# Patient Record
Sex: MALE | NOT HISPANIC OR LATINO | Employment: OTHER | URBAN - METROPOLITAN AREA
[De-identification: names, ages, dates, MRNs, and addresses within clinical notes are randomized per-mention and may not be internally consistent; named-entity substitution may affect disease eponyms.]

---

## 2023-03-24 LAB
ANION GAP IN SER/PLAS: 15 MMOL/L (ref 10–20)
BASOPHILS (10*3/UL) IN BLOOD BY AUTOMATED COUNT: 0.03 X10E9/L (ref 0–0.1)
BASOPHILS/100 LEUKOCYTES IN BLOOD BY AUTOMATED COUNT: 0.4 % (ref 0–2)
CALCIUM (MG/DL) IN SER/PLAS: 9 MG/DL (ref 8.6–10.3)
CARBON DIOXIDE, TOTAL (MMOL/L) IN SER/PLAS: 32 MMOL/L (ref 21–32)
CHLORIDE (MMOL/L) IN SER/PLAS: 91 MMOL/L (ref 98–107)
CREATININE (MG/DL) IN SER/PLAS: 3.33 MG/DL (ref 0.5–1.3)
EOSINOPHILS (10*3/UL) IN BLOOD BY AUTOMATED COUNT: 0.11 X10E9/L (ref 0–0.4)
EOSINOPHILS/100 LEUKOCYTES IN BLOOD BY AUTOMATED COUNT: 1.6 % (ref 0–6)
ERYTHROCYTE DISTRIBUTION WIDTH (RATIO) BY AUTOMATED COUNT: 15.2 % (ref 11.5–14.5)
ERYTHROCYTE MEAN CORPUSCULAR HEMOGLOBIN CONCENTRATION (G/DL) BY AUTOMATED: 31.1 G/DL (ref 32–36)
ERYTHROCYTE MEAN CORPUSCULAR VOLUME (FL) BY AUTOMATED COUNT: 95 FL (ref 80–100)
ERYTHROCYTES (10*6/UL) IN BLOOD BY AUTOMATED COUNT: 2.95 X10E12/L (ref 4.5–5.9)
GFR MALE: 17 ML/MIN/1.73M2
GLUCOSE (MG/DL) IN SER/PLAS: 111 MG/DL (ref 74–99)
HEMATOCRIT (%) IN BLOOD BY AUTOMATED COUNT: 28 % (ref 41–52)
HEMOGLOBIN (G/DL) IN BLOOD: 8.7 G/DL (ref 13.5–17.5)
IMMATURE GRANULOCYTES/100 LEUKOCYTES IN BLOOD BY AUTOMATED COUNT: 0.3 % (ref 0–0.9)
LEUKOCYTES (10*3/UL) IN BLOOD BY AUTOMATED COUNT: 6.7 X10E9/L (ref 4.4–11.3)
LYMPHOCYTES (10*3/UL) IN BLOOD BY AUTOMATED COUNT: 1.67 X10E9/L (ref 0.8–3)
LYMPHOCYTES/100 LEUKOCYTES IN BLOOD BY AUTOMATED COUNT: 25 % (ref 13–44)
MONOCYTES (10*3/UL) IN BLOOD BY AUTOMATED COUNT: 0.63 X10E9/L (ref 0.05–0.8)
MONOCYTES/100 LEUKOCYTES IN BLOOD BY AUTOMATED COUNT: 9.4 % (ref 2–10)
NATRIURETIC PEPTIDE B (PG/ML) IN SER/PLAS: 247 PG/ML (ref 0–99)
NEUTROPHILS (10*3/UL) IN BLOOD BY AUTOMATED COUNT: 4.23 X10E9/L (ref 1.6–5.5)
NEUTROPHILS/100 LEUKOCYTES IN BLOOD BY AUTOMATED COUNT: 63.3 % (ref 40–80)
PLATELETS (10*3/UL) IN BLOOD AUTOMATED COUNT: 229 X10E9/L (ref 150–450)
POTASSIUM (MMOL/L) IN SER/PLAS: 3.8 MMOL/L (ref 3.5–5.3)
SODIUM (MMOL/L) IN SER/PLAS: 134 MMOL/L (ref 136–145)
UREA NITROGEN (MG/DL) IN SER/PLAS: 69 MG/DL (ref 6–23)

## 2023-10-09 ENCOUNTER — HOSPITAL ENCOUNTER (INPATIENT)
Facility: HOSPITAL | Age: 88
LOS: 9 days | Discharge: ANOTHER HOSPITAL | DRG: 064 | End: 2023-10-19
Attending: STUDENT IN AN ORGANIZED HEALTH CARE EDUCATION/TRAINING PROGRAM | Admitting: INTERNAL MEDICINE
Payer: MEDICARE

## 2023-10-09 ENCOUNTER — APPOINTMENT (OUTPATIENT)
Dept: RADIOLOGY | Facility: HOSPITAL | Age: 88
DRG: 064 | End: 2023-10-09
Payer: MEDICARE

## 2023-10-09 DIAGNOSIS — R13.11 ORAL PHASE DYSPHAGIA: ICD-10-CM

## 2023-10-09 DIAGNOSIS — N18.9 ACUTE KIDNEY INJURY SUPERIMPOSED ON CHRONIC KIDNEY DISEASE (CMS-HCC): ICD-10-CM

## 2023-10-09 DIAGNOSIS — Z79.01 ANTICOAGULANT LONG-TERM USE: ICD-10-CM

## 2023-10-09 DIAGNOSIS — N39.0 URINARY TRACT INFECTION ASSOCIATED WITH INDWELLING URETHRAL CATHETER, INITIAL ENCOUNTER (CMS-HCC): ICD-10-CM

## 2023-10-09 DIAGNOSIS — I69.322 DYSARTHRIA AS LATE EFFECT OF CEREBELLAR CEREBROVASCULAR ACCIDENT (CVA): ICD-10-CM

## 2023-10-09 DIAGNOSIS — N17.9 ACUTE KIDNEY INJURY SUPERIMPOSED ON CHRONIC KIDNEY DISEASE (CMS-HCC): ICD-10-CM

## 2023-10-09 DIAGNOSIS — I61.9 INTRAPARENCHYMAL HEMORRHAGE OF BRAIN (MULTI): Primary | ICD-10-CM

## 2023-10-09 DIAGNOSIS — T83.511A URINARY TRACT INFECTION ASSOCIATED WITH INDWELLING URETHRAL CATHETER, INITIAL ENCOUNTER (CMS-HCC): ICD-10-CM

## 2023-10-09 DIAGNOSIS — R73.9 HYPERGLYCEMIA: ICD-10-CM

## 2023-10-09 DIAGNOSIS — I1A.0 RESISTANT HYPERTENSION: ICD-10-CM

## 2023-10-09 DIAGNOSIS — I48.91 A-FIB (MULTI): ICD-10-CM

## 2023-10-09 DIAGNOSIS — K21.9 GASTROESOPHAGEAL REFLUX DISEASE, UNSPECIFIED WHETHER ESOPHAGITIS PRESENT: ICD-10-CM

## 2023-10-09 LAB
APTT PPP: 40 SECONDS (ref 27–38)
BASOPHILS # BLD AUTO: 0.03 X10*3/UL (ref 0–0.1)
BASOPHILS NFR BLD AUTO: 0.4 %
EOSINOPHIL # BLD AUTO: 0.11 X10*3/UL (ref 0–0.4)
EOSINOPHIL NFR BLD AUTO: 1.4 %
ERYTHROCYTE [DISTWIDTH] IN BLOOD BY AUTOMATED COUNT: 14.5 % (ref 11.5–14.5)
GLUCOSE BLD MANUAL STRIP-MCNC: 200 MG/DL (ref 74–99)
HCT VFR BLD AUTO: 34.4 % (ref 41–52)
HGB BLD-MCNC: 10.6 G/DL (ref 13.5–17.5)
IMM GRANULOCYTES # BLD AUTO: 0.03 X10*3/UL (ref 0–0.5)
IMM GRANULOCYTES NFR BLD AUTO: 0.4 % (ref 0–0.9)
INR PPP: 1.6 (ref 0.9–1.1)
LYMPHOCYTES # BLD AUTO: 1.99 X10*3/UL (ref 0.8–3)
LYMPHOCYTES NFR BLD AUTO: 25.9 %
MCH RBC QN AUTO: 29.3 PG (ref 26–34)
MCHC RBC AUTO-ENTMCNC: 30.8 G/DL (ref 32–36)
MCV RBC AUTO: 95 FL (ref 80–100)
MONOCYTES # BLD AUTO: 0.83 X10*3/UL (ref 0.05–0.8)
MONOCYTES NFR BLD AUTO: 10.8 %
NEUTROPHILS # BLD AUTO: 4.69 X10*3/UL (ref 1.6–5.5)
NEUTROPHILS NFR BLD AUTO: 61.1 %
NRBC BLD-RTO: 0 /100 WBCS (ref 0–0)
PLATELET # BLD AUTO: 208 X10*3/UL (ref 150–450)
PMV BLD AUTO: 11.2 FL (ref 7.5–11.5)
PROTHROMBIN TIME: 17.8 SECONDS (ref 9.8–12.8)
RBC # BLD AUTO: 3.62 X10*6/UL (ref 4.5–5.9)
WBC # BLD AUTO: 7.7 X10*3/UL (ref 4.4–11.3)

## 2023-10-09 PROCEDURE — 96374 THER/PROPH/DIAG INJ IV PUSH: CPT

## 2023-10-09 PROCEDURE — G1004 CDSM NDSC: HCPCS

## 2023-10-09 PROCEDURE — 84484 ASSAY OF TROPONIN QUANT: CPT | Performed by: STUDENT IN AN ORGANIZED HEALTH CARE EDUCATION/TRAINING PROGRAM

## 2023-10-09 PROCEDURE — 2550000001 HC RX 255 CONTRASTS: Performed by: STUDENT IN AN ORGANIZED HEALTH CARE EDUCATION/TRAINING PROGRAM

## 2023-10-09 PROCEDURE — 99285 EMERGENCY DEPT VISIT HI MDM: CPT | Mod: 25 | Performed by: STUDENT IN AN ORGANIZED HEALTH CARE EDUCATION/TRAINING PROGRAM

## 2023-10-09 PROCEDURE — 82947 ASSAY GLUCOSE BLOOD QUANT: CPT

## 2023-10-09 PROCEDURE — 70496 CT ANGIOGRAPHY HEAD: CPT | Performed by: STUDENT IN AN ORGANIZED HEALTH CARE EDUCATION/TRAINING PROGRAM

## 2023-10-09 PROCEDURE — 80053 COMPREHEN METABOLIC PANEL: CPT | Performed by: STUDENT IN AN ORGANIZED HEALTH CARE EDUCATION/TRAINING PROGRAM

## 2023-10-09 PROCEDURE — 85025 COMPLETE CBC W/AUTO DIFF WBC: CPT | Performed by: STUDENT IN AN ORGANIZED HEALTH CARE EDUCATION/TRAINING PROGRAM

## 2023-10-09 PROCEDURE — 2500000004 HC RX 250 GENERAL PHARMACY W/ HCPCS (ALT 636 FOR OP/ED): Performed by: STUDENT IN AN ORGANIZED HEALTH CARE EDUCATION/TRAINING PROGRAM

## 2023-10-09 PROCEDURE — 85730 THROMBOPLASTIN TIME PARTIAL: CPT | Performed by: STUDENT IN AN ORGANIZED HEALTH CARE EDUCATION/TRAINING PROGRAM

## 2023-10-09 PROCEDURE — 83036 HEMOGLOBIN GLYCOSYLATED A1C: CPT | Mod: STJLAB

## 2023-10-09 PROCEDURE — 93010 ELECTROCARDIOGRAM REPORT: CPT | Performed by: STUDENT IN AN ORGANIZED HEALTH CARE EDUCATION/TRAINING PROGRAM

## 2023-10-09 PROCEDURE — 99291 CRITICAL CARE FIRST HOUR: CPT | Performed by: STUDENT IN AN ORGANIZED HEALTH CARE EDUCATION/TRAINING PROGRAM

## 2023-10-09 PROCEDURE — 6360000002 HC RX 636 FACTOR: Performed by: STUDENT IN AN ORGANIZED HEALTH CARE EDUCATION/TRAINING PROGRAM

## 2023-10-09 PROCEDURE — 85610 PROTHROMBIN TIME: CPT | Performed by: STUDENT IN AN ORGANIZED HEALTH CARE EDUCATION/TRAINING PROGRAM

## 2023-10-09 PROCEDURE — 70450 CT HEAD/BRAIN W/O DYE: CPT | Mod: MG

## 2023-10-09 PROCEDURE — 70498 CT ANGIOGRAPHY NECK: CPT | Performed by: STUDENT IN AN ORGANIZED HEALTH CARE EDUCATION/TRAINING PROGRAM

## 2023-10-09 PROCEDURE — A4217 STERILE WATER/SALINE, 500 ML: HCPCS | Performed by: STUDENT IN AN ORGANIZED HEALTH CARE EDUCATION/TRAINING PROGRAM

## 2023-10-09 PROCEDURE — 99292 CRITICAL CARE ADDL 30 MIN: CPT | Performed by: STUDENT IN AN ORGANIZED HEALTH CARE EDUCATION/TRAINING PROGRAM

## 2023-10-09 PROCEDURE — 36415 COLL VENOUS BLD VENIPUNCTURE: CPT | Performed by: STUDENT IN AN ORGANIZED HEALTH CARE EDUCATION/TRAINING PROGRAM

## 2023-10-09 RX ORDER — NICARDIPINE HYDROCHLORIDE 0.2 MG/ML
2.5-15 INJECTION INTRAVENOUS CONTINUOUS
Status: DISCONTINUED | OUTPATIENT
Start: 2023-10-09 | End: 2023-10-12

## 2023-10-09 RX ADMIN — PROTHROMBIN, COAGULATION FACTOR VII HUMAN, COAGULATION FACTOR IX HUMAN, COAGULATION FACTOR X HUMAN, PROTEIN C, PROTEIN S HUMAN, AND WATER 2000 UNITS: KIT at 23:47

## 2023-10-09 RX ADMIN — IOHEXOL 75 ML: 350 INJECTION, SOLUTION INTRAVENOUS at 22:52

## 2023-10-09 RX ADMIN — NICARDIPINE HYDROCHLORIDE 5 MG/HR: 0.2 INJECTION, SOLUTION INTRAVENOUS at 23:34

## 2023-10-09 ASSESSMENT — LIFESTYLE VARIABLES
HAVE PEOPLE ANNOYED YOU BY CRITICIZING YOUR DRINKING: NO
EVER HAD A DRINK FIRST THING IN THE MORNING TO STEADY YOUR NERVES TO GET RID OF A HANGOVER: NO
HAVE YOU EVER FELT YOU SHOULD CUT DOWN ON YOUR DRINKING: NO
EVER FELT BAD OR GUILTY ABOUT YOUR DRINKING: NO

## 2023-10-09 ASSESSMENT — COLUMBIA-SUICIDE SEVERITY RATING SCALE - C-SSRS
6. HAVE YOU EVER DONE ANYTHING, STARTED TO DO ANYTHING, OR PREPARED TO DO ANYTHING TO END YOUR LIFE?: NO
2. HAVE YOU ACTUALLY HAD ANY THOUGHTS OF KILLING YOURSELF?: NO
1. IN THE PAST MONTH, HAVE YOU WISHED YOU WERE DEAD OR WISHED YOU COULD GO TO SLEEP AND NOT WAKE UP?: NO

## 2023-10-09 NOTE — Clinical Note
Temporary dialysis catheter removed from right internal jugular.  14.5fr x 23cm tunneled dialysis catheter placed to right chest. Line secured with suture, venotomy closed with steri strips.  Site dressed with CHG dressing.  No bleeding or hematoma  noted. Pt tolerated procedure well.  Pt taken to room 3017 for recovery and ongoing hospitalization.

## 2023-10-09 NOTE — Clinical Note
Procedure end time. Temporary dialysis catheter placed to right chest and sutured into place. Site free from bleeding/hematoma. Patient tolerated procedure well. Surgical technician applying G central line dressing at this time.

## 2023-10-10 ENCOUNTER — APPOINTMENT (OUTPATIENT)
Dept: RADIOLOGY | Facility: HOSPITAL | Age: 88
DRG: 064 | End: 2023-10-10
Payer: MEDICARE

## 2023-10-10 PROBLEM — I61.9 INTRAPARENCHYMAL HEMORRHAGE OF BRAIN (MULTI): Status: ACTIVE | Noted: 2023-10-10

## 2023-10-10 PROBLEM — R74.01 TRANSAMINITIS: Status: ACTIVE | Noted: 2023-10-10

## 2023-10-10 PROBLEM — R73.9 HYPERGLYCEMIA: Status: ACTIVE | Noted: 2023-10-10

## 2023-10-10 PROBLEM — I10 HYPERTENSION: Status: ACTIVE | Noted: 2023-10-10

## 2023-10-10 LAB
ALBUMIN SERPL BCP-MCNC: 3.2 G/DL (ref 3.4–5)
ALBUMIN SERPL BCP-MCNC: 3.5 G/DL (ref 3.4–5)
ALP SERPL-CCNC: 129 U/L (ref 33–136)
ALP SERPL-CCNC: 137 U/L (ref 33–136)
ALT SERPL W P-5'-P-CCNC: 34 U/L (ref 10–52)
ALT SERPL W P-5'-P-CCNC: 35 U/L (ref 10–52)
ANION GAP SERPL CALC-SCNC: 11 MMOL/L (ref 10–20)
ANION GAP SERPL CALC-SCNC: 13 MMOL/L (ref 10–20)
AST SERPL W P-5'-P-CCNC: 40 U/L (ref 9–39)
AST SERPL W P-5'-P-CCNC: 40 U/L (ref 9–39)
BILIRUB SERPL-MCNC: 0.6 MG/DL (ref 0–1.2)
BILIRUB SERPL-MCNC: 0.6 MG/DL (ref 0–1.2)
BUN SERPL-MCNC: 49 MG/DL (ref 6–23)
BUN SERPL-MCNC: 50 MG/DL (ref 6–23)
CALCIUM SERPL-MCNC: 8.5 MG/DL (ref 8.6–10.3)
CALCIUM SERPL-MCNC: 8.6 MG/DL (ref 8.6–10.3)
CARDIAC TROPONIN I PNL SERPL HS: 22 NG/L (ref 0–20)
CARDIAC TROPONIN I PNL SERPL HS: 22 NG/L (ref 0–20)
CHLORIDE SERPL-SCNC: 97 MMOL/L (ref 98–107)
CHLORIDE SERPL-SCNC: 99 MMOL/L (ref 98–107)
CO2 SERPL-SCNC: 25 MMOL/L (ref 21–32)
CO2 SERPL-SCNC: 26 MMOL/L (ref 21–32)
CREAT SERPL-MCNC: 3.06 MG/DL (ref 0.5–1.3)
CREAT SERPL-MCNC: 3.11 MG/DL (ref 0.5–1.3)
ERYTHROCYTE [DISTWIDTH] IN BLOOD BY AUTOMATED COUNT: 14.6 % (ref 11.5–14.5)
EST. AVERAGE GLUCOSE BLD GHB EST-MCNC: 166 MG/DL
GFR SERPL CREATININE-BSD FRML MDRD: 19 ML/MIN/1.73M*2
GFR SERPL CREATININE-BSD FRML MDRD: 19 ML/MIN/1.73M*2
GLUCOSE BLD MANUAL STRIP-MCNC: 108 MG/DL (ref 74–99)
GLUCOSE BLD MANUAL STRIP-MCNC: 137 MG/DL (ref 74–99)
GLUCOSE BLD MANUAL STRIP-MCNC: 158 MG/DL (ref 74–99)
GLUCOSE BLD MANUAL STRIP-MCNC: 188 MG/DL (ref 74–99)
GLUCOSE BLD MANUAL STRIP-MCNC: 209 MG/DL (ref 74–99)
GLUCOSE BLD MANUAL STRIP-MCNC: 224 MG/DL (ref 74–99)
GLUCOSE SERPL-MCNC: 151 MG/DL (ref 74–99)
GLUCOSE SERPL-MCNC: 192 MG/DL (ref 74–99)
HBA1C MFR BLD: 7.4 %
HCT VFR BLD AUTO: 31.9 % (ref 41–52)
HGB BLD-MCNC: 10 G/DL (ref 13.5–17.5)
INR PPP: 1.4 (ref 0.9–1.1)
MAGNESIUM SERPL-MCNC: 2.03 MG/DL (ref 1.6–2.4)
MCH RBC QN AUTO: 29.9 PG (ref 26–34)
MCHC RBC AUTO-ENTMCNC: 31.3 G/DL (ref 32–36)
MCV RBC AUTO: 96 FL (ref 80–100)
NRBC BLD-RTO: 0 /100 WBCS (ref 0–0)
PHOSPHATE SERPL-MCNC: 2.9 MG/DL (ref 2.5–4.9)
PHOSPHATE SERPL-MCNC: 2.9 MG/DL (ref 2.5–4.9)
PLATELET # BLD AUTO: 184 X10*3/UL (ref 150–450)
PMV BLD AUTO: 10.8 FL (ref 7.5–11.5)
POTASSIUM SERPL-SCNC: 3.7 MMOL/L (ref 3.5–5.3)
POTASSIUM SERPL-SCNC: 4.2 MMOL/L (ref 3.5–5.3)
PROT SERPL-MCNC: 7 G/DL (ref 6.4–8.2)
PROT SERPL-MCNC: 7.5 G/DL (ref 6.4–8.2)
PROTHROMBIN TIME: 15.4 SECONDS (ref 9.8–12.8)
RBC # BLD AUTO: 3.34 X10*6/UL (ref 4.5–5.9)
SODIUM SERPL-SCNC: 131 MMOL/L (ref 136–145)
SODIUM SERPL-SCNC: 132 MMOL/L (ref 136–145)
WBC # BLD AUTO: 8.1 X10*3/UL (ref 4.4–11.3)

## 2023-10-10 PROCEDURE — 36415 COLL VENOUS BLD VENIPUNCTURE: CPT

## 2023-10-10 PROCEDURE — 84100 ASSAY OF PHOSPHORUS: CPT

## 2023-10-10 PROCEDURE — 97116 GAIT TRAINING THERAPY: CPT | Mod: GP | Performed by: PHYSICAL THERAPIST

## 2023-10-10 PROCEDURE — 92523 SPEECH SOUND LANG COMPREHEN: CPT | Mod: GN | Performed by: STUDENT IN AN ORGANIZED HEALTH CARE EDUCATION/TRAINING PROGRAM

## 2023-10-10 PROCEDURE — 99291 CRITICAL CARE FIRST HOUR: CPT

## 2023-10-10 PROCEDURE — 2500000002 HC RX 250 W HCPCS SELF ADMINISTERED DRUGS (ALT 637 FOR MEDICARE OP, ALT 636 FOR OP/ED)

## 2023-10-10 PROCEDURE — 97530 THERAPEUTIC ACTIVITIES: CPT | Mod: GO

## 2023-10-10 PROCEDURE — 2020000001 HC ICU ROOM DAILY

## 2023-10-10 PROCEDURE — 97165 OT EVAL LOW COMPLEX 30 MIN: CPT | Mod: GO

## 2023-10-10 PROCEDURE — 2500000001 HC RX 250 WO HCPCS SELF ADMINISTERED DRUGS (ALT 637 FOR MEDICARE OP): Performed by: INTERNAL MEDICINE

## 2023-10-10 PROCEDURE — 96372 THER/PROPH/DIAG INJ SC/IM: CPT

## 2023-10-10 PROCEDURE — 80053 COMPREHEN METABOLIC PANEL: CPT

## 2023-10-10 PROCEDURE — 85027 COMPLETE CBC AUTOMATED: CPT

## 2023-10-10 PROCEDURE — 84484 ASSAY OF TROPONIN QUANT: CPT

## 2023-10-10 PROCEDURE — 99222 1ST HOSP IP/OBS MODERATE 55: CPT | Performed by: PHYSICIAN ASSISTANT

## 2023-10-10 PROCEDURE — 97162 PT EVAL MOD COMPLEX 30 MIN: CPT | Mod: GP | Performed by: PHYSICAL THERAPIST

## 2023-10-10 PROCEDURE — 2500000002 HC RX 250 W HCPCS SELF ADMINISTERED DRUGS (ALT 637 FOR MEDICARE OP, ALT 636 FOR OP/ED): Performed by: INTERNAL MEDICINE

## 2023-10-10 PROCEDURE — 70450 CT HEAD/BRAIN W/O DYE: CPT | Performed by: STUDENT IN AN ORGANIZED HEALTH CARE EDUCATION/TRAINING PROGRAM

## 2023-10-10 PROCEDURE — G1004 CDSM NDSC: HCPCS

## 2023-10-10 PROCEDURE — 2500000004 HC RX 250 GENERAL PHARMACY W/ HCPCS (ALT 636 FOR OP/ED)

## 2023-10-10 PROCEDURE — 2500000001 HC RX 250 WO HCPCS SELF ADMINISTERED DRUGS (ALT 637 FOR MEDICARE OP)

## 2023-10-10 PROCEDURE — 92610 EVALUATE SWALLOWING FUNCTION: CPT | Mod: GN | Performed by: STUDENT IN AN ORGANIZED HEALTH CARE EDUCATION/TRAINING PROGRAM

## 2023-10-10 PROCEDURE — 2500000004 HC RX 250 GENERAL PHARMACY W/ HCPCS (ALT 636 FOR OP/ED): Performed by: INTERNAL MEDICINE

## 2023-10-10 PROCEDURE — 82947 ASSAY GLUCOSE BLOOD QUANT: CPT

## 2023-10-10 PROCEDURE — 85610 PROTHROMBIN TIME: CPT

## 2023-10-10 PROCEDURE — 83735 ASSAY OF MAGNESIUM: CPT

## 2023-10-10 RX ORDER — MULTIVITAMIN/IRON/FOLIC ACID 18MG-0.4MG
1 TABLET ORAL EVERY OTHER DAY
COMMUNITY
End: 2023-10-19 | Stop reason: HOSPADM

## 2023-10-10 RX ORDER — AMIODARONE HYDROCHLORIDE 200 MG/1
200 TABLET ORAL DAILY
Status: DISCONTINUED | OUTPATIENT
Start: 2023-10-10 | End: 2023-10-19 | Stop reason: HOSPADM

## 2023-10-10 RX ORDER — SERTRALINE HYDROCHLORIDE 25 MG/1
25 TABLET, FILM COATED ORAL DAILY
Status: ON HOLD | COMMUNITY
End: 2023-11-05 | Stop reason: ENTERED-IN-ERROR

## 2023-10-10 RX ORDER — DEXTROSE 50 % IN WATER (D50W) INTRAVENOUS SYRINGE
25
Status: DISCONTINUED | OUTPATIENT
Start: 2023-10-10 | End: 2023-10-19 | Stop reason: HOSPADM

## 2023-10-10 RX ORDER — ROSUVASTATIN CALCIUM 20 MG/1
20 TABLET, COATED ORAL DAILY
COMMUNITY

## 2023-10-10 RX ORDER — MULTIVITAMIN/IRON/FOLIC ACID 18MG-0.4MG
1 TABLET ORAL EVERY OTHER DAY
Status: DISCONTINUED | OUTPATIENT
Start: 2023-10-11 | End: 2023-10-11

## 2023-10-10 RX ORDER — AMIODARONE HYDROCHLORIDE 200 MG/1
200 TABLET ORAL DAILY
COMMUNITY
End: 2023-11-08 | Stop reason: HOSPADM

## 2023-10-10 RX ORDER — ALBUTEROL SULFATE 90 UG/1
2 AEROSOL, METERED RESPIRATORY (INHALATION) EVERY 6 HOURS PRN
Status: ON HOLD | COMMUNITY
End: 2023-11-05 | Stop reason: ENTERED-IN-ERROR

## 2023-10-10 RX ORDER — FUROSEMIDE 40 MG/1
40 TABLET ORAL DAILY PRN
COMMUNITY
End: 2023-10-19 | Stop reason: HOSPADM

## 2023-10-10 RX ORDER — ESOMEPRAZOLE MAGNESIUM 40 MG/1
40 GRANULE, DELAYED RELEASE ORAL
Status: DISCONTINUED | OUTPATIENT
Start: 2023-10-11 | End: 2023-10-19 | Stop reason: HOSPADM

## 2023-10-10 RX ORDER — ASPIRIN 81 MG/1
81 TABLET ORAL DAILY
COMMUNITY

## 2023-10-10 RX ORDER — DEXTROSE MONOHYDRATE 100 MG/ML
50 INJECTION, SOLUTION INTRAVENOUS ONCE AS NEEDED
Status: DISCONTINUED | OUTPATIENT
Start: 2023-10-10 | End: 2023-10-19 | Stop reason: HOSPADM

## 2023-10-10 RX ORDER — VIT C/E/ZN/COPPR/LUTEIN/ZEAXAN 250MG-90MG
25 CAPSULE ORAL EVERY OTHER DAY
Status: DISCONTINUED | OUTPATIENT
Start: 2023-10-11 | End: 2023-10-19 | Stop reason: HOSPADM

## 2023-10-10 RX ORDER — PANTOPRAZOLE SODIUM 40 MG/10ML
40 INJECTION, POWDER, LYOPHILIZED, FOR SOLUTION INTRAVENOUS
Status: DISCONTINUED | OUTPATIENT
Start: 2023-10-11 | End: 2023-10-19 | Stop reason: HOSPADM

## 2023-10-10 RX ORDER — INSULIN LISPRO 100 [IU]/ML
INJECTION, SOLUTION INTRAVENOUS; SUBCUTANEOUS
COMMUNITY
End: 2023-10-19 | Stop reason: HOSPADM

## 2023-10-10 RX ORDER — ONDANSETRON HYDROCHLORIDE 2 MG/ML
4 INJECTION, SOLUTION INTRAVENOUS EVERY 6 HOURS PRN
Status: DISCONTINUED | OUTPATIENT
Start: 2023-10-10 | End: 2023-10-13

## 2023-10-10 RX ORDER — HYDRALAZINE HYDROCHLORIDE 25 MG/1
0.5 TABLET, FILM COATED ORAL 2 TIMES DAILY
COMMUNITY
End: 2023-10-19 | Stop reason: HOSPADM

## 2023-10-10 RX ORDER — INSULIN GLARGINE 100 [IU]/ML
12 INJECTION, SOLUTION SUBCUTANEOUS NIGHTLY
COMMUNITY
End: 2023-11-08 | Stop reason: HOSPADM

## 2023-10-10 RX ORDER — INSULIN LISPRO 100 [IU]/ML
0-10 INJECTION, SOLUTION INTRAVENOUS; SUBCUTANEOUS EVERY 4 HOURS
Status: DISCONTINUED | OUTPATIENT
Start: 2023-10-10 | End: 2023-10-10

## 2023-10-10 RX ORDER — HYDRALAZINE HYDROCHLORIDE 25 MG/1
12.5 TABLET, FILM COATED ORAL 2 TIMES DAILY
Status: DISCONTINUED | OUTPATIENT
Start: 2023-10-10 | End: 2023-10-11

## 2023-10-10 RX ORDER — INSULIN LISPRO 100 [IU]/ML
0-10 INJECTION, SOLUTION INTRAVENOUS; SUBCUTANEOUS
Status: DISCONTINUED | OUTPATIENT
Start: 2023-10-10 | End: 2023-10-19 | Stop reason: HOSPADM

## 2023-10-10 RX ORDER — INSULIN GLARGINE 100 [IU]/ML
12 INJECTION, SOLUTION SUBCUTANEOUS NIGHTLY
Status: DISCONTINUED | OUTPATIENT
Start: 2023-10-10 | End: 2023-10-19 | Stop reason: HOSPADM

## 2023-10-10 RX ORDER — DOCUSATE SODIUM 100 MG/1
100 CAPSULE, LIQUID FILLED ORAL DAILY
COMMUNITY

## 2023-10-10 RX ORDER — ISOSORBIDE MONONITRATE 30 MG/1
30 TABLET, EXTENDED RELEASE ORAL DAILY
COMMUNITY
End: 2023-10-19 | Stop reason: HOSPADM

## 2023-10-10 RX ORDER — PANTOPRAZOLE SODIUM 40 MG/1
40 TABLET, DELAYED RELEASE ORAL
Status: DISCONTINUED | OUTPATIENT
Start: 2023-10-11 | End: 2023-10-19 | Stop reason: HOSPADM

## 2023-10-10 RX ORDER — VIT C/E/ZN/COPPR/LUTEIN/ZEAXAN 250MG-90MG
25 CAPSULE ORAL EVERY OTHER DAY
COMMUNITY

## 2023-10-10 RX ORDER — OMEPRAZOLE 40 MG/1
40 CAPSULE, DELAYED RELEASE ORAL
COMMUNITY
End: 2023-10-19 | Stop reason: HOSPADM

## 2023-10-10 RX ORDER — FLUTICASONE PROPIONATE 50 MCG
1 SPRAY, SUSPENSION (ML) NASAL DAILY
COMMUNITY
End: 2023-10-19 | Stop reason: HOSPADM

## 2023-10-10 RX ORDER — ROSUVASTATIN CALCIUM 20 MG/1
20 TABLET, COATED ORAL DAILY
Status: DISCONTINUED | OUTPATIENT
Start: 2023-10-10 | End: 2023-10-19 | Stop reason: HOSPADM

## 2023-10-10 RX ORDER — DOCUSATE SODIUM 100 MG/1
100 CAPSULE, LIQUID FILLED ORAL 2 TIMES DAILY
Status: DISCONTINUED | OUTPATIENT
Start: 2023-10-10 | End: 2023-10-19 | Stop reason: HOSPADM

## 2023-10-10 RX ORDER — DULAGLUTIDE 3 MG/.5ML
1 INJECTION, SOLUTION SUBCUTANEOUS
COMMUNITY
End: 2023-10-19 | Stop reason: HOSPADM

## 2023-10-10 RX ADMIN — INSULIN LISPRO 4 UNITS: 100 INJECTION, SOLUTION INTRAVENOUS; SUBCUTANEOUS at 20:28

## 2023-10-10 RX ADMIN — HYDRALAZINE HYDROCHLORIDE 12.5 MG: 25 TABLET ORAL at 20:29

## 2023-10-10 RX ADMIN — INSULIN GLARGINE 12 UNITS: 100 INJECTION, SOLUTION SUBCUTANEOUS at 20:31

## 2023-10-10 RX ADMIN — AMIODARONE HYDROCHLORIDE 200 MG: 200 TABLET ORAL at 11:53

## 2023-10-10 RX ADMIN — INSULIN LISPRO 4 UNITS: 100 INJECTION, SOLUTION INTRAVENOUS; SUBCUTANEOUS at 17:47

## 2023-10-10 RX ADMIN — ROSUVASTATIN CALCIUM 20 MG: 20 TABLET, FILM COATED ORAL at 11:53

## 2023-10-10 RX ADMIN — HYDRALAZINE HYDROCHLORIDE 12.5 MG: 25 TABLET ORAL at 11:53

## 2023-10-10 RX ADMIN — INSULIN LISPRO 2 UNITS: 100 INJECTION, SOLUTION INTRAVENOUS; SUBCUTANEOUS at 02:51

## 2023-10-10 RX ADMIN — DOCUSATE SODIUM 100 MG: 100 CAPSULE, LIQUID FILLED ORAL at 20:29

## 2023-10-10 RX ADMIN — DOCUSATE SODIUM 100 MG: 100 CAPSULE, LIQUID FILLED ORAL at 09:19

## 2023-10-10 RX ADMIN — NICARDIPINE HYDROCHLORIDE 2.5 MG/HR: 0.2 INJECTION, SOLUTION INTRAVENOUS at 12:45

## 2023-10-10 SDOH — SOCIAL STABILITY: SOCIAL NETWORK
DO YOU BELONG TO ANY CLUBS OR ORGANIZATIONS SUCH AS CHURCH GROUPS UNIONS, FRATERNAL OR ATHLETIC GROUPS, OR SCHOOL GROUPS?: NO

## 2023-10-10 SDOH — ECONOMIC STABILITY: INCOME INSECURITY: IN THE PAST 12 MONTHS, HAS THE ELECTRIC, GAS, OIL, OR WATER COMPANY THREATENED TO SHUT OFF SERVICE IN YOUR HOME?: NO

## 2023-10-10 SDOH — SOCIAL STABILITY: SOCIAL NETWORK: HOW OFTEN DO YOU ATTEND CHURCH OR RELIGIOUS SERVICES?: MORE THAN 4 TIMES PER YEAR

## 2023-10-10 SDOH — ECONOMIC STABILITY: TRANSPORTATION INSECURITY
IN THE PAST 12 MONTHS, HAS LACK OF TRANSPORTATION KEPT YOU FROM MEETINGS, WORK, OR FROM GETTING THINGS NEEDED FOR DAILY LIVING?: NO

## 2023-10-10 SDOH — SOCIAL STABILITY: SOCIAL NETWORK: HOW OFTEN DO YOU ATTENT MEETINGS OF THE CLUB OR ORGANIZATION YOU BELONG TO?: NEVER

## 2023-10-10 SDOH — ECONOMIC STABILITY: FOOD INSECURITY: WITHIN THE PAST 12 MONTHS, YOU WORRIED THAT YOUR FOOD WOULD RUN OUT BEFORE YOU GOT MONEY TO BUY MORE.: NEVER TRUE

## 2023-10-10 SDOH — ECONOMIC STABILITY: INCOME INSECURITY: IN THE LAST 12 MONTHS, WAS THERE A TIME WHEN YOU WERE NOT ABLE TO PAY THE MORTGAGE OR RENT ON TIME?: NO

## 2023-10-10 SDOH — ECONOMIC STABILITY: INCOME INSECURITY: HOW HARD IS IT FOR YOU TO PAY FOR THE VERY BASICS LIKE FOOD, HOUSING, MEDICAL CARE, AND HEATING?: NOT HARD AT ALL

## 2023-10-10 SDOH — ECONOMIC STABILITY: HOUSING INSECURITY: IN THE LAST 12 MONTHS, HOW MANY PLACES HAVE YOU LIVED?: 1

## 2023-10-10 SDOH — ECONOMIC STABILITY: FOOD INSECURITY: WITHIN THE PAST 12 MONTHS, THE FOOD YOU BOUGHT JUST DIDN'T LAST AND YOU DIDN'T HAVE MONEY TO GET MORE.: NEVER TRUE

## 2023-10-10 SDOH — SOCIAL STABILITY: SOCIAL INSECURITY: HAVE YOU HAD THOUGHTS OF HARMING ANYONE ELSE?: NO

## 2023-10-10 SDOH — ECONOMIC STABILITY: HOUSING INSECURITY
IN THE LAST 12 MONTHS, WAS THERE A TIME WHEN YOU DID NOT HAVE A STEADY PLACE TO SLEEP OR SLEPT IN A SHELTER (INCLUDING NOW)?: NO

## 2023-10-10 SDOH — HEALTH STABILITY: MENTAL HEALTH: HOW OFTEN DO YOU HAVE A DRINK CONTAINING ALCOHOL?: NEVER

## 2023-10-10 SDOH — SOCIAL STABILITY: SOCIAL INSECURITY: DO YOU FEEL ANYONE HAS EXPLOITED OR TAKEN ADVANTAGE OF YOU FINANCIALLY OR OF YOUR PERSONAL PROPERTY?: NO

## 2023-10-10 SDOH — HEALTH STABILITY: MENTAL HEALTH
STRESS IS WHEN SOMEONE FEELS TENSE, NERVOUS, ANXIOUS, OR CAN'T SLEEP AT NIGHT BECAUSE THEIR MIND IS TROUBLED. HOW STRESSED ARE YOU?: NOT AT ALL

## 2023-10-10 SDOH — HEALTH STABILITY: PHYSICAL HEALTH: ON AVERAGE, HOW MANY DAYS PER WEEK DO YOU ENGAGE IN MODERATE TO STRENUOUS EXERCISE (LIKE A BRISK WALK)?: 4 DAYS

## 2023-10-10 SDOH — SOCIAL STABILITY: SOCIAL INSECURITY: DOES ANYONE TRY TO KEEP YOU FROM HAVING/CONTACTING OTHER FRIENDS OR DOING THINGS OUTSIDE YOUR HOME?: NO

## 2023-10-10 SDOH — HEALTH STABILITY: MENTAL HEALTH: HOW OFTEN DO YOU HAVE 6 OR MORE DRINKS ON ONE OCCASION?: NEVER

## 2023-10-10 SDOH — SOCIAL STABILITY: SOCIAL INSECURITY: ABUSE: ADULT

## 2023-10-10 SDOH — ECONOMIC STABILITY: TRANSPORTATION INSECURITY
IN THE PAST 12 MONTHS, HAS THE LACK OF TRANSPORTATION KEPT YOU FROM MEDICAL APPOINTMENTS OR FROM GETTING MEDICATIONS?: NO

## 2023-10-10 SDOH — SOCIAL STABILITY: SOCIAL NETWORK: ARE YOU MARRIED, WIDOWED, DIVORCED, SEPARATED, NEVER MARRIED, OR LIVING WITH A PARTNER?: MARRIED

## 2023-10-10 SDOH — SOCIAL STABILITY: SOCIAL NETWORK: IN A TYPICAL WEEK, HOW MANY TIMES DO YOU TALK ON THE PHONE WITH FAMILY, FRIENDS, OR NEIGHBORS?: TWICE A WEEK

## 2023-10-10 SDOH — SOCIAL STABILITY: SOCIAL NETWORK: HOW OFTEN DO YOU GET TOGETHER WITH FRIENDS OR RELATIVES?: THREE TIMES A WEEK

## 2023-10-10 SDOH — HEALTH STABILITY: PHYSICAL HEALTH: ON AVERAGE, HOW MANY MINUTES DO YOU ENGAGE IN EXERCISE AT THIS LEVEL?: 30 MIN

## 2023-10-10 SDOH — SOCIAL STABILITY: SOCIAL INSECURITY: ARE YOU OR HAVE YOU BEEN THREATENED OR ABUSED PHYSICALLY, EMOTIONALLY, OR SEXUALLY BY ANYONE?: NO

## 2023-10-10 SDOH — SOCIAL STABILITY: SOCIAL INSECURITY: ARE THERE ANY APPARENT SIGNS OF INJURIES/BEHAVIORS THAT COULD BE RELATED TO ABUSE/NEGLECT?: NO

## 2023-10-10 SDOH — HEALTH STABILITY: MENTAL HEALTH: HOW MANY STANDARD DRINKS CONTAINING ALCOHOL DO YOU HAVE ON A TYPICAL DAY?: PATIENT DOES NOT DRINK

## 2023-10-10 SDOH — SOCIAL STABILITY: SOCIAL INSECURITY: HAS ANYONE EVER THREATENED TO HURT YOUR FAMILY OR YOUR PETS?: NO

## 2023-10-10 SDOH — SOCIAL STABILITY: SOCIAL INSECURITY: DO YOU FEEL UNSAFE GOING BACK TO THE PLACE WHERE YOU ARE LIVING?: NO

## 2023-10-10 SDOH — SOCIAL STABILITY: SOCIAL INSECURITY: WERE YOU ABLE TO COMPLETE ALL THE BEHAVIORAL HEALTH SCREENINGS?: YES

## 2023-10-10 ASSESSMENT — COGNITIVE AND FUNCTIONAL STATUS - GENERAL
STANDING UP FROM CHAIR USING ARMS: A LOT
DRESSING REGULAR UPPER BODY CLOTHING: A LOT
MOBILITY SCORE: 12
PERSONAL GROOMING: A LITTLE
HELP NEEDED FOR BATHING: A LITTLE
WALKING IN HOSPITAL ROOM: A LOT
TOILETING: TOTAL
PERSONAL GROOMING: A LITTLE
DAILY ACTIVITIY SCORE: 18
MOVING TO AND FROM BED TO CHAIR: A LOT
PERSONAL GROOMING: A LITTLE
WALKING IN HOSPITAL ROOM: A LOT
STANDING UP FROM CHAIR USING ARMS: A LOT
PATIENT BASELINE BEDBOUND: NO
DRESSING REGULAR LOWER BODY CLOTHING: A LOT
DAILY ACTIVITIY SCORE: 13
EATING MEALS: A LITTLE
MOVING FROM LYING ON BACK TO SITTING ON SIDE OF FLAT BED WITH BEDRAILS: A LITTLE
CLIMB 3 TO 5 STEPS WITH RAILING: TOTAL
EATING MEALS: A LITTLE
CLIMB 3 TO 5 STEPS WITH RAILING: TOTAL
MOVING FROM LYING ON BACK TO SITTING ON SIDE OF FLAT BED WITH BEDRAILS: A LOT
CLIMB 3 TO 5 STEPS WITH RAILING: TOTAL
HELP NEEDED FOR BATHING: A LITTLE
TOILETING: A LITTLE
MOVING FROM LYING ON BACK TO SITTING ON SIDE OF FLAT BED WITH BEDRAILS: A LITTLE
STANDING UP FROM CHAIR USING ARMS: A LITTLE
DRESSING REGULAR UPPER BODY CLOTHING: A LITTLE
MOVING TO AND FROM BED TO CHAIR: A LOT
DRESSING REGULAR LOWER BODY CLOTHING: A LITTLE
TOILETING: A LITTLE
HELP NEEDED FOR BATHING: A LOT
DAILY ACTIVITIY SCORE: 18
TURNING FROM BACK TO SIDE WHILE IN FLAT BAD: A LITTLE
EATING MEALS: A LITTLE
WALKING IN HOSPITAL ROOM: TOTAL
MOBILITY SCORE: 13
DRESSING REGULAR UPPER BODY CLOTHING: A LITTLE
MOVING TO AND FROM BED TO CHAIR: A LOT
TURNING FROM BACK TO SIDE WHILE IN FLAT BAD: A LOT
TURNING FROM BACK TO SIDE WHILE IN FLAT BAD: A LITTLE
MOBILITY SCORE: 12
DRESSING REGULAR LOWER BODY CLOTHING: A LITTLE

## 2023-10-10 ASSESSMENT — ACTIVITIES OF DAILY LIVING (ADL)
TOILETING: NEEDS ASSISTANCE
LACK_OF_TRANSPORTATION: NO
ADEQUATE_TO_COMPLETE_ADL: YES
DRESSING YOURSELF: NEEDS ASSISTANCE
HEARING - RIGHT EAR: FUNCTIONAL
HEARING - LEFT EAR: FUNCTIONAL
GROOMING: NEEDS ASSISTANCE
WALKS IN HOME: INDEPENDENT
FEEDING YOURSELF: NEEDS ASSISTANCE
JUDGMENT_ADEQUATE_SAFELY_COMPLETE_DAILY_ACTIVITIES: NO
PATIENT'S MEMORY ADEQUATE TO SAFELY COMPLETE DAILY ACTIVITIES?: YES
LACK_OF_TRANSPORTATION: NO
BATHING: NEEDS ASSISTANCE

## 2023-10-10 ASSESSMENT — PAIN - FUNCTIONAL ASSESSMENT
PAIN_FUNCTIONAL_ASSESSMENT: 0-10

## 2023-10-10 ASSESSMENT — ENCOUNTER SYMPTOMS
HEADACHES: 0
NUMBNESS: 0
NAUSEA: 0
SHORTNESS OF BREATH: 0
BACK PAIN: 0
NECK PAIN: 0
SPEECH DIFFICULTY: 1
ABDOMINAL PAIN: 0
WEAKNESS: 1

## 2023-10-10 ASSESSMENT — PATIENT HEALTH QUESTIONNAIRE - PHQ9
1. LITTLE INTEREST OR PLEASURE IN DOING THINGS: NOT AT ALL
SUM OF ALL RESPONSES TO PHQ9 QUESTIONS 1 & 2: 0
2. FEELING DOWN, DEPRESSED OR HOPELESS: NOT AT ALL

## 2023-10-10 ASSESSMENT — PAIN SCALES - GENERAL
PAINLEVEL_OUTOF10: 0 - NO PAIN

## 2023-10-10 ASSESSMENT — LIFESTYLE VARIABLES
SKIP TO QUESTIONS 9-10: 1
SKIP TO QUESTIONS 9-10: 1
HOW MANY STANDARD DRINKS CONTAINING ALCOHOL DO YOU HAVE ON A TYPICAL DAY: PATIENT DOES NOT DRINK
AUDIT-C TOTAL SCORE: 0
HOW OFTEN DO YOU HAVE A DRINK CONTAINING ALCOHOL: NEVER
SUBSTANCE_ABUSE_PAST_12_MONTHS: NO
PRESCIPTION_ABUSE_PAST_12_MONTHS: NO
AUDIT-C TOTAL SCORE: 0
HOW OFTEN DO YOU HAVE 6 OR MORE DRINKS ON ONE OCCASION: NEVER
AUDIT-C TOTAL SCORE: 0

## 2023-10-10 NOTE — CARE PLAN
Problem: Diabetes  Goal: Achieve decreasing blood glucose levels by end of shift  Outcome: Progressing  Goal: Increase stability of blood glucose readings by end of shift  Outcome: Progressing  Goal: Decrease in ketones present in urine by end of shift  Outcome: Progressing  Goal: Maintain electrolyte levels within acceptable range throughout shift  Outcome: Progressing  Goal: Learn about and adhere to nutrition recommendations by end of shift  Outcome: Progressing  Goal: Increase self care and/or family involovement by end of shift  Outcome: Progressing  Goal: Receive DSME education by end of shift  Outcome: Progressing     Problem: General Stroke  Goal: Demonstrate improvement in neurological exam throughout the shift  Outcome: Progressing  Goal: Maintain BP within ordered limits throughout shift  Outcome: Progressing  Goal: Participate in treatment (ie., meds, therapy) throughout shift  Outcome: Progressing  Goal: No symptoms of hemorrhage throughout shift  Outcome: Progressing  Goal: Controlled blood glucose throughout shift  Outcome: Progressing     Problem: ICU Stroke  Goal: Achieve/maintain targeted sodium level throughout shift  Outcome: Progressing     Problem: Fall/Injury  Goal: Verbalize understanding of risk factor reduction measures to prevent injury from fall in the home  Outcome: Progressing  Goal: Pace activities to prevent fatigue by end of the shift  Outcome: Progressing     Problem: Psychosocial Needs  Goal: Demonstrates ability to cope with hospitalization/illness  Outcome: Progressing  Flowsheets (Taken 10/10/2023 8893)  Demonstrates ability to cope with hospitalization/illness:   Encourage verbalization of feelings/concerns/expectations   Provide low-stimulation environment as needed   Assist resident to identify and practice own strengths and abilities   Encourage resident to set and complete small goals for self   Reinforce positive adaptation of new coping behaviors   Include  resident/family/caregiver in decisions related to psychosocial needs  Goal: Collaborate with me, my family, and caregiver to identify my specific goals  Outcome: Progressing  Flowsheets (Taken 10/10/2023 0214)  Cultural Requests During Hospitalization: n/a  Spiritual Requests During Hospitalization: n/a     Problem: Discharge Barriers  Goal: My discharge needs are met  Outcome: Progressing   The patient's goals for the shift include get a few hours sleep this shift    The clinical goals for the shift include maintain injury free this shift    Over the shift, the patient did not make progress toward the following goals. Barriers to progression include anxiety at times. Recommendations to address these barriers include encouragement and reinforcement.

## 2023-10-10 NOTE — PROGRESS NOTES
Occupational Therapy    Evaluation    Patient Name: Zack Figueroa  MRN: [id number]  Today's Date: 10/10/2023  Time Calculation  Start Time: 1109  Stop Time: 1135  Time Calculation (min): 26 min        Assessment:  Prognosis: Fair    Plan:  Treatment Interventions: Neuromuscular reeducation, Patient/family training, Endurance training, UE strengthening/ROM, Functional transfer training, ADL retraining, Fine motor coordination activities  OT Frequency: 5 times per week  OT Discharge Recommendations: High intensity level of continued care  Treatment Interventions: Neuromuscular reeducation, Patient/family training, Endurance training, UE strengthening/ROM, Functional transfer training, ADL retraining, Fine motor coordination activities    Subjective   Current Problem:  1. Intraparenchymal hemorrhage of brain (CMS/HCC)          General:  General  Reason for Referral: impaired ADLs; patient to ED with stroke like symptoms  Referred By: NITA Rodriguez; 10/10  Past Medical History Relevant to Rehab:  (afib, anemia, DM, CAD s/p CABG, CKD III-IV, HFpEF, JESUS, previous CVA)  Family/Caregiver Present: Yes (wife)  Co-Treatment: PT (to facilitate safety and activity tolerance)  Prior to Session Communication: Bedside nurse  Patient Position Received:  (patient seated in recliner to start session, agreeable to OT session this date; patient seated in recliner at end of session with all needs met and call bell within reach, chair alarm on)  General Comment:  (CTH:acute or subacute hemorrhagic lacunar infarct centered in the (L) posterior internal capsule with mild associated vasgenic edema)  Precautions:  Medical Precautions: Fall precautions, Seizure precautions (aspiration precuaitons; SBP <140, HOB @30 degrees)  Vital Signs:  Heart Rate: 71  Resp: 19  BP: 127/54  Pain:  Pain Assessment  Pain Assessment: 0-10  Pain Score: 0 - No pain    Objective   Cognition:  Overall Cognitive Status:  ([place], increased processing  time and dysartria noted)           Home Living:  Home Living Comments: requires assist with IADLs; patient reporting that he does not drive; declines recent falls; retired cardiologist/professor (patient reporting that he lives in a two story house with his wife with 2STE and (B) HR; patient with second story bed/bath but access to first floor set up as needed; patient with WIS with shower chair and GB; utilizes FWW at PLOF, independent ADLs)    ADL:  ADL Comments: LB dressing: MAX A to don (B) socks, Toileting: total assist 2/2 ext cath  Activity Tolerance:  Endurance: Decreased tolerance for upright activites  Bed Mobility/Transfers:     and Transfers  Transfer: Yes  Transfer 1  Trials/Comments 1: sit < > stand x3 trials this date with MOD A and FWW assist (significant (R) lateral lean and pushers syndrome noted)     Sensation:  Sensation Comment: reports chronic numbness/tingling to (B) LEs  Strength:  Strength Comments:  ((L) UE grossly 4-/5; (R) UE impaired, gossly 3/5 with shoulder AROM 0-80 degrees)     Coordination:  Finger to Target: Impaired (moderate dysmetria noted with (R) UE)        Extremities: RUE   RUE :  (see strength for details) and LUE   LUE: Within Functional Limits    Therapeutic Activity/Treatment:  Completing x3 trials of functional mobility with MOD A- MAX A for functional mobility 2/2 severe (R) lateral lean, chair follow required with FWW assist; completing for household distances x3 with improved posture noted over course of mobility     Outcome Measures:[facility] Daily Activity  Putting on and taking off regular lower body clothing: A lot  Bathing (including washing, rinsing, drying): A lot  Putting on and taking off regular upper body clothing: A lot  Toileting, which includes using toilet, bedpan or urinal: Total  Taking care of personal grooming such as brushing teeth: A little  Eating Meals: A little  Daily Activity - Total Score: 13        Education Documentation  Body Mechanics,  taught by aVle Call OT at 10/10/2023  2:06 PM.  Learner: Significant Other, Patient  Readiness: Acceptance  Method: Explanation  Response: Verbalizes Understanding, Needs Reinforcement    ADL Training, taught by Vale Call OT at 10/10/2023  2:06 PM.  Learner: Significant Other, Patient  Readiness: Acceptance  Method: Explanation  Response: Verbalizes Understanding, Needs Reinforcement    Education Comments  No comments found.        OP EDUCATION:  Education  Individual(s) Educated: Patient, Spouse  Education Provided: Ergonomics and postural realignment, Fall precautons, POC discussed and agreed upon  Education Comment: recpetive to recommendations, will require continued education    Goals:  Encounter Problems       Encounter Problems (Active)       OT Goals       Patient will complete functional transfers at CGA level with LRD to facilitate increased independence and safety with home going  (Progressing)       Start:  10/10/23    Expected End:  10/24/23            Patient will complete functional mobility for household distances at CGA level with LRD to facilitate increased independence and safety with home going  (Progressing)       Start:  10/10/23    Expected End:  10/24/23            Patient will complete LB dressing at CGA level to facilitate safety and independence for home going   (Progressing)       Start:  10/10/23    Expected End:  10/24/23            Patient will complete toileting at CGA level to facilitate safety and independence for home going   (Progressing)       Start:  10/10/23    Expected End:  10/24/23            Patient will complete UB dressing at supervision level to facilitate safety and independence for home going   (Progressing)       Start:  10/10/23    Expected End:  10/24/23

## 2023-10-10 NOTE — PROGRESS NOTES
Physical Therapy    Physical Therapy Evaluation    Patient Name: Zack Figueroa  MRN: [id number]  Today's Date: 10/11/2023        Assessment/Plan   PT Assessment  PT Assessment Results: Decreased strength, Decreased range of motion, Decreased endurance, Impaired balance, Decreased mobility, Decreased cognition, Impaired judgement, Decreased safety awareness, Decreased coordination, Impaired hearing  Rehab Prognosis: Good  Evaluation/Treatment Tolerance: Patient tolerated treatment well  Medical Staff Made Aware: Yes  End of Session Communication: Bedside nurse    Assessment Comment: Pt is a 89 y/o male admitted on 10/9 for intraparenchymal hemorrhage of brain. Pt presents with RUE strength and coordination deficits. He displayed a significant right lateral lean during functional mobility tasks with inability to maintain COG/GABRIEL in midline. He had an episode of retro LOB when standing and beginning to ambulate with FWW. Able to recover standing balance with assistance from SPT. Pt able to tolerate sit <> stand transfers and ambulation during session as stated in functional assessment below. He is functioning below baseline level of function. Pt will benefit from skilled therapy during stay to improve overall functional mobility, strength, ROM, balance and safety awareness. Upon discharge pt will benefit from high intensity therapy for continued improvement in functional mobility.     End of Session Patient Position: Alarm on, Up in chair  IP OR SWING BED PT PLAN  Inpatient or Swing Bed: Inpatient  PT Plan  Treatment/Interventions: Bed mobility, Transfer training, Gait training, Stair training, Balance training, Neuromuscular re-education, Strengthening, Endurance training, Range of motion, Therapeutic exercise, Therapeutic activity, Postural re-education  PT Plan: Skilled PT  PT Frequency: Daily  PT Discharge Recommendations: High intensity level of continued care  PT Recommended Transfer Status: Assist x2,  Assistive device    Subjective     Current Problem:  1. Intraparenchymal hemorrhage of brain (CMS/HCC)        2. Oral phase dysphagia [R13.11]        3. 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]          Past Medical History:   Diagnosis Date    Atrial fibrillation (CMS/[facility])     CAD (coronary artery disease)     CHF (congestive heart failure) (CMS/[facility])     CKD (chronic kidney disease) stage 3, GFR 30-59 ml/min (CMS/[facility])     HLD (hyperlipidemia)     HTN (hypertension)     JESUS (iron deficiency anemia)     Insulin dependent type 2 diabetes mellitus (CMS/[facility])     Stroke (CMS/[facility])      Past Surgical History:   Procedure Laterality Date    APPENDECTOMY      CORONARY ARTERY BYPASS GRAFT  1994       General Visit Information:       Home Living:  Home Living  Home Living Comments: Pt lives in 2-story home with wife and kids. 1-2 MERVIN to enter with BL HR. Flight of steps with HR to second floor where bed/bath located. Walk in shower with shower seat and grab bars. Also had a bathroom on first floor.    Prior Level of Function:  Prior Function Per Pt/Caregiver Report  Prior Function Comments: Mod I for mobility with FWW. Independent with ADLs. Requires assistance for IADLs from wife. Wife provides transportation, pt does not drive. Denies recent falls. Retired cardiologist.    Precautions:  Precautions  Medical Precautions: Fall precautions, Seizure precautions (Aspiration precautions)  Precautions Comment: Maintain HOB at 30 degrees all times. Maintain SBP < 140.    Vital Signs:     Objective     Pain:       Cognition:       General Assessments:                                Functional Assessments:                      Extremity/Trunk Assessments:                Outcome Measures:        Encounter Problems       Encounter Problems (Active)       PT Problem       Bed mobility (Progressing)       Start:  10/10/23    Expected End:  10/31/23       Pt will be able to complete bed mobility mod  I with use of bed HR.            Transfers (Progressing)       Start:  10/10/23    Expected End:  10/31/23       Pt will be able to complete all transfers with FWW CGA demonstrating good safety awareness and proper body mechanics.           Gait (Progressing)       Start:  10/10/23    Expected End:  10/31/23       Pt will be able to ambulate 100 ft with FWW CGA with good safety awareness and ability to maintain COG/GABRIEL in midline.           Strength (Progressing)       Start:  10/10/23    Expected End:  10/31/23       Pt will improve BLE strength with supine, seated and standing exercises to WFL.           Balance (Progressing)       Start:  10/10/23    Expected End:  10/31/23       Pt will demonstrate good static/dynamic standing balance without evidence of right lateral lean or retro LOB.                  Education Documentation  Body Mechanics, taught by VERONICA Ochoa at 10/10/2023  1:29 PM.  Learner: Family, Patient  Readiness: Acceptance  Method: Explanation  Response: Verbalizes Understanding, Needs Reinforcement    Mobility Training, taught by VERONICA Ochoa at 10/10/2023  1:29 PM.  Learner: Family, Patient  Readiness: Acceptance  Method: Explanation  Response: Verbalizes Understanding, Needs Reinforcement    Education Comments  Pt educated on the benefits of mobility and importance of participation in therapy for overall health and wellness. Pt educated on proper body mechanics and proper hand placement during functional mobility tasks.

## 2023-10-10 NOTE — PROGRESS NOTES
1530 Met with patient an his son, Barber, at the bedside. States patient lives at home with his wife. States he uses walker and only has one step in the home to use, otherwise has ramp to get inside and stays on first floor for bed/bathroom. Son states patient just finished with [facility] yesterday for PT and Home health aide. Has hired help (mostly for patient's mom per son for housework) 8 hours per week from [employer]. Patient does not drive. Sometimes wife transports, but also uses [employer] senior transportation sometimes. Patient is also active with palliative care at home as well and he cannot remember name of agency--sees them once a month currently. Discussed dc plan. Patient's son/family would like  [facility]--referral sent and also provided AR list--son states he does prefer  [place] after reviewing list.

## 2023-10-10 NOTE — CARE PLAN
The patient's goals for the shift include get a few hours sleep this shift    The clinical goals for the shift include maintain injury free this shift    Over the shift, the patient did not make progress toward the following goals. Barriers to progression include denial of need. Recommendations to address these barriers include education to patient and family.

## 2023-10-10 NOTE — NURSING NOTE
PATIENT ADMITTED TO ROOM 2126 FROM ED ACCOMPANIED BY EDRN. ADMISSION AND INITIAL ASSESSMENT COMPLETED.

## 2023-10-10 NOTE — PROGRESS NOTES
Speech-Language Pathology    Inpatient Speech-Language Pathology Clinical Swallow Evaluation    Patient Name: Zack Figueroa  MRN: [id number]  Today's Date: 10/10/2023             Current Problem:   Patient Active Problem List   Diagnosis    Intraparenchymal hemorrhage of brain (CMS/HCC)    Hypertension    Hyperglycemia    Transaminitis         Recommendations:  Risk for Aspiration: No  Additional Recommendations: Dysphagia treatment  Solid Diet Recommendations : Regular (IDDSI Level 7)  Liquid Diet Recommendations: Thin (IDDSI Level 0)  Compensatory Swallowing Strategies: Upright 90 degrees as possible for all oral intake, Remain upright for 20-30 minutes after meals, No straws, Single sips, Small bites/sips, Eat/feed slowly  Medication Administration Recommendations: Whole, With Liquid  Follow up treatments: Diet tolerance monitoring, Patient/family education      Assessment:  Patient presents with mild oral dysphagia and no suspected oropharyngeal dysphagia upon completion of clinical swallow evaluation at bedside this date. Oral motor assessment remarkable for R labial/lingual weakness. Initial attempt to drink from the cup resulted in a small amount of anterior escape on the R side but this appeared to be related to dexterity versus oral dysphagia. Subsequent sips completed with no escape. Initial straw presentation completed with no s/s of aspiration however when independently taking a drink the patient had a cough response. The patient's son pointed out that he has never seen his father use a straw so it wasn't a natural thing for him to do. Puree trial completed with appropriate bolus manipulation, A-P transfer and timely onset of swallow. Solid trial completed with appropriate mastication and mild oral residues that were cleared with a liquid wash. The patient is favorable to resume an oral diet as medically indicated. ST to follow.       Prognosis: Good  Treatment Provided:  No      Plan:  Inpatient/Swing Bed or Outpatient: Inpatient  Treatment/Interventions: Diet recommendations, Assess diet tolerance  SLP Plan: Skilled SLP  SLP Frequency: 1x per week  Duration: 1 week  Next Treatment Priority: Diet tolerance  Discussed POC: Patient  Discussed Risks/Benefits: Yes  Patient/Caregiver Agreeable: Yes    Goals:  Patient will:  Tolerate recommended oral diet absent of overt s/s of aspiration and without pulmonary compromise.  Implement safe swallowing strategies to reduce risk of aspiration in 100% of trials given caregiver assistance/cueing as needed.  Complete a modified barium swallow study (MBSS) as warranted upon further assessment/discussion with medical team for objective assessment of oropharyngeal swallow function, to assess for aspiration, and to guide further recommendations and treatment plan.    Subjective   Patient oriented to person and place. Unable to state the year. Speech and language assessment also ordered. See separate note for more details. Patient's son indicated the patient was having further cognitive decline over the past 6 months. Patient is a former cardiologist.    General Visit Information:  Patient Class: Inpatient  Living Environment: Home  Caregiver Feedback: Son present for the evaluation  Reason for Referral: Patient was seen for a clinical swallow evaluation (CSE) to assess swallow function, determine least restrictive diet, determine if a modified barium swallow study is warranted and develop appropriate POC for the current setting.   Referred By: ARACELIS Rodriguez-CNP  Past Medical History Relevant to Rehab: Patient presented to ED for stroke like symptoms and AMS. afib, anemia, DM, CAD s/p CABG, CKD III-IV, HFpEF, JESUS, previous CVA  Prior Level of Function: WFL (Son reports patient's previous stroke symptoms had resolved)  Patient Seen During This Visit: Yes  Prior to Session Communication: Bedside nurse  Reviewed Procedures and Risks:  "Yes  [employer] Diet: regular/thin  Current Diet : NPO  Dysphagia Diagnosis: Mild oral stage dysphagia    Baseline Assessment:  Behavior/Cognition: Cooperative, Pleasant mood  Volitional Cough: Weak    Pain:  Pain Assessment: 0-10  Pain Score: 0 - No pain     Oral/Motor Assessment:  Dentition: Adequate/Natural  Oral Motor: Impaired Function  Facial Symmetry: Right droop  Lingual Deviation Right:  (Yes)  Intelligibility: Intelligibility reduced  Intelligibility Ratin%-99%  Breath Support: Adequate for speech    Consistencies Trialed:  Consistencies Trialed: Yes  Consistencies Trialed: Thin (IDDSI Level 0) - Straw, Thin (IDDSI Level 0) - Cup, Pureed/extremely thick (IDDSI Level 4), Regular (IDDSI Level 7)    Clinical Observations:  Patient Positioning: Upright in Chair  Was The 3 oz Swallow Protocol Completed: No    Education:    Learner:  Patient    Family  Barriers to Learning: cognitive limitations   Method: Verbal  Written (\"Swallowing Guidelines\" posted at patient's bedside)    Education - Topic: ST provided patient education regarding role of ST, purpose of assessment, clinical impressions, goals of treatment, and plan of care. Patient verbalized questionable full comprehension, consistent with cognitive status. Education will be reinforced. ST further coordinated with RN regarding recommendations and precautions per this assessment, with RN verbalizing understanding.    Outcome:  Verbalized understanding and agreement  needs review/reinforcement      "

## 2023-10-10 NOTE — CARE PLAN
Problem: PT Problem  Goal: Bed mobility  Description: Pt will be able to complete bed mobility mod I with use of bed HR.     Outcome: Progressing  Goal: Transfers  Description: Pt will be able to complete all transfers with FWW CGA demonstrating good safety awareness and proper body mechanics.    Outcome: Progressing  Goal: Gait  Description: Pt will be able to ambulate 100 ft with FWW CGA with good safety awareness and ability to maintain COG/GABRIEL in midline.    Outcome: Progressing  Goal: Strength  Description: Pt will improve BLE strength with supine, seated and standing exercises to WFL.    Outcome: Progressing  Goal: Balance  Description: Pt will demonstrate good static/dynamic standing balance without evidence of right lateral lean or retro LOB.    Outcome: Progressing

## 2023-10-10 NOTE — CARE PLAN
Problem: Diabetes  Goal: Achieve decreasing blood glucose levels by end of shift  Outcome: Progressing     Problem: General Stroke  Goal: Demonstrate improvement in neurological exam throughout the shift  Outcome: Progressing     Problem: General Stroke  Goal: Maintain BP within ordered limits throughout shift  Outcome: Met     Problem: General Stroke  Goal: Participate in treatment (ie., meds, therapy) throughout shift  Outcome: Met   The patient's goals for the shift include get a few hours sleep this shift    The clinical goals for the shift include maintain injury free this shift    Over the shift, the patient did not make progress toward the following goals. Barriers to progression include ilness. Recommendations to address these barriers include educated patient and family throughout the shift.

## 2023-10-10 NOTE — H&P
Addendum   Lung auscultation is positive for inspirational crackles on the right lower lung, heart is regular, now murmurs, or gallops. Right hand drifting in a sitting position with eyes closed. Dysarthria present with tongue deviates to the right. Pupils are 2 mm, round and reactive to light.     Plan summary  Find out baseline of renal function. Son confirmed that RFT is consistent with the patients baseline on CKD 4 with Crt of 3-4. Started hydralazine 12.5 mg. Patient passed swallow test with Speech Therapist who okayed oral intake. Patient placed on Diabetic Diet. Hold Aspirin 81 mg, Eliquis, Lasix PRN, Zoloft. Patient's. Nephrology consulted.      Scheduled medications  amiodarone, 200 mg, oral, Daily  docusate sodium, 100 mg, oral, BID  [START ON 10/11/2023] esomeprazole, 40 mg, nasoduodenal tube, Daily before breakfast   Or  [START ON 10/11/2023] pantoprazole, 40 mg, oral, Daily before breakfast   Or  [START ON 10/11/2023] pantoprazole, 40 mg, intravenous, Daily before breakfast  influenza, 0.7 mL, intramuscular, During hospitalization  hydrALAZINE, 12.5 mg, oral, BID  insulin lispro, 0-10 Units, subcutaneous, q4h  rosuvastatin, 20 mg, oral, Daily        Continuous medications  niCARdipine, 2.5-15 mg/hr, Last Rate: 2.5 mg/hr (10/10/23 1245)      PRN medications  PRN medications: dextrose 10 % in water (D10W), dextrose, glucagon, ondansetron     History Of Present Illness  Robert Malave is a 88 y.o. male with PMH of a fib on Eliquis, HFpEF, CAD s/p CABG, CKD 3-4, HLD, HTN, JESUS, IDDM and previous CVA who presented with altered mental status, right arm and leg weakness, and concern for stroke like symptoms. Patient denies fall. He denies pain, nausea, shortness of breath or loss of sensation. His wife is no longer present, history was obtained from chart review and patient. He is still having some confusion and dysarthria but able to answer yes/no questions.     ED course: Initial vitals-T36.8, HR 68,  RR 20, /54, SPO2 98%.  Labs remarkable for anemia with H/H 10.6/34.4 (hx of JESUS), hyperglycemia of 200, INR 1.6/PT 17.8 on anticoagulation, elevated troponin 22, BUN/Cr 49/3.06 (hx of CKD3), AlkPhos 137, and AST 40. BAT called, CT showed acute or subacute hemorrhagic lacunar infarct centered in the left posterior internal capsule with mild associated vasogenic edema. NSGY was consulted and recommended repeat CT in 6 hours as well as Q1 neurochecks and goal SBP < 140. Patient was given Kcentra for anticoagulation reversal and started on nicardipine for BP control. He was admitted to ICU for close neurological and hemodynamic monitoring.     Past Medical History  Past Medical History:   Diagnosis Date    Atrial fibrillation (CMS/[facility])     CAD (coronary artery disease)     CHF (congestive heart failure) (CMS/[facility])     CKD (chronic kidney disease) stage 3, GFR 30-59 ml/min (CMS/[facility])     HLD (hyperlipidemia)     HTN (hypertension)     JESUS (iron deficiency anemia)     Insulin dependent type 2 diabetes mellitus (CMS/[facility])     Stroke (CMS/[facility])        Surgical History  Past Surgical History:   Procedure Laterality Date    APPENDECTOMY      CORONARY ARTERY BYPASS GRAFT  1994        Social History  He reports that he has never smoked. He does not have any smokeless tobacco history on file. He reports that he does not drink alcohol and does not use drugs.    Family History  No family history on file. DM, CAD, MI     Allergies  Patient has no known allergies.    Review of Systems   Unable to perform ROS: Mental status change   Respiratory:  Negative for shortness of breath.    Cardiovascular:  Negative for chest pain.   Gastrointestinal:  Negative for abdominal pain and nausea.   Musculoskeletal:  Negative for back pain and neck pain.   Neurological:  Positive for speech difficulty and weakness. Negative for numbness and headaches.        Physical Exam  Constitutional:       General: He is not in acute distress.  HENT:       "Head: Normocephalic and atraumatic.      Mouth/Throat:      Mouth: Mucous membranes are moist.      Pharynx: Oropharynx is clear.   Eyes:      Extraocular Movements: Extraocular movements intact.      Conjunctiva/sclera: Conjunctivae normal.      Pupils: Pupils are equal, round, and reactive to light.   Cardiovascular:      Rate and Rhythm: Normal rate and regular rhythm.      Pulses: Normal pulses.      Heart sounds: Normal heart sounds.   Pulmonary:      Breath sounds: Normal breath sounds.   Abdominal:      General: There is no distension.      Palpations: Abdomen is soft.      Tenderness: There is no abdominal tenderness.   Musculoskeletal:         General: No tenderness or deformity.      Cervical back: Normal range of motion.   Skin:     General: Skin is warm and dry.   Neurological:      Mental Status: He is alert.      Sensory: No sensory deficit.      Motor: Weakness present.      Comments: 4/5 RUE, 4/5 RLE. No drift. Dysarthria.   Psychiatric:         Behavior: Behavior normal.        Last Recorded Vitals  Blood pressure 117/53, pulse 76, temperature 35.8 °C (96.4 °F), temperature source Temporal, resp. rate 21, height 1.676 m (5' 5.98\"), weight 78.1 kg (172 lb 2.9 oz), SpO2 99 %.    Relevant Results  Scheduled medications  influenza, 0.7 mL, intramuscular, During hospitalization  insulin lispro, 0-10 Units, subcutaneous, q4h      Continuous medications  niCARdipine, 2.5-15 mg/hr, Last Rate: 5 mg/hr (10/09/23 2334)      PRN medications  Results for orders placed or performed during the hospital encounter of 10/09/23 (from the past 24 hour(s))   POCT GLUCOSE   Result Value Ref Range    POCT Glucose 200 (H) 74 - 99 mg/dL   CBC and Auto Differential   Result Value Ref Range    WBC 7.7 4.4 - 11.3 x10*3/uL    nRBC 0.0 0.0 - 0.0 /100 WBCs    RBC 3.62 (L) 4.50 - 5.90 x10*6/uL    Hemoglobin 10.6 (L) 13.5 - 17.5 g/dL    Hematocrit 34.4 (L) 41.0 - 52.0 %    MCV 95 80 - 100 fL    MCH 29.3 26.0 - 34.0 pg    MCHC 30.8 " (L) 32.0 - 36.0 g/dL    RDW 14.5 11.5 - 14.5 %    Platelets 208 150 - 450 x10*3/uL    MPV 11.2 7.5 - 11.5 fL    Neutrophils % 61.1 40.0 - 80.0 %    Immature Granulocytes %, Automated 0.4 0.0 - 0.9 %    Lymphocytes % 25.9 13.0 - 44.0 %    Monocytes % 10.8 2.0 - 10.0 %    Eosinophils % 1.4 0.0 - 6.0 %    Basophils % 0.4 0.0 - 2.0 %    Neutrophils Absolute 4.69 1.60 - 5.50 x10*3/uL    Immature Granulocytes Absolute, Automated 0.03 0.00 - 0.50 x10*3/uL    Lymphocytes Absolute 1.99 0.80 - 3.00 x10*3/uL    Monocytes Absolute 0.83 (H) 0.05 - 0.80 x10*3/uL    Eosinophils Absolute 0.11 0.00 - 0.40 x10*3/uL    Basophils Absolute 0.03 0.00 - 0.10 x10*3/uL   Comprehensive metabolic panel   Result Value Ref Range    Glucose 192 (H) 74 - 99 mg/dL    Sodium 132 (L) 136 - 145 mmol/L    Potassium 4.2 3.5 - 5.3 mmol/L    Chloride 97 (L) 98 - 107 mmol/L    Bicarbonate 26 21 - 32 mmol/L    Anion Gap 13 10 - 20 mmol/L    Urea Nitrogen 49 (H) 6 - 23 mg/dL    Creatinine 3.06 (H) 0.50 - 1.30 mg/dL    eGFR 19 (L) >60 mL/min/1.73m*2    Calcium 8.6 8.6 - 10.3 mg/dL    Albumin 3.5 3.4 - 5.0 g/dL    Alkaline Phosphatase 137 (H) 33 - 136 U/L    Total Protein 7.5 6.4 - 8.2 g/dL    AST 40 (H) 9 - 39 U/L    Bilirubin, Total 0.6 0.0 - 1.2 mg/dL    ALT 35 10 - 52 U/L   Troponin I, High Sensitivity   Result Value Ref Range    Troponin I, High Sensitivity 22 (H) 0 - 20 ng/L   Protime-INR   Result Value Ref Range    Protime 17.8 (H) 9.8 - 12.8 seconds    INR 1.6 (H) 0.9 - 1.1   APTT   Result Value Ref Range    aPTT 40 (H) 27 - 38 seconds     CT brain attack head wo IV contrast    Result Date: 10/10/2023  Interpreted By:  Esteban Johnson, STUDY: CT ANGIO BRAIN ATTACK NECK W IV CONTRAST AND POST PROCEDURE; CT ANGIO BRAIN ATTACK HEAD W IV CONTRAST AND POST PROCEDURE; CT BRAIN ATTACK HEAD WO IV CONTRAST;  10/9/2023 10:52 pm; 10/9/2023 10:46 pm   INDICATION: Signs/Symptoms:Stroke Evaluation with VAN Positive; Signs/Symptoms:Stroke Evaluation.   COMPARISON:  None.   ACCESSION NUMBER(S): [id number]; [id number]; [id number]   ORDERING CLINICIAN: ISABELLA SNEED   TECHNIQUE: Unenhanced CT images of the head were obtained. Subsequently,  75 mL Omnipaque 350 was administered intravenously and axial images of the head and neck were acquired.  Coronal, sagittal, and 3-D reconstructions were provided for review.   FINDINGS: Noncontrast CT of the head: There is an approximately 1.8 x 1.4 cm hyperdensity involving the left posterior internal capsule with suggestion of mild surrounding vasogenic edema, likely representing acute to subacute hemorrhagic lacunar infarct. There is no significant mass effect. The gray-white matter differentiation is otherwise maintained. There is no hydrocephalus. There is mild to moderate generalized cerebral volume loss and associated ventricular and sulcal enlargement. Scattered periventricular and deep white matter hypodensities suggestive of mild to moderate chronic microvascular ischemic change.   CTA HEAD FINDINGS:   Anterior circulation: The bilateral intracranial internal carotid arteries, bilateral carotid terminals, bilateral proximal anterior and middle cerebral arteries are patent without significant focal stenosis. There are bilateral carotid siphon atherosclerotic calcifications without significant focal stenosis.   Posterior circulation: Bilateral intracranial vertebral arteries, vertebrobasilar junction, basilar artery and proximal posterior cerebral arteries are normal.   CTA NECK FINDINGS:   Right carotid vessels: There is retropharyngeal course of the upper common carotid artery. There are atherosclerotic calcifications of the carotid bifurcation with 0% stenosis by NASCET criteria. The remainder of the cervical left internal carotid artery is also widely patent.   Left carotid vessels: There is retropharyngeal course of the upper common carotid artery. There are atherosclerotic calcifications of the carotid bifurcation with 0%  stenosis by NASCET criteria. The remainder of the cervical left internal carotid artery is also widely patent.   Vertebral vessels:  The visualized segments of the cervical vertebral arteries are patent. There are atherosclerotic calcifications of the left vertebral artery without focal stenosis.         Acute or subacute hemorrhagic lacunar infarct centered in the left posterior internal capsule with mild associated vasogenic edema. No evidence for significant stenosis of the cervical vessels.   No evidence for significant stenosis or large branch vessel cutoffs of the intracranial vessels.   Esteban Johnson discussed the significance and urgency of this critical finding by telephone with  ISABELLA SNEED on 10/10/2023 at 12:05 am. (**-RCF-**) Findings:  See findings.     MACRO: None   Signed by: Esteban Johnson 10/10/2023 12:06 AM Dictation workstation:   [id number]    CT angio brain attack head w IV contrast and post procedure    Result Date: 10/10/2023  Interpreted By:  Esteban Johnson, STUDY: CT ANGIO BRAIN ATTACK NECK W IV CONTRAST AND POST PROCEDURE; CT ANGIO BRAIN ATTACK HEAD W IV CONTRAST AND POST PROCEDURE; CT BRAIN ATTACK HEAD WO IV CONTRAST;  10/9/2023 10:52 pm; 10/9/2023 10:46 pm   INDICATION: Signs/Symptoms:Stroke Evaluation with VAN Positive; Signs/Symptoms:Stroke Evaluation.   COMPARISON: None.   ACCESSION NUMBER(S): [id number]; [id number]; [id number]   ORDERING CLINICIAN: ISABELLA SNEED   TECHNIQUE: Unenhanced CT images of the head were obtained. Subsequently,  75 mL Omnipaque 350 was administered intravenously and axial images of the head and neck were acquired.  Coronal, sagittal, and 3-D reconstructions were provided for review.   FINDINGS: Noncontrast CT of the head: There is an approximately 1.8 x 1.4 cm hyperdensity involving the left posterior internal capsule with suggestion of mild surrounding vasogenic edema, likely representing acute to subacute hemorrhagic lacunar infarct. There is  no significant mass effect. The gray-white matter differentiation is otherwise maintained. There is no hydrocephalus. There is mild to moderate generalized cerebral volume loss and associated ventricular and sulcal enlargement. Scattered periventricular and deep white matter hypodensities suggestive of mild to moderate chronic microvascular ischemic change.   CTA HEAD FINDINGS:   Anterior circulation: The bilateral intracranial internal carotid arteries, bilateral carotid terminals, bilateral proximal anterior and middle cerebral arteries are patent without significant focal stenosis. There are bilateral carotid siphon atherosclerotic calcifications without significant focal stenosis.   Posterior circulation: Bilateral intracranial vertebral arteries, vertebrobasilar junction, basilar artery and proximal posterior cerebral arteries are normal.   CTA NECK FINDINGS:   Right carotid vessels: There is retropharyngeal course of the upper common carotid artery. There are atherosclerotic calcifications of the carotid bifurcation with 0% stenosis by NASCET criteria. The remainder of the cervical left internal carotid artery is also widely patent.   Left carotid vessels: There is retropharyngeal course of the upper common carotid artery. There are atherosclerotic calcifications of the carotid bifurcation with 0% stenosis by NASCET criteria. The remainder of the cervical left internal carotid artery is also widely patent.   Vertebral vessels:  The visualized segments of the cervical vertebral arteries are patent. There are atherosclerotic calcifications of the left vertebral artery without focal stenosis.         Acute or subacute hemorrhagic lacunar infarct centered in the left posterior internal capsule with mild associated vasogenic edema. No evidence for significant stenosis of the cervical vessels.   No evidence for significant stenosis or large branch vessel cutoffs of the intracranial vessels.   Esteban Johnson discussed  the significance and urgency of this critical finding by telephone with  ISABELLA SNEED on 10/10/2023 at 12:05 am. (**-RCF-**) Findings:  See findings.     MACRO: None   Signed by: Esteban Johnson 10/10/2023 12:06 AM Dictation workstation:   [id number]    CT angio brain attack neck w IV contrast and post procedure    Result Date: 10/10/2023  Interpreted By:  Esteban Johnson, STUDY: CT ANGIO BRAIN ATTACK NECK W IV CONTRAST AND POST PROCEDURE; CT ANGIO BRAIN ATTACK HEAD W IV CONTRAST AND POST PROCEDURE; CT BRAIN ATTACK HEAD WO IV CONTRAST;  10/9/2023 10:52 pm; 10/9/2023 10:46 pm   INDICATION: Signs/Symptoms:Stroke Evaluation with VAN Positive; Signs/Symptoms:Stroke Evaluation.   COMPARISON: None.   ACCESSION NUMBER(S): [id number]; [id number]; [id number]   ORDERING CLINICIAN: ISABELLA SNEED   TECHNIQUE: Unenhanced CT images of the head were obtained. Subsequently,  75 mL Omnipaque 350 was administered intravenously and axial images of the head and neck were acquired.  Coronal, sagittal, and 3-D reconstructions were provided for review.   FINDINGS: Noncontrast CT of the head: There is an approximately 1.8 x 1.4 cm hyperdensity involving the left posterior internal capsule with suggestion of mild surrounding vasogenic edema, likely representing acute to subacute hemorrhagic lacunar infarct. There is no significant mass effect. The gray-white matter differentiation is otherwise maintained. There is no hydrocephalus. There is mild to moderate generalized cerebral volume loss and associated ventricular and sulcal enlargement. Scattered periventricular and deep white matter hypodensities suggestive of mild to moderate chronic microvascular ischemic change.   CTA HEAD FINDINGS:   Anterior circulation: The bilateral intracranial internal carotid arteries, bilateral carotid terminals, bilateral proximal anterior and middle cerebral arteries are patent without significant focal stenosis. There are bilateral carotid siphon  atherosclerotic calcifications without significant focal stenosis.   Posterior circulation: Bilateral intracranial vertebral arteries, vertebrobasilar junction, basilar artery and proximal posterior cerebral arteries are normal.   CTA NECK FINDINGS:   Right carotid vessels: There is retropharyngeal course of the upper common carotid artery. There are atherosclerotic calcifications of the carotid bifurcation with 0% stenosis by NASCET criteria. The remainder of the cervical left internal carotid artery is also widely patent.   Left carotid vessels: There is retropharyngeal course of the upper common carotid artery. There are atherosclerotic calcifications of the carotid bifurcation with 0% stenosis by NASCET criteria. The remainder of the cervical left internal carotid artery is also widely patent.   Vertebral vessels:  The visualized segments of the cervical vertebral arteries are patent. There are atherosclerotic calcifications of the left vertebral artery without focal stenosis.         Acute or subacute hemorrhagic lacunar infarct centered in the left posterior internal capsule with mild associated vasogenic edema. No evidence for significant stenosis of the cervical vessels.   No evidence for significant stenosis or large branch vessel cutoffs of the intracranial vessels.   Esteban Johnson discussed the significance and urgency of this critical finding by telephone with  ISABELLA SNEED on 10/10/2023 at 12:05 am. (**-RCF-**) Findings:  See findings.     MACRO: None   Signed by: Esteban Johnson 10/10/2023 12:06 AM Dictation workstation:   [id number]        Assessment/Plan   Principal Problem:    Intraparenchymal hemorrhage of brain (CMS/HCC)  Active Problems:    Hypertension    Hyperglycemia    Transaminitis    Plan:  Neurological    #Acute or subacute hemorrhagic lacunar infarct centered in the left posterior internal capsule with mild associated vasogenic edema.   #CVA  #Altered mental status  - Patient is alert  with dysarthria, mild RUE/RLE weakness, no drift, no loss of sensation.   - Given Kcentra in ED for DOAC reversal   - Neurological checks q1h  - Seizure, fall, aspiration precautions  - Head of bed at 30 degrees at all times  - SBP goal < 140, continue nicardipine  - CTH: repeat 6 hours after admit CT, ordered for 0445  - PT/OT/SLP  - Zofran for nausea  - Hold all anticoagulants  - Neurosurgery following, appreciate recs    Cardiovascular  #Hypertension  #A fib on Eliquis (currently on hold due to intracranial hemorrhage)   #HFpEF  #CAD s/p CABG  - Hemodynamically stable. Goal SBP < 140.  - Continuous cardiac telemetry and Q1 vitals  - SBP goal < 140  - Continue nicardipine  -Trop 22, trend  - Hold anticoagulation    Respiratory: Lungs are clear. In no respiratory distress.  - Aspiration precautions, head of bed above 30 degrees  - PRN O2    Gastrointestinal:  #Hyperlipidemia  - Abdomen soft. No issues. Transaminitis  - Monitor CMP  - NPO until after head CT, if remains stable will begin feeding  - Bedside swallow evaluation  - GI Prophylaxis: not indicated    Renal:   #CKD 3-4  - Admit creatinine 3.06  - Monitor daily CMP, Mg, Phos  - Avoid hypotonic fluids as this can worsen cerebral edema  - No Fields if remains alert    Endocrinology:  #Insulin dependent diabetes melitis  - FSBS q4hr while NPO. Once eating, with meals.  - Insulin SS with mild coverage for now, if sugars are high will increase  - Hypoglycemia protocol    Hematology:   #Iron Deficiency Anemia  - Hemoglobin 10.6  - Monitor CBC daily  - Given Kcentra in ED   - SCDs for prophylaxis; no anticoagulants given ICH    Infectious Disease:   - No infection suspected. No leukocytosis. Afebrile.  - Current access: PIVs  - Keep normothermic, aggressive fever control as this worsens neurological outcomes  - Acetamnophen PRN for pain or headache prn fever > 38.3    Disposition:  Patient requires ICU level of care for frequent neuro monitoring and potential  progression of ICH.          I spent 32 minutes in the professional and overall care of this patient.      Kathleen Luis, APRN-CNP

## 2023-10-10 NOTE — PROGRESS NOTES
Speech-Language Pathology    SLP ADULT Inpatient Speech-Language Cognition    Patient Name: Zack Figueroa  MRN: [id number]  Today's Date: 10/10/2023   Time Calculation  Start Time: 1430  Stop Time: 1445  Time Calculation (min): 15 min         Current Problem:   Patient Active Problem List   Diagnosis    Intraparenchymal hemorrhage of brain (CMS/HCC)    Hypertension    Hyperglycemia    Transaminitis     SLP Assessment:  SLP Assessment  SLP Assessment Results: Motor Speech Deficits  Prognosis: Good  Treatment Provided: No      SLP Plan:  Plan  Inpatient/Swing Bed or Outpatient: Inpatient  SLP Frequency: 1x per week  Duration: 1 week  Next Treatment Priority: speech/lang goals  Discussed POC: Patient, Caregiver/family  Discussed Risks/Benefits: Yes  Patient/Caregiver Agreeable: Yes    Goals:  Patient will:  1. Increase speech intelligibility at the phrase/sentence with use of recommended strategies (reduced rate, increased volume, over-articulation) in 90% of opportunities.  2. Recall intelligibility-enhancing strategies given minimal-moderate cues during treatment session.   3. Express wants/needs in the current environment given min cues.     Subjective   Patient oriented to person and place. Unable to state the year. clinical swallow evaluation also ordered. See separate note for more details. Patient's son indicated the patient was having further cognitive decline over the past 6 months. 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    General Visit Information:  General Information  Chart Reviewed: Yes  Caregiver Feedback: Son present for the evaluation  Reason for Referral: Changes in speech production  Referred By: NITA Rodriguez  Past Medical History Relevant to Rehab: Patient presented to ED for stroke like symptoms and AMS. afib, anemia, DM, CAD s/p CABG, CKD III-IV, HFpEF, JESUS, previous CVA  Patient Seen During This Visit: Yes  Prior to Session Communication: Bedside  nurse      Objective   Language Assessment:  [place] Aphasia Screening Test (MAST): 65/80    Expressive Index: 33/40  -Namin/10  -Automatic Speech: 10/10  -Repetition: 10/10  -Writing: 10 - not assessed   -Verbal Fluency: 5/10     Receptive Index: 32/40  -Yes/No Accuracy:   -Object Recognition: 8/10  -Following Instructions: 6/10; difficulty with 2 step instructions  -Reading Instructions: -/10 not assessed    The patient's son reported that the patient does not have a dementia diagnosis but has been declining cognitively. Scores on the MAST reflect some language/cultural differences as English is not the patient's native language. The son endorses the patient's spoken English is not at baseline. He spoke to the patient in Marathi and reported that the patient understood him just fine and his responses were more intelligible than in English. However the patient did demonstrate difficulty remembering some family member names even when given a phonemic cue.     Pain:  Pain Assessment  Pain Assessment: 0-10  Pain Score: 0 - No pain      Cognition:  Cognition  Overall Cognitive Status: Impaired at baseline  Orientation Level: Disoriented to time, Disoriented to situation  Awareness of Deficits: Decreased Awareness of Deficits      Motor Speech Production:  Motor Speech Production  Assessments Used: Informal    Education:  Learner:  Patient    Family  Barriers to Learning: cognitive limitations   Method: Verbal  Education - Topic: ST provided patient education regarding role of ST, purpose of assessment, clinical impressions, goals of treatment, and plan of care. Patient verbalized questionable full comprehension, consistent with cognitive status. Education will be reinforced. ST further coordinated with RN regarding recommendations and precautions per this assessment, with RN verbalizing understanding.    Outcome:  Verbalized understanding and agreement  needs review/reinforcement

## 2023-10-10 NOTE — ED PROVIDER NOTES
HPI   No chief complaint on file.      HPI  Patient is an 88-year-old male with history of previous CVA who was on apixaban who presents to the emergency department today due to concern for altered mental status as well as strokelike symptoms.  On arrival, patient had some dysarthria as well as right upper extremity and lower extremity drift as well as confusion.  Last known normal was last night prior to patient went to bed.  Patient was confused but was otherwise following commands.  He denies any chest pain or abdominal pain.  Patient did have paperwork with him that showed that he was on Xarelto.                  Joe Coma Scale Score: 15                  Patient History   No past medical history on file.  No past surgical history on file.  No family history on file.  Social History     Tobacco Use    Smoking status: Not on file    Smokeless tobacco: Not on file   Substance Use Topics    Alcohol use: Not on file    Drug use: Not on file       Physical Exam   ED Triage Vitals   Temp Pulse Resp BP   -- -- -- --      SpO2 Temp src Heart Rate Source Patient Position   -- -- -- --      BP Location FiO2 (%)     -- --       Physical Exam  Vitals and nursing note reviewed.   Constitutional:       General: He is not in acute distress.     Appearance: He is well-developed.   HENT:      Head: Normocephalic and atraumatic.   Eyes:      Conjunctiva/sclera: Conjunctivae normal.   Cardiovascular:      Rate and Rhythm: Normal rate and regular rhythm.      Heart sounds: No murmur heard.  Pulmonary:      Effort: Pulmonary effort is normal. No respiratory distress.      Breath sounds: Normal breath sounds.   Abdominal:      Palpations: Abdomen is soft.      Tenderness: There is no abdominal tenderness.   Musculoskeletal:         General: No swelling.      Cervical back: Neck supple.   Skin:     General: Skin is warm and dry.      Capillary Refill: Capillary refill takes less than 2 seconds.   Neurological:      Mental Status:  He is alert.      GCS: GCS eye subscore is 4. GCS verbal subscore is 4. GCS motor subscore is 6.      Cranial Nerves: Dysarthria present.      Sensory: Sensation is intact.      Motor: Pronator drift present.      Coordination: Finger-Nose-Finger Test abnormal.   Psychiatric:         Mood and Affect: Mood normal.         ED Course & MDM   Diagnoses as of 10/11/23 1936   Intraparenchymal hemorrhage of brain (CMS/HCC)       Medical Decision Making  Patient is a 88-year-old male with history of previous CVA on Eliquis who presents to the ER due to concern for strokelike symptoms.  On arrival, patient was in no acute distress patient was mildly hypertensive at 173 systolic but other vital signs were stable.  Brain attack was called for patient.  This he did have NIH of 7 with right upper and lower extremity drift as well as dysarthria and ataxia physical exam.  He was immediately taken to CT imaging.  CT imaging of head as well as CTAs of head and neck.  CBC, BMP, PT Entercote troponin were also ordered for patient.    Patient CBC showed mild anemia of 10.6, otherwise platelets within normal limits.  CMP did show elevated BUN/creatinine 49/3.06.  Troponin was mildly elevated 22.   .  CT Noncon imaging did show an independent review concern for possible left-sided intraparenchymal hemorrhage.  Radiology did confirm stating there is an acute or subacute hemorrhagic lacunar infarction in the left posterior internal capsule with associated vasogenic edema.  Given patient's Eliquis use, Kcentra was ordered and patient had been started on Cardene drip for blood pressure control monitoring.  Neurosurgery was involved with patient's case and recommended blood pressure control also before every and admit to ICU for repeat imaging in 6 hours as well as close monitoring.  Given this, patient was discussed with intensivist on-call, who accepted patient for admission under their service.    Procedure  Critical Care    Performed by:  Geraldo Jiménez DO  Authorized by: Deondre Pedersen DO    Critical care provider statement:     Critical care time (minutes):  46    Critical care time was exclusive of:  Separately billable procedures and treating other patients    Critical care was necessary to treat or prevent imminent or life-threatening deterioration of the following conditions:  CNS failure or compromise    Critical care was time spent personally by me on the following activities:  Blood draw for specimens, development of treatment plan with patient or surrogate, discussions with consultants, evaluation of patient's response to treatment, examination of patient and obtaining history from patient or surrogate    I assumed direction of critical care for this patient from another provider in my specialty: yes      Care discussed with: admitting provider         Geraldo Jiménez DO  Resident  10/11/23 [id number]

## 2023-10-10 NOTE — CONSULTS
Reason For Consult  Intracranial hemorrhage    History Of Present Illness  Zack Figueroa is a 88 y.o. male presenting with strokelike symptoms including altered mental status and right arm and leg weakness.  Denied fall.  CT of the head showed 1.8 x 1.4 cm hypodensity left posterior internal capsule likely subacute hemorrhage and neurosurgery placed on consult.  On visit this morning patient is awake, alert, and cooperative.  He is moving all extremities though significantly less coordination of right upper extremity than left.  He endorses full sensation throughout.  Does have mild right-sided facial droop otherwise neuro exam largely intact.  Repeat head CT shows hemorrhage is stable.     Past Medical History  He has a past medical history of Atrial fibrillation (CMS/[facility]), CAD (coronary artery disease), CHF (congestive heart failure) (CMS/[facility]), CKD (chronic kidney disease) stage 3, GFR 30-59 ml/min (CMS/[facility]), HLD (hyperlipidemia), HTN (hypertension), JESUS (iron deficiency anemia), Insulin dependent type 2 diabetes mellitus (CMS/[facility]), and Stroke (CMS/[facility]).    Surgical History  He has a past surgical history that includes Coronary artery bypass graft (1994) and Appendectomy.     Social History  He reports that he has never smoked. He does not have any smokeless tobacco history on file. He reports that he does not drink alcohol and does not use drugs.    Family History  No family history on file.     Allergies  Patient has no known allergies.    Review of Systems  14/14 systems reviewed and negative other than what is listed in the history of present illness      Physical Exam  General: Well developed, awake/alert/oriented x3, no distress, alert and cooperative  Skin: Warm and dry, no lesions, no rashes  ENMT: Mucous membranes moist, no apparent injury, no lesions seen  Head/Neck: Neck Supple, no apparent injury  Respiratory/Thorax: Normal breath sounds with good chest expansion, thorax  "symmetric  Cardiovascular: No pitting edema, no JVD  Cranial nerve II-XI largely intact except mild right facial droop  Motor Strength: 4/5 right upper extremity abduction, bicep, tricep, otherwise 5/5 Throughout all extremities    Muscle Bulk: Normal and symmetric in all extremities    Sensation: intact to light touch      Last Recorded Vitals  Blood pressure 134/60, pulse 68, temperature 36.6 °C (97.9 °F), resp. rate 17, height 1.676 m (5' 5.98\"), weight 78 kg (171 lb 15.3 oz), SpO2 96 %.    Relevant Results  CT head wo IV contrast    Result Date: 10/10/2023  Interpreted By:  Esteban Johnson, STUDY: CT HEAD WO IV CONTRAST;  10/10/2023 4:58 am   INDICATION: Signs/Symptoms:L intraparenchymal hemorrhage.   COMPARISON: None.   ACCESSION NUMBER(S): [id number]   ORDERING CLINICIAN: JOHN MCNEAL   TECHNIQUE: Axial noncontrast CT images of the head.   FINDINGS: Gray-white matter differentiation is maintained. There are no extra-axial collections, with recent intravenous contrast administration slightly limiting evaluation of the extra-axial spaces.. No significant mass effect or midline shift. There is no hydrocephalus. Mild generalized cerebral volume loss and associated ventricular and sulcal enlargement. Scattered periventricular and deep white matter hypodensities with suggestion of bilateral basal ganglia chronic lacunar infarcts which may represent mild-to-moderate chronic microvascular ischemic change.   HEMORRHAGE: There is stable appearance of the left thalamic capsular geographic hyperdensity likely representing acute to subacute hemorrhagic lacunar infarct. There is questionable associated vasogenic edema without evidence of significant mass effect.   CALVARIUM: No depressed skull fracture.   EXTRACRANIAL SOFT TISSUES:.. Unremarkable.   PARANASAL SINUSES/MASTOIDS: The visualized paranasal sinuses are well aerated. Mastoid air cells are clear.   ORBITS: Bilateral cataract surgeries.         Similar appearance " of geographic parenchymal hyperdensity centered in the posterior left internal capsule and left thalamus suggestive of recent hemorrhagic lacunar infarct. No evidence of significant mass effect or midline shift. Brain MRI may be obtained to better characterize chronicity.   Signed by: Esteban Johnson 10/10/2023 5:23 AM Dictation workstation:   [id number]    CT brain attack head wo IV contrast    Result Date: 10/10/2023  Interpreted By:  Esteban Johnson, STUDY: CT ANGIO BRAIN ATTACK NECK W IV CONTRAST AND POST PROCEDURE; CT ANGIO BRAIN ATTACK HEAD W IV CONTRAST AND POST PROCEDURE; CT BRAIN ATTACK HEAD WO IV CONTRAST;  10/9/2023 10:52 pm; 10/9/2023 10:46 pm   INDICATION: Signs/Symptoms:Stroke Evaluation with VAN Positive; Signs/Symptoms:Stroke Evaluation.   COMPARISON: None.   ACCESSION NUMBER(S): [id number]; [id number]; [id number]   ORDERING CLINICIAN: ISABELLA SNEED   TECHNIQUE: Unenhanced CT images of the head were obtained. Subsequently,  75 mL Omnipaque 350 was administered intravenously and axial images of the head and neck were acquired.  Coronal, sagittal, and 3-D reconstructions were provided for review.   FINDINGS: Noncontrast CT of the head: There is an approximately 1.8 x 1.4 cm hyperdensity involving the left posterior internal capsule with suggestion of mild surrounding vasogenic edema, likely representing acute to subacute hemorrhagic lacunar infarct. There is no significant mass effect. The gray-white matter differentiation is otherwise maintained. There is no hydrocephalus. There is mild to moderate generalized cerebral volume loss and associated ventricular and sulcal enlargement. Scattered periventricular and deep white matter hypodensities suggestive of mild to moderate chronic microvascular ischemic change.   CTA HEAD FINDINGS:   Anterior circulation: The bilateral intracranial internal carotid arteries, bilateral carotid terminals, bilateral proximal anterior and middle cerebral arteries  are patent without significant focal stenosis. There are bilateral carotid siphon atherosclerotic calcifications without significant focal stenosis.   Posterior circulation: Bilateral intracranial vertebral arteries, vertebrobasilar junction, basilar artery and proximal posterior cerebral arteries are normal.   CTA NECK FINDINGS:   Right carotid vessels: There is retropharyngeal course of the upper common carotid artery. There are atherosclerotic calcifications of the carotid bifurcation with 0% stenosis by NASCET criteria. The remainder of the cervical left internal carotid artery is also widely patent.   Left carotid vessels: There is retropharyngeal course of the upper common carotid artery. There are atherosclerotic calcifications of the carotid bifurcation with 0% stenosis by NASCET criteria. The remainder of the cervical left internal carotid artery is also widely patent.   Vertebral vessels:  The visualized segments of the cervical vertebral arteries are patent. There are atherosclerotic calcifications of the left vertebral artery without focal stenosis.         Acute or subacute hemorrhagic lacunar infarct centered in the left posterior internal capsule with mild associated vasogenic edema. No evidence for significant stenosis of the cervical vessels.   No evidence for significant stenosis or large branch vessel cutoffs of the intracranial vessels.   Esteban Johnson discussed the significance and urgency of this critical finding by telephone with  ISABELLA SNEED on 10/10/2023 at 12:05 am. (**-RCF-**) Findings:  See findings.     MACRO: None   Signed by: Esteban Johnson 10/10/2023 12:06 AM Dictation workstation:   [id number]    CT angio brain attack head w IV contrast and post procedure    Result Date: 10/10/2023  Interpreted By:  Esteban Johnson, STUDY: CT ANGIO BRAIN ATTACK NECK W IV CONTRAST AND POST PROCEDURE; CT ANGIO BRAIN ATTACK HEAD W IV CONTRAST AND POST PROCEDURE; CT BRAIN ATTACK HEAD WO IV CONTRAST;   10/9/2023 10:52 pm; 10/9/2023 10:46 pm   INDICATION: Signs/Symptoms:Stroke Evaluation with VAN Positive; Signs/Symptoms:Stroke Evaluation.   COMPARISON: None.   ACCESSION NUMBER(S): [id number]; [id number]; [id number]   ORDERING CLINICIAN: ISABELLA SNEED   TECHNIQUE: Unenhanced CT images of the head were obtained. Subsequently,  75 mL Omnipaque 350 was administered intravenously and axial images of the head and neck were acquired.  Coronal, sagittal, and 3-D reconstructions were provided for review.   FINDINGS: Noncontrast CT of the head: There is an approximately 1.8 x 1.4 cm hyperdensity involving the left posterior internal capsule with suggestion of mild surrounding vasogenic edema, likely representing acute to subacute hemorrhagic lacunar infarct. There is no significant mass effect. The gray-white matter differentiation is otherwise maintained. There is no hydrocephalus. There is mild to moderate generalized cerebral volume loss and associated ventricular and sulcal enlargement. Scattered periventricular and deep white matter hypodensities suggestive of mild to moderate chronic microvascular ischemic change.   CTA HEAD FINDINGS:   Anterior circulation: The bilateral intracranial internal carotid arteries, bilateral carotid terminals, bilateral proximal anterior and middle cerebral arteries are patent without significant focal stenosis. There are bilateral carotid siphon atherosclerotic calcifications without significant focal stenosis.   Posterior circulation: Bilateral intracranial vertebral arteries, vertebrobasilar junction, basilar artery and proximal posterior cerebral arteries are normal.   CTA NECK FINDINGS:   Right carotid vessels: There is retropharyngeal course of the upper common carotid artery. There are atherosclerotic calcifications of the carotid bifurcation with 0% stenosis by NASCET criteria. The remainder of the cervical left internal carotid artery is also widely patent.   Left carotid  vessels: There is retropharyngeal course of the upper common carotid artery. There are atherosclerotic calcifications of the carotid bifurcation with 0% stenosis by NASCET criteria. The remainder of the cervical left internal carotid artery is also widely patent.   Vertebral vessels:  The visualized segments of the cervical vertebral arteries are patent. There are atherosclerotic calcifications of the left vertebral artery without focal stenosis.         Acute or subacute hemorrhagic lacunar infarct centered in the left posterior internal capsule with mild associated vasogenic edema. No evidence for significant stenosis of the cervical vessels.   No evidence for significant stenosis or large branch vessel cutoffs of the intracranial vessels.   Esteban Johnson discussed the significance and urgency of this critical finding by telephone with  ISABELLA SNEED on 10/10/2023 at 12:05 am. (**-RCF-**) Findings:  See findings.     MACRO: None   Signed by: Esteban Johnson 10/10/2023 12:06 AM Dictation workstation:   [id number]    CT angio brain attack neck w IV contrast and post procedure    Result Date: 10/10/2023  Interpreted By:  Esteban Johnson, STUDY: CT ANGIO BRAIN ATTACK NECK W IV CONTRAST AND POST PROCEDURE; CT ANGIO BRAIN ATTACK HEAD W IV CONTRAST AND POST PROCEDURE; CT BRAIN ATTACK HEAD WO IV CONTRAST;  10/9/2023 10:52 pm; 10/9/2023 10:46 pm   INDICATION: Signs/Symptoms:Stroke Evaluation with VAN Positive; Signs/Symptoms:Stroke Evaluation.   COMPARISON: None.   ACCESSION NUMBER(S): [id number]; [id number]; [id number]   ORDERING CLINICIAN: ISABELLA SNEED   TECHNIQUE: Unenhanced CT images of the head were obtained. Subsequently,  75 mL Omnipaque 350 was administered intravenously and axial images of the head and neck were acquired.  Coronal, sagittal, and 3-D reconstructions were provided for review.   FINDINGS: Noncontrast CT of the head: There is an approximately 1.8 x 1.4 cm hyperdensity involving the left  posterior internal capsule with suggestion of mild surrounding vasogenic edema, likely representing acute to subacute hemorrhagic lacunar infarct. There is no significant mass effect. The gray-white matter differentiation is otherwise maintained. There is no hydrocephalus. There is mild to moderate generalized cerebral volume loss and associated ventricular and sulcal enlargement. Scattered periventricular and deep white matter hypodensities suggestive of mild to moderate chronic microvascular ischemic change.   CTA HEAD FINDINGS:   Anterior circulation: The bilateral intracranial internal carotid arteries, bilateral carotid terminals, bilateral proximal anterior and middle cerebral arteries are patent without significant focal stenosis. There are bilateral carotid siphon atherosclerotic calcifications without significant focal stenosis.   Posterior circulation: Bilateral intracranial vertebral arteries, vertebrobasilar junction, basilar artery and proximal posterior cerebral arteries are normal.   CTA NECK FINDINGS:   Right carotid vessels: There is retropharyngeal course of the upper common carotid artery. There are atherosclerotic calcifications of the carotid bifurcation with 0% stenosis by NASCET criteria. The remainder of the cervical left internal carotid artery is also widely patent.   Left carotid vessels: There is retropharyngeal course of the upper common carotid artery. There are atherosclerotic calcifications of the carotid bifurcation with 0% stenosis by NASCET criteria. The remainder of the cervical left internal carotid artery is also widely patent.   Vertebral vessels:  The visualized segments of the cervical vertebral arteries are patent. There are atherosclerotic calcifications of the left vertebral artery without focal stenosis.         Acute or subacute hemorrhagic lacunar infarct centered in the left posterior internal capsule with mild associated vasogenic edema. No evidence for significant  stenosis of the cervical vessels.   No evidence for significant stenosis or large branch vessel cutoffs of the intracranial vessels.   Esteban Johnson discussed the significance and urgency of this critical finding by telephone with  ISABELLA SNEED on 10/10/2023 at 12:05 am. (**-RCF-**) Findings:  See findings.     MACRO: None   Signed by: Esteban Johnson 10/10/2023 12:06 AM Dictation workstation:   [id number]        Assessment/Plan     Subacute to acute left intracranial hemorrhage  Stable on repeat head CT  Neuro exam as expected and stable, patient reports right upper extremity improving with strength and coordination  No acute neurosurgical intervention planned  Recommend SBP less than 140  Hold blood thinners  Repeat head CT outpatient in 2 weeks      John Heck PA-C

## 2023-10-11 ENCOUNTER — APPOINTMENT (OUTPATIENT)
Dept: CARDIOLOGY | Facility: HOSPITAL | Age: 88
DRG: 064 | End: 2023-10-11
Payer: MEDICARE

## 2023-10-11 LAB
25(OH)D3 SERPL-MCNC: 27 NG/ML (ref 30–100)
ALBUMIN SERPL BCP-MCNC: 3.2 G/DL (ref 3.4–5)
ALBUMIN SERPL BCP-MCNC: 3.4 G/DL (ref 3.4–5)
ALBUMIN SERPL BCP-MCNC: 3.4 G/DL (ref 3.4–5)
ALP SERPL-CCNC: 129 U/L (ref 33–136)
ALT SERPL W P-5'-P-CCNC: 41 U/L (ref 10–52)
ANION GAP SERPL CALC-SCNC: 14 MMOL/L (ref 10–20)
ANION GAP SERPL CALC-SCNC: 14 MMOL/L (ref 10–20)
ANION GAP SERPL CALC-SCNC: 16 MMOL/L (ref 10–20)
AST SERPL W P-5'-P-CCNC: 49 U/L (ref 9–39)
ATRIAL RATE: 68 BPM
BILIRUB SERPL-MCNC: 0.6 MG/DL (ref 0–1.2)
BUN SERPL-MCNC: 58 MG/DL (ref 6–23)
BUN SERPL-MCNC: 61 MG/DL (ref 6–23)
BUN SERPL-MCNC: 61 MG/DL (ref 6–23)
CALCIUM SERPL-MCNC: 8.4 MG/DL (ref 8.6–10.3)
CALCIUM SERPL-MCNC: 8.7 MG/DL (ref 8.6–10.3)
CALCIUM SERPL-MCNC: 8.7 MG/DL (ref 8.6–10.3)
CHLORIDE SERPL-SCNC: 97 MMOL/L (ref 98–107)
CHLORIDE SERPL-SCNC: 97 MMOL/L (ref 98–107)
CHLORIDE SERPL-SCNC: 98 MMOL/L (ref 98–107)
CO2 SERPL-SCNC: 21 MMOL/L (ref 21–32)
CO2 SERPL-SCNC: 23 MMOL/L (ref 21–32)
CO2 SERPL-SCNC: 25 MMOL/L (ref 21–32)
CREAT SERPL-MCNC: 4.09 MG/DL (ref 0.5–1.3)
CREAT SERPL-MCNC: 4.47 MG/DL (ref 0.5–1.3)
CREAT SERPL-MCNC: 4.74 MG/DL (ref 0.5–1.3)
ERYTHROCYTE [DISTWIDTH] IN BLOOD BY AUTOMATED COUNT: 14.8 % (ref 11.5–14.5)
GFR SERPL CREATININE-BSD FRML MDRD: 11 ML/MIN/1.73M*2
GFR SERPL CREATININE-BSD FRML MDRD: 12 ML/MIN/1.73M*2
GFR SERPL CREATININE-BSD FRML MDRD: 14 ML/MIN/1.73M*2
GLUCOSE BLD MANUAL STRIP-MCNC: 168 MG/DL (ref 74–99)
GLUCOSE BLD MANUAL STRIP-MCNC: 196 MG/DL (ref 74–99)
GLUCOSE BLD MANUAL STRIP-MCNC: 64 MG/DL (ref 74–99)
GLUCOSE BLD MANUAL STRIP-MCNC: 66 MG/DL (ref 74–99)
GLUCOSE BLD MANUAL STRIP-MCNC: 82 MG/DL (ref 74–99)
GLUCOSE BLD MANUAL STRIP-MCNC: 84 MG/DL (ref 74–99)
GLUCOSE SERPL-MCNC: 177 MG/DL (ref 74–99)
GLUCOSE SERPL-MCNC: 73 MG/DL (ref 74–99)
GLUCOSE SERPL-MCNC: 80 MG/DL (ref 74–99)
HCT VFR BLD AUTO: 32.6 % (ref 41–52)
HGB BLD-MCNC: 10 G/DL (ref 13.5–17.5)
INR PPP: 1.3 (ref 0.9–1.1)
MAGNESIUM SERPL-MCNC: 2.08 MG/DL (ref 1.6–2.4)
MCH RBC QN AUTO: 29.7 PG (ref 26–34)
MCHC RBC AUTO-ENTMCNC: 30.7 G/DL (ref 32–36)
MCV RBC AUTO: 97 FL (ref 80–100)
NRBC BLD-RTO: 0 /100 WBCS (ref 0–0)
P OFFSET: 90 MS
P ONSET: 59 MS
PHOSPHATE SERPL-MCNC: 4 MG/DL (ref 2.5–4.9)
PHOSPHATE SERPL-MCNC: 4 MG/DL (ref 2.5–4.9)
PLATELET # BLD AUTO: 193 X10*3/UL (ref 150–450)
PMV BLD AUTO: 11 FL (ref 7.5–11.5)
POTASSIUM SERPL-SCNC: 4.1 MMOL/L (ref 3.5–5.3)
POTASSIUM SERPL-SCNC: 4.2 MMOL/L (ref 3.5–5.3)
POTASSIUM SERPL-SCNC: 4.4 MMOL/L (ref 3.5–5.3)
PR INTERVAL: 306 MS
PROT SERPL-MCNC: 7.3 G/DL (ref 6.4–8.2)
PROTHROMBIN TIME: 14.8 SECONDS (ref 9.8–12.8)
Q ONSET: 212 MS
QRS COUNT: 11 BEATS
QRS DURATION: 110 MS
QT INTERVAL: 432 MS
QTC CALCULATION(BAZETT): 459 MS
QTC FREDERICIA: 450 MS
R AXIS: 194 DEGREES
RBC # BLD AUTO: 3.37 X10*6/UL (ref 4.5–5.9)
SODIUM SERPL-SCNC: 130 MMOL/L (ref 136–145)
SODIUM SERPL-SCNC: 131 MMOL/L (ref 136–145)
SODIUM SERPL-SCNC: 132 MMOL/L (ref 136–145)
T AXIS: 206 DEGREES
T OFFSET: 428 MS
VENTRICULAR RATE: 68 BPM
WBC # BLD AUTO: 10.4 X10*3/UL (ref 4.4–11.3)

## 2023-10-11 PROCEDURE — 2500000004 HC RX 250 GENERAL PHARMACY W/ HCPCS (ALT 636 FOR OP/ED): Performed by: INTERNAL MEDICINE

## 2023-10-11 PROCEDURE — 83970 ASSAY OF PARATHORMONE: CPT | Performed by: INTERNAL MEDICINE

## 2023-10-11 PROCEDURE — 97116 GAIT TRAINING THERAPY: CPT | Mod: GP,CQ

## 2023-10-11 PROCEDURE — 96372 THER/PROPH/DIAG INJ SC/IM: CPT

## 2023-10-11 PROCEDURE — 99291 CRITICAL CARE FIRST HOUR: CPT

## 2023-10-11 PROCEDURE — 82306 VITAMIN D 25 HYDROXY: CPT | Performed by: INTERNAL MEDICINE

## 2023-10-11 PROCEDURE — 85610 PROTHROMBIN TIME: CPT

## 2023-10-11 PROCEDURE — 97112 NEUROMUSCULAR REEDUCATION: CPT | Mod: GP,CQ

## 2023-10-11 PROCEDURE — 2500000001 HC RX 250 WO HCPCS SELF ADMINISTERED DRUGS (ALT 637 FOR MEDICARE OP)

## 2023-10-11 PROCEDURE — 80053 COMPREHEN METABOLIC PANEL: CPT

## 2023-10-11 PROCEDURE — 80069 RENAL FUNCTION PANEL: CPT | Mod: STJLAB,CCI

## 2023-10-11 PROCEDURE — 51701 INSERT BLADDER CATHETER: CPT

## 2023-10-11 PROCEDURE — 83735 ASSAY OF MAGNESIUM: CPT

## 2023-10-11 PROCEDURE — 36415 COLL VENOUS BLD VENIPUNCTURE: CPT | Mod: STJLAB

## 2023-10-11 PROCEDURE — 83921 ORGANIC ACID SINGLE QUANT: CPT

## 2023-10-11 PROCEDURE — 80061 LIPID PANEL: CPT | Performed by: INTERNAL MEDICINE

## 2023-10-11 PROCEDURE — 2500000004 HC RX 250 GENERAL PHARMACY W/ HCPCS (ALT 636 FOR OP/ED)

## 2023-10-11 PROCEDURE — 85027 COMPLETE CBC AUTOMATED: CPT

## 2023-10-11 PROCEDURE — 82607 VITAMIN B-12: CPT

## 2023-10-11 PROCEDURE — 36415 COLL VENOUS BLD VENIPUNCTURE: CPT

## 2023-10-11 PROCEDURE — 93005 ELECTROCARDIOGRAM TRACING: CPT

## 2023-10-11 PROCEDURE — 2500000001 HC RX 250 WO HCPCS SELF ADMINISTERED DRUGS (ALT 637 FOR MEDICARE OP): Performed by: INTERNAL MEDICINE

## 2023-10-11 PROCEDURE — 2500000002 HC RX 250 W HCPCS SELF ADMINISTERED DRUGS (ALT 637 FOR MEDICARE OP, ALT 636 FOR OP/ED)

## 2023-10-11 PROCEDURE — 2020000001 HC ICU ROOM DAILY

## 2023-10-11 PROCEDURE — 82947 ASSAY GLUCOSE BLOOD QUANT: CPT

## 2023-10-11 PROCEDURE — 84100 ASSAY OF PHOSPHORUS: CPT

## 2023-10-11 PROCEDURE — 2500000002 HC RX 250 W HCPCS SELF ADMINISTERED DRUGS (ALT 637 FOR MEDICARE OP, ALT 636 FOR OP/ED): Performed by: INTERNAL MEDICINE

## 2023-10-11 RX ORDER — SODIUM CHLORIDE 9 MG/ML
75 INJECTION, SOLUTION INTRAVENOUS CONTINUOUS
Status: DISCONTINUED | OUTPATIENT
Start: 2023-10-11 | End: 2023-10-12

## 2023-10-11 RX ORDER — HYDRALAZINE HYDROCHLORIDE 25 MG/1
12.5 TABLET, FILM COATED ORAL ONCE
Status: COMPLETED | OUTPATIENT
Start: 2023-10-11 | End: 2023-10-11

## 2023-10-11 RX ORDER — HYDRALAZINE HYDROCHLORIDE 25 MG/1
25 TABLET, FILM COATED ORAL 2 TIMES DAILY
Status: DISCONTINUED | OUTPATIENT
Start: 2023-10-11 | End: 2023-10-19 | Stop reason: HOSPADM

## 2023-10-11 RX ORDER — B-COMPLEX WITH VITAMIN C
1 TABLET ORAL EVERY OTHER DAY
Status: DISCONTINUED | OUTPATIENT
Start: 2023-10-11 | End: 2023-10-19 | Stop reason: HOSPADM

## 2023-10-11 RX ORDER — LABETALOL HYDROCHLORIDE 5 MG/ML
10 INJECTION, SOLUTION INTRAVENOUS EVERY 2 HOUR PRN
Status: DISCONTINUED | OUTPATIENT
Start: 2023-10-11 | End: 2023-10-13

## 2023-10-11 RX ORDER — SERTRALINE HYDROCHLORIDE 25 MG/1
25 TABLET, FILM COATED ORAL DAILY
Status: DISCONTINUED | OUTPATIENT
Start: 2023-10-11 | End: 2023-10-19 | Stop reason: HOSPADM

## 2023-10-11 RX ORDER — HYDROXYZINE HYDROCHLORIDE 25 MG/1
25 TABLET, FILM COATED ORAL ONCE
Status: COMPLETED | OUTPATIENT
Start: 2023-10-11 | End: 2023-10-11

## 2023-10-11 RX ORDER — B-COMPLEX WITH VITAMIN C
1 TABLET ORAL DAILY
Status: DISCONTINUED | OUTPATIENT
Start: 2023-10-11 | End: 2023-10-11

## 2023-10-11 RX ORDER — ISOSORBIDE MONONITRATE 30 MG/1
30 TABLET, EXTENDED RELEASE ORAL DAILY
Status: DISCONTINUED | OUTPATIENT
Start: 2023-10-11 | End: 2023-10-19 | Stop reason: HOSPADM

## 2023-10-11 RX ORDER — HYDRALAZINE HYDROCHLORIDE 25 MG/1
12.5 TABLET, FILM COATED ORAL 2 TIMES DAILY
Status: DISCONTINUED | OUTPATIENT
Start: 2023-10-11 | End: 2023-10-11

## 2023-10-11 RX ADMIN — NICARDIPINE HYDROCHLORIDE 2.5 MG/HR: 0.2 INJECTION, SOLUTION INTRAVENOUS at 03:25

## 2023-10-11 RX ADMIN — DOCUSATE SODIUM 100 MG: 100 CAPSULE, LIQUID FILLED ORAL at 21:28

## 2023-10-11 RX ADMIN — SODIUM CHLORIDE 75 ML/HR: 9 INJECTION, SOLUTION INTRAVENOUS at 14:04

## 2023-10-11 RX ADMIN — DOCUSATE SODIUM 100 MG: 100 CAPSULE, LIQUID FILLED ORAL at 08:59

## 2023-10-11 RX ADMIN — Medication 1 TABLET: at 08:59

## 2023-10-11 RX ADMIN — SERTRALINE HYDROCHLORIDE 25 MG: 25 TABLET ORAL at 11:08

## 2023-10-11 RX ADMIN — HYDRALAZINE HYDROCHLORIDE 25 MG: 25 TABLET ORAL at 21:28

## 2023-10-11 RX ADMIN — INSULIN GLARGINE 12 UNITS: 100 INJECTION, SOLUTION SUBCUTANEOUS at 21:26

## 2023-10-11 RX ADMIN — ROSUVASTATIN CALCIUM 20 MG: 20 TABLET, FILM COATED ORAL at 08:59

## 2023-10-11 RX ADMIN — AMIODARONE HYDROCHLORIDE 200 MG: 200 TABLET ORAL at 08:59

## 2023-10-11 RX ADMIN — Medication 25 MCG: at 09:00

## 2023-10-11 RX ADMIN — HYDROXYZINE HYDROCHLORIDE 25 MG: 25 TABLET ORAL at 23:01

## 2023-10-11 RX ADMIN — HYDRALAZINE HYDROCHLORIDE 12.5 MG: 25 TABLET ORAL at 16:15

## 2023-10-11 RX ADMIN — PANTOPRAZOLE SODIUM 40 MG: 40 TABLET, DELAYED RELEASE ORAL at 09:20

## 2023-10-11 RX ADMIN — INSULIN LISPRO 2 UNITS: 100 INJECTION, SOLUTION INTRAVENOUS; SUBCUTANEOUS at 21:38

## 2023-10-11 RX ADMIN — ISOSORBIDE MONONITRATE 30 MG: 30 TABLET, EXTENDED RELEASE ORAL at 09:20

## 2023-10-11 RX ADMIN — NICARDIPINE HYDROCHLORIDE 2.5 MG/HR: 0.2 INJECTION, SOLUTION INTRAVENOUS at 21:31

## 2023-10-11 RX ADMIN — HYDRALAZINE HYDROCHLORIDE 12.5 MG: 25 TABLET ORAL at 08:59

## 2023-10-11 SDOH — ECONOMIC STABILITY: FOOD INSECURITY: WITHIN THE PAST 12 MONTHS, YOU WORRIED THAT YOUR FOOD WOULD RUN OUT BEFORE YOU GOT MONEY TO BUY MORE.: NEVER TRUE

## 2023-10-11 SDOH — ECONOMIC STABILITY: FOOD INSECURITY: WITHIN THE PAST 12 MONTHS, THE FOOD YOU BOUGHT JUST DIDN'T LAST AND YOU DIDN'T HAVE MONEY TO GET MORE.: NEVER TRUE

## 2023-10-11 SDOH — ECONOMIC STABILITY: INCOME INSECURITY: IN THE LAST 12 MONTHS, WAS THERE A TIME WHEN YOU WERE NOT ABLE TO PAY THE MORTGAGE OR RENT ON TIME?: NO

## 2023-10-11 SDOH — ECONOMIC STABILITY: INCOME INSECURITY: HOW HARD IS IT FOR YOU TO PAY FOR THE VERY BASICS LIKE FOOD, HOUSING, MEDICAL CARE, AND HEATING?: NOT HARD AT ALL

## 2023-10-11 SDOH — ECONOMIC STABILITY: HOUSING INSECURITY: IN THE LAST 12 MONTHS, HOW MANY PLACES HAVE YOU LIVED?: 1

## 2023-10-11 ASSESSMENT — PAIN - FUNCTIONAL ASSESSMENT
PAIN_FUNCTIONAL_ASSESSMENT: 0-10

## 2023-10-11 ASSESSMENT — COGNITIVE AND FUNCTIONAL STATUS - GENERAL
MOBILITY SCORE: 13
WALKING IN HOSPITAL ROOM: A LOT
STANDING UP FROM CHAIR USING ARMS: A LITTLE
TURNING FROM BACK TO SIDE WHILE IN FLAT BAD: A LOT
MOVING FROM LYING ON BACK TO SITTING ON SIDE OF FLAT BED WITH BEDRAILS: A LOT
CLIMB 3 TO 5 STEPS WITH RAILING: A LOT
MOVING TO AND FROM BED TO CHAIR: A LOT

## 2023-10-11 ASSESSMENT — PAIN SCALES - GENERAL
PAINLEVEL_OUTOF10: 0 - NO PAIN

## 2023-10-11 ASSESSMENT — ACTIVITIES OF DAILY LIVING (ADL): LACK_OF_TRANSPORTATION: NO

## 2023-10-11 NOTE — CARE PLAN
Problem: General Stroke  Goal: Demonstrate improvement in neurological exam throughout the shift  Outcome: Progressing     Problem: Fall/Injury  Goal: Verbalize understanding of risk factor reduction measures to prevent injury from fall in the home  Outcome: Progressing     Problem: Fall/Injury  Goal: Use assistive devices by end of the shift  Outcome: Progressing     Problem: Psychosocial Needs  Goal: Demonstrates ability to cope with hospitalization/illness  Outcome: Met     Problem: Psychosocial Needs  Goal: Collaborate with me, my family, and caregiver to identify my specific goals  Outcome: Met   The patient's goals for the shift include get a few hours sleep this shift    The clinical goals for the shift include Patient neurological assessment will not have worsening htrough 10/11/23 at 0700.    Over the shift, the patient did not make progress toward the following goals. Barriers to progression include ilness. Recommendations to address these barriers include effective communication with family and patient.

## 2023-10-11 NOTE — CARE PLAN
The patient's goals for the shift include get a few hours sleep this shift    The clinical goals for the shift include to not have worsening in neurological exam through shift.     Patient remained safe and injury free. VSS. Patient is unable to void naturally and has intermittent confusion. Patient intermittently tries to get out of bed. Patient continues to progress per the plan of care.

## 2023-10-11 NOTE — PROGRESS NOTES
Physical Therapy    Physical Therapy Evaluation    Patient Name: Zack Figueroa  MRN: [id number]  Today's Date: 10/10/2023   Time Calculation  Start Time: 1108  Stop Time: 1137  Time Calculation (min): 29 min    Assessment/Plan   PT Assessment  PT Assessment Results: Decreased strength, Decreased range of motion, Decreased endurance, Impaired balance, Decreased mobility, Decreased cognition, Impaired judgement, Decreased safety awareness, Decreased coordination, Impaired hearing  Rehab Prognosis: Good  Evaluation/Treatment Tolerance: Patient tolerated treatment well  Medical Staff Made Aware: Yes  End of Session Communication: Bedside nurse    Assessment Comment: Assessment Comment: Pt is a 89 y/o male admitted on 10/9 for intraparenchymal hemorrhage of brain. Pt presents with RUE strength and coordination deficits. He displayed a significant right lateral lean during functional mobility tasks with inability to maintain COG/GABIREL in midline. He had an episode of retro LOB when standing and beginning to ambulate with FWW. Able to recover standing balance with assistance from SPT. Pt able to tolerate sit <> stand transfers and ambulation during session as stated in functional assessment below. He is functioning below baseline level of function. Pt will benefit from skilled therapy during stay to improve overall functional mobility, strength, ROM, balance and safety awareness. Upon discharge pt will benefit from high intensity therapy for continued improvement in functional mobility.     End of Session Patient Position: Alarm on, Up in chair  IP OR SWING BED PT PLAN  Inpatient or Swing Bed: Inpatient  PT Plan  Treatment/Interventions: Bed mobility, Transfer training, Gait training, Stair training, Balance training, Neuromuscular re-education, Strengthening, Endurance training, Range of motion, Therapeutic exercise, Therapeutic activity, Postural re-education  PT Plan: Skilled PT  PT Frequency: Daily  PT Discharge  Recommendations: High intensity level of continued care  PT Recommended Transfer Status: Assist x2, Assistive device    Subjective     Current Problem:  1. Intraparenchymal hemorrhage of brain (CMS/HCC)        2. Oral phase dysphagia [R13.11]        3. 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]          Past Medical History:   Diagnosis Date    Atrial fibrillation (CMS/[facility])     CAD (coronary artery disease)     CHF (congestive heart failure) (CMS/[facility])     CKD (chronic kidney disease) stage 3, GFR 30-59 ml/min (CMS/[facility])     HLD (hyperlipidemia)     HTN (hypertension)     JESUS (iron deficiency anemia)     Insulin dependent type 2 diabetes mellitus (CMS/[facility])     Stroke (CMS/[facility])      Past Surgical History:   Procedure Laterality Date    APPENDECTOMY      CORONARY ARTERY BYPASS GRAFT  1994       General Visit Information:  General  Reason for Referral: Impaired mobility  Referred By: Dr. Lamb  Family/Caregiver Present: Yes  Caregiver Feedback: Wife present [facility] through evaluation. Discussed discharge recommendations for pt.  Co-Treatment: OT  Prior to Session Communication: Bedside nurse  Patient Position Received: Up in chair  General Comment: Consulted with bedside nurse with permission to work with pt. Upon entering room pt sitting in chair resting. Pt willing and cooperative to work with pt.    Home Living:  Home Living  Home Living Comments: Pt lives in 2-story home with wife and kids. 1-2 MERVIN to enter with BL HR. Flight of steps with HR to second floor where bed/bath located. Walk in shower with shower seat and grab bars. Also had a bathroom on first floor.    Prior Level of Function:  Prior Function Per Pt/Caregiver Report  Prior Function Comments: Mod I for mobility with FWW. Independent with ADLs. Requires assistance for IADLs from wife. Wife provides transportation, pt does not drive. Denies recent falls. Retired cardiologist.    Precautions:  Precautions  Medical  Precautions: Fall precautions, Seizure precautions (Aspiration precautions)  Precautions Comment: Maintain HOB at 30 degrees all times. Maintain SBP < 140.    Vital Signs:  Vital Signs  Heart Rate: 71 (Post treatment: 73)  Heart Rate Source: Monitor  Resp: 19 (Post treatment: 16)  BP: 127/57 (Post treatment: 132/63)  Patient Position: Sitting  Objective     Pain:  Pain Assessment  Pain Assessment: 0-10  Pain Score: 0 - No pain    Cognition:  Cognition  Overall Cognitive Status:  (A&O x2)    General Assessments:         Sensation  Sensation Comment: Pt reports parasthesias in BL feet d/t peripheral neuropathy.        Coordination  Finger to Nose: Impaired (Displays dymetria with undershooting.)  Postural Control  Postural Control: Impaired  Posture Comment: Pt displays a signficiant right lateral lean in sitting and standing.  Static Sitting Balance  Static Sitting-Comment/Number of Minutes: Fair with UE/LE support  Static Standing Balance  Static Standing-Comment/Number of Minutes: Poor (Significant right lateral lean upon standing with FWW. Attempted standing without use of FWW using HH assist and gait belt min-mod A x2. Pt able to tolerate this for ~10-15 sec.)  Dynamic Standing Balance  Dynamic Standing-Comments: Poor (Signifiant right lateral lean and episode of retro LOB when pt began to ambulate.)    Functional Assessments:        Transfers  Transfer: Yes  Transfer 1  Trials/Comments 1: Sit <> stand x3 trials with FWW and gait belt min-mod A x1. Demonstrates significant right lateral lean and required verbal/tactile cues for proper hand placement. Sit <> stand x1 trial min-mod A x2 with HH assistance and use of gait belt.  Ambulation/Gait Training  Ambulation/Gait Training Performed: Yes  Ambulation/Gait Training 1  Comments/Distance (ft) 1: Ambulated ~40 ft with FWW and gait belt max A x1 with chair follow. Demonstrates signficiant right lateral lean and inability to shift weight to midline. He displays  decreased stance time on RLE and insufficient foot clearance BL. Attempted to ambulate with HH assist without FWW with gait belt, pt became too unsteady and reaching for FWW.          Extremity/Trunk Assessments:  RUE   RUE :  (Impaired, signficiant weakness and limited ROM in RUE.)     RLE   RLE : Within Functional Limits  LLE   LLE : Within Functional Limits    Outcome Measures:     [facility] Basic Mobility  Turning from your back to your side while in a flat bed without using bedrails: A lot  Moving from lying on your back to sitting on the side of a flat bed without using bedrails: A lot  Moving to and from bed to chair (including a wheelchair): A lot  Standing up from a chair using your arms (e.g. wheelchair or bedside chair): A little  To walk in hospital room: A lot  Climbing 3-5 steps with railing: Total  Basic Mobility - Total Score: 12  Encounter Problems       Encounter Problems (Active)       PT Problem       Bed mobility (Progressing)       Start:  10/10/23    Expected End:  10/31/23       Pt will be able to complete bed mobility mod I with use of bed HR.            Transfers (Progressing)       Start:  10/10/23    Expected End:  10/31/23       Pt will be able to complete all transfers with FWW CGA demonstrating good safety awareness and proper body mechanics.           Gait (Progressing)       Start:  10/10/23    Expected End:  10/31/23       Pt will be able to ambulate 100 ft with FWW CGA with good safety awareness and ability to maintain COG/GABRIEL in midline.           Strength (Progressing)       Start:  10/10/23    Expected End:  10/31/23       Pt will improve BLE strength with supine, seated and standing exercises to WFL.           Balance (Progressing)       Start:  10/10/23    Expected End:  10/31/23       Pt will demonstrate good static/dynamic standing balance without evidence of right lateral lean or retro LOB.                  Education Documentation  Body Mechanics, taught by Lyssa Camacho  S-PT at 10/10/2023  1:29 PM.  Learner: Family, Patient  Readiness: Acceptance  Method: Explanation  Response: Verbalizes Understanding, Needs Reinforcement    Mobility Training, taught by MADISON OchoaPT at 10/10/2023  1:29 PM.  Learner: Family, Patient  Readiness: Acceptance  Method: Explanation  Response: Verbalizes Understanding, Needs Reinforcement    Education Comments  Pt educated on the benefits of mobility and importance of participation in therapy for overall health and wellness. Pt educated on proper body mechanics and proper hand placement during functional mobility tasks.

## 2023-10-11 NOTE — PROGRESS NOTES
Met with pt and wife at bedside to update them that [facility] is able to accept upon dc. Pt's wife states she will update her son as well.      Ketty Joy RN

## 2023-10-11 NOTE — PROGRESS NOTES
Physical Therapy    Physical Therapy Treatment    Patient Name: Zack Figueroa  MRN: [id number]  Today's Date: 10/11/2023  Time Calculation  Start Time: 0950  Stop Time: 1020  Time Calculation (min): 30 min       Assessment/Plan   PT Assessment  PT Assessment Results: Decreased strength, Impaired balance, Decreased mobility, Decreased coordination, Decreased cognition, Decreased safety awareness  Rehab Prognosis: Good  Evaluation/Treatment Tolerance: Patient tolerated treatment well  Medical Staff Made Aware: Yes  End of Session Communication: Bedside nurse  Assessment Comment: ,  End of Session Patient Position: Alarm on, Up in chair     PT Plan  Treatment/Interventions: Bed mobility, Transfer training, Gait training, Balance training, Neuromuscular re-education  PT Plan: Skilled PT  PT Frequency: Daily  PT Discharge Recommendations: High intensity level of continued care  PT Recommended Transfer Status: Assist x2, Assistive device      General Visit Information:   PT  Visit  PT Received On: 10/11/23  Prior to Session Communication: Bedside nurse  Patient Position Received: Bed, 3 rail up, Alarm on    Subjective   Precautions:  Precautions  Medical Precautions: Fall precautions  Precautions Comment: Maintain HOB at 30 degrees all times. Maintain SBP < 140.  Vital Signs:  Vital Signs  Heart Rate: 74  Heart Rate Source: Monitor  SpO2: 100 %  BP: 135/70  MAP (mmHg): 108  BP Location: Left arm  BP Method: Automatic  Patient Position: Sitting    Objective   Pain:  Pain Assessment  Pain Assessment: 0-10  Pain Score: 0 - No pain  Clinical Progression: Not changed  Patient's Stated Pain Goal: No pain  Cognition:  Cognition  Overall Cognitive Status: Impaired at baseline  Orientation Level: Unable to assess  Postural Control:  Postural Control  Posture Comment: Pt displays a signficiant right lateral lean in sitting and standing.  Extremity/Trunk Assessments:    Activity Tolerance:  Activity Tolerance  Endurance:  Tolerates 30+ min exercise without fatigue  Early Mobility/Exercise Safety Screen: Proceed with mobilization - No exclusion criteria met  Treatments:  Balance/Neuromuscular Re-Education  Balance/Neuromuscular Re-Education Activity Performed: Yes  Balance/Neuromuscular Re-Education Activity 1: static sitting edge of bed with self righting reaction, mild right lean but able ot correct somewhat with visual cues.    Bed Mobility  Bed Mobility: Yes  Bed Mobility 1  Bed Mobility 1: Supine to sitting  Level of Assistance 1: Moderate assistance  Bed Mobility Comments 1: x1    Ambulation/Gait Training  Ambulation/Gait Training Performed: Yes  Ambulation/Gait Training 1  Surface 1: Level tile  Device 1: Rolling walker  Gait Support Devices: Gait belt  Assistance 1: Moderate assistance, Maximum tactile cues  Quality of Gait 1: Wide base of support  Comments/Distance (ft) 1: 40' x2, moderate to heavy right lean, difficulty placing R foot.  Transfers  Transfer: Yes  Transfer 1  Transfer From 1: Sit to  Transfer to 1: Stand  Technique 1: Sit to stand  Transfer Device 1: Walker  Transfer Level of Assistance 1: Minimum assistance  Trials/Comments 1: x2 trials    Outcome Measures:  [facility] Basic Mobility  Turning from your back to your side while in a flat bed without using bedrails: A lot  Moving from lying on your back to sitting on the side of a flat bed without using bedrails: A lot  Moving to and from bed to chair (including a wheelchair): A lot  Standing up from a chair using your arms (e.g. wheelchair or bedside chair): A little  To walk in hospital room: A lot  Climbing 3-5 steps with railing: A lot  Basic Mobility - Total Score: 13    Education Documentation  Body Mechanics, taught by Ady Beckman PTA at 10/11/2023 10:33 AM.  Learner: Patient  Readiness: Acceptance  Method: Explanation, Demonstration  Response: Verbalizes Understanding, Needs Reinforcement    Mobility Training, taught by Ady Beckman PTA at 10/11/2023 10:33  AM.  Learner: Patient  Readiness: Acceptance  Method: Explanation, Demonstration  Response: Verbalizes Understanding, Needs Reinforcement    Education Comments  No comments found.           Encounter Problems       Encounter Problems (Active)       PT Problem       Bed mobility (Progressing)       Start:  10/10/23    Expected End:  10/31/23       Pt will be able to complete bed mobility mod I with use of bed HR.            Transfers (Progressing)       Start:  10/10/23    Expected End:  10/31/23       Pt will be able to complete all transfers with FWW CGA demonstrating good safety awareness and proper body mechanics.           Gait (Progressing)       Start:  10/10/23    Expected End:  10/31/23       Pt will be able to ambulate 100 ft with FWW CGA with good safety awareness and ability to maintain COG/GABRIEL in midline.           Strength (Progressing)       Start:  10/10/23    Expected End:  10/31/23       Pt will improve BLE strength with supine, seated and standing exercises to WFL.           Balance (Progressing)       Start:  10/10/23    Expected End:  10/31/23       Pt will demonstrate good static/dynamic standing balance without evidence of right lateral lean or retro LOB.              Pain - Adult          Safety       LTG - Patient will adhere to hip precautions during ADL's and transfers       Start:  10/10/23            LTG - Patient will demonstrate safety requirements appropriate to situation/environment       Start:  10/10/23            LTG - Patient will utilize safety techniques       Start:  10/10/23            STG - Patient locks brakes on wheelchair       Start:  10/10/23            STG - Patient uses call light consistently to request assistance with transfers       Start:  10/10/23            STG - Patient uses gait belt during all transfers       Start:  10/10/23            Goal 1       Start:  10/10/23            Goal 2       Start:  10/10/23            Goal 3       Start:  10/10/23

## 2023-10-11 NOTE — CARE PLAN
Brief nephrology note:    Patient evaluated at bedside in the presence of his family.  Baseline serum creatinine in the 3-4 range  Limited data available for review but will review outside records  Full evaluation to follow, thank you very much!

## 2023-10-11 NOTE — NURSING NOTE
Patient repeatedly endorses need to urinate. Patient has not had any urine out this shift. Patient bladder scanned for 528mL. Kathleen Luis NP notified. Per Kathleen Luis NP, straight cath patient. Straight cath performed at this time. Patient straight cathed for 550mL. Patient states that he feels better now. Results of procedure given to CCM.

## 2023-10-11 NOTE — NURSING NOTE
Patient has not had any urine output. CCM aware and bladder scanned this morning with a total of 74cc. Per dr. Navarro start IV fluids and bladder scan at 5pm. Patient bladder scan at 5pm was 150cc. CCM made aware.

## 2023-10-11 NOTE — CARE PLAN
Problem: General Stroke  Goal: Tolerate enteral feeding throughout shift  Outcome: Met     Problem: Pain - Adult  Goal: Verbalizes/displays adequate comfort level or baseline comfort level  Outcome: Met     Problem: Safety - Adult  Goal: Free from fall injury  Outcome: Met   The patient's goals for the shift include get a few hours sleep this shift    The clinical goals for the shift include Patient neurological assessment will not have worsening htrough 10/11/23 at 0700.      Patient remained safe and injury free. VSS. Patient is unable to void naturally and has intermittent confusion. Patient intermittently tries to get out of bed. Patient continues to progress per the plan of care.

## 2023-10-11 NOTE — CONSULTS
INPATIENT NEPHROLOGY CONSULT NOTE        CONSULT: Nephrology SERVICE    PATIENT NAME: Zack Figueroa    MRN: [id number]  DATE of SERVICE: October 11, 2023  TIME of SERVICE: 10:44 AM    REASON FOR CONSULT:    REQUESTING PHYSICIAN:   PRIMARY CARE PHYSICIAN: Pippa Bonner MD    HPI:  Mr. Figueroa is a 88 y.o. male who presents [facility] October 10, 2023 for further evaluation of change in mental status associated with right arm and left leg weakness with concern for stroke.  Diagnosed with intraparenchymal hemorrhage.    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 Last hemoglobin A1c 7.6.  History of paroxysmal atrial fibrillation follows with EP physiologist Dr. Chávez and cardiologist Dr. Martinez,, chronic diastolic heart failure, coronary artery disease status post remote CABG, hypertension, hyperlipidemia, history of CVA.    Patient presented due to change in mental status associated with right arm and left leg weakness.  Patient seen and evaluated at bedside history obtained from his wife and son who is currently visiting.  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  CT head demonstrated 1.81 1.4 cm hypodensity left posterior internal capsule likely subacute hemorrhage.  Patient evaluated by neurosurgery team who suggested to repeat CT scan to monitor stability.      Renal history:  Last BMP prior to this admission March 2023 demonstrated creatinine 3.4 mg deciliter BUN of 69 and EGFR 17 mL/min per 1.73 m²  Baseline   Noted to have chronic anemia with hemoglobin of 8.8 mg deciliter increased RDW  Proteinuria plus protein to  creatinine ratio 2.6  Continue hyperparathyroidism last     Echocardiogram performed today at [facility] February 11, 2023 demonstrated preserved left ventricular ejection fraction increased left ventricular mass, moderately increased left ventricular septal thickness and patient noted to have prior history of syncopal episodes.  Left atrium dilated.  No evidence of valvular heart disease.      ASSESSMENT AND PLAN:    Acute Kidney injury superimposed on chronic kidney disease stage IV:  -- Acute kidney injury in the setting of intracranial hemorrhage, hemodynamically mediated changes  -- Treated with IV nicardipine infusion.  -- Proteinuric kidney disease which increased risk of coagulopathy  -- Serum creatinine up trended to 4.7 mg deciliter EGFR dropped to 12.  --Acute urinary retention  -- Oliguria to add 1 L of IV fluid    Subacute to acute left intracranial hemorrhage     Atrial fibrillation: Anticoagulation on hold  Started on amiodarone    Hypertension:   - Blood pressure on presentation 187/76 started on nicardipine infusion  - Pressure readings over the past 12 hours reviewed blood pressure fluctuating between 118/50 to 130/57  -Relatively low diastolic blood pressure, wide pulse pressure    Chronic kidney disease stage IV:  -- Longstanding history of diabetes mellitus complicated with triopathy  -- Diabetic nephropathy, hypertensive nephrosclerosis  -- Proteinuric with a protein to creatinine ratio of 2.6  mg/g 7 months ago  -- Baseline serum creatinine 3.3 mg/dL, EGFR 17 mL/min per 1.73 m²  -- serum creatinine 4.1 mg/dl  BUN of 58 EGFR of 14 mL/min per 1.73 m²    Hyponatremia sodium level 131:  -- Suspected hypervolemic related  -- To check BNP    Secondary hyperparathyroidism: Last PTH was more than 600    Acute urinary retention:  Bladder scan 500 cc status post straight cath overnight    Volume: Concern for hypovolemia    Plan:  Complex case patient with longstanding history of  diabetes mellitus complicated with vasculopathy who presented with acute hemorrhagic stroke and in need to maintain the blood pressure on the lower side to ensure stability of intracranial hemorrhage on the other hand lower blood pressure will contribute to organ hypoperfusion and worsening renal function.  Low diastolic blood pressure and wide blood pulse pressure suggestive of low organ hypoperfusion.  Since stabilizing hemorrhagic stroke is lifesaving and maintaining relatively low blood pressures is critical in his case he will benefit from continuing current plan.    Oliguria to start normal saline at 75 cc/h  Acute bladder retention to repeat bladder scan to insert Fields catheter for residual of more than 300  To check BNP  To Check vitamin D, PTH level  To Check protein to creatinine ratio  Eventually will need baseline kidney ultrasound.    Medication reviewed, renally dosed.    No uremic symptoms no urgent indication for renal replacement therapy!    I sincerely,  thank you Dr. Lamb for this opportunity to participate in the care of your patient  We will continue to monitor closely with you, thank you!    Of note, patient does not have an established nephrologist.  Was seen by a few nephrologist in the inpatient setting but not in the outpatient.   Interested in follow-up as an outpatient postdischarge.    PAST MEDICAL HISTORY:    Past Medical History:   Diagnosis Date    Atrial fibrillation (CMS/[facility])     CAD (coronary artery disease)     CHF (congestive heart failure) (CMS/[facility])     CKD (chronic kidney disease) stage 3, GFR 30-59 ml/min (CMS/[facility])     HLD (hyperlipidemia)     HTN (hypertension)     JESUS (iron deficiency anemia)     Insulin dependent type 2 diabetes mellitus (CMS/[facility])     Stroke (CMS/[facility])        PAST SURGICAL HISTORY:    Past Surgical History:   Procedure Laterality Date    APPENDECTOMY      CORONARY ARTERY BYPASS GRAFT  1994       FAMILY HISTORY:  No family history on file.    SOCIAL HISTORY:     Social History     Tobacco Use    Smoking status: Never   Substance Use Topics    Alcohol use: Never    Drug use: Never       MEDICATIONS:  Prior to Admission Medications:  Medications Prior to Admission   Medication Sig Dispense Refill Last Dose    albuterol 90 mcg/actuation inhaler Inhale 2 puffs every 6 hours if needed for wheezing.       amiodarone (Pacerone) 200 mg tablet Take 1 tablet (200 mg) by mouth once daily.       apixaban (Eliquis) 2.5 mg tablet Take 1 tablet (2.5 mg) by mouth 2 times a day.       aspirin 81 mg EC tablet Take 1 tablet (81 mg) by mouth once daily.       b complex 0.4 mg tablet Take 1 tablet by mouth every other day.       CARBONYL IRON ORAL Take 1 tablet by mouth every other day.       cholecalciferol (Vitamin D3) 25 MCG (1000 UT) capsule Take 1 capsule (25 mcg) by mouth every other day.       docusate sodium (Colace) 100 mg capsule Take 1 capsule (100 mg) by mouth once daily.       dulaglutide (Trulicity) 3 mg/0.5 mL pen injector Inject 1 Dose under the skin every 7 days. On Sat       fluticasone (Flonase Allergy Relief) 50 mcg/actuation nasal spray Administer 1 spray into each nostril once daily. Shake gently. Before first use, prime pump. After use, clean tip and replace cap.       furosemide (Lasix) 40 mg tablet Take 1 tablet (40 mg) by mouth once daily as needed.       hydrALAZINE (Apresoline) 25 mg tablet Take 0.5 tablets (12.5 mg) by mouth 2 times a day.       insulin glargine (Lantus U-100 Insulin) 100 unit/mL injection Inject 12 Units under the skin once daily at bedtime. Take as directed per insulin instructions.       insulin lispro (HumaLOG) 100 unit/mL injection Inject under the skin 3 times a day with meals. 10 units with breakfast  10 units  with lunch  12 units with dinner       isosorbide mononitrate ER (Imdur) 30 mg 24 hr tablet Take 1 tablet (30 mg) by mouth once daily. Do not crush or chew.       omeprazole (PriLOSEC) 40 mg DR capsule Take 1 capsule (40 mg) by mouth  once daily in the evening.  Take before meals. Do not crush or chew.       rosuvastatin (Crestor) 20 mg tablet Take 1 tablet (20 mg) by mouth once daily.       sertraline (Zoloft) 25 mg tablet Take 1 tablet (25 mg) by mouth once daily.        [unfilled]    ALLERGIES:  No Known Allergies    COMPLETE REVIEW OF SYSTEMS:    A comprehensive 14 point review of systems was obtained.  Constitutional: Positive for fatigue and weakness, patient mental status reported to be better since presentation  HENT: Negative for congestion and sore throat.    Eyes: Negative for pain and visual disturbance.  Respiratory: Intermittent shortness of breath.    Cardiovascular: Negative for chest pain and palpitations.  Gastrointestinal: Negative for abdominal pain, diarrhea and vomiting.  Genitourinary: Negative for dysuria and hematuria.  Musculoskeletal: Positive for back pain and myalgias.  Skin: negative for wound. Negative for color change and rash.  Neurological: Positive for weakness and headaches.  Hematological: Negative for adenopathy. Does not bruise/bleed easily.  Psychiatric/Behavioral: Negative for agitation and confusion.  All other reviewed and negative other than HPI.    PHYSICAL EXAM: Physical exam performed.  Patient Vitals for the past 24 hrs:   BP Temp Temp src Pulse Resp SpO2 Weight   10/11/23 0950 135/70 -- -- 74 -- 100 % --   10/11/23 0800 131/64 36.5 °C (97.7 °F) -- 68 25 97 % --   10/11/23 0700 124/62 -- -- 74 19 98 % --   10/11/23 0600 (!) 126/49 -- -- 78 (!) 30 97 % 78.3 kg (172 lb 9.9 oz)   10/11/23 0500 118/54 -- -- 70 26 97 % --   10/11/23 0425 135/60 36.6 °C (97.9 °F) Temporal 82 (!) 29 99 % --   10/11/23 0424 -- -- -- 82 (!) 34 99 % --   10/11/23 0400 -- -- -- 74 (!) 31 97 % --   10/11/23 0328 110/55 -- -- 74 (!) 31 97 % --   10/11/23 0300 -- -- -- 76 (!) 39 93 % --   10/11/23 0200 122/64 -- -- 78 (!) 31 97 % --   10/11/23 0135 136/55 -- -- 78 25 97 % --   10/11/23 0100 (!) 134/48 -- -- 74 21 97 % --   10/11/23  0020 140/55 -- -- 76 (!) 27 99 % --   10/11/23 0010 -- -- -- 76 (!) 29 100 % --   10/11/23 0000 -- 36.1 °C (97 °F) Temporal 76 (!) 29 97 % --   10/10/23 2350 -- -- -- 76 19 99 % --   10/10/23 2340 -- -- -- 78 (!) 28 99 % --   10/10/23 2330 127/51 -- -- 72 18 98 % --   10/10/23 2320 -- -- -- 72 (!) 37 98 % --   10/10/23 2310 -- -- -- 74 19 98 % --   10/10/23 2300 136/60 -- -- 74 22 99 % --   10/10/23 2250 -- -- -- 74 (!) 37 99 % --   10/10/23 2240 -- -- -- 76 (!) 35 99 % --   10/10/23 2230 136/55 -- -- 76 (!) 29 99 % --   10/10/23 2220 130/54 -- -- 78 24 100 % --   10/10/23 2210 -- -- -- 78 (!) 44 -- --   10/10/23 2200 -- -- -- 80 (!) 38 -- --   10/10/23 2140 133/62 -- -- 76 (!) 38 99 % --   10/10/23 2130 146/59 -- -- 74 (!) 31 100 % --   10/10/23 2120 -- -- -- 74 21 97 % --   10/10/23 2110 136/60 -- -- 72 19 98 % --   10/10/23 2100 -- -- -- 74 19 98 % --   10/10/23 2050 -- -- -- 74 23 98 % --   10/10/23 2040 -- -- -- 74 20 98 % --   10/10/23 2030 129/63 -- -- 74 20 98 % --   10/10/23 2020 129/56 -- -- 72 21 99 % --   10/10/23 2010 -- -- -- 70 17 98 % --   10/10/23 2000 137/55 36 °C (96.8 °F) Temporal 72 18 98 % --   10/10/23 1950 -- -- -- 72 18 98 % --   10/10/23 1940 -- -- -- 70 17 100 % --   10/10/23 1930 135/58 -- -- 70 17 100 % --   10/10/23 1920 129/53 -- -- 70 17 100 % --   10/10/23 1910 -- -- -- 72 18 100 % --   10/10/23 1900 129/53 -- -- 72 21 100 % --   10/10/23 1850 -- -- -- 74 21 100 % --   10/10/23 1840 -- -- -- 74 (!) 28 -- --   10/10/23 1830 (!) 123/47 -- -- 76 (!) 27 -- --   10/10/23 1820 -- -- -- 74 (!) 29 -- --   10/10/23 1810 -- -- -- 68 17 -- --   10/10/23 1800 133/58 -- -- 68 16 -- --   10/10/23 1700 124/56 -- -- 66 17 98 % --   10/10/23 1600 138/60 36.2 °C (97.2 °F) -- 70 19 99 % --   10/10/23 1500 132/61 -- -- 68 18 99 % --   10/10/23 1400 140/60 -- -- 66 16 98 % --   10/10/23 1300 134/60 -- -- 68 17 96 % --   10/10/23 1200 136/61 36.6 °C (97.9 °F) -- 70 17 97 % --   10/10/23 1109 127/54 -- --  71 19 -- --   10/10/23 1108 127/57 -- -- 71 19 -- --   10/10/23 1100 132/63 -- -- 68 15 99 % --     Body mass index is 27.88 kg/m².    CONSTITUTIONAL:  Vital signs reviewed. Hemodynamically stable.  GENERAL: Well developed, well nourished.  SKIN: No petechia or ecchymosis  HEAD/SINUSES: Atraumatic  EYES: PERRLA, + pallor  OROPHARYNX: Dry mucous membranes  NECK: +  jugulovenous distention, No carotid bruits, Carotid pulse normal contour, Supple  LUNGS: Diminished air entry bilaterally, no crackles  CARDIAC: distant S1 and S2; no rubs, murmurs, or gallops  ABDOMEN: Abdomen soft, non-tender, BS normal, distended  EXTREMITIES: Good capillary refill, trace edema.  NEUROLOGIC: Awake, alert and oriented ×2-3, right arm and left leg weakness  HEMATOLOGIC/Lymphatic/Immunologic: No abnormal bleeding, echymosis, inflammation. No cervical or supraclavicular lymphadenopathy.    Intake/Output last 3 shifts:  I/O last 3 completed shifts:  In: 220 (2.8 mL/kg) [I.V.:140 (1.8 mL/kg); IV Piggyback:80]  Out: 700 (8.9 mL/kg) [Urine:700 (0.2 mL/kg/hr)]  Weight: 78.3 kg     Diagnostic tests reviewed for today's visit:    CBC, Coags, RNP, Mg, Phos   Results from last 7 days   Lab Units 10/11/23  0338   WBC AUTO x10*3/uL 10.4   RBC AUTO x10*6/uL 3.37*   HEMOGLOBIN g/dL 10.0*   HEMATOCRIT % 32.6*     Results from last 7 days   Lab Units 10/11/23  0338 10/10/23  0352   SODIUM mmol/L 131* 131*   POTASSIUM mmol/L 4.2 3.7   CHLORIDE mmol/L 98 99   CO2 mmol/L 21 25   BUN mg/dL 58* 50*   CREATININE mg/dL 4.09* 3.11*   CALCIUM mg/dL 8.7 8.5*   PHOSPHORUS mg/dL  --  2.9  2.9   MAGNESIUM mg/dL 2.08 2.03   BILIRUBIN TOTAL mg/dL 0.6 0.6   ALT U/L 41 34   AST U/L 49* 40*         IMAGING: CXR reviewed in  images.      SIGNATURE: Nereida Navarro MD  Nephrology and Hypertension  [address]  Office phone: [phone]  FAX: [phone]      This note was partially created using voice recognition software and is inherently  "subject to errors including those of syntax and \"sound-alike\" substitutions which may escape proofreading.  In such instances, original meaning may be extrapolated by contextual derivation.   "

## 2023-10-11 NOTE — CONSULTS
"Nutrition Assessment Note   Assessment Subjective/Objective:   Note Type: re :auto referral s/p stroke     Note Authored by: Registered Dietitian Nutritionist   Pager Number: [phone] secure chat     Nutrition Note:  Zack Figueroa is a 88 y.o. male admitted 10/9 with altered mental status, RUE and RLE weakness and dysarthria. CT finding acute to subacute hemorrhagic lacunar infarct.  ST eval 10/10 suggesting regular foods/fluids; following for dysarthria.     Past Medical History   has a past medical history of Atrial fibrillation (CMS/[facility]), CAD (coronary artery disease), CHF (congestive heart failure) (CMS/[facility]), CKD (chronic kidney disease) stage 3, GFR 30-59 ml/min (CMS/[facility]), HLD (hyperlipidemia), HTN (hypertension), JESUS (iron deficiency anemia), Insulin dependent type 2 diabetes mellitus (CMS/[facility]), and Stroke (CMS/[facility]).  Surgical History   has a past surgical history that includes Coronary artery bypass graft (1994) and Appendectomy.      Objective Information:    Pain: Pain Assessment: 0-10  Pain Score: 0 - No pain    Vitals: Blood pressure 128/56, pulse 68, temperature 36.3 °C (97.3 °F), resp. rate (!) 30, height 1.676 m (5' 5.98\"), weight 78.3 kg (172 lb 9.9 oz), SpO2 100 %.    Height/Weight:  Height: 167.6   Weight (kg):  78.3kg   BMI (kg/m2): 27.8kg/m2   Weight history/ % weight change: denies  Significant Weight Loss:  Interpretation of Weight Loss:        Recent Lab Results:  Lab Results   Component Value Date    GLUCOSE 80 10/11/2023    CALCIUM 8.7 10/11/2023     (L) 10/11/2023    K 4.1 10/11/2023    CO2 25 10/11/2023    CL 97 (L) 10/11/2023    BUN 61 (H) 10/11/2023    CREATININE 4.47 (H) 10/11/2023   T chol 114, HDL 63.8, LDL 40, triglyceride 52, HA1C 7.4%    Medications:  Scheduled medications  amiodarone, 200 mg, oral, Daily  B complex-vitamin C, 1 tablet, oral, Every other day  cholecalciferol, 25 mcg, oral, Every other day  docusate sodium, 100 mg, oral, BID  esomeprazole, 40 mg, " "nasoduodenal tube, Daily before breakfast   Or  pantoprazole, 40 mg, oral, Daily before breakfast   Or  pantoprazole, 40 mg, intravenous, Daily before breakfast  influenza, 0.7 mL, intramuscular, During hospitalization  hydrALAZINE, 12.5 mg, oral, BID  insulin glargine, 12 Units, subcutaneous, Nightly  insulin lispro, 0-10 Units, subcutaneous, Before meals & nightly  isosorbide mononitrate ER, 30 mg, oral, Daily  rosuvastatin, 20 mg, oral, Daily  sertraline, 25 mg, oral, Daily      Continuous medications  niCARdipine, 2.5-15 mg/hr, Last Rate: 2.5 mg/hr (10/11/23 1330)  sodium chloride 0.9%, 75 mL/hr, Last Rate: 75 mL/hr (10/11/23 1404)      PRN medications  PRN medications: dextrose 10 % in water (D10W), dextrose, glucagon, ondansetron    Nutrition Orders:  Dietary Orders (From admission, onward)       Start     Ordered    10/10/23 1510  Adult diet Carb Controlled; 60 gram carb/meal, 30 gram Carb evening snack  Diet effective now        Question Answer Comment   Diet type Carb Controlled    Carb diet selection: 60 gram carb/meal, 30 gram Carb evening snack        10/10/23 1509    10/10/23 0222  May Participate in Room Service  Once        Question:  .  Answer:  Yes    10/10/23 0222                     Food/Nutrition Related History:  Energy Intake: >75%  Food/Nutrition Related History: When asked pt how he ate PTA, pt reports \"good, good.\" Pt lives with wife.    GI Symptoms:                           Last BM: 10/9   Time Frame for GI Symptoms Greater Than 2 Weeks: no   Oral Problems: no   Mobility: right sided weakness     Food Allergies: pt denies   Nutritional Supplements: no     Nutrition Focused Physical Findings:   Physical Findings: deferred1+        Subcutaneous Fat Loss:  Orbital Fat Pads:    Buccal Fat Pads:  Triceps:   Ribs:     Muscle Wasting:  Temporalis:   Pectoralis (Clavicular Region):    Deltoid/Trapezius:  Interosseous:   Scapular region:   Quadriceps:   Gastrocnemius:        Edema:  Edema: 1+ BLE " "  Edema Location:      Physical Findings (nutrition deficiency/toxicity):  Skin: intact     Estimated Needs:   kcals/day:    Predictive Equation Used: kcals/kg;    gms protein/day:    Weight Used:    mL fluid/day: 1mL/kcal/day or as per physician.     Nutrition Diagnosis:   New:yes  Self feeding difficulties related to stroke as evidenced by pt with right sided weakness s/p hemorrhagic stroke with need for family/staff assistance with meals.   Additional Assessment Information:   10/11: case discussed during CCM rounds and stroke rounds. PT eating 50-75% of meals; ate >75% of breakfast with staff feeding pt. Cardene off today and home oral meds adjusted.  Repeat renal BMP pending as may need IVF bolus.         Nutrition Interventions:  Coordination of Care with: LIBRA Gutierrez, CCM team and stroke team during pt care rounds.      Nutrition Education:  Diet Education: \"Cholesterol Score\" per AHA  Education Provided to: 10/11: Met with pt at bedside; pt tired s/p PT/OT. Labs reviewed; pt denies need for further MNT intervention at present time.        Nutrition Goals:  Goals: Nutrition Therapy:     oral intake >75% with assistance,adequate PO fluid intakes, Blood Glucose  mg/dl, lab values within normal limits, electrolytes within normal limits, maintain stable wt     Nutrition Goal Outcomes: goal not met.  Will assess progress towards goals upon follow-up.       Nutrition Therapy Recommendations:   Recommendations:   Diet: continue with consistent 60g CHO. Please assist with all meals.  Education: completed. Pt with this RD's name and # as well as out pt MNT contact information         Dietitian Monitoring and Evaluation Plan:  Monitoring and Evaluation Plan: PO intake/tolerance, fluid intake/adequacy, weight trend, stool output, urine output,  overall appearance, meal behavior, digestive function, labs    Follow Up  Time Spent (min): 45 minutes  Follow up: Provided information on outpatient nutrition therapy " services, Provided inpatient RDN contact information  Last Date of Nutrition Visit: 10/11/23  Nutrition Follow-Up Needed?: 7-10 days  Follow up Comment: s/p stroke, edu done

## 2023-10-11 NOTE — NURSING NOTE
Patient has not voided since straight catheterization at 0000. Patient bladder scan reveals 77mL.

## 2023-10-11 NOTE — PROGRESS NOTES
Zack Figueroa is a 88 y.o. male on day 1 of admission presenting with Intraparenchymal hemorrhage of brain (CMS/HCC).    Subjective   Robert Malave is a 88 y.o. male with PMH of a fib on Eliquis, HFpEF, CAD s/p CABG, CKD 3-4, HLD, HTN, JESUS, IDDM and previous CVA who is admitted to the ICU day 2 due to acute hemorrhagic lacunar infarct requiring Q1 neuro check. PTOT evaluated, patient still require support ambulating.        VS: /49, HR 78, RR 30 , 97%  CBC: WBC normal, H/H 10.0/32.6; PT/INR 14.8/ 1.3  CMP: GLU 73, Na+ 131, potassium is normal, RFT CKD 4, GFR 14, Crt 4.09, BUN 58. Nephrology consulted.   I&O:   Lines: Nicardipine 2.5 mg/hr      Plan summary  Resume at home imdur 30 mg PO Daily to get the patient off of nicardipine. Patient was taken off of nicardipine but his SBP went up above 140 and he was put back on it. Repeat RFT at 4 pm. Serum B12 ordered. We restarted Zoloft. Nephrology consulted. Dr. Navarro recommendations are below:  Oliguria to start normal saline at 75 cc/h  Acute bladder retention to repeat bladder scan to insert Fields catheter for residual of more than 300  To check BNP  To Check vitamin D, PTH level  To Check protein to creatinine ratio  Eventually will need baseline kidney ultrasound.      Scheduled medications  amiodarone, 200 mg, oral, Daily  B complex-vitamin C, 1 tablet, oral, Every other day  cholecalciferol, 25 mcg, oral, Every other day  docusate sodium, 100 mg, oral, BID  esomeprazole, 40 mg, nasoduodenal tube, Daily before breakfast   Or  pantoprazole, 40 mg, oral, Daily before breakfast   Or  pantoprazole, 40 mg, intravenous, Daily before breakfast  influenza, 0.7 mL, intramuscular, During hospitalization  hydrALAZINE, 12.5 mg, oral, BID  insulin glargine, 12 Units, subcutaneous, Nightly  insulin lispro, 0-10 Units, subcutaneous, Before meals & nightly  rosuvastatin, 20 mg, oral, Daily      Continuous medications  niCARdipine, 2.5-15 mg/hr, Last Rate: 2.5  "mg/hr (10/11/23 0325)      PRN medications  PRN medications: dextrose 10 % in water (D10W), dextrose, glucagon, ondansetron           Objective     Physical Exam  Constitutional:       General: He is not in acute distress.     Appearance: He is ill-appearing. He is not diaphoretic.   HENT:      Head: Normocephalic and atraumatic.      Nose: Nose normal. No congestion or rhinorrhea.      Mouth/Throat:      Mouth: Mucous membranes are moist.   Eyes:      General:         Right eye: No discharge.         Left eye: No discharge.      Conjunctiva/sclera: Conjunctivae normal.      Pupils: Pupils are equal, round, and reactive to light.   Cardiovascular:      Rate and Rhythm: Normal rate and regular rhythm.      Pulses: Normal pulses.      Heart sounds: Normal heart sounds. No murmur heard.     No friction rub.   Pulmonary:      Effort: Pulmonary effort is normal. No respiratory distress.      Breath sounds: Normal breath sounds. No stridor. No wheezing or rales.   Chest:      Chest wall: No tenderness.   Abdominal:      General: There is no distension.      Palpations: There is no mass.      Tenderness: There is no abdominal tenderness. There is no right CVA tenderness, left CVA tenderness, guarding or rebound.   Musculoskeletal:         General: No swelling or tenderness.      Cervical back: No tenderness.      Right lower leg: No edema.      Left lower leg: No edema.   Skin:     General: Skin is warm.      Capillary Refill: Capillary refill takes less than 2 seconds.      Coloration: Skin is not jaundiced or pale.      Findings: No bruising, erythema, lesion or rash.   Neurological:      Mental Status: He is alert.      Motor: Weakness present.      Comments: Right sided weakness   Psychiatric:         Mood and Affect: Mood normal.           Last Recorded Vitals  Blood pressure (!) 126/49, pulse 78, temperature 36.6 °C (97.9 °F), temperature source Temporal, resp. rate (!) 30, height 1.676 m (5' 5.98\"), weight 78.3 kg " (172 lb 9.9 oz), SpO2 97 %.  Intake/Output last 3 Shifts:  I/O last 3 completed shifts:  In: 220 (2.8 mL/kg) [I.V.:140 (1.8 mL/kg); IV Piggyback:80]  Out: 700 (8.9 mL/kg) [Urine:700 (0.2 mL/kg/hr)]  Weight: 78.3 kg     Relevant Results           This patient currently has cardiac telemetry ordered; if you would like to modify or discontinue the telemetry order, click here to go to the orders activity to modify/discontinue the order.    Results for orders placed or performed during the hospital encounter of 10/09/23 (from the past 24 hour(s))   POCT GLUCOSE   Result Value Ref Range    POCT Glucose 137 (H) 74 - 99 mg/dL   POCT GLUCOSE   Result Value Ref Range    POCT Glucose 158 (H) 74 - 99 mg/dL   POCT GLUCOSE   Result Value Ref Range    POCT Glucose 209 (H) 74 - 99 mg/dL   POCT GLUCOSE   Result Value Ref Range    POCT Glucose 224 (H) 74 - 99 mg/dL   Magnesium   Result Value Ref Range    Magnesium 2.08 1.60 - 2.40 mg/dL   CBC   Result Value Ref Range    WBC 10.4 4.4 - 11.3 x10*3/uL    nRBC 0.0 0.0 - 0.0 /100 WBCs    RBC 3.37 (L) 4.50 - 5.90 x10*6/uL    Hemoglobin 10.0 (L) 13.5 - 17.5 g/dL    Hematocrit 32.6 (L) 41.0 - 52.0 %    MCV 97 80 - 100 fL    MCH 29.7 26.0 - 34.0 pg    MCHC 30.7 (L) 32.0 - 36.0 g/dL    RDW 14.8 (H) 11.5 - 14.5 %    Platelets 193 150 - 450 x10*3/uL    MPV 11.0 7.5 - 11.5 fL   Comprehensive metabolic panel   Result Value Ref Range    Glucose 73 (L) 74 - 99 mg/dL    Sodium 131 (L) 136 - 145 mmol/L    Potassium 4.2 3.5 - 5.3 mmol/L    Chloride 98 98 - 107 mmol/L    Bicarbonate 21 21 - 32 mmol/L    Anion Gap 16 10 - 20 mmol/L    Urea Nitrogen 58 (H) 6 - 23 mg/dL    Creatinine 4.09 (H) 0.50 - 1.30 mg/dL    eGFR 14 (L) >60 mL/min/1.73m*2    Calcium 8.7 8.6 - 10.3 mg/dL    Albumin 3.4 3.4 - 5.0 g/dL    Alkaline Phosphatase 129 33 - 136 U/L    Total Protein 7.3 6.4 - 8.2 g/dL    AST 49 (H) 9 - 39 U/L    Bilirubin, Total 0.6 0.0 - 1.2 mg/dL    ALT 41 10 - 52 U/L   Protime-INR   Result Value Ref Range     Protime 14.8 (H) 9.8 - 12.8 seconds    INR 1.3 (H) 0.9 - 1.1   Lipid panel   Result Value Ref Range    Cholesterol 114 0 - 199 mg/dL    HDL-Cholesterol 63.8 mg/dL    Cholesterol/HDL Ratio 1.8     LDL Calculated 40 (L) 140 - 190 mg/dL    VLDL 10 0 - 40 mg/dL    Triglycerides 52 0 - 149 mg/dL    Non HDL Cholesterol 50 0 - 149 mg/dL                   Assessment/Plan   Principal Problem:    Intraparenchymal hemorrhage of brain (CMS/HCC)  Active Problems:    Hypertension    Hyperglycemia    Transaminitis    Plan:  Neurological     #Acute or subacute hemorrhagic lacunar infarct centered in the left posterior internal capsule with mild associated vasogenic edema.   #CVA  #Altered mental status  - Resumed imdur home dose  - Resumed hydralazine   - Patient is alert with dysarthria, mild RUE/RLE weakness, no drift, no loss of sensation.   - Neurological checks q1h  - Seizure, fall, aspiration precautions  - Head of bed at 30 degrees at all times  - SBP goal < 140, continue nicardipine  - PT/OT/SLP  - Hold all anticoagulants  - Neurosurgery following, appreciate recs     Cardiovascular  #Hypertension  #A fib on Eliquis (currently on hold due to intracranial hemorrhage)   #HFpEF  #CAD s/p CABG  - Hemodynamically stable. Goal SBP < 140.  - Continuous cardiac telemetry and Q1 vitals  - DC nicardipine, start home med imdur 30 mg PO ddaily  -Trop 22, trend  - Hold anticoagulation     Respiratory: Lungs are clear.   In no respiratory distress.  - Aspiration precautions, head of bed above 30 degrees  - PRN O2     Gastrointestinal:  #Hyperlipidemia  - Abdomen soft. No issues. Transaminitis  - Monitor CMP  - NPO until after head CT, if remains stable will begin feeding  - Bedside swallow evaluation  - GI Prophylaxis: not indicated     Renal:   #CKD 3-4  - Creatinine 4.09  - Monitor daily CMP, Mg, Phos  - Avoid hypotonic fluids as this can worsen cerebral edema  - No Fields if remains alert  - Nephrology consulted - pt was seen by   Dinary.     Endocrinology:  #Insulin dependent diabetes melitis  - FSBS q4hr while NPO. Once eating, with meals.  - Insulin SS with mild coverage for now, if sugars are high will increase  - Hypoglycemia protocol     Hematology:   #Iron Deficiency Anemia  - H/H 10.0/ 32.6  - Monitor CBC daily  - SCDs for prophylaxis;      Infectious Disease:   - Afebrile  - No infection suspected.   - No leukocytosis.  - Current access: PIVs  - Keep normothermic, aggressive fever control as this worsens neurological outcomes     Disposition:  Patient requires ICU level of care for frequent neuro monitoring and potential progression of ICH.            Sindy Sanchez MD

## 2023-10-12 LAB
ALBUMIN SERPL BCP-MCNC: 3.2 G/DL (ref 3.4–5)
ALBUMIN SERPL BCP-MCNC: 3.4 G/DL (ref 3.4–5)
ALP SERPL-CCNC: 121 U/L (ref 33–136)
ALT SERPL W P-5'-P-CCNC: 52 U/L (ref 10–52)
ANION GAP SERPL CALC-SCNC: 14 MMOL/L (ref 10–20)
ANION GAP SERPL CALC-SCNC: 15 MMOL/L (ref 10–20)
APTT PPP: 31 SECONDS (ref 27–38)
AST SERPL W P-5'-P-CCNC: 59 U/L (ref 9–39)
BILIRUB SERPL-MCNC: 0.6 MG/DL (ref 0–1.2)
BNP SERPL-MCNC: 691 PG/ML (ref 0–99)
BUN SERPL-MCNC: 64 MG/DL (ref 6–23)
BUN SERPL-MCNC: 69 MG/DL (ref 6–23)
CALCIUM SERPL-MCNC: 8.3 MG/DL (ref 8.6–10.3)
CALCIUM SERPL-MCNC: 8.4 MG/DL (ref 8.6–10.3)
CHLORIDE SERPL-SCNC: 96 MMOL/L (ref 98–107)
CHLORIDE SERPL-SCNC: 98 MMOL/L (ref 98–107)
CHLORIDE UR-SCNC: 51 MMOL/L
CHLORIDE/CREATININE (MMOL/G) IN URINE: 64 MMOL/G CREAT (ref 23–275)
CO2 SERPL-SCNC: 21 MMOL/L (ref 21–32)
CO2 SERPL-SCNC: 22 MMOL/L (ref 21–32)
CREAT SERPL-MCNC: 5.33 MG/DL (ref 0.5–1.3)
CREAT SERPL-MCNC: 5.79 MG/DL (ref 0.5–1.3)
CREAT UR-MCNC: 76.8 MG/DL (ref 20–370)
CREAT UR-MCNC: 76.8 MG/DL (ref 20–370)
CREAT UR-MCNC: 79.1 MG/DL (ref 20–370)
ERYTHROCYTE [DISTWIDTH] IN BLOOD BY AUTOMATED COUNT: 15.1 % (ref 11.5–14.5)
GFR SERPL CREATININE-BSD FRML MDRD: 10 ML/MIN/1.73M*2
GFR SERPL CREATININE-BSD FRML MDRD: 9 ML/MIN/1.73M*2
GLUCOSE BLD MANUAL STRIP-MCNC: 126 MG/DL (ref 74–99)
GLUCOSE BLD MANUAL STRIP-MCNC: 166 MG/DL (ref 74–99)
GLUCOSE BLD MANUAL STRIP-MCNC: 174 MG/DL (ref 74–99)
GLUCOSE BLD MANUAL STRIP-MCNC: 186 MG/DL (ref 74–99)
GLUCOSE BLD MANUAL STRIP-MCNC: 196 MG/DL (ref 74–99)
GLUCOSE BLD MANUAL STRIP-MCNC: 203 MG/DL (ref 74–99)
GLUCOSE BLD MANUAL STRIP-MCNC: 56 MG/DL (ref 74–99)
GLUCOSE SERPL-MCNC: 151 MG/DL (ref 74–99)
GLUCOSE SERPL-MCNC: 55 MG/DL (ref 74–99)
HCT VFR BLD AUTO: 29.8 % (ref 41–52)
HGB BLD-MCNC: 9.3 G/DL (ref 13.5–17.5)
INR PPP: 1.2 (ref 0.9–1.1)
MAGNESIUM SERPL-MCNC: 1.97 MG/DL (ref 1.6–2.4)
MCH RBC QN AUTO: 28.9 PG (ref 26–34)
MCHC RBC AUTO-ENTMCNC: 31.2 G/DL (ref 32–36)
MCV RBC AUTO: 93 FL (ref 80–100)
NRBC BLD-RTO: 0 /100 WBCS (ref 0–0)
PHOSPHATE SERPL-MCNC: 4.5 MG/DL (ref 2.5–4.9)
PLATELET # BLD AUTO: 167 X10*3/UL (ref 150–450)
PMV BLD AUTO: 10.6 FL (ref 7.5–11.5)
POTASSIUM SERPL-SCNC: 4.3 MMOL/L (ref 3.5–5.3)
POTASSIUM SERPL-SCNC: 4.9 MMOL/L (ref 3.5–5.3)
PROT SERPL-MCNC: 6.8 G/DL (ref 6.4–8.2)
PROT UR-ACNC: 715 MG/DL (ref 5–25)
PROT/CREAT UR: 9.31 MG/MG CREAT (ref 0–0.17)
PROTHROMBIN TIME: 13.8 SECONDS (ref 9.8–12.8)
PTH-INTACT SERPL-MCNC: 424.2 PG/ML (ref 18.5–88)
PTH-INTACT SERPL-MCNC: 434.6 PG/ML (ref 18.5–88)
RBC # BLD AUTO: 3.22 X10*6/UL (ref 4.5–5.9)
SODIUM SERPL-SCNC: 127 MMOL/L (ref 136–145)
SODIUM SERPL-SCNC: 130 MMOL/L (ref 136–145)
SODIUM UR-SCNC: 37 MMOL/L
SODIUM/CREAT UR-RTO: 48 MMOL/G CREAT
VIT B12 SERPL-MCNC: 772 PG/ML (ref 211–911)
WBC # BLD AUTO: 10.2 X10*3/UL (ref 4.4–11.3)

## 2023-10-12 PROCEDURE — 2500000004 HC RX 250 GENERAL PHARMACY W/ HCPCS (ALT 636 FOR OP/ED)

## 2023-10-12 PROCEDURE — 83880 ASSAY OF NATRIURETIC PEPTIDE: CPT

## 2023-10-12 PROCEDURE — 2500000004 HC RX 250 GENERAL PHARMACY W/ HCPCS (ALT 636 FOR OP/ED): Performed by: INTERNAL MEDICINE

## 2023-10-12 PROCEDURE — 97530 THERAPEUTIC ACTIVITIES: CPT | Mod: GO

## 2023-10-12 PROCEDURE — 1200000002 HC GENERAL ROOM WITH TELEMETRY DAILY

## 2023-10-12 PROCEDURE — 82570 ASSAY OF URINE CREATININE: CPT | Performed by: INTERNAL MEDICINE

## 2023-10-12 PROCEDURE — 2580000001 HC RX 258 IV SOLUTIONS

## 2023-10-12 PROCEDURE — 85027 COMPLETE CBC AUTOMATED: CPT

## 2023-10-12 PROCEDURE — 36415 COLL VENOUS BLD VENIPUNCTURE: CPT

## 2023-10-12 PROCEDURE — 97112 NEUROMUSCULAR REEDUCATION: CPT | Mod: GP,CQ

## 2023-10-12 PROCEDURE — 97116 GAIT TRAINING THERAPY: CPT | Mod: GP,CQ

## 2023-10-12 PROCEDURE — 83970 ASSAY OF PARATHORMONE: CPT | Mod: CMCLAB

## 2023-10-12 PROCEDURE — 2500000001 HC RX 250 WO HCPCS SELF ADMINISTERED DRUGS (ALT 637 FOR MEDICARE OP)

## 2023-10-12 PROCEDURE — 99222 1ST HOSP IP/OBS MODERATE 55: CPT | Performed by: STUDENT IN AN ORGANIZED HEALTH CARE EDUCATION/TRAINING PROGRAM

## 2023-10-12 PROCEDURE — 96372 THER/PROPH/DIAG INJ SC/IM: CPT

## 2023-10-12 PROCEDURE — 80053 COMPREHEN METABOLIC PANEL: CPT

## 2023-10-12 PROCEDURE — 97110 THERAPEUTIC EXERCISES: CPT | Mod: GP,CQ

## 2023-10-12 PROCEDURE — 82436 ASSAY OF URINE CHLORIDE: CPT | Performed by: INTERNAL MEDICINE

## 2023-10-12 PROCEDURE — 80069 RENAL FUNCTION PANEL: CPT | Mod: CCI

## 2023-10-12 PROCEDURE — 36415 COLL VENOUS BLD VENIPUNCTURE: CPT | Mod: STJLAB

## 2023-10-12 PROCEDURE — 82947 ASSAY GLUCOSE BLOOD QUANT: CPT

## 2023-10-12 PROCEDURE — 84100 ASSAY OF PHOSPHORUS: CPT

## 2023-10-12 PROCEDURE — 2500000005 HC RX 250 GENERAL PHARMACY W/O HCPCS

## 2023-10-12 PROCEDURE — 99233 SBSQ HOSP IP/OBS HIGH 50: CPT

## 2023-10-12 PROCEDURE — 83735 ASSAY OF MAGNESIUM: CPT

## 2023-10-12 PROCEDURE — 2500000002 HC RX 250 W HCPCS SELF ADMINISTERED DRUGS (ALT 637 FOR MEDICARE OP, ALT 636 FOR OP/ED)

## 2023-10-12 PROCEDURE — 2500000002 HC RX 250 W HCPCS SELF ADMINISTERED DRUGS (ALT 637 FOR MEDICARE OP, ALT 636 FOR OP/ED): Performed by: INTERNAL MEDICINE

## 2023-10-12 PROCEDURE — 85730 THROMBOPLASTIN TIME PARTIAL: CPT

## 2023-10-12 PROCEDURE — 2500000001 HC RX 250 WO HCPCS SELF ADMINISTERED DRUGS (ALT 637 FOR MEDICARE OP): Performed by: INTERNAL MEDICINE

## 2023-10-12 PROCEDURE — 85610 PROTHROMBIN TIME: CPT

## 2023-10-12 RX ORDER — NICARDIPINE HYDROCHLORIDE 0.2 MG/ML
1-15 INJECTION INTRAVENOUS CONTINUOUS PRN
Status: DISCONTINUED | OUTPATIENT
Start: 2023-10-12 | End: 2023-10-12

## 2023-10-12 RX ORDER — HYDRALAZINE HYDROCHLORIDE 20 MG/ML
10 INJECTION INTRAMUSCULAR; INTRAVENOUS
Status: DISCONTINUED | OUTPATIENT
Start: 2023-10-12 | End: 2023-10-13

## 2023-10-12 RX ORDER — FUROSEMIDE 10 MG/ML
80 INJECTION INTRAMUSCULAR; INTRAVENOUS EVERY 12 HOURS
Status: DISCONTINUED | OUTPATIENT
Start: 2023-10-12 | End: 2023-10-12

## 2023-10-12 RX ORDER — LABETALOL HYDROCHLORIDE 5 MG/ML
10 INJECTION, SOLUTION INTRAVENOUS EVERY 10 MIN PRN
Status: DISCONTINUED | OUTPATIENT
Start: 2023-10-12 | End: 2023-10-13

## 2023-10-12 RX ORDER — SODIUM CHLORIDE, SODIUM LACTATE, POTASSIUM CHLORIDE, CALCIUM CHLORIDE 600; 310; 30; 20 MG/100ML; MG/100ML; MG/100ML; MG/100ML
75 INJECTION, SOLUTION INTRAVENOUS CONTINUOUS
Status: DISCONTINUED | OUTPATIENT
Start: 2023-10-12 | End: 2023-10-12

## 2023-10-12 RX ORDER — DEXTROSE 50 % IN WATER (D50W) INTRAVENOUS SYRINGE
25 ONCE
Status: COMPLETED | OUTPATIENT
Start: 2023-10-12 | End: 2023-10-12

## 2023-10-12 RX ORDER — SODIUM CHLORIDE, SODIUM LACTATE, POTASSIUM CHLORIDE, CALCIUM CHLORIDE 600; 310; 30; 20 MG/100ML; MG/100ML; MG/100ML; MG/100ML
75 INJECTION, SOLUTION INTRAVENOUS CONTINUOUS
Status: ACTIVE | OUTPATIENT
Start: 2023-10-12 | End: 2023-10-12

## 2023-10-12 RX ADMIN — HYDRALAZINE HYDROCHLORIDE 25 MG: 25 TABLET ORAL at 21:35

## 2023-10-12 RX ADMIN — ROSUVASTATIN CALCIUM 20 MG: 20 TABLET, FILM COATED ORAL at 08:17

## 2023-10-12 RX ADMIN — AMIODARONE HYDROCHLORIDE 200 MG: 200 TABLET ORAL at 08:17

## 2023-10-12 RX ADMIN — SODIUM CHLORIDE, POTASSIUM CHLORIDE, SODIUM LACTATE AND CALCIUM CHLORIDE 75 ML/HR: 600; 310; 30; 20 INJECTION, SOLUTION INTRAVENOUS at 11:28

## 2023-10-12 RX ADMIN — ISOSORBIDE MONONITRATE 30 MG: 30 TABLET, EXTENDED RELEASE ORAL at 08:17

## 2023-10-12 RX ADMIN — HYDRALAZINE HYDROCHLORIDE 10 MG: 20 INJECTION INTRAMUSCULAR; INTRAVENOUS at 18:46

## 2023-10-12 RX ADMIN — PANTOPRAZOLE SODIUM 40 MG: 40 TABLET, DELAYED RELEASE ORAL at 08:19

## 2023-10-12 RX ADMIN — HYDRALAZINE HYDROCHLORIDE 25 MG: 25 TABLET ORAL at 08:17

## 2023-10-12 RX ADMIN — LABETALOL HYDROCHLORIDE 10 MG: 5 INJECTION, SOLUTION INTRAVENOUS at 09:46

## 2023-10-12 RX ADMIN — HYDRALAZINE HYDROCHLORIDE 10 MG: 20 INJECTION INTRAMUSCULAR; INTRAVENOUS at 13:12

## 2023-10-12 RX ADMIN — HYDRALAZINE HYDROCHLORIDE 10 MG: 20 INJECTION INTRAMUSCULAR; INTRAVENOUS at 23:21

## 2023-10-12 RX ADMIN — HYDRALAZINE HYDROCHLORIDE 10 MG: 20 INJECTION INTRAMUSCULAR; INTRAVENOUS at 14:34

## 2023-10-12 RX ADMIN — FUROSEMIDE 80 MG: 10 INJECTION, SOLUTION INTRAMUSCULAR; INTRAVENOUS at 08:20

## 2023-10-12 RX ADMIN — DOCUSATE SODIUM 100 MG: 100 CAPSULE, LIQUID FILLED ORAL at 21:35

## 2023-10-12 RX ADMIN — DEXTROSE MONOHYDRATE 25 G: 25 INJECTION, SOLUTION INTRAVENOUS at 08:39

## 2023-10-12 RX ADMIN — DOCUSATE SODIUM 100 MG: 100 CAPSULE, LIQUID FILLED ORAL at 08:17

## 2023-10-12 RX ADMIN — INSULIN LISPRO 2 UNITS: 100 INJECTION, SOLUTION INTRAVENOUS; SUBCUTANEOUS at 21:48

## 2023-10-12 RX ADMIN — SERTRALINE HYDROCHLORIDE 25 MG: 25 TABLET ORAL at 08:17

## 2023-10-12 ASSESSMENT — COGNITIVE AND FUNCTIONAL STATUS - GENERAL
CLIMB 3 TO 5 STEPS WITH RAILING: A LOT
EATING MEALS: A LITTLE
STANDING UP FROM CHAIR USING ARMS: A LITTLE
MOVING FROM LYING ON BACK TO SITTING ON SIDE OF FLAT BED WITH BEDRAILS: A LOT
MOVING TO AND FROM BED TO CHAIR: A LOT
MOVING TO AND FROM BED TO CHAIR: A LOT
MOBILITY SCORE: 13
TOILETING: A LOT
TURNING FROM BACK TO SIDE WHILE IN FLAT BAD: A LOT
DAILY ACTIVITIY SCORE: 14
MOBILITY SCORE: 13
DRESSING REGULAR UPPER BODY CLOTHING: A LOT
TOILETING: A LOT
STANDING UP FROM CHAIR USING ARMS: A LITTLE
EATING MEALS: A LITTLE
WALKING IN HOSPITAL ROOM: A LOT
HELP NEEDED FOR BATHING: A LOT
HELP NEEDED FOR BATHING: A LOT
CLIMB 3 TO 5 STEPS WITH RAILING: A LOT
MOVING FROM LYING ON BACK TO SITTING ON SIDE OF FLAT BED WITH BEDRAILS: A LOT
DRESSING REGULAR LOWER BODY CLOTHING: A LOT
TURNING FROM BACK TO SIDE WHILE IN FLAT BAD: A LOT
DAILY ACTIVITIY SCORE: 14
DRESSING REGULAR LOWER BODY CLOTHING: A LOT
PERSONAL GROOMING: A LITTLE
PERSONAL GROOMING: A LITTLE
DRESSING REGULAR UPPER BODY CLOTHING: A LOT
WALKING IN HOSPITAL ROOM: A LOT

## 2023-10-12 ASSESSMENT — PAIN SCALES - GENERAL
PAINLEVEL_OUTOF10: 0 - NO PAIN

## 2023-10-12 ASSESSMENT — PAIN - FUNCTIONAL ASSESSMENT
PAIN_FUNCTIONAL_ASSESSMENT: 0-10

## 2023-10-12 NOTE — PROGRESS NOTES
Physical Therapy    Physical Therapy Treatment    Patient Name: Zack Figueroa  MRN: [id number]  Today's Date: 10/12/2023   Time Calculation  Start Time: 1036  Stop Time: 1120  Time Calculation (min): 44 min    Assessment/Plan   PT Assessment  PT Assessment Results: Decreased strength, Impaired balance, Decreased mobility, Decreased coordination, Decreased cognition, Impaired judgement, Decreased safety awareness  Rehab Prognosis: Good  Evaluation/Treatment Tolerance: Patient tolerated treatment well  Medical Staff Made Aware: Yes  End of Session Communication: Bedside nurse  Assessment Comment: improved overall static and dynamic sitting balance.  improved coordination though still with significant R lean, more so with fatigue  End of Session Patient Position: Up in chair, Alarm on  IP OR SWING BED PT PLAN  Inpatient or Swing Bed: Inpatient  PT Plan  Treatment/Interventions: Bed mobility, Transfer training, Gait training, Balance training, Neuromuscular re-education, Strengthening  PT Plan: Skilled PT  PT Frequency: Daily  PT Discharge Recommendations: High intensity level of continued care  PT Recommended Transfer Status: Assist x2, Assistive device       10/12/23 1036   PT  Visit   PT Received On 10/12/23   Response to Previous Treatment Patient with no complaints from previous session.   General   Family/Caregiver Present Yes   Prior to Session Communication Bedside nurse   Patient Position Received Bed, 3 rail up;Alarm on   Precautions   Medical Precautions Fall precautions   Precautions Comment Maintain HOB at 30 degrees all times. Maintain SBP < 140.   Pain Assessment   Pain Assessment 0-10   Pain Score 0 - No pain   Cognition   Overall Cognitive Status Impaired at baseline   Orientation Level Disoriented to situation   Postural Control   Postural Control WFL   Head Control improved   Trunk Control improved   Righting Reactions improved   Posture Comment mild right lean with dynamic sitting but able to  right himself with verbal and visual cues.   Therapeutic Exercise   Therapeutic Exercise Performed Yes   Therapeutic Exercise Activity 1 ankle pumps, resisted foot press, heel slides, hip ABD x15  (decreased control RLE)   Balance/Neuromuscular Re-Education   Balance/Neuromuscular Re-Education Activity Performed Yes   Balance/Neuromuscular Re-Education Activity 1 reaching left and right across midline to various target height and distance.  decreased coordination and accuracy  R   Bed Mobility   Bed Mobility Yes   Bed Mobility 1   Bed Mobility 1 Supine to sitting   Level of Assistance 1 Moderate assistance   Bed Mobility Comments 1 x1   Ambulation/Gait Training   Ambulation/Gait Training Performed Yes   Ambulation/Gait Training 1   Surface 1 Level tile   Device 1 Rolling walker   Gait Support Devices Gait belt   Assistance 1 Moderate assistance   Quality of Gait 1 Wide base of support   Comments/Distance (ft) 1 15', improved heel strike and foot placement R though still ataxic,  R lean, able to self correct somewhat though worse as patient fatigued.   Transfers   Transfer Yes   Transfer 1   Transfer From 1 Sit to   Transfer to 1 Stand   Technique 1 Sit to stand   Transfer Device 1 Walker;Gait belt   Transfer Level of Assistance 1 Minimum assistance   Trials/Comments 1 x2 assist   Activity Tolerance   Endurance Tolerates 30+ min exercise without fatigue   Early Mobility/Exercise Safety Screen Proceed with mobilization - No exclusion criteria met   PT Assessment   PT Assessment Results Decreased strength;Impaired balance;Decreased mobility;Decreased coordination;Decreased cognition;Impaired judgement;Decreased safety awareness   Evaluation/Treatment Tolerance Patient tolerated treatment well   Medical Staff Made Aware Yes   End of Session Communication Bedside nurse   Assessment Comment improved overall static and dynamic sitting balance.  improved coordination though still with significant R lean, more so with  fatigue   End of Session Patient Position Up in chair;Alarm on   Education   Individual(s) Educated Patient;Spouse   Patient/Caregiver Demonstrated Understanding yes   Patient Response to Education Patient/Caregiver Verbalized Understanding of Information   PT Plan   Inpatient/Swing Bed or Outpatient Inpatient   PT Plan   Treatment/Interventions Bed mobility;Transfer training;Gait training;Balance training;Neuromuscular re-education;Strengthening   PT Frequency Daily   PT Discharge Recommendations High intensity level of continued care   PT Recommended Transfer Status Assist x2;Assistive device           Outcome Measures:     [facility] Basic Mobility  Turning from your back to your side while in a flat bed without using bedrails: A lot  Moving from lying on your back to sitting on the side of a flat bed without using bedrails: A lot  Moving to and from bed to chair (including a wheelchair): A lot  Standing up from a chair using your arms (e.g. wheelchair or bedside chair): A little  To walk in hospital room: A lot  Climbing 3-5 steps with railing: A lot  Basic Mobility - Total Score: 13              ICU Mobility Screen  Early Mobility/Exercise Safety Screen: Proceed with mobilization - No exclusion criteria met  E = Exercise and Early Mobility  Early Mobility/Exercise Safety Screen: Proceed with mobilization - No exclusion criteria met  Pediatric E = Exercise and Early Mobility  Early Mobility/Exercise Safety Screen: Proceed with mobilization - No exclusion criteria met                      Encounter Problems       Encounter Problems (Active)       PT Problem       Bed mobility (Progressing)       Start:  10/10/23    Expected End:  10/31/23       Pt will be able to complete bed mobility mod I with use of bed HR.            Transfers (Progressing)       Start:  10/10/23    Expected End:  10/31/23       Pt will be able to complete all transfers with FWW CGA demonstrating good safety awareness and proper body mechanics.            Gait (Progressing)       Start:  10/10/23    Expected End:  10/31/23       Pt will be able to ambulate 100 ft with FWW CGA with good safety awareness and ability to maintain COG/GABRIEL in midline.           Strength (Progressing)       Start:  10/10/23    Expected End:  10/31/23       Pt will improve BLE strength with supine, seated and standing exercises to WFL.           Balance (Progressing)       Start:  10/10/23    Expected End:  10/31/23       Pt will demonstrate good static/dynamic standing balance without evidence of right lateral lean or retro LOB.              Pain - Adult          Safety       LTG - Patient will adhere to hip precautions during ADL's and transfers       Start:  10/10/23            LTG - Patient will demonstrate safety requirements appropriate to situation/environment       Start:  10/10/23            LTG - Patient will utilize safety techniques       Start:  10/10/23            STG - Patient locks brakes on wheelchair       Start:  10/10/23            STG - Patient uses call light consistently to request assistance with transfers       Start:  10/10/23            STG - Patient uses gait belt during all transfers       Start:  10/10/23            Goal 1       Start:  10/10/23            Goal 2       Start:  10/10/23            Goal 3       Start:  10/10/23                 Education Documentation  Body Mechanics, taught by Ady Beckman PTA at 10/12/2023 11:31 AM.  Learner: Family, Patient  Readiness: Acceptance  Method: Explanation, Demonstration  Response: Verbalizes Understanding, Needs Reinforcement    Mobility Training, taught by Ady Beckman PTA at 10/12/2023 11:31 AM.  Learner: Family, Patient  Readiness: Acceptance  Method: Explanation, Demonstration  Response: Verbalizes Understanding, Needs Reinforcement    Education Comments  No comments found.

## 2023-10-12 NOTE — PROGRESS NOTES
Zack Figueroa is a 88 y.o. male on day 2 of admission presenting with Intraparenchymal hemorrhage of brain (CMS/HCC).    Subjective   Patient had a relatively uneventful overnight, is currently off of his Cardene drip, heart rate in the 70s, 72 at time of examination sinus rhythm.  Patient was mildly hypertensive with a systolic blood pressure of 150, per protocol we will is to keep blood pressure under 140 systolic, hydralazine has been ordered.  Additionally patient's blood glucose was low this morning, read as 55 on general chemistry.  1 amp of D50 was ordered to replenish.  Patient did have an elevated BNP of 691.  Patient has been cleared by neurosurgery, currently on every 4 hours neurochecks due to hemorrhagic thalamic mass lacunar infarct.  Continuing to hold the patient's home anticoagulation at this time.  We will plan on downgrading the patient to telemetry at some point today.       Objective     Physical Exam  Vitals and nursing note reviewed.   Constitutional:       Appearance: Normal appearance. He is not ill-appearing.   HENT:      Head: Normocephalic and atraumatic.      Mouth/Throat:      Mouth: Mucous membranes are dry.      Pharynx: Oropharynx is clear.   Eyes:      Extraocular Movements: Extraocular movements intact.      Pupils: Pupils are equal, round, and reactive to light.   Cardiovascular:      Rate and Rhythm: Tachycardia present.      Pulses: Normal pulses.      Heart sounds: Normal heart sounds. No murmur heard.     No friction rub. No gallop.   Pulmonary:      Effort: Pulmonary effort is normal. No respiratory distress.      Breath sounds: Normal breath sounds. No stridor. No wheezing, rhonchi or rales.   Abdominal:      General: Bowel sounds are normal. There is no distension.      Palpations: Abdomen is soft. There is no mass.      Tenderness: There is no abdominal tenderness. There is no guarding.   Musculoskeletal:         General: No swelling or tenderness.   Skin:      "General: Skin is warm and dry.      Capillary Refill: Capillary refill takes less than 2 seconds.      Coloration: Skin is not jaundiced or pale.   Neurological:      Mental Status: He is alert and oriented to person, place, and time.      Cranial Nerves: No cranial nerve deficit.      Comments: Dysarthria, RUE/RLE weakness without drift   Psychiatric:         Mood and Affect: Mood normal.         Behavior: Behavior normal.         Last Recorded Vitals  Blood pressure 135/58, pulse 72, temperature 36.6 °C (97.9 °F), temperature source Temporal, resp. rate (!) 37, height 1.676 m (5' 5.98\"), weight 78.2 kg (172 lb 6.4 oz), SpO2 (!) 86 %.  Intake/Output last 3 Shifts:  I/O last 3 completed shifts:  In: 1430 (18.3 mL/kg) [I.V.:1430 (18.3 mL/kg)]  Out: 650 (8.3 mL/kg) [Urine:650 (0.2 mL/kg/hr)]  Weight: 78.2 kg     Relevant Results           This patient currently has cardiac telemetry ordered; if you would like to modify or discontinue the telemetry order, click here to go to the orders activity to modify/discontinue the order.      Scheduled medications  amiodarone, 200 mg, oral, Daily  B complex-vitamin C, 1 tablet, oral, Every other day  cholecalciferol, 25 mcg, oral, Every other day  docusate sodium, 100 mg, oral, BID  esomeprazole, 40 mg, nasoduodenal tube, Daily before breakfast   Or  pantoprazole, 40 mg, oral, Daily before breakfast   Or  pantoprazole, 40 mg, intravenous, Daily before breakfast  influenza, 0.7 mL, intramuscular, During hospitalization  furosemide, 80 mg, intravenous, q12h  hydrALAZINE, 25 mg, oral, BID  [Held by provider] insulin glargine, 12 Units, subcutaneous, Nightly  insulin lispro, 0-10 Units, subcutaneous, Before meals & nightly  isosorbide mononitrate ER, 30 mg, oral, Daily  rosuvastatin, 20 mg, oral, Daily  sertraline, 25 mg, oral, Daily      Continuous medications  niCARdipine, 1-15 mg/hr      PRN medications  PRN medications: dextrose 10 % in water (D10W), dextrose, glucagon, " hydrALAZINE, labetaloL, labetaloL, niCARdipine, ondansetron  ECG 12 lead    Result Date: 10/11/2023  Sinus rhythm with 1st degree AV block Right superior axis deviation Inferior infarct , age undetermined Possible Abnormal ECG No previous ECGs available Confirmed by Lazaro Coley ([id number]) on 10/11/2023 9:27:18 AM    CT head wo IV contrast    Result Date: 10/10/2023  Interpreted By:  Esteban Johnson, STUDY: CT HEAD WO IV CONTRAST;  10/10/2023 4:58 am   INDICATION: Signs/Symptoms:L intraparenchymal hemorrhage.   COMPARISON: None.   ACCESSION NUMBER(S): [id number]   ORDERING CLINICIAN: JOHN MCNEAL   TECHNIQUE: Axial noncontrast CT images of the head.   FINDINGS: Gray-white matter differentiation is maintained. There are no extra-axial collections, with recent intravenous contrast administration slightly limiting evaluation of the extra-axial spaces.. No significant mass effect or midline shift. There is no hydrocephalus. Mild generalized cerebral volume loss and associated ventricular and sulcal enlargement. Scattered periventricular and deep white matter hypodensities with suggestion of bilateral basal ganglia chronic lacunar infarcts which may represent mild-to-moderate chronic microvascular ischemic change.   HEMORRHAGE: There is stable appearance of the left thalamic capsular geographic hyperdensity likely representing acute to subacute hemorrhagic lacunar infarct. There is questionable associated vasogenic edema without evidence of significant mass effect.   CALVARIUM: No depressed skull fracture.   EXTRACRANIAL SOFT TISSUES:.. Unremarkable.   PARANASAL SINUSES/MASTOIDS: The visualized paranasal sinuses are well aerated. Mastoid air cells are clear.   ORBITS: Bilateral cataract surgeries.         Similar appearance of geographic parenchymal hyperdensity centered in the posterior left internal capsule and left thalamus suggestive of recent hemorrhagic lacunar infarct. No evidence of significant mass effect or  midline shift. Brain MRI may be obtained to better characterize chronicity.   Signed by: Esteban Johnson 10/10/2023 5:23 AM Dictation workstation:   [id number]    CT brain attack head wo IV contrast    Result Date: 10/10/2023  Interpreted By:  Esteban Johnson, STUDY: CT ANGIO BRAIN ATTACK NECK W IV CONTRAST AND POST PROCEDURE; CT ANGIO BRAIN ATTACK HEAD W IV CONTRAST AND POST PROCEDURE; CT BRAIN ATTACK HEAD WO IV CONTRAST;  10/9/2023 10:52 pm; 10/9/2023 10:46 pm   INDICATION: Signs/Symptoms:Stroke Evaluation with VAN Positive; Signs/Symptoms:Stroke Evaluation.   COMPARISON: None.   ACCESSION NUMBER(S): [id number]; [id number]; [id number]   ORDERING CLINICIAN: ISABELLA SNEED   TECHNIQUE: Unenhanced CT images of the head were obtained. Subsequently,  75 mL Omnipaque 350 was administered intravenously and axial images of the head and neck were acquired.  Coronal, sagittal, and 3-D reconstructions were provided for review.   FINDINGS: Noncontrast CT of the head: There is an approximately 1.8 x 1.4 cm hyperdensity involving the left posterior internal capsule with suggestion of mild surrounding vasogenic edema, likely representing acute to subacute hemorrhagic lacunar infarct. There is no significant mass effect. The gray-white matter differentiation is otherwise maintained. There is no hydrocephalus. There is mild to moderate generalized cerebral volume loss and associated ventricular and sulcal enlargement. Scattered periventricular and deep white matter hypodensities suggestive of mild to moderate chronic microvascular ischemic change.   CTA HEAD FINDINGS:   Anterior circulation: The bilateral intracranial internal carotid arteries, bilateral carotid terminals, bilateral proximal anterior and middle cerebral arteries are patent without significant focal stenosis. There are bilateral carotid siphon atherosclerotic calcifications without significant focal stenosis.   Posterior circulation: Bilateral intracranial  vertebral arteries, vertebrobasilar junction, basilar artery and proximal posterior cerebral arteries are normal.   CTA NECK FINDINGS:   Right carotid vessels: There is retropharyngeal course of the upper common carotid artery. There are atherosclerotic calcifications of the carotid bifurcation with 0% stenosis by NASCET criteria. The remainder of the cervical left internal carotid artery is also widely patent.   Left carotid vessels: There is retropharyngeal course of the upper common carotid artery. There are atherosclerotic calcifications of the carotid bifurcation with 0% stenosis by NASCET criteria. The remainder of the cervical left internal carotid artery is also widely patent.   Vertebral vessels:  The visualized segments of the cervical vertebral arteries are patent. There are atherosclerotic calcifications of the left vertebral artery without focal stenosis.         Acute or subacute hemorrhagic lacunar infarct centered in the left posterior internal capsule with mild associated vasogenic edema. No evidence for significant stenosis of the cervical vessels.   No evidence for significant stenosis or large branch vessel cutoffs of the intracranial vessels.   Esteban Johnson discussed the significance and urgency of this critical finding by telephone with  ISABELLA SNEED on 10/10/2023 at 12:05 am. (**-RCF-**) Findings:  See findings.     MACRO: None   Signed by: Esteban Johnson 10/10/2023 12:06 AM Dictation workstation:   [id number]    CT angio brain attack head w IV contrast and post procedure    Result Date: 10/10/2023  Interpreted By:  Esteban Johnson, STUDY: CT ANGIO BRAIN ATTACK NECK W IV CONTRAST AND POST PROCEDURE; CT ANGIO BRAIN ATTACK HEAD W IV CONTRAST AND POST PROCEDURE; CT BRAIN ATTACK HEAD WO IV CONTRAST;  10/9/2023 10:52 pm; 10/9/2023 10:46 pm   INDICATION: Signs/Symptoms:Stroke Evaluation with VAN Positive; Signs/Symptoms:Stroke Evaluation.   COMPARISON: None.   ACCESSION NUMBER(S): [id number];  [id number]; [id number]   ORDERING CLINICIAN: ISABELLA SNEED   TECHNIQUE: Unenhanced CT images of the head were obtained. Subsequently,  75 mL Omnipaque 350 was administered intravenously and axial images of the head and neck were acquired.  Coronal, sagittal, and 3-D reconstructions were provided for review.   FINDINGS: Noncontrast CT of the head: There is an approximately 1.8 x 1.4 cm hyperdensity involving the left posterior internal capsule with suggestion of mild surrounding vasogenic edema, likely representing acute to subacute hemorrhagic lacunar infarct. There is no significant mass effect. The gray-white matter differentiation is otherwise maintained. There is no hydrocephalus. There is mild to moderate generalized cerebral volume loss and associated ventricular and sulcal enlargement. Scattered periventricular and deep white matter hypodensities suggestive of mild to moderate chronic microvascular ischemic change.   CTA HEAD FINDINGS:   Anterior circulation: The bilateral intracranial internal carotid arteries, bilateral carotid terminals, bilateral proximal anterior and middle cerebral arteries are patent without significant focal stenosis. There are bilateral carotid siphon atherosclerotic calcifications without significant focal stenosis.   Posterior circulation: Bilateral intracranial vertebral arteries, vertebrobasilar junction, basilar artery and proximal posterior cerebral arteries are normal.   CTA NECK FINDINGS:   Right carotid vessels: There is retropharyngeal course of the upper common carotid artery. There are atherosclerotic calcifications of the carotid bifurcation with 0% stenosis by NASCET criteria. The remainder of the cervical left internal carotid artery is also widely patent.   Left carotid vessels: There is retropharyngeal course of the upper common carotid artery. There are atherosclerotic calcifications of the carotid bifurcation with 0% stenosis by NASCET criteria. The remainder  of the cervical left internal carotid artery is also widely patent.   Vertebral vessels:  The visualized segments of the cervical vertebral arteries are patent. There are atherosclerotic calcifications of the left vertebral artery without focal stenosis.         Acute or subacute hemorrhagic lacunar infarct centered in the left posterior internal capsule with mild associated vasogenic edema. No evidence for significant stenosis of the cervical vessels.   No evidence for significant stenosis or large branch vessel cutoffs of the intracranial vessels.   Esteban Johnson discussed the significance and urgency of this critical finding by telephone with  ISABELLA SNEED on 10/10/2023 at 12:05 am. (**-RCF-**) Findings:  See findings.     MACRO: None   Signed by: Esteban Johnson 10/10/2023 12:06 AM Dictation workstation:   [id number]    CT angio brain attack neck w IV contrast and post procedure    Result Date: 10/10/2023  Interpreted By:  Esteban Johnson, STUDY: CT ANGIO BRAIN ATTACK NECK W IV CONTRAST AND POST PROCEDURE; CT ANGIO BRAIN ATTACK HEAD W IV CONTRAST AND POST PROCEDURE; CT BRAIN ATTACK HEAD WO IV CONTRAST;  10/9/2023 10:52 pm; 10/9/2023 10:46 pm   INDICATION: Signs/Symptoms:Stroke Evaluation with VAN Positive; Signs/Symptoms:Stroke Evaluation.   COMPARISON: None.   ACCESSION NUMBER(S): [id number]; [id number]; [id number]   ORDERING CLINICIAN: ISABELLA SNEED   TECHNIQUE: Unenhanced CT images of the head were obtained. Subsequently,  75 mL Omnipaque 350 was administered intravenously and axial images of the head and neck were acquired.  Coronal, sagittal, and 3-D reconstructions were provided for review.   FINDINGS: Noncontrast CT of the head: There is an approximately 1.8 x 1.4 cm hyperdensity involving the left posterior internal capsule with suggestion of mild surrounding vasogenic edema, likely representing acute to subacute hemorrhagic lacunar infarct. There is no significant mass effect. The gray-white  matter differentiation is otherwise maintained. There is no hydrocephalus. There is mild to moderate generalized cerebral volume loss and associated ventricular and sulcal enlargement. Scattered periventricular and deep white matter hypodensities suggestive of mild to moderate chronic microvascular ischemic change.   CTA HEAD FINDINGS:   Anterior circulation: The bilateral intracranial internal carotid arteries, bilateral carotid terminals, bilateral proximal anterior and middle cerebral arteries are patent without significant focal stenosis. There are bilateral carotid siphon atherosclerotic calcifications without significant focal stenosis.   Posterior circulation: Bilateral intracranial vertebral arteries, vertebrobasilar junction, basilar artery and proximal posterior cerebral arteries are normal.   CTA NECK FINDINGS:   Right carotid vessels: There is retropharyngeal course of the upper common carotid artery. There are atherosclerotic calcifications of the carotid bifurcation with 0% stenosis by NASCET criteria. The remainder of the cervical left internal carotid artery is also widely patent.   Left carotid vessels: There is retropharyngeal course of the upper common carotid artery. There are atherosclerotic calcifications of the carotid bifurcation with 0% stenosis by NASCET criteria. The remainder of the cervical left internal carotid artery is also widely patent.   Vertebral vessels:  The visualized segments of the cervical vertebral arteries are patent. There are atherosclerotic calcifications of the left vertebral artery without focal stenosis.         Acute or subacute hemorrhagic lacunar infarct centered in the left posterior internal capsule with mild associated vasogenic edema. No evidence for significant stenosis of the cervical vessels.   No evidence for significant stenosis or large branch vessel cutoffs of the intracranial vessels.   Esteban Johnson discussed the significance and urgency of this  critical finding by telephone with  ISABELLA SNEED on 10/10/2023 at 12:05 am. (**-RCF-**) Findings:  See findings.     MACRO: None   Signed by: Esteban Johnson 10/10/2023 12:06 AM Dictation workstation:   [id number]    Results for orders placed or performed during the hospital encounter of 10/09/23 (from the past 24 hour(s))   ECG 12 lead   Result Value Ref Range    Ventricular Rate 68 BPM    Atrial Rate 68 BPM    OR Interval 306 ms    QRS Duration 110 ms    QT Interval 432 ms    QTC Calculation(Bazett) 459 ms    R Axis 194 degrees    T Axis 206 degrees    QRS Count 11 beats    Q Onset 212 ms    P Onset 59 ms    P Offset 90 ms    T Offset 428 ms    QTC Fredericia 450 ms   Renal Function Panel   Result Value Ref Range    Glucose 80 74 - 99 mg/dL    Sodium 132 (L) 136 - 145 mmol/L    Potassium 4.1 3.5 - 5.3 mmol/L    Chloride 97 (L) 98 - 107 mmol/L    Bicarbonate 25 21 - 32 mmol/L    Anion Gap 14 10 - 20 mmol/L    Urea Nitrogen 61 (H) 6 - 23 mg/dL    Creatinine 4.47 (H) 0.50 - 1.30 mg/dL    eGFR 12 (L) >60 mL/min/1.73m*2    Calcium 8.7 8.6 - 10.3 mg/dL    Phosphorus 4.0 2.5 - 4.9 mg/dL    Albumin 3.4 3.4 - 5.0 g/dL   Vitamin D 25-Hydroxy,Total (for eval of Vitamin D levels)   Result Value Ref Range    Vitamin D, 25-Hydroxy, Total 27 (L) 30 - 100 ng/mL   POCT GLUCOSE   Result Value Ref Range    POCT Glucose 82 74 - 99 mg/dL   PTH, Intact   Result Value Ref Range    Parathyroid Hormone, Intact 424.2 (H) 18.5 - 88.0 pg/mL   POCT GLUCOSE   Result Value Ref Range    POCT Glucose 168 (H) 74 - 99 mg/dL   Renal Function Panel   Result Value Ref Range    Glucose 177 (H) 74 - 99 mg/dL    Sodium 130 (L) 136 - 145 mmol/L    Potassium 4.4 3.5 - 5.3 mmol/L    Chloride 97 (L) 98 - 107 mmol/L    Bicarbonate 23 21 - 32 mmol/L    Anion Gap 14 10 - 20 mmol/L    Urea Nitrogen 61 (H) 6 - 23 mg/dL    Creatinine 4.74 (H) 0.50 - 1.30 mg/dL    eGFR 11 (L) >60 mL/min/1.73m*2    Calcium 8.4 (L) 8.6 - 10.3 mg/dL    Phosphorus 4.0 2.5 - 4.9  mg/dL    Albumin 3.2 (L) 3.4 - 5.0 g/dL   Vitamin B12   Result Value Ref Range    Vitamin B12 772 211 - 911 pg/mL   POCT GLUCOSE   Result Value Ref Range    POCT Glucose 196 (H) 74 - 99 mg/dL   Magnesium   Result Value Ref Range    Magnesium 1.97 1.60 - 2.40 mg/dL   Comprehensive metabolic panel   Result Value Ref Range    Glucose 55 (LL) 74 - 99 mg/dL    Sodium 130 (L) 136 - 145 mmol/L    Potassium 4.3 3.5 - 5.3 mmol/L    Chloride 98 98 - 107 mmol/L    Bicarbonate 22 21 - 32 mmol/L    Anion Gap 14 10 - 20 mmol/L    Urea Nitrogen 64 (H) 6 - 23 mg/dL    Creatinine 5.33 (H) 0.50 - 1.30 mg/dL    eGFR 10 (L) >60 mL/min/1.73m*2    Calcium 8.3 (L) 8.6 - 10.3 mg/dL    Albumin 3.2 (L) 3.4 - 5.0 g/dL    Alkaline Phosphatase 121 33 - 136 U/L    Total Protein 6.8 6.4 - 8.2 g/dL    AST 59 (H) 9 - 39 U/L    Bilirubin, Total 0.6 0.0 - 1.2 mg/dL    ALT 52 10 - 52 U/L   CBC   Result Value Ref Range    WBC 10.2 4.4 - 11.3 x10*3/uL    nRBC 0.0 0.0 - 0.0 /100 WBCs    RBC 3.22 (L) 4.50 - 5.90 x10*6/uL    Hemoglobin 9.3 (L) 13.5 - 17.5 g/dL    Hematocrit 29.8 (L) 41.0 - 52.0 %    MCV 93 80 - 100 fL    MCH 28.9 26.0 - 34.0 pg    MCHC 31.2 (L) 32.0 - 36.0 g/dL    RDW 15.1 (H) 11.5 - 14.5 %    Platelets 167 150 - 450 x10*3/uL    MPV 10.6 7.5 - 11.5 fL   B-type natriuretic peptide   Result Value Ref Range     (H) 0 - 99 pg/mL   POCT GLUCOSE   Result Value Ref Range    POCT Glucose 186 (H) 74 - 99 mg/dL   POCT GLUCOSE   Result Value Ref Range    POCT Glucose 56 (L) 74 - 99 mg/dL               Assessment/Plan   Principal Problem:    Intraparenchymal hemorrhage of brain (CMS/HCC)  Active Problems:    Hypertension    Hyperglycemia    Transaminitis    #Acute or subacute hemorrhagic lacunar infarct centered in the left posterior internal capsule with mild associated vasogenic edema.   #CVA  #History of hypertension  #Insulin dependent diabetes melitis  #History of atrial fibrillation anticoagulated on Eliquis (currently on hold due to  intracranial hemorrhage)   #History of HFpEF  #History of CAD s/p CABG  #Hyperlipidemia  #CKD 3-4      Neurological     -Monitoring for worsening effects of hemorrhagic lacunar thalamic infarct  -CAM bundle  -ABCDEF bundle  - Resumed imdur home dose  - Resumed hydralazine   - Patient is alert with dysarthria, mild RUE/RLE weakness, no drift, no loss of sensation.   - Neurological checks q1h  - Seizure, fall, aspiration precautions  - Head of bed at 30 degrees at all times  - SBP goal < 140  -Patient is currently off of Cardene, heart rate sinus rhythm in the 70s  - PT/OT/SLP  - Hold all anticoagulants  - Neurosurgery has signed off on the patient, now on every 4 hour neurochecks  -Neurology consult placed, appreciate neurology recommendations     Cardiovascular    - Hemodynamically stable. Goal SBP < 140.  - Continuous cardiac telemetry and Q1 vitals  - DC nicardipine, start home med imdur 30 mg PO ddaily  -Trop 22, delta 22, stable  - Hold anticoagulation  -BNP noted to be elevated at 619 on the a.m. of 10/12     Respiratory:     -No pulmonary complaints at this time  -Vesicular breath sounds auscultated from bases to apices, equal and symmetric chest rise  -Patient saturating above 92% on room air  -Continue to maintain goal oxygen saturation above 92%  - Aspiration precautions, head of bed above 30 degrees  - PRN O2     Gastrointestinal:    - Abdomen soft. No issues. Transaminitis  - Monitor CMP  - Patient on Diabetic diet  - GI Prophylaxis: not indicated     Renal:     - Creatinine 5.33, GFR of 10 on the morning of 10/12- appears to be DENNISE in setting of CKD  - Monitor daily CMP, Mg, Phos  - Lactated ringers ay 75 ml/hr for rehydration   - No Fields if remains alert  - Nephrology consulted - pt was seen by Dr. Navarro.  Appreciate nephrology recommendations     Endocrinology:    - FSBS q4hr while NPO. Once eating, with meals.  - Insulin SS with mild coverage for now, if sugars are high will increase  - Hypoglycemia  protocol  -Blood glucose was 55 on the a.m. of 10/12, given 1 amp of D50 we will continue to monitor Repeat glucose 166     Hematology:     - H/H 10.0/ 32.6  - Monitor CBC daily  - SCDs for prophylaxis;   -Continuing to hold anticoagulation    Infectious Disease:     -WBC 10.2 has the a.m. of 10/12  - Afebrile  - No indication of infectious disease.  - Keep normothermic, aggressive fever control as this worsens neurological outcomes     Disposition: Patient has remained off of Cardene drip overnight, currently with a heart rate sinus rhythm of 72.  We will attempt to downgrade the patient to telemetry today.  Neurology placed on consult will appreciate recommendations. Appreciate Nephrology recommendations.           Arnaldo Uriarte, DO

## 2023-10-12 NOTE — PROGRESS NOTES
INPATIENT NEPHROLOGY CONSULT PROGRESS NOTE      Patient Name: Zack Figueroa MRN: [id number]  DATE of SERVICE: October 12, 2023  TIME of SERVICE: 8:12 AM  CONSULTING SERVICE: Nephrology    REASON for CONSULT: DENNISE, CKD IV      INTERVAL HISTORY:  Worsening DENNISE scr up to 5.3 mg/gl--> 5.7 mg/dl   Oligoanuric   BNP > 600     SUMMARY OF STAY:  Mr. Figueroa is a 88 y.o. male who presents [facility] October 10, 2023 for further evaluation of change in mental status associated with right arm and left leg weakness with concern for stroke.  Diagnosed with intraparenchymal hemorrhage.  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 Last hemoglobin A1c 7.6.  History of paroxysmal atrial fibrillation follows with EP physiologist Dr. Chávez and cardiologist Dr. Martinez,, chronic diastolic heart failure, coronary artery disease status post remote CABG, hypertension, hyperlipidemia, history of CVA.    ASSESSMENT:    Acute Kidney injury superimposed on chronic kidney disease stage IV:  -- Acute kidney injury in the setting of intracranial hemorrhage, hemodynamically mediated changes, with component of contrast-induced nephropathy  -- Treated with IV nicardipine infusion.  -- Proteinuric kidney disease which increased risk of coagulopathy  -- Serum creatinine up trended to 4.7 mg deciliter EGFR dropped to 12.  --Acute urinary retention to repeat bladder scan  -- Recent contrast exposure  -- Oliguria lack of improvement with IV fluid and Lasix trial  -- ATN with high urinary sodium   -- Developing uremic symptoms , hemodialysis options discussed.  Patient interested to start hemodialysis tomorrow if no improvement in  renal parameters    Creatinine  0.50 - 1.30 mg/dL 5.79 High  5.33 High  4.74 High  4.47 High  4.09 High    3.11 High    eGFR  >60 mL/min/1.73m*2 9 Low  10 Low  CM 11 Low  CM 12 Low  CM 14 Low  CM   19 Low      Subacute to acute left intracranial hemorrhage   Complex case patient with longstanding history of diabetes mellitus complicated with vasculopathy who presented with acute hemorrhagic stroke and in need to maintain the blood pressure on the lower side to ensure stability of intracranial hemorrhage on the other hand lower blood pressure will contribute to organ hypoperfusion and worsening renal function.  Low diastolic blood pressure and wide blood pulse pressure suggestive of low organ hypoperfusion.  Since stabilizing hemorrhagic stroke is lifesaving and maintaining relatively low blood pressures is critical in his case.     Atrial fibrillation: Anticoagulation on hold  Started on amiodarone     Hypertension:   - Blood pressure on presentation 187/76 started on nicardipine infusion  - Pressure readings over the past 12 hours reviewed blood pressure fluctuating between 118/50 to 130/57  -Relatively low diastolic blood pressure, wide pulse pressure     Chronic kidney disease stage IV:  -- Longstanding history of diabetes mellitus complicated with triopathy  -- Diabetic nephropathy, hypertensive nephrosclerosis  -- Proteinuric with a protein to creatinine ratio of 2.6  mg/g 7 months ago  -- Baseline serum creatinine 3.3 mg/dL, EGFR 17 mL/min per 1.73 m²  -- serum creatinine 4.1 mg/dl  BUN of 58 EGFR of 14 mL/min per 1.73 m²     Hyponatremia sodium level 131:  -- Suspected hypervolemic related  -- To check BNP     Secondary hyperparathyroidism: Last PTH was more than 600     Acute urinary retention:  Bladder scan 500 cc status post straight cath overnight     Volume: Status post IVF     PLAN:  Oligoanuric, trial of diuretic with no response.   To monitor UOP   To hold nicardipine drip, to enhance renal  perfusion.  To recheck bladder scan  NPO after midnight     Developing uremic symptoms, hemodialysis options discussed.  Patient interested in starting hemodialysis tomorrow if no improvement in renal parameters.  Will discuss the plan again in am.    Will follow, thank you!    SUBJECTIVE:  Seen and evaluated at bedside in the intensive care unit urine output remains  Marginal.  External Fields catheter in place.  No improvement in urine output despite IV fluid and Lasix.  Developing uremic symptoms including hiccups poor appetite  Excessive fatigue  Medications:    Current Facility-Administered Medications:     amiodarone (Pacerone) tablet 200 mg, 200 mg, oral, Daily, Danuta Taylor MD, 200 mg at 10/11/23 0859    B complex-vitamin C tablet 1 tablet, 1 tablet, oral, Every other day, Kathleen Luis APRN-CNP, 1 tablet at 10/11/23 0859    cholecalciferol (Vitamin D-3) capsule 25 mcg, 25 mcg, oral, Every other day, Kathleen WATSON Trefny APRN-CNP, 25 mcg at 10/11/23 0900    dextrose 10 % in water (D10W) infusion, 50 mL/hr, intravenous, Once PRN, Kathleen Luis APRN-CNP    dextrose 50 % injection 25 g, 25 g, intravenous, q15 min PRN, Kathleen Luis APRN-CNP    docusate sodium (Colace) capsule 100 mg, 100 mg, oral, BID, Kathleen WATSON Trefny, APRN-CNP, 100 mg at 10/11/23 2128    esomeprazole (NexIUM) suspension 40 mg, 40 mg, nasoduodenal tube, Daily before breakfast **OR** pantoprazole (ProtoNix) EC tablet 40 mg, 40 mg, oral, Daily before breakfast, 40 mg at 10/11/23 0920 **OR** pantoprazole (ProtoNix) injection 40 mg, 40 mg, intravenous, Daily before breakfast, Danuta Taylor MD    flu vaccine, quadrivalent, high-dose, preservative free, age 65y+ (FLUZONE), 0.7 mL, intramuscular, During hospitalization, Danuta Taylor MD    furosemide (Lasix) injection 80 mg, 80 mg, intravenous, q12h, Nereida Navarro MD    glucagon (Glucagen) injection 1 mg, 1 mg, intramuscular, q15 min PRN, Kathleen Luis, APRN-CNP     hydrALAZINE (Apresoline) injection 10 mg, 10 mg, intravenous, q20 min PRN, Arnaldo Uriarte DO    hydrALAZINE (Apresoline) tablet 25 mg, 25 mg, oral, BID, Danuta Taylor MD, 25 mg at 10/11/23 2128    [Held by provider] insulin glargine (Lantus) injection 12 Units, 12 Units, subcutaneous, Nightly, NITA Rodriguez, 12 Units at 10/11/23 2126    insulin lispro (HumaLOG) injection 0-10 Units, 0-10 Units, subcutaneous, Before meals & nightly, NITA Rodriguez, 2 Units at 10/11/23 2138    isosorbide mononitrate ER (Imdur) 24 hr tablet 30 mg, 30 mg, oral, Daily, Sindy Sanchez MD, 30 mg at 10/11/23 0920    labetaloL (Normodyne,Trandate) injection 10 mg, 10 mg, intravenous, q2h PRN, NITA Rodriguez    labetaloL (Normodyne,Trandate) injection 10 mg, 10 mg, intravenous, q10 min PRN, Arnaldo Uriarte DO    niCARdipine (Cardene) 40 mg in sodium chloride 200 mL (0.2 mg/mL) infusion (premix), 1-15 mg/hr, intravenous, Continuous PRN, Arnaldo Uriarte DO    ondansetron (Zofran) injection 4 mg, 4 mg, intravenous, q6h PRN, NITA Rodriguez    rosuvastatin (Crestor) tablet 20 mg, 20 mg, oral, Daily, Danuta Taylor MD, 20 mg at 10/11/23 0859    sertraline (Zoloft) tablet 25 mg, 25 mg, oral, Daily, Danuta Taylor MD, 25 mg at 10/11/23 1108    PERTINENT ROS:  GENERAL:  positive for fatigue, poor appetite.  No fever/chills  RESPIRATORY:  positive for shortness of breath.  Negative for cough, wheezing.  CARDIOVASCULAR:   Negative for chest pain or palpitation.  GI:  Negative for abdominal pain, diarrhea, heartburn, nausea, vomiting  : negative for dysuria, hematuria    Physical Exam:  Vital signs in last 24 hours:  Temp:  [36.3 °C (97.3 °F)-36.6 °C (97.9 °F)] 36.6 °C (97.9 °F)  Heart Rate:  [66-78] 72  Resp:  [17-39] 37  BP: (119-152)/(47-72) 135/58    General: Awake, cooperative, not in acute distress  HEENT:  NCAT,  mucous membranes moist and pink  NECK:  Elevated JVD, no  carotid bruit, supple, no cervical mass or thyromegaly  LUNGS;  Diminished breath sounds, fine Rales  CV:  Distant, regular rate and rhythm, no murmurs  ABDOMEN:  abdomen soft, nontender, BS normal, no masses or organomegaly  EDEMA:  +2 lower extremity edema/dependent edema  SKIN:  dry and normal turgor, no clubbing, cyanosis or petechia.  No rashes noted      Intake/Output last 3 shifts:  I/O last 3 completed shifts:  In: 1430 (18.3 mL/kg) [I.V.:1430 (18.3 mL/kg)]  Out: 650 (8.3 mL/kg) [Urine:650 (0.2 mL/kg/hr)]  Weight: 78.2 kg     DATA:  Diagnotic tests reviewed for Todays visit:  Results from last 7 days   Lab Units 10/12/23  0507   WBC AUTO x10*3/uL 10.2   RBC AUTO x10*6/uL 3.22*   HEMOGLOBIN g/dL 9.3*   HEMATOCRIT % 29.8*     Results from last 7 days   Lab Units 10/12/23  0506 10/11/23  1643   SODIUM mmol/L 130* 130*   POTASSIUM mmol/L 4.3 4.4   CHLORIDE mmol/L 98 97*   CO2 mmol/L 22 23   BUN mg/dL 64* 61*   CREATININE mg/dL 5.33* 4.74*   CALCIUM mg/dL 8.3* 8.4*   PHOSPHORUS mg/dL  --  4.0   MAGNESIUM mg/dL 1.97  --    BILIRUBIN TOTAL mg/dL 0.6  --    ALT U/L 52  --    AST U/L 59*  --          IMAGING: CXR reviewed in  images      SIGNATURE: Nereida Navarro MD  Nephrology and Hypertension  [address]  Office phone: [phone]  FAX: [phone]    This note was partially generated using the Dragon voice recognition system, and there may be some incorrect words, spelling's and punctuation that were not noted in checking the note before saving.

## 2023-10-12 NOTE — PROGRESS NOTES
Internal Medicine Progress Note    Patient Name: Zack Figueroa          MRN: [id number]  Today's Date: October 12, 2023          Attending: George Lenz MD    Subjective:  Patient was seen and examined at bedside.    Review Of Systems:  GENERAL: Generalized weakness  CARDIOVASCULAR: Negative for chest pain, leg swelling  RESPIRATORY: Negative for shortness of breath, cough, wheezing  GI: No nausea, vomiting, or diarrhea        Objective:  Vitals:    10/12/23 1300 10/12/23 1330 10/12/23 1400 10/12/23 1500   BP: 147/65 136/62 131/59 129/57   BP Location:       Patient Position:       Pulse: 58 58 62 66   Resp: 19 18 20 (!) 31   Temp:       TempSrc:       SpO2: 97% 97% 96% 96%   Weight:       Height:               Physical Exam:   General appearance: Frail appearing, in no distress  Lungs: Clear to auscultation, no wheezing or rhonchi  Heart: No murmur, gallop, or rubs.  No ectopy  Abdomen: Soft, non-tender. Bowel sounds normal.  Extremities: No deformity, no edema  Neuro: Awake, tremors in upper extremities    Labs:  Results for orders placed or performed during the hospital encounter of 10/09/23 (from the past 24 hour(s))   POCT GLUCOSE   Result Value Ref Range    POCT Glucose 196 (H) 74 - 99 mg/dL   Magnesium   Result Value Ref Range    Magnesium 1.97 1.60 - 2.40 mg/dL   Comprehensive metabolic panel   Result Value Ref Range    Glucose 55 (LL) 74 - 99 mg/dL    Sodium 130 (L) 136 - 145 mmol/L    Potassium 4.3 3.5 - 5.3 mmol/L    Chloride 98 98 - 107 mmol/L    Bicarbonate 22 21 - 32 mmol/L    Anion Gap 14 10 - 20 mmol/L    Urea Nitrogen 64 (H) 6 - 23 mg/dL    Creatinine 5.33 (H) 0.50 - 1.30 mg/dL    eGFR 10 (L) >60 mL/min/1.73m*2    Calcium 8.3 (L) 8.6 - 10.3 mg/dL    Albumin 3.2 (L) 3.4 - 5.0 g/dL    Alkaline Phosphatase 121 33 - 136 U/L    Total Protein 6.8 6.4 - 8.2 g/dL    AST 59 (H) 9 - 39 U/L    Bilirubin, Total 0.6 0.0 - 1.2 mg/dL    ALT 52 10 - 52 U/L   CBC   Result Value Ref Range    WBC 10.2 4.4 -  11.3 x10*3/uL    nRBC 0.0 0.0 - 0.0 /100 WBCs    RBC 3.22 (L) 4.50 - 5.90 x10*6/uL    Hemoglobin 9.3 (L) 13.5 - 17.5 g/dL    Hematocrit 29.8 (L) 41.0 - 52.0 %    MCV 93 80 - 100 fL    MCH 28.9 26.0 - 34.0 pg    MCHC 31.2 (L) 32.0 - 36.0 g/dL    RDW 15.1 (H) 11.5 - 14.5 %    Platelets 167 150 - 450 x10*3/uL    MPV 10.6 7.5 - 11.5 fL   PTH, Intact   Result Value Ref Range    Parathyroid Hormone, Intact 434.6 (H) 18.5 - 88.0 pg/mL   B-type natriuretic peptide   Result Value Ref Range     (H) 0 - 99 pg/mL   POCT GLUCOSE   Result Value Ref Range    POCT Glucose 186 (H) 74 - 99 mg/dL   POCT GLUCOSE   Result Value Ref Range    POCT Glucose 56 (L) 74 - 99 mg/dL   POCT GLUCOSE   Result Value Ref Range    POCT Glucose 166 (H) 74 - 99 mg/dL   Protime-INR   Result Value Ref Range    Protime 13.8 (H) 9.8 - 12.8 seconds    INR 1.2 (H) 0.9 - 1.1   aPTT   Result Value Ref Range    aPTT 31 27 - 38 seconds   Protein, Urine Random   Result Value Ref Range    Total Protein, Urine Random 715 (H) 5 - 25 mg/dL    Creatinine, Urine Random 76.8 20.0 - 370.0 mg/dL    T. Protein/Creatinine Ratio 9.31 (H) 0.00 - 0.17 mg/mg Creat   Sodium, Urine Random   Result Value Ref Range    Sodium, Urine Random 37 mmol/L    Creatinine, Urine Random 76.8 20.0 - 370.0 mg/dL    Sodium/Creatinine Ratio 48 Not established. mmol/g Creat   Chloride, Urine Random   Result Value Ref Range    Chloride, Urine Random 51 mmol/L    Creatinine, Urine Random 79.1 20.0 - 370.0 mg/dL    Chloride/Creatinine Ratio 64 23 - 275 mmol/g creat   POCT GLUCOSE   Result Value Ref Range    POCT Glucose 126 (H) 74 - 99 mg/dL   Renal function panel   Result Value Ref Range    Glucose 151 (H) 74 - 99 mg/dL    Sodium 127 (L) 136 - 145 mmol/L    Potassium 4.9 3.5 - 5.3 mmol/L    Chloride 96 (L) 98 - 107 mmol/L    Bicarbonate 21 21 - 32 mmol/L    Anion Gap 15 10 - 20 mmol/L    Urea Nitrogen 69 (H) 6 - 23 mg/dL    Creatinine 5.79 (H) 0.50 - 1.30 mg/dL    eGFR 9 (L) >60 mL/min/1.73m*2     Calcium 8.4 (L) 8.6 - 10.3 mg/dL    Phosphorus 4.5 2.5 - 4.9 mg/dL    Albumin 3.4 3.4 - 5.0 g/dL   POCT GLUCOSE   Result Value Ref Range    POCT Glucose 174 (H) 74 - 99 mg/dL   Sterile Cup   Result Value Ref Range    Extra Tube Hold for add-ons.        Medications:  Scheduled medications  amiodarone, 200 mg, oral, Daily  B complex-vitamin C, 1 tablet, oral, Every other day  cholecalciferol, 25 mcg, oral, Every other day  docusate sodium, 100 mg, oral, BID  esomeprazole, 40 mg, nasoduodenal tube, Daily before breakfast   Or  pantoprazole, 40 mg, oral, Daily before breakfast   Or  pantoprazole, 40 mg, intravenous, Daily before breakfast  influenza, 0.7 mL, intramuscular, During hospitalization  hydrALAZINE, 25 mg, oral, BID  [Held by provider] insulin glargine, 12 Units, subcutaneous, Nightly  insulin lispro, 0-10 Units, subcutaneous, Before meals & nightly  isosorbide mononitrate ER, 30 mg, oral, Daily  rosuvastatin, 20 mg, oral, Daily  sertraline, 25 mg, oral, Daily      Continuous medications  lactated Ringer's, 75 mL/hr, Last Rate: 75 mL/hr (10/12/23 1128)      PRN medications  PRN medications: dextrose 10 % in water (D10W), dextrose, glucagon, hydrALAZINE, labetaloL, labetaloL, ondansetron      Assessment/Plan:  Medical Problems       Problem List       * (Principal) Intraparenchymal hemorrhage of brain (CMS/[facility])    Overview Signed 10/12/2023  5:32 PM by George Lenz MD     Continue monitoring blood pressure  Continue monitoring anticoagulation and antiplatelets  Continue statin  Neurology is following           Hypertension    Overview Signed 10/12/2023  5:34 PM by George Lenz MD     Continue hydralazine and isosorbide mononitrate  Patient is off nicardipine drip               Transaminitis    Overview Signed 10/12/2023  5:34 PM by George Lenz MD     We will monitor transaminase levels                 Discussed with patient, RN    George Lenz MD   Date: 10/12/23  Time: 5:35 PM    This note was  "partially created using voice recognition software and is inherently subject to errors including those of syntax and \"sound-alike\" substitutions which may escape proofreading. In such instances, original meaning may be extrapolated by contextual derivation    "

## 2023-10-12 NOTE — PROGRESS NOTES
Occupational Therapy    OT Treatment    Patient Name: Zack Figueroa  MRN: [id number]  Today's Date: 10/12/2023  Time Calculation  Start Time: 1035  Stop Time: 1119  Time Calculation (min): 44 min                   Plan:  OT Frequency: 5 times per week  OT Discharge Recommendations: High intensity level of continued care     Subjective     Current Problem:  1. Intraparenchymal hemorrhage of brain (CMS/HCC)  Critical Care    Critical Care      2. Oral phase dysphagia [R13.11]        3. 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 A-fib (CMS/[facility], located within [facility])        5. Anticoagulant long-term use            General:  OT Received On: 10/12/23  Family/Caregiver Present: Yes  Prior to Session Communication: Bedside nurse  Patient Position Received: Bed, 3 rail up    Vital Signs:       Pain:  Pain Assessment  Pain Assessment: 0-10  Pain Score: 0 - No pain  Objective      Activities of Daily Living:                   Toileting  Toileting Level of Assistance: Maximum assistance  Where Assessed:  (standing EOB)  Toileting Comments:  (Pt slightly incontinent upon standing, max A for walter and incontinence care)    Functional Standing Tolerance:       Bed Mobility/Transfers: Bed Mobility  Bed Mobility: Yes  Bed Mobility 1  Bed Mobility 1: Supine to sitting  Level of Assistance 1: Moderate assistance  Transfers  Transfer: Yes  Transfer 1  Transfer From 1: Sit to  Transfer to 1: Stand  Transfer Device 1: Walker  Transfer Level of Assistance 1: Minimum assistance (x2)  Trials/Comments 1:  (Fm performed within household distances Mod A with chair follow, became fatigued and leaning to R side.)                   Outcome Measures:[facility] Daily Activity  Putting on and taking off regular lower body clothing: A lot  Bathing (including washing, rinsing, drying): A lot  Putting on and taking off regular upper body clothing: A lot  Toileting, which includes using toilet, bedpan or urinal: A lot  Taking  care of personal grooming such as brushing teeth: A little  Eating Meals: A little  Daily Activity - Total Score: 14  Education Documentation  Body Mechanics, taught by BELEN Cannon at 10/12/2023 12:20 PM.  Learner: Family, Patient  Readiness: Acceptance  Method: Demonstration  Response: Needs Reinforcement    ADL Training, taught by BELEN Cannon at 10/12/2023 12:20 PM.  Learner: Family, Patient  Readiness: Acceptance  Method: Demonstration  Response: Needs Reinforcement    Education Comments  No comments found.        EDUCATION:  Education  Individual(s) Educated: Patient, Spouse  Education Provided: Ergonomics and postural realignment, Fall precautons, POC discussed and agreed upon  Education Comment: recpetive to recommendations, will require continued education    Goals:  Encounter Problems       Encounter Problems (Active)       OT Goals       Patient will complete functional transfers at CGA level with LRD to facilitate increased independence and safety with home going  (Progressing)       Start:  10/10/23    Expected End:  10/24/23            Patient will complete functional mobility for household distances at CGA level with LRD to facilitate increased independence and safety with home going  (Progressing)       Start:  10/10/23    Expected End:  10/24/23            Patient will complete LB dressing at CGA level to facilitate safety and independence for home going   (Progressing)       Start:  10/10/23    Expected End:  10/24/23            Patient will complete toileting at CGA level to facilitate safety and independence for home going   (Progressing)       Start:  10/10/23    Expected End:  10/24/23            Patient will complete UB dressing at supervision level to facilitate safety and independence for home going   (Progressing)       Start:  10/10/23    Expected End:  10/24/23               Safety       LTG - Patient will adhere to hip precautions during ADL's and transfers        Start:  10/10/23            LTG - Patient will demonstrate safety requirements appropriate to situation/environment       Start:  10/10/23            LTG - Patient will utilize safety techniques       Start:  10/10/23            STG - Patient locks brakes on wheelchair       Start:  10/10/23            STG - Patient uses call light consistently to request assistance with transfers       Start:  10/10/23            STG - Patient uses gait belt during all transfers       Start:  10/10/23            Goal 1       Start:  10/10/23            Goal 2       Start:  10/10/23            Goal 3       Start:  10/10/23

## 2023-10-12 NOTE — CONSULTS
History Of Present Illness  Zack Figueroa is a 88 y.o. male presenting with ICH. 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 Wife reports his baseline Bps at home are around 120-140 systolic. Since then he has made some recovery with his strength but remains a bit confused.     Prior to admission he was taking Eliquis 2.5mg BID plus aspirin 81mg as prescribed by his cardiologist. She reports previous history of stroke in 2014 (not sure if hemorrhagic or ischemic) for which he was evaluated at [facility]. At that time he was not on Eliquis. He previously had 2 attempted cardioversions but remains with atrial flutter. He has no history of GI or other bleeding, and has hx of 3 falls previously.    Past Medical History  Past Medical History:   Diagnosis Date    Atrial fibrillation (CMS/[facility])     CAD (coronary artery disease)     CHF (congestive heart failure) (CMS/[facility])     CKD (chronic kidney disease) stage 3, GFR 30-59 ml/min (CMS/[facility])     HLD (hyperlipidemia)     HTN (hypertension)     JESUS (iron deficiency anemia)     Insulin dependent type 2 diabetes mellitus (CMS/[facility])     Stroke (CMS/[facility])      Surgical History  Past Surgical History:   Procedure Laterality Date    APPENDECTOMY      CORONARY ARTERY BYPASS GRAFT  1994     Social History  Social History     Tobacco Use    Smoking status: Never   Substance Use Topics    Alcohol use: Never    Drug use: Never     Allergies  Patient has no known allergies.  Medications Prior to Admission   Medication Sig Dispense Refill Last Dose    albuterol 90 mcg/actuation inhaler Inhale 2 puffs every 6 hours if needed for wheezing.       amiodarone (Pacerone) 200 mg tablet Take 1 tablet (200 mg) by mouth once daily.       apixaban (Eliquis) 2.5 mg tablet Take 1 tablet (2.5 mg) by mouth 2 times a day.       aspirin 81 mg EC tablet Take 1 tablet (81 mg)  by mouth once daily.       b complex 0.4 mg tablet Take 1 tablet by mouth every other day.       CARBONYL IRON ORAL Take 1 tablet by mouth every other day.       cholecalciferol (Vitamin D3) 25 MCG (1000 UT) capsule Take 1 capsule (25 mcg) by mouth every other day.       docusate sodium (Colace) 100 mg capsule Take 1 capsule (100 mg) by mouth once daily.       dulaglutide (Trulicity) 3 mg/0.5 mL pen injector Inject 1 Dose under the skin every 7 days. On Sat       fluticasone (Flonase Allergy Relief) 50 mcg/actuation nasal spray Administer 1 spray into each nostril once daily. Shake gently. Before first use, prime pump. After use, clean tip and replace cap.       furosemide (Lasix) 40 mg tablet Take 1 tablet (40 mg) by mouth once daily as needed.       hydrALAZINE (Apresoline) 25 mg tablet Take 0.5 tablets (12.5 mg) by mouth 2 times a day.       insulin glargine (Lantus U-100 Insulin) 100 unit/mL injection Inject 12 Units under the skin once daily at bedtime. Take as directed per insulin instructions.       insulin lispro (HumaLOG) 100 unit/mL injection Inject under the skin 3 times a day with meals. 10 units with breakfast  10 units  with lunch  12 units with dinner       isosorbide mononitrate ER (Imdur) 30 mg 24 hr tablet Take 1 tablet (30 mg) by mouth once daily. Do not crush or chew.       omeprazole (PriLOSEC) 40 mg DR capsule Take 1 capsule (40 mg) by mouth once daily in the evening.  Take before meals. Do not crush or chew.       rosuvastatin (Crestor) 20 mg tablet Take 1 tablet (20 mg) by mouth once daily.       sertraline (Zoloft) 25 mg tablet Take 1 tablet (25 mg) by mouth once daily.          Review of Systems  Neurological Exam  Mental Status   Speech is normal. Language is fluent with no aphasia. Attention and concentration are normal.    Cranial Nerves  CN II: Visual fields full to confrontation.  CN III, IV, VI: Extraocular movements intact bilaterally. Normal saccades. Normal smooth pursuit. Normal  "lids and orbits bilaterally. Pupils equal round and reactive to light bilaterally.  CN V:  Right: Facial sensation is normal.  Left: Facial sensation is normal on the left.  CN VII: Full and symmetric facial movement.  CN VIII: Hearing is normal.  CN IX, X: Palate elevates symmetrically  CN XI:  Right: Trapezius strength is normal.  Left: Trapezius strength is normal.  CN XII: Tongue midline without atrophy or fasciculations.    Motor  Normal muscle bulk throughout. No fasciculations present. Normal muscle tone. No abnormal involuntary movements.                                               Right                     Left   Shoulder abduction               4                          5  Elbow flexion                         4                          5  Elbow extension                    4                          5  Finger flexion                         5                          5  Finger extension                    5                          5  Finger abduction                    4+                          5  Thumb abduction                   5                          5  Hip flexion                              4+                          5  Knee flexion                           4+                          5  Knee extension                      4+                          5  Plantarflexion                         5                          5  Dorsiflexion                            5                          5    Sensory  Light touch is normal in upper and lower extremities.     Reflexes  Right Plantar: downgoing  Left Plantar: downgoing  Hyporeflexic throughout.    Coordination  Right: Finger-to-nose normal. Heel-to-shin normal.Left: Finger-to-nose normal. Heel-to-shin normal.    Last Recorded Vitals  Blood pressure 124/59, pulse 58, temperature 36.5 °C (97.7 °F), resp. rate 18, height 1.676 m (5' 5.98\"), weight 78.2 kg (172 lb 6.4 oz), SpO2 98 %.    Relevant Results  NIH Stroke Scale  1A. Level of " Consciousness: Alert, Keenly Responsive  1B. Ask Month and Age: 1 Question Right  1C. Blink Eyes & Squeeze Hands: Performs Both Tasks  2. Best Gaze: Normal  3. Visual: No Visual Loss  4. Facial Palsy: Minor Paralysis  5A. Motor - Left Arm: No Drift  5B. Motor - Right Arm: Drift  6A. Motor - Left Leg: No Drift  6B. Motor - Right Leg: No Drift  7. Limb Ataxia: Absent  8. Sensory Loss: Normal  9. Best Language: No Aphasia  10. Dysarthria: Normal  11. Extinction and Inattention: No Abnormality  NIH Stroke Scale: 3    Results for orders placed or performed during the hospital encounter of 10/09/23 (from the past 24 hour(s))   PTH, Intact   Result Value Ref Range    Parathyroid Hormone, Intact 424.2 (H) 18.5 - 88.0 pg/mL   POCT GLUCOSE   Result Value Ref Range    POCT Glucose 168 (H) 74 - 99 mg/dL   Renal Function Panel   Result Value Ref Range    Glucose 177 (H) 74 - 99 mg/dL    Sodium 130 (L) 136 - 145 mmol/L    Potassium 4.4 3.5 - 5.3 mmol/L    Chloride 97 (L) 98 - 107 mmol/L    Bicarbonate 23 21 - 32 mmol/L    Anion Gap 14 10 - 20 mmol/L    Urea Nitrogen 61 (H) 6 - 23 mg/dL    Creatinine 4.74 (H) 0.50 - 1.30 mg/dL    eGFR 11 (L) >60 mL/min/1.73m*2    Calcium 8.4 (L) 8.6 - 10.3 mg/dL    Phosphorus 4.0 2.5 - 4.9 mg/dL    Albumin 3.2 (L) 3.4 - 5.0 g/dL   Vitamin B12   Result Value Ref Range    Vitamin B12 772 211 - 911 pg/mL   POCT GLUCOSE   Result Value Ref Range    POCT Glucose 196 (H) 74 - 99 mg/dL   Magnesium   Result Value Ref Range    Magnesium 1.97 1.60 - 2.40 mg/dL   Comprehensive metabolic panel   Result Value Ref Range    Glucose 55 (LL) 74 - 99 mg/dL    Sodium 130 (L) 136 - 145 mmol/L    Potassium 4.3 3.5 - 5.3 mmol/L    Chloride 98 98 - 107 mmol/L    Bicarbonate 22 21 - 32 mmol/L    Anion Gap 14 10 - 20 mmol/L    Urea Nitrogen 64 (H) 6 - 23 mg/dL    Creatinine 5.33 (H) 0.50 - 1.30 mg/dL    eGFR 10 (L) >60 mL/min/1.73m*2    Calcium 8.3 (L) 8.6 - 10.3 mg/dL    Albumin 3.2 (L) 3.4 - 5.0 g/dL    Alkaline  Phosphatase 121 33 - 136 U/L    Total Protein 6.8 6.4 - 8.2 g/dL    AST 59 (H) 9 - 39 U/L    Bilirubin, Total 0.6 0.0 - 1.2 mg/dL    ALT 52 10 - 52 U/L   CBC   Result Value Ref Range    WBC 10.2 4.4 - 11.3 x10*3/uL    nRBC 0.0 0.0 - 0.0 /100 WBCs    RBC 3.22 (L) 4.50 - 5.90 x10*6/uL    Hemoglobin 9.3 (L) 13.5 - 17.5 g/dL    Hematocrit 29.8 (L) 41.0 - 52.0 %    MCV 93 80 - 100 fL    MCH 28.9 26.0 - 34.0 pg    MCHC 31.2 (L) 32.0 - 36.0 g/dL    RDW 15.1 (H) 11.5 - 14.5 %    Platelets 167 150 - 450 x10*3/uL    MPV 10.6 7.5 - 11.5 fL   B-type natriuretic peptide   Result Value Ref Range     (H) 0 - 99 pg/mL   POCT GLUCOSE   Result Value Ref Range    POCT Glucose 186 (H) 74 - 99 mg/dL   POCT GLUCOSE   Result Value Ref Range    POCT Glucose 56 (L) 74 - 99 mg/dL   POCT GLUCOSE   Result Value Ref Range    POCT Glucose 166 (H) 74 - 99 mg/dL   Protein, Urine Random   Result Value Ref Range    Total Protein, Urine Random 715 (H) 5 - 25 mg/dL    Creatinine, Urine Random 76.8 20.0 - 370.0 mg/dL    T. Protein/Creatinine Ratio 9.31 (H) 0.00 - 0.17 mg/mg Creat   Sodium, Urine Random   Result Value Ref Range    Sodium, Urine Random 37 mmol/L    Creatinine, Urine Random 76.8 20.0 - 370.0 mg/dL    Sodium/Creatinine Ratio 48 Not established. mmol/g Creat   Chloride, Urine Random   Result Value Ref Range    Chloride, Urine Random 51 mmol/L    Creatinine, Urine Random 79.1 20.0 - 370.0 mg/dL    Chloride/Creatinine Ratio 64 23 - 275 mmol/g creat   POCT GLUCOSE   Result Value Ref Range    POCT Glucose 126 (H) 74 - 99 mg/dL      CT head wo IV contrast 10/10/2023    Narrative  Interpreted By:  Johnson, Esteban,  STUDY:  CT HEAD WO IV CONTRAST;  10/10/2023 4:58 am    INDICATION:  Signs/Symptoms:L intraparenchymal hemorrhage.    COMPARISON:  None.    ACCESSION NUMBER(S):  [id number]    ORDERING CLINICIAN:  JOHN MCNEAL    TECHNIQUE:  Axial noncontrast CT images of the head.    FINDINGS:  Gray-white matter differentiation is maintained.  There are no  extra-axial collections, with recent intravenous contrast  administration slightly limiting evaluation of the extra-axial  spaces.. No significant mass effect or midline shift. There is no  hydrocephalus. Mild generalized cerebral volume loss and associated  ventricular and sulcal enlargement. Scattered periventricular and  deep white matter hypodensities with suggestion of bilateral basal  ganglia chronic lacunar infarcts which may represent mild-to-moderate  chronic microvascular ischemic change.    HEMORRHAGE: There is stable appearance of the left thalamic capsular  geographic hyperdensity likely representing acute to subacute  hemorrhagic lacunar infarct. There is questionable associated  vasogenic edema without evidence of significant mass effect.    CALVARIUM: No depressed skull fracture.    EXTRACRANIAL SOFT TISSUES:.. Unremarkable.    PARANASAL SINUSES/MASTOIDS: The visualized paranasal sinuses are well  aerated. Mastoid air cells are clear.    ORBITS: Bilateral cataract surgeries.    Impression  Similar appearance of geographic parenchymal hyperdensity centered in  the posterior left internal capsule and left thalamus suggestive of  recent hemorrhagic lacunar infarct. No evidence of significant mass  effect or midline shift. Brain MRI may be obtained to better  characterize chronicity.    Signed by: Esteban Johnson 10/10/2023 5:23 AM  Dictation workstation:   [id number]            Assessment/Plan   Principal Problem:    Intraparenchymal hemorrhage of brain (CMS/HCC)  Active Problems:    Hypertension    Hyperglycemia    Transaminitis           Impression:  Left thalamic ICH related to anticoagulation    Recommend:  Hold AC/AP for now  Goal normotension  I had a long discussion about high risk of recurrent bleed with future reinitiation of Eliquis, as well as high risk of ischemic stroke with holding anticoagulation. Intermediate option would be aspirin monotherapy. He will consider these  options and will get back to me.  Additionally I suggested he discuss with his cardiologist regarding any possibility of ablative or procedural treatments for atrial flutter.    I spent 62 minutes in the professional and overall care of this patient.      Kris Dickey MD

## 2023-10-12 NOTE — CARE PLAN
The patient's goals for the shift include get a few hours sleep this shift    The clinical goals for the shift include Patient will maintain injury free by not getting out of bed unsupervised this shift      Problem: Fall/Injury  Goal: Verbalize understanding of risk factor reduction measures to prevent injury from fall in the home  Outcome: Not Progressing  Note: Patient attempted to get out of bed unsupervised without using call light throughout shift.  Bed alarm on, patient re-educated on importance of using call light for assistance.

## 2023-10-13 ENCOUNTER — APPOINTMENT (OUTPATIENT)
Dept: DIALYSIS | Facility: HOSPITAL | Age: 88
End: 2023-10-13
Payer: MEDICARE

## 2023-10-13 ENCOUNTER — APPOINTMENT (OUTPATIENT)
Dept: RADIOLOGY | Facility: HOSPITAL | Age: 88
DRG: 064 | End: 2023-10-13
Payer: MEDICARE

## 2023-10-13 PROBLEM — N18.9 ACUTE KIDNEY INJURY SUPERIMPOSED ON CHRONIC KIDNEY DISEASE (CMS-HCC): Status: ACTIVE | Noted: 2023-10-13

## 2023-10-13 PROBLEM — N17.9 ACUTE KIDNEY INJURY SUPERIMPOSED ON CHRONIC KIDNEY DISEASE (CMS-HCC): Status: ACTIVE | Noted: 2023-10-13

## 2023-10-13 LAB
ALBUMIN SERPL BCP-MCNC: 3.3 G/DL (ref 3.4–5)
ANION GAP SERPL CALC-SCNC: 18 MMOL/L (ref 10–20)
BUN SERPL-MCNC: 78 MG/DL (ref 6–23)
CALCIUM SERPL-MCNC: 8.5 MG/DL (ref 8.6–10.3)
CHLORIDE SERPL-SCNC: 94 MMOL/L (ref 98–107)
CO2 SERPL-SCNC: 19 MMOL/L (ref 21–32)
CREAT SERPL-MCNC: 6.14 MG/DL (ref 0.5–1.3)
ERYTHROCYTE [DISTWIDTH] IN BLOOD BY AUTOMATED COUNT: 15.1 % (ref 11.5–14.5)
GFR SERPL CREATININE-BSD FRML MDRD: 8 ML/MIN/1.73M*2
GLUCOSE BLD MANUAL STRIP-MCNC: 148 MG/DL (ref 74–99)
GLUCOSE BLD MANUAL STRIP-MCNC: 150 MG/DL (ref 74–99)
GLUCOSE BLD MANUAL STRIP-MCNC: 169 MG/DL (ref 74–99)
GLUCOSE BLD MANUAL STRIP-MCNC: 187 MG/DL (ref 74–99)
GLUCOSE SERPL-MCNC: 138 MG/DL (ref 74–99)
HCT VFR BLD AUTO: 30 % (ref 41–52)
HGB BLD-MCNC: 9.9 G/DL (ref 13.5–17.5)
MAGNESIUM SERPL-MCNC: 2.14 MG/DL (ref 1.6–2.4)
MCH RBC QN AUTO: 30.2 PG (ref 26–34)
MCHC RBC AUTO-ENTMCNC: 33 G/DL (ref 32–36)
MCV RBC AUTO: 92 FL (ref 80–100)
NRBC BLD-RTO: 0 /100 WBCS (ref 0–0)
PHOSPHATE SERPL-MCNC: 4.8 MG/DL (ref 2.5–4.9)
PLATELET # BLD AUTO: 186 X10*3/UL (ref 150–450)
PMV BLD AUTO: 11.5 FL (ref 7.5–11.5)
POTASSIUM SERPL-SCNC: 4.9 MMOL/L (ref 3.5–5.3)
RBC # BLD AUTO: 3.28 X10*6/UL (ref 4.5–5.9)
SODIUM SERPL-SCNC: 126 MMOL/L (ref 136–145)
WBC # BLD AUTO: 15.2 X10*3/UL (ref 4.4–11.3)

## 2023-10-13 PROCEDURE — 86706 HEP B SURFACE ANTIBODY: CPT | Mod: CMCLAB | Performed by: PHYSICIAN ASSISTANT

## 2023-10-13 PROCEDURE — 2500000001 HC RX 250 WO HCPCS SELF ADMINISTERED DRUGS (ALT 637 FOR MEDICARE OP): Performed by: INTERNAL MEDICINE

## 2023-10-13 PROCEDURE — C1752 CATH,HEMODIALYSIS,SHORT-TERM: HCPCS

## 2023-10-13 PROCEDURE — 2500000004 HC RX 250 GENERAL PHARMACY W/ HCPCS (ALT 636 FOR OP/ED): Performed by: PHYSICIAN ASSISTANT

## 2023-10-13 PROCEDURE — 77001 FLUOROGUIDE FOR VEIN DEVICE: CPT | Performed by: RADIOLOGY

## 2023-10-13 PROCEDURE — 2500000001 HC RX 250 WO HCPCS SELF ADMINISTERED DRUGS (ALT 637 FOR MEDICARE OP): Performed by: PHYSICIAN ASSISTANT

## 2023-10-13 PROCEDURE — 36556 INSERT NON-TUNNEL CV CATH: CPT | Performed by: RADIOLOGY

## 2023-10-13 PROCEDURE — 36415 COLL VENOUS BLD VENIPUNCTURE: CPT | Performed by: PHYSICIAN ASSISTANT

## 2023-10-13 PROCEDURE — 77001 FLUOROGUIDE FOR VEIN DEVICE: CPT

## 2023-10-13 PROCEDURE — 83735 ASSAY OF MAGNESIUM: CPT

## 2023-10-13 PROCEDURE — 97110 THERAPEUTIC EXERCISES: CPT | Mod: GP,CQ

## 2023-10-13 PROCEDURE — 76937 US GUIDE VASCULAR ACCESS: CPT | Performed by: RADIOLOGY

## 2023-10-13 PROCEDURE — 76942 ECHO GUIDE FOR BIOPSY: CPT | Performed by: RADIOLOGY

## 2023-10-13 PROCEDURE — 2500000001 HC RX 250 WO HCPCS SELF ADMINISTERED DRUGS (ALT 637 FOR MEDICARE OP)

## 2023-10-13 PROCEDURE — 36415 COLL VENOUS BLD VENIPUNCTURE: CPT

## 2023-10-13 PROCEDURE — 2780000003 HC OR 278 NO HCPCS

## 2023-10-13 PROCEDURE — 86704 HEP B CORE ANTIBODY TOTAL: CPT | Mod: CMCLAB | Performed by: PHYSICIAN ASSISTANT

## 2023-10-13 PROCEDURE — 99231 SBSQ HOSP IP/OBS SF/LOW 25: CPT | Performed by: NURSE PRACTITIONER

## 2023-10-13 PROCEDURE — 87340 HEPATITIS B SURFACE AG IA: CPT | Mod: CMCLAB | Performed by: PHYSICIAN ASSISTANT

## 2023-10-13 PROCEDURE — 2500000004 HC RX 250 GENERAL PHARMACY W/ HCPCS (ALT 636 FOR OP/ED)

## 2023-10-13 PROCEDURE — 36556 INSERT NON-TUNNEL CV CATH: CPT

## 2023-10-13 PROCEDURE — 5A1D70Z PERFORMANCE OF URINARY FILTRATION, INTERMITTENT, LESS THAN 6 HOURS PER DAY: ICD-10-PCS | Performed by: INTERNAL MEDICINE

## 2023-10-13 PROCEDURE — 82947 ASSAY GLUCOSE BLOOD QUANT: CPT

## 2023-10-13 PROCEDURE — 87340 HEPATITIS B SURFACE AG IA: CPT | Performed by: PHYSICIAN ASSISTANT

## 2023-10-13 PROCEDURE — 1200000002 HC GENERAL ROOM WITH TELEMETRY DAILY

## 2023-10-13 PROCEDURE — 2500000004 HC RX 250 GENERAL PHARMACY W/ HCPCS (ALT 636 FOR OP/ED): Performed by: INTERNAL MEDICINE

## 2023-10-13 PROCEDURE — 97116 GAIT TRAINING THERAPY: CPT | Mod: GP,CQ

## 2023-10-13 PROCEDURE — 2500000002 HC RX 250 W HCPCS SELF ADMINISTERED DRUGS (ALT 637 FOR MEDICARE OP, ALT 636 FOR OP/ED): Performed by: INTERNAL MEDICINE

## 2023-10-13 PROCEDURE — 85027 COMPLETE CBC AUTOMATED: CPT

## 2023-10-13 PROCEDURE — 80069 RENAL FUNCTION PANEL: CPT

## 2023-10-13 PROCEDURE — 36415 COLL VENOUS BLD VENIPUNCTURE: CPT | Mod: STJLAB | Performed by: PHYSICIAN ASSISTANT

## 2023-10-13 PROCEDURE — 02HV33Z INSERTION OF INFUSION DEVICE INTO SUPERIOR VENA CAVA, PERCUTANEOUS APPROACH: ICD-10-PCS | Performed by: RADIOLOGY

## 2023-10-13 PROCEDURE — 8010000001 HC DIALYSIS - HEMODIALYSIS PER DAY

## 2023-10-13 PROCEDURE — 76937 US GUIDE VASCULAR ACCESS: CPT

## 2023-10-13 RX ORDER — HEPARIN SODIUM 1000 [USP'U]/ML
2000 INJECTION, SOLUTION INTRAVENOUS; SUBCUTANEOUS
Status: DISCONTINUED | OUTPATIENT
Start: 2023-10-13 | End: 2023-10-19 | Stop reason: HOSPADM

## 2023-10-13 RX ADMIN — SERTRALINE HYDROCHLORIDE 25 MG: 25 TABLET ORAL at 08:29

## 2023-10-13 RX ADMIN — DOCUSATE SODIUM 100 MG: 100 CAPSULE, LIQUID FILLED ORAL at 08:29

## 2023-10-13 RX ADMIN — HYDRALAZINE HYDROCHLORIDE 25 MG: 25 TABLET ORAL at 08:30

## 2023-10-13 RX ADMIN — LABETALOL HYDROCHLORIDE 10 MG: 5 INJECTION, SOLUTION INTRAVENOUS at 03:46

## 2023-10-13 RX ADMIN — Medication 1 TABLET: at 09:00

## 2023-10-13 RX ADMIN — HYDRALAZINE HYDROCHLORIDE 10 MG: 20 INJECTION INTRAMUSCULAR; INTRAVENOUS at 01:28

## 2023-10-13 RX ADMIN — ISOSORBIDE MONONITRATE 30 MG: 30 TABLET, EXTENDED RELEASE ORAL at 08:29

## 2023-10-13 RX ADMIN — HYDRALAZINE HYDROCHLORIDE 10 MG: 20 INJECTION INTRAMUSCULAR; INTRAVENOUS at 05:47

## 2023-10-13 RX ADMIN — ROSUVASTATIN CALCIUM 20 MG: 20 TABLET, FILM COATED ORAL at 22:36

## 2023-10-13 RX ADMIN — PANTOPRAZOLE SODIUM 40 MG: 40 TABLET, DELAYED RELEASE ORAL at 08:29

## 2023-10-13 RX ADMIN — AMIODARONE HYDROCHLORIDE 200 MG: 200 TABLET ORAL at 08:30

## 2023-10-13 RX ADMIN — HYDRALAZINE HYDROCHLORIDE 25 MG: 25 TABLET ORAL at 22:35

## 2023-10-13 RX ADMIN — DOCUSATE SODIUM 100 MG: 100 CAPSULE, LIQUID FILLED ORAL at 22:39

## 2023-10-13 ASSESSMENT — PAIN SCALES - GENERAL
PAINLEVEL_OUTOF10: 1
PAINLEVEL_OUTOF10: 0 - NO PAIN

## 2023-10-13 ASSESSMENT — PAIN - FUNCTIONAL ASSESSMENT
PAIN_FUNCTIONAL_ASSESSMENT: 0-10

## 2023-10-13 ASSESSMENT — COGNITIVE AND FUNCTIONAL STATUS - GENERAL
WALKING IN HOSPITAL ROOM: A LOT
MOBILITY SCORE: 12
TURNING FROM BACK TO SIDE WHILE IN FLAT BAD: A LOT
MOVING TO AND FROM BED TO CHAIR: A LOT
MOVING FROM LYING ON BACK TO SITTING ON SIDE OF FLAT BED WITH BEDRAILS: A LOT
STANDING UP FROM CHAIR USING ARMS: A LOT
CLIMB 3 TO 5 STEPS WITH RAILING: A LOT

## 2023-10-13 ASSESSMENT — ACTIVITIES OF DAILY LIVING (ADL): EFFECT OF PAIN ON DAILY ACTIVITIES: .

## 2023-10-13 NOTE — CARE PLAN
The patient's goals for the shift include maintain systolic blood pressure less than 140.     The clinical goals for the shift include Patient will maintain injury free by not getting out of bed unsupervised this shift    Over the shift, the patient did not make progress toward the following goals. Barriers to progression include acuity of illness. Recommendations to address these barriers include effective communication with family.

## 2023-10-13 NOTE — PROGRESS NOTES
Physical Therapy    Physical Therapy Treatment    Patient Name: Zack Figueroa  MRN: [id number]  Today's Date: 10/13/2023  Time Calculation  Start Time: 1037  Stop Time: 1116  Time Calculation (min): 39 min       Assessment/Plan   PT Assessment  PT Assessment Results: Decreased strength, Decreased endurance, Impaired balance, Decreased mobility  Rehab Prognosis: Good  Evaluation/Treatment Tolerance: Patient tolerated treatment well  Medical Staff Made Aware: Yes  End of Session Communication: Bedside nurse  Assessment Comment: improved overall static and dynamic sitting balance.  improved coordination though still with significant R lean, more so with fatigue  End of Session Patient Position: Up in chair, Alarm on  PT Plan  Inpatient/Swing Bed or Outpatient: Inpatient  PT Plan  Treatment/Interventions: Bed mobility, Therapeutic exercise, Transfer training  PT Plan: Skilled PT  PT Frequency: Daily  PT Discharge Recommendations: High intensity level of continued care  PT Recommended Transfer Status: Assist x2, Assistive device     10/13/23 1035   PT  Visit   PT Received On 10/13/23   Response to Previous Treatment Patient with no complaints from previous session.   General   Reason for Referral .   Referred By .   Past Medical History Relevant to Rehab .   Family/Caregiver Present Yes   Prior to Session Communication Bedside nurse   Patient Position Received Bed, 3 rail up;Alarm on   Preferred Learning Style verbal;visual   General Comment .   Precautions   Hearing/Visual Limitations .   Medical Precautions Fall precautions   Pain Assessment   Pain Assessment 0-10   Pain Score 0 - No pain   Pain Radiating Towards .   Effect of Pain on Daily Activities .   Response to Interventions .   Cognition   Overall Cognitive Status Impaired at baseline   Orientation Level Disoriented to time   Postural Control   Postural Control WFL   Therapeutic Exercise   Therapeutic Exercise Performed Yes   Therapeutic Exercise Activity  1 ankle pumps, resisted foot press, heel slides, hip ABD x15  (decreased control RLE)   Bed Mobility   Bed Mobility Yes   Bed Mobility 1   Bed Mobility 1 Supine to sitting   Level of Assistance 1 Moderate assistance   Bed Mobility Comments 1 x1   Transfers   Transfer Yes   Transfer 1   Transfer From 1 Bed to   Transfer to 1 Stand   Technique 1 Sit to stand   Transfer Device 1 Walker;Gait belt   Transfer Level of Assistance 1 Minimum assistance   Trials/Comments 1 x2 assist   Transfers 2   Transfer From 2 Chair with arms to   Transfer to 2 Stand   Technique 2 Stand to sit   Transfer Device 2 Walker   Transfer Level of Assistance 2 Minimum assistance   Trials/Comments 2 x2   Activity Tolerance   Endurance Tolerates 30+ min exercise without fatigue   Early Mobility/Exercise Safety Screen Proceed with mobilization - No exclusion criteria met   PT Assessment   PT Assessment Results Decreased strength;Decreased endurance;Impaired balance;Decreased mobility   Rehab Prognosis Good   End of Session Patient Position Up in chair;Alarm on   Education   Individual(s) Educated Patient;Spouse;Child   Education Provided Fall Risk   Patient/Caregiver Demonstrated Understanding yes   Plan of Care Discussed and Agreed Upon yes   Patient Response to Education Patient/Caregiver Verbalized Understanding of Information   Education Comment .   PT Plan   Inpatient/Swing Bed or Outpatient Inpatient   PT Plan   Treatment/Interventions Bed mobility;Therapeutic exercise;Transfer training   PT Plan Skilled PT   PT Frequency Daily   PT Recommended Transfer Status Assist x2;Assistive device     Outcome Measures:  [facility] Basic Mobility  Turning from your back to your side while in a flat bed without using bedrails: A lot  Moving from lying on your back to sitting on the side of a flat bed without using bedrails: A lot  Moving to and from bed to chair (including a wheelchair): A lot  Standing up from a chair using your arms (e.g. wheelchair or  bedside chair): A lot  To walk in hospital room: A lot  Climbing 3-5 steps with railing: A lot  Basic Mobility - Total Score: 12  Education Documentation      Mobility Training, taught by Ady Beckman PTA at 10/13/2023 11:23 AM.  Learner: Family, Patient  Readiness: Acceptance  Method: Explanation  Response: Verbalizes Understanding, Needs Reinforcement      Education Comments  No comments found.        Current Problem:  1. Intraparenchymal hemorrhage of brain (CMS/[facility])  Critical Care    Critical Care      2. Oral phase dysphagia [R13.11]        3. 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 A-fib (CMS/[facility])        5. Anticoagulant long-term use                            EDUCATION:  Education  Individual(s) Educated: Patient, Spouse, Child  Education Provided: Fall Risk  Patient/Caregiver Demonstrated Understanding: yes  Plan of Care Discussed and Agreed Upon: yes  Patient Response to Education: Patient/Caregiver Verbalized Understanding of Information  Education Comment: .  Encounter Problems       Encounter Problems (Active)       PT Problem       Bed mobility (Progressing)       Start:  10/10/23    Expected End:  10/31/23       Pt will be able to complete bed mobility mod I with use of bed HR.            Transfers (Progressing)       Start:  10/10/23    Expected End:  10/31/23       Pt will be able to complete all transfers with FWW CGA demonstrating good safety awareness and proper body mechanics.           Gait (Progressing)       Start:  10/10/23    Expected End:  10/31/23       Pt will be able to ambulate 100 ft with FWW CGA with good safety awareness and ability to maintain COG/GABRIEL in midline.           Strength (Progressing)       Start:  10/10/23    Expected End:  10/31/23       Pt will improve BLE strength with supine, seated and standing exercises to WFL.           Balance (Progressing)       Start:  10/10/23    Expected End:  10/31/23       Pt will  demonstrate good static/dynamic standing balance without evidence of right lateral lean or retro LOB.              Pain - Adult          Safety       LTG - Patient will adhere to hip precautions during ADL's and transfers       Start:  10/10/23            LTG - Patient will demonstrate safety requirements appropriate to situation/environment       Start:  10/10/23            LTG - Patient will utilize safety techniques       Start:  10/10/23            STG - Patient locks brakes on wheelchair       Start:  10/10/23            STG - Patient uses call light consistently to request assistance with transfers       Start:  10/10/23            STG - Patient uses gait belt during all transfers       Start:  10/10/23            Goal 1       Start:  10/10/23            Goal 2       Start:  10/10/23            Goal 3       Start:  10/10/23

## 2023-10-13 NOTE — PRE-PROCEDURE NOTE
Interventional Radiology Preprocedure Note    Indication for procedure: The primary encounter diagnosis was Intraparenchymal hemorrhage of brain (CMS/HCC). 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    Relevant review of systems: NA    Relevant Labs:   Lab Results   Component Value Date    CREATININE 6.14 (H) 10/13/2023    EGFR 8 (L) 10/13/2023    INR 1.2 (H) 10/12/2023    PROTIME 13.8 (H) 10/12/2023       Directed physical examination:    Sleepy  No acute distress  Regular rate, rhythm  Breathing non-labored    Local sedation    Benefits, risks and alternatives of procedure and planned sedation have been discussed with the patient and/or their representative. All questions answered and they agree to proceed.

## 2023-10-13 NOTE — CARE PLAN
Problem: Diabetes  Goal: Achieve decreasing blood glucose levels by end of shift  Outcome: Progressing  Goal: Increase stability of blood glucose readings by end of shift  Outcome: Progressing  Goal: Decrease in ketones present in urine by end of shift  Outcome: Progressing  Goal: Maintain electrolyte levels within acceptable range throughout shift  Outcome: Progressing  Goal: Learn about and adhere to nutrition recommendations by end of shift  Outcome: Progressing  Goal: Increase self care and/or family involovement by end of shift  Outcome: Progressing  Goal: Receive DSME education by end of shift  Outcome: Progressing     Problem: General Stroke  Goal: No symptoms of hemorrhage throughout shift  Outcome: Progressing  Goal: Controlled blood glucose throughout shift  Outcome: Progressing  Goal: Out of bed three times today  Outcome: Progressing   The patient's goals for the shift include get a few hours sleep this shift    The clinical goals for the shift include Patient will have intact neuros this shift    Over the shift, the patient did not make progress toward the following goals. Barriers to progression include motivation to learn. Recommendations to address these barriers include re-educate patient.

## 2023-10-13 NOTE — CARE PLAN
Brief Nephrology note:  Per phone conversation with the wife this morning, family leaning toward comfort care measures.  Family meeting will be conducted in 1 hour to discuss further plans.  In the interim to hold off on dialysis catheter placement.    Family meeting at 11 AM:  Plan of care discussed with patient, patient wife and the son, family elected to proceed with a trial of hemodialysis based on patient wish.  Interventional radiology reconsulted to proceed with dialysis catheter placement.  Dialysis team informed to initiate hemodialysis today.

## 2023-10-13 NOTE — PROGRESS NOTES
"Zack Figueroa is a 88 y.o. male on day 3 of admission presenting with Intraparenchymal hemorrhage of brain (CMS/HCC).      Subjective   No overnight events.       Objective     Last Recorded Vitals  Blood pressure 134/74, pulse 64, temperature 36.1 °C (97 °F), temperature source Temporal, resp. rate 22, height 1.676 m (5' 5.98\"), weight 83.1 kg (183 lb 3.2 oz), SpO2 96 %.    Physical Exam  Neurological Exam    Mental Status: Awake and alert. Oriented to person, place and time. Speech was fluent to history. Naming, repetition and comprehension were intact.   CN: VFF, PERRL, EOMI. Facial sensation was intact to light touch bilaterally. Facial expression was symmetric. Palate elevated symmetrically. Shoulder shrug was symmetric. Tongue protruded midline.   Motor: Normal muscle bulk and tone. Strength was (R/L) 4/5 shoulder abduction, elbow flexion/extension,  strenght, hip flexion, knee flexion/extension, ankle dorsi- and plantar flexion. There were no abnormal movements.   Sensory: Intact to light touch  in all 4 extremities.   Coordination: Finger to nose were intact with no dysmetria.     Relevant Results    Scheduled medications  amiodarone, 200 mg, oral, Daily  B complex-vitamin C, 1 tablet, oral, Every other day  cholecalciferol, 25 mcg, oral, Every other day  docusate sodium, 100 mg, oral, BID  esomeprazole, 40 mg, nasoduodenal tube, Daily before breakfast   Or  pantoprazole, 40 mg, oral, Daily before breakfast   Or  pantoprazole, 40 mg, intravenous, Daily before breakfast  influenza, 0.7 mL, intramuscular, During hospitalization  hydrALAZINE, 25 mg, oral, BID  [Held by provider] insulin glargine, 12 Units, subcutaneous, Nightly  insulin lispro, 0-10 Units, subcutaneous, Before meals & nightly  isosorbide mononitrate ER, 30 mg, oral, Daily  rosuvastatin, 20 mg, oral, Daily  sertraline, 25 mg, oral, Daily      Continuous medications     PRN medications  PRN medications: dextrose 10 % in water (D10W), " dextrose, glucagon, hydrALAZINE, labetaloL, labetaloL, ondansetron  Results for orders placed or performed during the hospital encounter of 10/09/23 (from the past 24 hour(s))   POCT GLUCOSE   Result Value Ref Range    POCT Glucose 126 (H) 74 - 99 mg/dL   Renal function panel   Result Value Ref Range    Glucose 151 (H) 74 - 99 mg/dL    Sodium 127 (L) 136 - 145 mmol/L    Potassium 4.9 3.5 - 5.3 mmol/L    Chloride 96 (L) 98 - 107 mmol/L    Bicarbonate 21 21 - 32 mmol/L    Anion Gap 15 10 - 20 mmol/L    Urea Nitrogen 69 (H) 6 - 23 mg/dL    Creatinine 5.79 (H) 0.50 - 1.30 mg/dL    eGFR 9 (L) >60 mL/min/1.73m*2    Calcium 8.4 (L) 8.6 - 10.3 mg/dL    Phosphorus 4.5 2.5 - 4.9 mg/dL    Albumin 3.4 3.4 - 5.0 g/dL   POCT GLUCOSE   Result Value Ref Range    POCT Glucose 174 (H) 74 - 99 mg/dL   Sterile Cup   Result Value Ref Range    Extra Tube Hold for add-ons.    POCT GLUCOSE   Result Value Ref Range    POCT Glucose 203 (H) 74 - 99 mg/dL   POCT GLUCOSE   Result Value Ref Range    POCT Glucose 196 (H) 74 - 99 mg/dL   CBC   Result Value Ref Range    WBC 15.2 (H) 4.4 - 11.3 x10*3/uL    nRBC 0.0 0.0 - 0.0 /100 WBCs    RBC 3.28 (L) 4.50 - 5.90 x10*6/uL    Hemoglobin 9.9 (L) 13.5 - 17.5 g/dL    Hematocrit 30.0 (L) 41.0 - 52.0 %    MCV 92 80 - 100 fL    MCH 30.2 26.0 - 34.0 pg    MCHC 33.0 32.0 - 36.0 g/dL    RDW 15.1 (H) 11.5 - 14.5 %    Platelets 186 150 - 450 x10*3/uL    MPV 11.5 7.5 - 11.5 fL   Renal function panel   Result Value Ref Range    Glucose 138 (H) 74 - 99 mg/dL    Sodium 126 (L) 136 - 145 mmol/L    Potassium 4.9 3.5 - 5.3 mmol/L    Chloride 94 (L) 98 - 107 mmol/L    Bicarbonate 19 (L) 21 - 32 mmol/L    Anion Gap 18 10 - 20 mmol/L    Urea Nitrogen 78 (H) 6 - 23 mg/dL    Creatinine 6.14 (H) 0.50 - 1.30 mg/dL    eGFR 8 (L) >60 mL/min/1.73m*2    Calcium 8.5 (L) 8.6 - 10.3 mg/dL    Phosphorus 4.8 2.5 - 4.9 mg/dL    Albumin 3.3 (L) 3.4 - 5.0 g/dL   Magnesium   Result Value Ref Range    Magnesium 2.14 1.60 - 2.40 mg/dL    POCT GLUCOSE   Result Value Ref Range    POCT Glucose 148 (H) 74 - 99 mg/dL       CT head wo IV contrast    Result Date: 10/10/2023  Interpreted By:  Esteban Johnson, STUDY: CT HEAD WO IV CONTRAST;  10/10/2023 4:58 am   INDICATION: Signs/Symptoms:L intraparenchymal hemorrhage.   COMPARISON: None.   ACCESSION NUMBER(S): [id number]   ORDERING CLINICIAN: JOHN MCNEAL   TECHNIQUE: Axial noncontrast CT images of the head.   FINDINGS: Gray-white matter differentiation is maintained. There are no extra-axial collections, with recent intravenous contrast administration slightly limiting evaluation of the extra-axial spaces.. No significant mass effect or midline shift. There is no hydrocephalus. Mild generalized cerebral volume loss and associated ventricular and sulcal enlargement. Scattered periventricular and deep white matter hypodensities with suggestion of bilateral basal ganglia chronic lacunar infarcts which may represent mild-to-moderate chronic microvascular ischemic change.   HEMORRHAGE: There is stable appearance of the left thalamic capsular geographic hyperdensity likely representing acute to subacute hemorrhagic lacunar infarct. There is questionable associated vasogenic edema without evidence of significant mass effect.   CALVARIUM: No depressed skull fracture.   EXTRACRANIAL SOFT TISSUES:.. Unremarkable.   PARANASAL SINUSES/MASTOIDS: The visualized paranasal sinuses are well aerated. Mastoid air cells are clear.   ORBITS: Bilateral cataract surgeries.         Similar appearance of geographic parenchymal hyperdensity centered in the posterior left internal capsule and left thalamus suggestive of recent hemorrhagic lacunar infarct. No evidence of significant mass effect or midline shift. Brain MRI may be obtained to better characterize chronicity.   Signed by: Esteban Johnson 10/10/2023 5:23 AM Dictation workstation:   [id number]    CT brain attack head wo IV contrast    Result Date:  10/10/2023  Interpreted By:  Esteban Johnson, STUDY: CT ANGIO BRAIN ATTACK NECK W IV CONTRAST AND POST PROCEDURE; CT ANGIO BRAIN ATTACK HEAD W IV CONTRAST AND POST PROCEDURE; CT BRAIN ATTACK HEAD WO IV CONTRAST;  10/9/2023 10:52 pm; 10/9/2023 10:46 pm   INDICATION: Signs/Symptoms:Stroke Evaluation with VAN Positive; Signs/Symptoms:Stroke Evaluation.   COMPARISON: None.   ACCESSION NUMBER(S): [id number]; [id number]; [id number]   ORDERING CLINICIAN: ISABELLA SNEED   TECHNIQUE: Unenhanced CT images of the head were obtained. Subsequently,  75 mL Omnipaque 350 was administered intravenously and axial images of the head and neck were acquired.  Coronal, sagittal, and 3-D reconstructions were provided for review.   FINDINGS: Noncontrast CT of the head: There is an approximately 1.8 x 1.4 cm hyperdensity involving the left posterior internal capsule with suggestion of mild surrounding vasogenic edema, likely representing acute to subacute hemorrhagic lacunar infarct. There is no significant mass effect. The gray-white matter differentiation is otherwise maintained. There is no hydrocephalus. There is mild to moderate generalized cerebral volume loss and associated ventricular and sulcal enlargement. Scattered periventricular and deep white matter hypodensities suggestive of mild to moderate chronic microvascular ischemic change.   CTA HEAD FINDINGS:   Anterior circulation: The bilateral intracranial internal carotid arteries, bilateral carotid terminals, bilateral proximal anterior and middle cerebral arteries are patent without significant focal stenosis. There are bilateral carotid siphon atherosclerotic calcifications without significant focal stenosis.   Posterior circulation: Bilateral intracranial vertebral arteries, vertebrobasilar junction, basilar artery and proximal posterior cerebral arteries are normal.   CTA NECK FINDINGS:   Right carotid vessels: There is retropharyngeal course of the upper common  carotid artery. There are atherosclerotic calcifications of the carotid bifurcation with 0% stenosis by NASCET criteria. The remainder of the cervical left internal carotid artery is also widely patent.   Left carotid vessels: There is retropharyngeal course of the upper common carotid artery. There are atherosclerotic calcifications of the carotid bifurcation with 0% stenosis by NASCET criteria. The remainder of the cervical left internal carotid artery is also widely patent.   Vertebral vessels:  The visualized segments of the cervical vertebral arteries are patent. There are atherosclerotic calcifications of the left vertebral artery without focal stenosis.         Acute or subacute hemorrhagic lacunar infarct centered in the left posterior internal capsule with mild associated vasogenic edema. No evidence for significant stenosis of the cervical vessels.   No evidence for significant stenosis or large branch vessel cutoffs of the intracranial vessels.   Esteban Johnson discussed the significance and urgency of this critical finding by telephone with  ISABELLA SNEED on 10/10/2023 at 12:05 am. (**-RCF-**) Findings:  See findings.     MACRO: None   Signed by: Esteban Johnson 10/10/2023 12:06 AM Dictation workstation:   [id number]    CT angio brain attack head w IV contrast and post procedure    Result Date: 10/10/2023  Interpreted By:  Esteban Johnson, STUDY: CT ANGIO BRAIN ATTACK NECK W IV CONTRAST AND POST PROCEDURE; CT ANGIO BRAIN ATTACK HEAD W IV CONTRAST AND POST PROCEDURE; CT BRAIN ATTACK HEAD WO IV CONTRAST;  10/9/2023 10:52 pm; 10/9/2023 10:46 pm   INDICATION: Signs/Symptoms:Stroke Evaluation with VAN Positive; Signs/Symptoms:Stroke Evaluation.   COMPARISON: None.   ACCESSION NUMBER(S): [id number]; [id number]; [id number]   ORDERING CLINICIAN: ISABELLA SNEED   TECHNIQUE: Unenhanced CT images of the head were obtained. Subsequently,  75 mL Omnipaque 350 was administered intravenously and axial images of  the head and neck were acquired.  Coronal, sagittal, and 3-D reconstructions were provided for review.   FINDINGS: Noncontrast CT of the head: There is an approximately 1.8 x 1.4 cm hyperdensity involving the left posterior internal capsule with suggestion of mild surrounding vasogenic edema, likely representing acute to subacute hemorrhagic lacunar infarct. There is no significant mass effect. The gray-white matter differentiation is otherwise maintained. There is no hydrocephalus. There is mild to moderate generalized cerebral volume loss and associated ventricular and sulcal enlargement. Scattered periventricular and deep white matter hypodensities suggestive of mild to moderate chronic microvascular ischemic change.   CTA HEAD FINDINGS:   Anterior circulation: The bilateral intracranial internal carotid arteries, bilateral carotid terminals, bilateral proximal anterior and middle cerebral arteries are patent without significant focal stenosis. There are bilateral carotid siphon atherosclerotic calcifications without significant focal stenosis.   Posterior circulation: Bilateral intracranial vertebral arteries, vertebrobasilar junction, basilar artery and proximal posterior cerebral arteries are normal.   CTA NECK FINDINGS:   Right carotid vessels: There is retropharyngeal course of the upper common carotid artery. There are atherosclerotic calcifications of the carotid bifurcation with 0% stenosis by NASCET criteria. The remainder of the cervical left internal carotid artery is also widely patent.   Left carotid vessels: There is retropharyngeal course of the upper common carotid artery. There are atherosclerotic calcifications of the carotid bifurcation with 0% stenosis by NASCET criteria. The remainder of the cervical left internal carotid artery is also widely patent.   Vertebral vessels:  The visualized segments of the cervical vertebral arteries are patent. There are atherosclerotic calcifications of the  left vertebral artery without focal stenosis.         Acute or subacute hemorrhagic lacunar infarct centered in the left posterior internal capsule with mild associated vasogenic edema. No evidence for significant stenosis of the cervical vessels.   No evidence for significant stenosis or large branch vessel cutoffs of the intracranial vessels.   Esteban Johnson discussed the significance and urgency of this critical finding by telephone with  ISABELLA SNEED on 10/10/2023 at 12:05 am. (**-RCF-**) Findings:  See findings.     MACRO: None   Signed by: Esteban Johnson 10/10/2023 12:06 AM Dictation workstation:   [id number]    CT angio brain attack neck w IV contrast and post procedure    Result Date: 10/10/2023  Interpreted By:  Esteban Johnson, STUDY: CT ANGIO BRAIN ATTACK NECK W IV CONTRAST AND POST PROCEDURE; CT ANGIO BRAIN ATTACK HEAD W IV CONTRAST AND POST PROCEDURE; CT BRAIN ATTACK HEAD WO IV CONTRAST;  10/9/2023 10:52 pm; 10/9/2023 10:46 pm   INDICATION: Signs/Symptoms:Stroke Evaluation with VAN Positive; Signs/Symptoms:Stroke Evaluation.   COMPARISON: None.   ACCESSION NUMBER(S): [id number]; [id number]; [id number]   ORDERING CLINICIAN: ISABELLA SNEED   TECHNIQUE: Unenhanced CT images of the head were obtained. Subsequently,  75 mL Omnipaque 350 was administered intravenously and axial images of the head and neck were acquired.  Coronal, sagittal, and 3-D reconstructions were provided for review.   FINDINGS: Noncontrast CT of the head: There is an approximately 1.8 x 1.4 cm hyperdensity involving the left posterior internal capsule with suggestion of mild surrounding vasogenic edema, likely representing acute to subacute hemorrhagic lacunar infarct. There is no significant mass effect. The gray-white matter differentiation is otherwise maintained. There is no hydrocephalus. There is mild to moderate generalized cerebral volume loss and associated ventricular and sulcal enlargement. Scattered periventricular  and deep white matter hypodensities suggestive of mild to moderate chronic microvascular ischemic change.   CTA HEAD FINDINGS:   Anterior circulation: The bilateral intracranial internal carotid arteries, bilateral carotid terminals, bilateral proximal anterior and middle cerebral arteries are patent without significant focal stenosis. There are bilateral carotid siphon atherosclerotic calcifications without significant focal stenosis.   Posterior circulation: Bilateral intracranial vertebral arteries, vertebrobasilar junction, basilar artery and proximal posterior cerebral arteries are normal.   CTA NECK FINDINGS:   Right carotid vessels: There is retropharyngeal course of the upper common carotid artery. There are atherosclerotic calcifications of the carotid bifurcation with 0% stenosis by NASCET criteria. The remainder of the cervical left internal carotid artery is also widely patent.   Left carotid vessels: There is retropharyngeal course of the upper common carotid artery. There are atherosclerotic calcifications of the carotid bifurcation with 0% stenosis by NASCET criteria. The remainder of the cervical left internal carotid artery is also widely patent.   Vertebral vessels:  The visualized segments of the cervical vertebral arteries are patent. There are atherosclerotic calcifications of the left vertebral artery without focal stenosis.         Acute or subacute hemorrhagic lacunar infarct centered in the left posterior internal capsule with mild associated vasogenic edema. No evidence for significant stenosis of the cervical vessels.   No evidence for significant stenosis or large branch vessel cutoffs of the intracranial vessels.   Esteban Johnson discussed the significance and urgency of this critical finding by telephone with  ISABELLA SNEED on 10/10/2023 at 12:05 am. (**-RCF-**) Findings:  See findings.     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  Maintain SBP < 140  Follow up with cardiology to discuss options for ablation vs procedural treatments for a-flutter  Follow up with neuro surgery as directed.    Case/plan discussed with Dr. Dickey.  No further needs from neurology; okay for transfer or discharge as per primary team. Please contact if condition changes for re-eval.          I spent 25 minutes in the professional and overall care of this patient.      Eliana Rushing, APRN-CNP

## 2023-10-13 NOTE — PROGRESS NOTES
Speech-Language Pathology                 Therapy Communication Note    Patient Name: Zack Figueroa  MRN: [id number]  Today's Date: 10/13/2023     Discipline: Speech Language Pathology    Missed Visit Reason:  Unavailable    Missed Time: Attempt    Comment:  [phone]  Attempted skilled dysphagia and dysarthria treatment. Patient was away for a procedure in IR. Will be moving to the [facility] afterward. Will attempt again as able.

## 2023-10-13 NOTE — POST-PROCEDURE NOTE
Interventional Radiology Brief Postprocedure Note    Attending: Raffi Polk MD    Diagnosis: Renal insufficiency    Procedure: Temporary dialysis catheter placement    Description of procedure: Right internal jugular 15 cm temporary dialysis (Trialysis) catheter placement. The catheter is ready for immediate use.     Sedation: Local    Complications: None    Estimated Blood Loss: minimal    Medications  As of 10/13/23 1458      iohexol (OMNIPaque) 350 mg iodine/mL injection 75 mL (mL) Total volume:  75 mL      Date/Time Rate/Dose/Volume Action       10/09/23  2252 75 mL Given               niCARdipine (Cardene) 40 mg in sodium chloride 200 mL (0.2 mg/mL) infusion (premix) (mg/hr) Total dose:  Cannot be calculated*   *Total user-documented volume 675 mL may contain volume from other administrations     Date/Time Rate/Dose/Volume Action       10/09/23  2334 5 mg/hr - 25 mL/hr New Bag     10/10/23  0328 2.5 mg/hr - 12.5 mL/hr Rate Change      0652 2.5 mg/hr - 12.5 mL/hr Rate Verify      0700 2.5 mg/hr - 12.5 mL/hr Rate Verify      0800 2.5 mg/hr - 12.5 mL/hr Rate Verify      0900 2.5 mg/hr - 12.5 mL/hr Rate Verify      1000 2.5 mg/hr - 12.5 mL/hr Rate Verify      1100 2.5 mg/hr - 12.5 mL/hr Rate Verify      1200 2.5 mg/hr - 12.5 mL/hr Rate Verify      1245 2.5 mg/hr - 12.5 mL/hr New Bag      1300 2.5 mg/hr - 12.5 mL/hr Rate Verify      1400 2.5 mg/hr - 12.5 mL/hr Rate Verify      1500 2.5 mg/hr - 12.5 mL/hr Rate Verify      1600 2.5 mg/hr - 12.5 mL/hr Rate Verify      1700 2.5 mg/hr - 12.5 mL/hr Rate Verify      1800 2.5 mg/hr - 12.5 mL/hr Rate Verify      1900 2.5 mg/hr - 12.5 mL/hr Rate Verify     10/11/23  0325 2.5 mg/hr - 12.5 mL/hr New Bag      0700 2.5 mg/hr - 12.5 mL/hr Rate Verify      0800 2.5 mg/hr - 12.5 mL/hr Rate Verify      0900 2.5 mg/hr - 12.5 mL/hr Rate Verify      1000 2.5 mg/hr - 12.5 mL/hr Rate Verify      1100  Stopped      1330 2.5 mg/hr - 12.5 mL/hr Restarted      1400 2.5 mg/hr - 12.5  mL/hr Rate Verify      1500 2.5 mg/hr - 12.5 mL/hr Rate Verify      1600 2.5 mg/hr - 12.5 mL/hr Rate Verify      1700 2.5 mg/hr - 12.5 mL/hr Rate Verify      1800 2.5 mg/hr - 12.5 mL/hr Rate Verify      1900 2.5 mg/hr - 12.5 mL/hr Rate Verify      2000 2.5 mg/hr - 12.5 mL/hr Rate Verify      2100 2.5 mg/hr - 12.5 mL/hr Rate Verify      2131 2.5 mg/hr - 12.5 mL/hr New Bag      2200 2.5 mg/hr - 12.5 mL/hr Rate Verify      2300 2.5 mg/hr - 12.5 mL/hr Rate Verify     10/12/23  0000 2.5 mg/hr - 12.5 mL/hr Rate Verify      0048  Stopped      0143 2.5 mg/hr - 12.5 mL/hr Restarted      0200 2.5 mg/hr - 12.5 mL/hr Rate Verify      0300 2.5 mg/hr - 12.5 mL/hr Rate Verify      0400 2.5 mg/hr - 12.5 mL/hr Rate Verify      0505  Stopped               four factor human prothrombin complex concentrate (Kcentra) 2,000 Units in sterile water 80 mL infusion (mL/hr) Total dose:  2,000 Units*   *From user-documented volume     Date/Time Rate/Dose/Volume Action       10/09/23  2347 2,000 Units - 480 mL/hr (over 10 min) New Bag      2357 80 mL [vol] Stopped               dextrose 50 % injection 25 g (g) Total dose:  0 g*   *Administration not included in total     Date/Time Rate/Dose/Volume Action       10/11/23  0804 *25 g Missed               dextrose 50 % injection 25 g (g) Total dose:  25 g Dosing weight:  78.2      Date/Time Rate/Dose/Volume Action       10/12/23  0839 25 g Given               insulin lispro (HumaLOG) injection 0-10 Units (Units) Total dose:  6 Units      Date/Time Rate/Dose/Volume Action       10/10/23  0251 2 Units Given      0555 *Not included in total Missed      0955 *Not included in total Missed      1355 *Not included in total Missed      1747 4 Units Given               insulin lispro (HumaLOG) injection 0-10 Units (Units) Total dose:  8 Units Dosing weight:  78      Date/Time Rate/Dose/Volume Action       10/10/23  2028 4 Units Given     10/11/23  0800 *Not included in total Missed      1100 *Not included  in total Missed      1600 *Not included in total Missed      2138 2 Units Given     10/12/23  0700 *Not included in total Missed      1100 *Not included in total Missed      1600 *Not included in total Missed      2148 2 Units Given     10/13/23  0700 *Not included in total Missed      1100 *Not included in total Missed               docusate sodium (Colace) capsule 100 mg (mg) Total dose:  700 mg Dosing weight:  78.1      Date/Time Rate/Dose/Volume Action       10/10/23  0919 100 mg Given      2029 100 mg Given     10/11/23  0859 100 mg Given      2128 100 mg Given     10/12/23  0817 100 mg Given      2135 100 mg Given     10/13/23  0829 100 mg Given               hydrALAZINE (Apresoline) tablet 12.5 mg (mg) Total dose:  37.5 mg Dosing weight:  78      Date/Time Rate/Dose/Volume Action       10/10/23  1153 12.5 mg Given      2029 12.5 mg Given     10/11/23  0859 12.5 mg Given               hydrALAZINE (Apresoline) tablet 12.5 mg (mg) Total dose:  12.5 mg Dosing weight:  78.3      Date/Time Rate/Dose/Volume Action       10/11/23  1615 12.5 mg Given               hydrALAZINE (Apresoline) tablet 25 mg (mg) Total dose:  100 mg Dosing weight:  78.3      Date/Time Rate/Dose/Volume Action       10/11/23  2128 25 mg Given     10/12/23  0817 25 mg Given      2135 25 mg Given     10/13/23  0830 25 mg Given               amiodarone (Pacerone) tablet 200 mg (mg) Total dose:  800 mg Dosing weight:  78      Date/Time Rate/Dose/Volume Action       10/10/23  1153 200 mg Given     10/11/23  0859 200 mg Given     10/12/23  0817 200 mg Given     10/13/23  0830 200 mg Given               rosuvastatin (Crestor) tablet 20 mg (mg) Total dose:  60 mg Dosing weight:  78      Date/Time Rate/Dose/Volume Action       10/10/23  1153 20 mg Given     10/11/23  0859 20 mg Given     10/12/23  0817 20 mg Given               esomeprazole (NexIUM) suspension 40 mg (mg) Total dose:  Cannot be calculated* Dosing weight:  78   *Administration dose not  documented     Date/Time Rate/Dose/Volume Action       10/11/23  0920 *Not included in total See Alternative     10/12/23  0819 *Not included in total See Alternative     10/13/23  0829 *Not included in total See Alternative               pantoprazole (ProtoNix) EC tablet 40 mg (mg) Total dose:  120 mg Dosing weight:  78      Date/Time Rate/Dose/Volume Action       10/11/23  0920 40 mg Given     10/12/23  0819 40 mg Given     10/13/23  0829 40 mg Given               pantoprazole (ProtoNix) injection 40 mg (mg) Total dose:  Cannot be calculated* Dosing weight:  78   *Administration dose not documented     Date/Time Rate/Dose/Volume Action       10/11/23  0920 *Not included in total See Alternative     10/12/23  0819 *Not included in total See Alternative     10/13/23  0829 *Not included in total See Alternative               insulin glargine (Lantus) injection 12 Units (Units) Total dose:  24 Units Dosing weight:  78      Date/Time Rate/Dose/Volume Action       10/10/23  2031 12 Units Given     10/11/23  2126 12 Units Given     10/12/23  0610 *Not included in total Held by provider      2100 *Not included in total Automatically Held               cholecalciferol (Vitamin D-3) capsule 25 mcg (mcg) Total dose:  25 mcg Dosing weight:  78      Date/Time Rate/Dose/Volume Action       10/11/23  0900 25 mcg Given               B complex-vitamin C tablet 1 tablet (tablet) Total dose:  2 tablet Dosing weight:  78      Date/Time Rate/Dose/Volume Action       10/11/23  0859 1 tablet Given     10/13/23  0900 1 tablet Given               isosorbide mononitrate ER (Imdur) 24 hr tablet 30 mg (mg) Total dose:  90 mg Dosing weight:  78.3      Date/Time Rate/Dose/Volume Action       10/11/23  0920 30 mg Given     10/12/23  0817 30 mg Given     10/13/23  0829 30 mg Given               sertraline (Zoloft) tablet 25 mg (mg) Total dose:  75 mg Dosing weight:  78.3      Date/Time Rate/Dose/Volume Action       10/11/23  1108 25 mg Given      10/12/23  0817 25 mg Given     10/13/23  0829 25 mg Given               sodium chloride 0.9% infusion (mL/hr) Total volume:  895 mL* Dosing weight:  78.3   *From user-documented volume     Date/Time Rate/Dose/Volume Action       10/11/23  1404 75 mL/hr New Bag      1500 75 mL/hr Rate Verify      1600 75 mL/hr Rate Verify      1700 75 mL/hr Rate Verify      1800 75 mL/hr Rate Verify      1900 75 mL/hr - 370 mL Rate Verify      2000 75 mL/hr - 75 mL Rate Verify      2100 75 mL/hr - 75 mL Rate Verify      2200 75 mL/hr - 75 mL Rate Verify      2300 75 mL/hr - 75 mL Rate Verify     10/12/23  0000 75 mL/hr - 75 mL Rate Verify      0100 75 mL/hr - 75 mL Rate Verify      0200 75 mL/hr - 75 mL Rate Verify      0202  Stopped      0500 0 mL                labetaloL (Normodyne,Trandate) injection 10 mg (mg) Total dose:  20 mg* Dosing weight:  78.3   *Administration not included in total     Date/Time Rate/Dose/Volume Action       10/12/23  0946 10 mg Given      1302 *10 mg Missed     10/13/23  0346 10 mg Given               hydrOXYzine HCL (Atarax) tablet 25 mg (mg) Total dose:  25 mg Dosing weight:  78.3      Date/Time Rate/Dose/Volume Action       10/11/23  2301 25 mg Given               hydrALAZINE (Apresoline) injection 10 mg (mg) Total dose:  60 mg Dosing weight:  78.2      Date/Time Rate/Dose/Volume Action       10/12/23  1312 10 mg (over 2 min) Given      1434 10 mg (over 2 min) Given      1846 10 mg (over 2 min) Given      2321 10 mg (over 2 min) Given     10/13/23  0128 10 mg (over 2 min) Given      0547 10 mg (over 2 min) Given               furosemide (Lasix) injection 80 mg (mg) Total dose:  80 mg Dosing weight:  78.2      Date/Time Rate/Dose/Volume Action       10/12/23  0820 80 mg Given               lactated Ringer's infusion (mL/hr) Total volume:  150 mL* Dosing weight:  78.2   *From user-documented volume     Date/Time Rate/Dose/Volume Action       10/12/23  1128 75 mL/hr New Bag     10/13/23  0259 150 mL                       See detailed result report with images in PACS.    The patient tolerated the procedure well without incident or complication and is in stable condition.

## 2023-10-14 ENCOUNTER — APPOINTMENT (OUTPATIENT)
Dept: DIALYSIS | Facility: HOSPITAL | Age: 88
End: 2023-10-14
Payer: MEDICARE

## 2023-10-14 ENCOUNTER — APPOINTMENT (OUTPATIENT)
Dept: RADIOLOGY | Facility: HOSPITAL | Age: 88
DRG: 064 | End: 2023-10-14
Payer: MEDICARE

## 2023-10-14 LAB
ALBUMIN SERPL BCP-MCNC: 3.1 G/DL (ref 3.4–5)
ANION GAP SERPL CALC-SCNC: 18 MMOL/L (ref 10–20)
BUN SERPL-MCNC: 66 MG/DL (ref 6–23)
CALCIUM SERPL-MCNC: 8.3 MG/DL (ref 8.6–10.3)
CHLORIDE SERPL-SCNC: 96 MMOL/L (ref 98–107)
CO2 SERPL-SCNC: 23 MMOL/L (ref 21–32)
CREAT SERPL-MCNC: 5.12 MG/DL (ref 0.5–1.3)
ERYTHROCYTE [DISTWIDTH] IN BLOOD BY AUTOMATED COUNT: 15.2 % (ref 11.5–14.5)
GFR SERPL CREATININE-BSD FRML MDRD: 10 ML/MIN/1.73M*2
GLUCOSE BLD MANUAL STRIP-MCNC: 156 MG/DL (ref 74–99)
GLUCOSE BLD MANUAL STRIP-MCNC: 157 MG/DL (ref 74–99)
GLUCOSE BLD MANUAL STRIP-MCNC: 188 MG/DL (ref 74–99)
GLUCOSE BLD MANUAL STRIP-MCNC: 225 MG/DL (ref 74–99)
GLUCOSE SERPL-MCNC: 146 MG/DL (ref 74–99)
HBV CORE AB SER QL: REACTIVE
HBV SURFACE AB SER-ACNC: 780.3 MIU/ML
HBV SURFACE AG SERPL QL IA: NONREACTIVE
HCT VFR BLD AUTO: 28.6 % (ref 41–52)
HGB BLD-MCNC: 9.3 G/DL (ref 13.5–17.5)
MAGNESIUM SERPL-MCNC: 2.15 MG/DL (ref 1.6–2.4)
MCH RBC QN AUTO: 29.6 PG (ref 26–34)
MCHC RBC AUTO-ENTMCNC: 32.5 G/DL (ref 32–36)
MCV RBC AUTO: 91 FL (ref 80–100)
NRBC BLD-RTO: 0 /100 WBCS (ref 0–0)
PHOSPHATE SERPL-MCNC: 4.5 MG/DL (ref 2.5–4.9)
PLATELET # BLD AUTO: 178 X10*3/UL (ref 150–450)
PMV BLD AUTO: 10.6 FL (ref 7.5–11.5)
POTASSIUM SERPL-SCNC: 4.5 MMOL/L (ref 3.5–5.3)
RBC # BLD AUTO: 3.14 X10*6/UL (ref 4.5–5.9)
SODIUM SERPL-SCNC: 132 MMOL/L (ref 136–145)
WBC # BLD AUTO: 17 X10*3/UL (ref 4.4–11.3)

## 2023-10-14 PROCEDURE — 2500000004 HC RX 250 GENERAL PHARMACY W/ HCPCS (ALT 636 FOR OP/ED): Performed by: PHYSICIAN ASSISTANT

## 2023-10-14 PROCEDURE — 71045 X-RAY EXAM CHEST 1 VIEW: CPT | Mod: FY

## 2023-10-14 PROCEDURE — 2500000002 HC RX 250 W HCPCS SELF ADMINISTERED DRUGS (ALT 637 FOR MEDICARE OP, ALT 636 FOR OP/ED): Performed by: PHYSICIAN ASSISTANT

## 2023-10-14 PROCEDURE — 2500000001 HC RX 250 WO HCPCS SELF ADMINISTERED DRUGS (ALT 637 FOR MEDICARE OP): Performed by: PHYSICIAN ASSISTANT

## 2023-10-14 PROCEDURE — 8010000001 HC DIALYSIS - HEMODIALYSIS PER DAY

## 2023-10-14 PROCEDURE — 80069 RENAL FUNCTION PANEL: CPT | Performed by: PHYSICIAN ASSISTANT

## 2023-10-14 PROCEDURE — 99231 SBSQ HOSP IP/OBS SF/LOW 25: CPT | Performed by: STUDENT IN AN ORGANIZED HEALTH CARE EDUCATION/TRAINING PROGRAM

## 2023-10-14 PROCEDURE — 85027 COMPLETE CBC AUTOMATED: CPT | Performed by: PHYSICIAN ASSISTANT

## 2023-10-14 PROCEDURE — 87040 BLOOD CULTURE FOR BACTERIA: CPT | Mod: CMCLAB | Performed by: NURSE PRACTITIONER

## 2023-10-14 PROCEDURE — 87075 CULTR BACTERIA EXCEPT BLOOD: CPT | Performed by: NURSE PRACTITIONER

## 2023-10-14 PROCEDURE — 71045 X-RAY EXAM CHEST 1 VIEW: CPT | Performed by: STUDENT IN AN ORGANIZED HEALTH CARE EDUCATION/TRAINING PROGRAM

## 2023-10-14 PROCEDURE — 97535 SELF CARE MNGMENT TRAINING: CPT | Mod: GO,CO

## 2023-10-14 PROCEDURE — 36415 COLL VENOUS BLD VENIPUNCTURE: CPT | Performed by: NURSE PRACTITIONER

## 2023-10-14 PROCEDURE — 83735 ASSAY OF MAGNESIUM: CPT | Performed by: PHYSICIAN ASSISTANT

## 2023-10-14 PROCEDURE — 96372 THER/PROPH/DIAG INJ SC/IM: CPT | Performed by: PHYSICIAN ASSISTANT

## 2023-10-14 PROCEDURE — 1200000002 HC GENERAL ROOM WITH TELEMETRY DAILY

## 2023-10-14 PROCEDURE — 82947 ASSAY GLUCOSE BLOOD QUANT: CPT

## 2023-10-14 PROCEDURE — 97112 NEUROMUSCULAR REEDUCATION: CPT | Mod: GP

## 2023-10-14 PROCEDURE — 36415 COLL VENOUS BLD VENIPUNCTURE: CPT | Performed by: PHYSICIAN ASSISTANT

## 2023-10-14 RX ADMIN — PANTOPRAZOLE SODIUM 40 MG: 40 TABLET, DELAYED RELEASE ORAL at 09:35

## 2023-10-14 RX ADMIN — HYDRALAZINE HYDROCHLORIDE 25 MG: 25 TABLET ORAL at 21:53

## 2023-10-14 RX ADMIN — HYDRALAZINE HYDROCHLORIDE 25 MG: 25 TABLET ORAL at 09:35

## 2023-10-14 RX ADMIN — AMIODARONE HYDROCHLORIDE 200 MG: 200 TABLET ORAL at 09:36

## 2023-10-14 RX ADMIN — INSULIN LISPRO 2 UNITS: 100 INJECTION, SOLUTION INTRAVENOUS; SUBCUTANEOUS at 23:05

## 2023-10-14 RX ADMIN — DOCUSATE SODIUM 100 MG: 100 CAPSULE, LIQUID FILLED ORAL at 09:35

## 2023-10-14 RX ADMIN — SERTRALINE HYDROCHLORIDE 25 MG: 25 TABLET ORAL at 09:35

## 2023-10-14 RX ADMIN — INSULIN LISPRO 4 UNITS: 100 INJECTION, SOLUTION INTRAVENOUS; SUBCUTANEOUS at 17:44

## 2023-10-14 RX ADMIN — ROSUVASTATIN CALCIUM 20 MG: 20 TABLET, FILM COATED ORAL at 09:35

## 2023-10-14 RX ADMIN — ISOSORBIDE MONONITRATE 30 MG: 30 TABLET, EXTENDED RELEASE ORAL at 09:35

## 2023-10-14 ASSESSMENT — COGNITIVE AND FUNCTIONAL STATUS - GENERAL
WALKING IN HOSPITAL ROOM: TOTAL
DAILY ACTIVITIY SCORE: 11
DAILY ACTIVITIY SCORE: 11
CLIMB 3 TO 5 STEPS WITH RAILING: TOTAL
EATING MEALS: A LOT
MOVING FROM LYING ON BACK TO SITTING ON SIDE OF FLAT BED WITH BEDRAILS: A LITTLE
STANDING UP FROM CHAIR USING ARMS: A LOT
TOILETING: A LOT
DRESSING REGULAR UPPER BODY CLOTHING: A LITTLE
DAILY ACTIVITIY SCORE: 15
PERSONAL GROOMING: A LOT
EATING MEALS: A LOT
MOVING TO AND FROM BED TO CHAIR: A LOT
EATING MEALS: A LOT
MOBILITY SCORE: 14
DRESSING REGULAR LOWER BODY CLOTHING: A LOT
CLIMB 3 TO 5 STEPS WITH RAILING: TOTAL
WALKING IN HOSPITAL ROOM: A LOT
HELP NEEDED FOR BATHING: A LOT
DRESSING REGULAR LOWER BODY CLOTHING: A LOT
MOBILITY SCORE: 10
WALKING IN HOSPITAL ROOM: TOTAL
STANDING UP FROM CHAIR USING ARMS: A LOT
MOVING FROM LYING ON BACK TO SITTING ON SIDE OF FLAT BED WITH BEDRAILS: A LOT
MOVING TO AND FROM BED TO CHAIR: A LOT
TOILETING: TOTAL
TURNING FROM BACK TO SIDE WHILE IN FLAT BAD: A LOT
MOBILITY SCORE: 10
STANDING UP FROM CHAIR USING ARMS: A LOT
PERSONAL GROOMING: A LOT
PERSONAL GROOMING: A LOT
HELP NEEDED FOR BATHING: A LITTLE
DRESSING REGULAR LOWER BODY CLOTHING: A LITTLE
DRESSING REGULAR UPPER BODY CLOTHING: A LOT
MOVING FROM LYING ON BACK TO SITTING ON SIDE OF FLAT BED WITH BEDRAILS: A LOT
TURNING FROM BACK TO SIDE WHILE IN FLAT BAD: A LITTLE
TOILETING: TOTAL
MOVING TO AND FROM BED TO CHAIR: A LOT
HELP NEEDED FOR BATHING: A LOT
TURNING FROM BACK TO SIDE WHILE IN FLAT BAD: A LOT
CLIMB 3 TO 5 STEPS WITH RAILING: A LOT
DRESSING REGULAR UPPER BODY CLOTHING: A LOT

## 2023-10-14 ASSESSMENT — PAIN - FUNCTIONAL ASSESSMENT
PAIN_FUNCTIONAL_ASSESSMENT: 0-10
PAIN_FUNCTIONAL_ASSESSMENT: 0-10

## 2023-10-14 ASSESSMENT — PAIN SCALES - GENERAL
PAINLEVEL_OUTOF10: 0 - NO PAIN

## 2023-10-14 ASSESSMENT — ACTIVITIES OF DAILY LIVING (ADL): HOME_MANAGEMENT_TIME_ENTRY: 15

## 2023-10-14 NOTE — PROGRESS NOTES
INPATIENT NEPHROLOGY CONSULT PROGRESS NOTE      Patient Name: Zack Figueroa MRN: [id number]  DATE of SERVICE: October 13, 2023  TIME of SERVICE: 04:09 PM  CONSULTING SERVICE: Nephrology    REASON for CONSULT: DENNISE, CKD IV    INTERVAL HISTORY:  Worsening DENNISE scr up to 5.3 mg/gl--> 5.7--> 6.14 mg/dl   Egfr 8 ml/min     SUMMARY OF STAY:  Mr. Figueroa is a 88 y.o. male who presents [facility] October 10, 2023 for further evaluation of change in mental status associated with right arm and left leg weakness with concern for stroke.  Diagnosed with intraparenchymal hemorrhage.  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 Last hemoglobin A1c 7.6.  History of paroxysmal atrial fibrillation follows with EP physiologist Dr. Chávez and cardiologist Dr. Martinez,, chronic diastolic heart failure, coronary artery disease status post remote CABG, hypertension, hyperlipidemia, history of CVA.    ASSESSMENT:  Acute Kidney injury superimposed on chronic kidney disease stage IV:  -- Acute kidney injury in the setting of intracranial hemorrhage, hemodynamically mediated changes, with component of contrast-induced nephropathy  -- s/p IV nicardipine infusion.  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  -- Recent contrast exposure  -- Oliguria lack of improvement with IV fluid and Lasix trial  -- ATN with high urinary sodium   -- Developing uremic symptoms , hemodialysis options discussed.  To start hemodialysis    Creatinine  0.50 - 1.30 mg/dL 5.79 High  5.33 High  4.74 High  4.47 High  4.09 High    3.11 High     eGFR  >60 mL/min/1.73m*2 9 Low  10 Low  CM 11 Low  CM 12 Low  CM 14 Low  CM   19 Low      Plan of care discussed with patient, patient wife and the son, family elected to proceed with a trial of hemodialysis based on patient wish.  Interventional radiology reconsulted to proceed with dialysis catheter placement.  Dialysis team informed to initiate hemodialysis today.     Subacute to acute left intracranial hemorrhage   Complex case patient with longstanding history of diabetes mellitus complicated with vasculopathy who presented with acute hemorrhagic stroke and in need to maintain the blood pressure on the lower side to ensure stability of intracranial hemorrhage on the other hand lower blood pressure will contribute to organ hypoperfusion and worsening renal function.  Low diastolic blood pressure and wide blood pulse pressure suggestive of low organ hypoperfusion.  Since stabilizing hemorrhagic stroke is lifesaving and maintaining relatively low blood pressures is critical in his case.     Atrial fibrillation: Anticoagulation on hold  Started on amiodarone     Hypertension:   - Blood pressure on presentation 187/76 started on nicardipine infusion  - Pressure readings over the past 12 hours reviewed blood pressure fluctuating between 118/50 to 130/57  -Relatively low diastolic blood pressure, wide pulse pressure     Chronic kidney disease stage IV:  -- Longstanding history of diabetes mellitus complicated with triopathy  -- Diabetic nephropathy, hypertensive nephrosclerosis  -- Proteinuric with a protein to creatinine ratio of 2.6  mg/g 7 months ago  -- Baseline serum creatinine 3.3 mg/dL, EGFR 17 mL/min per 1.73 m²  -- serum creatinine 4.1 mg/dl  BUN of 58 EGFR of 14 mL/min per 1.73 m²     Hyponatremia sodium level 131:  -- Suspected hypervolemic related  -- To check BNP     Secondary hyperparathyroidism: Last PTH was more than 600     Acute urinary retention:  Bladder scan 500 cc status post straight cath  overnight     Volume: hypervolemia      PLAN:  For hemodialysis today 10/13/23.  Interventional radiology placed R Temp dialysis cath (due to leukocytosis).  For second HD tomorrow.  Will follow, thank you!    SUBJECTIVE:  Seen and evaluated at bedside in the intensive care unit, confused and with poor appetite and anorexia.     Medications:    Current Facility-Administered Medications:     amiodarone (Pacerone) tablet 200 mg, 200 mg, oral, Daily, Jenny Fung PA-C, 200 mg at 10/13/23 0830    B complex-vitamin C tablet 1 tablet, 1 tablet, oral, Every other day, Jenny Fung PA-C, 1 tablet at 10/13/23 0900    cholecalciferol (Vitamin D-3) capsule 25 mcg, 25 mcg, oral, Every other day, Jenny Fung PA-C, 25 mcg at 10/11/23 0900    dextrose 10 % in water (D10W) infusion, 50 mL/hr, intravenous, Once PRN, Jenny Fung PA-C    dextrose 50 % injection 25 g, 25 g, intravenous, q15 min PRN, Jenny Fung PA-C    docusate sodium (Colace) capsule 100 mg, 100 mg, oral, BID, Jenny Fung PA-C, 100 mg at 10/13/23 0829    esomeprazole (NexIUM) suspension 40 mg, 40 mg, nasoduodenal tube, Daily before breakfast **OR** pantoprazole (ProtoNix) EC tablet 40 mg, 40 mg, oral, Daily before breakfast, 40 mg at 10/13/23 0829 **OR** pantoprazole (ProtoNix) injection 40 mg, 40 mg, intravenous, Daily before breakfast, Jenny Fung PA-C    glucagon (Glucagen) injection 1 mg, 1 mg, intramuscular, q15 min PRN, Jenny Fung PA-C    heparin 1,000 unit/mL injection 2,000 Units, 2,000 Units, intra-catheter, After Dialysis, Jenny Fung PA-C    heparin 1,000 unit/mL injection 2,000 Units, 2,000 Units, intra-catheter, After Dialysis, Jenny Fung PA-C    hydrALAZINE (Apresoline) tablet 25 mg, 25 mg, oral, BID, Jenny Fung PA-C, 25 mg at 10/13/23 0830    [Held by provider] insulin glargine (Lantus) injection 12 Units, 12 Units, subcutaneous, Nightly,  Jenny Fung PA-C, 12 Units at 10/11/23 2126    insulin lispro (HumaLOG) injection 0-10 Units, 0-10 Units, subcutaneous, Before meals & nightly, Jenny Fung PA-C, 2 Units at 10/12/23 2148    isosorbide mononitrate ER (Imdur) 24 hr tablet 30 mg, 30 mg, oral, Daily, Jenny Fung PA-C, 30 mg at 10/13/23 0829    rosuvastatin (Crestor) tablet 20 mg, 20 mg, oral, Daily, Jenny Fung PA-C, 20 mg at 10/12/23 0817    sertraline (Zoloft) tablet 25 mg, 25 mg, oral, Daily, Jenny Fung PA-C, 25 mg at 10/13/23 0829    sodium chloride 0.9 % bolus 200 mL, 200 mL, intravenous, After Dialysis, Jenny Fung PA-C    PERTINENT ROS:  GENERAL:  positive for fatigue, poor appetite.  No fever/chills  RESPIRATORY:  positive for shortness of breath.  Negative for cough, wheezing.  CARDIOVASCULAR:   Negative for chest pain or palpitation.  GI:  Negative for abdominal pain, diarrhea, heartburn, nausea, vomiting  : negative for dysuria, hematuria    Physical Exam:  Vital signs in last 24 hours:  Temp:  [35.9 °C (96.6 °F)-36.5 °C (97.7 °F)] 36.5 °C (97.7 °F)  Heart Rate:  [60-99] 69  Resp:  [18-35] 20  BP: (132-167)/(61-75) 139/68    General: Awake, cooperative, not in acute distress  HEENT:  NCAT,  mucous membranes moist and pink  NECK:  Elevated JVD, no carotid bruit, supple, no cervical mass or thyromegaly  LUNGS;  Diminished breath sounds, fine Rales  CV:  Distant, regular rate and rhythm, no murmurs  ABDOMEN:  abdomen soft, nontender, BS normal, no masses or organomegaly  EDEMA:  +2 lower extremity edema/dependent edema  SKIN:  dry and normal turgor, no clubbing, cyanosis or petechia.  No rashes noted      Intake/Output last 3 shifts:  I/O last 3 completed shifts:  In: 270 (3.2 mL/kg) [P.O.:120; I.V.:150 (1.8 mL/kg)]  Out: 28 (0.3 mL/kg) [Urine:28 (0 mL/kg/hr)]  Weight: 83.1 kg     DATA:  Diagnotic tests reviewed for Todays visit:  Results from last 7 days   Lab Units 10/13/23  7634    WBC AUTO x10*3/uL 15.2*   RBC AUTO x10*6/uL 3.28*   HEMOGLOBIN g/dL 9.9*   HEMATOCRIT % 30.0*       Results from last 7 days   Lab Units 10/13/23  0317 10/12/23  1601 10/12/23  0506   SODIUM mmol/L 126*   < > 130*   POTASSIUM mmol/L 4.9   < > 4.3   CHLORIDE mmol/L 94*   < > 98   CO2 mmol/L 19*   < > 22   BUN mg/dL 78*   < > 64*   CREATININE mg/dL 6.14*   < > 5.33*   CALCIUM mg/dL 8.5*   < > 8.3*   PHOSPHORUS mg/dL 4.8   < >  --    MAGNESIUM mg/dL 2.14  --  1.97   BILIRUBIN TOTAL mg/dL  --   --  0.6   ALT U/L  --   --  52   AST U/L  --   --  59*    < > = values in this interval not displayed.           IMAGING: CXR reviewed in  images      SIGNATURE: Nereida Navarro MD  Nephrology and Hypertension  [address]  Office phone: [phone]  FAX: [phone]    This note was partially generated using the Dragon voice recognition system, and there may be some incorrect words, spelling's and punctuation that were not noted in checking the note before saving.

## 2023-10-14 NOTE — PROGRESS NOTES
"Internal Medicine Progress Note    Patient Name: Zack Figueroa          MRN: [id number]  Today's Date: October 14, 2023          Attending: George Lenz MD    Subjective:  Patient was seen and examined at bedside.    Review Of Systems:  GENERAL: Generalized weakness  CARDIOVASCULAR: Negative for chest pain, leg swelling  RESPIRATORY: Negative for shortness of breath, cough, wheezing  GI: No nausea, vomiting, or diarrhea    Objective:  Vitals:    10/13/23 1955 10/13/23 2140 10/14/23 0000 10/14/23 0532   BP:   150/70    Pulse: 69 76 80    Resp:   18    Temp: 36.5 °C (97.7 °F) 36.6 °C (97.9 °F) 36.2 °C (97.2 °F)    TempSrc: Temporal Temporal     SpO2:   98%    Weight:    80.8 kg (178 lb 2.1 oz)   Height: 1.676 m (5' 5.98\")          Physical Exam:   General appearance: Frail appearing, in no distress  Lungs: Clear to auscultation, no wheezing or rhonchi  Heart: No murmur, gallop, or rubs.  No ectopy  Abdomen: Soft, non-tender. Bowel sounds normal.  Neuro: Awake, slurred speech    Labs:  Results for orders placed or performed during the hospital encounter of 10/09/23 (from the past 24 hour(s))   POCT GLUCOSE   Result Value Ref Range    POCT Glucose 169 (H) 74 - 99 mg/dL   POCT GLUCOSE   Result Value Ref Range    POCT Glucose 187 (H) 74 - 99 mg/dL   POCT GLUCOSE   Result Value Ref Range    POCT Glucose 150 (H) 74 - 99 mg/dL   POCT GLUCOSE   Result Value Ref Range    POCT Glucose 156 (H) 74 - 99 mg/dL   CBC   Result Value Ref Range    WBC 17.0 (H) 4.4 - 11.3 x10*3/uL    nRBC 0.0 0.0 - 0.0 /100 WBCs    RBC 3.14 (L) 4.50 - 5.90 x10*6/uL    Hemoglobin 9.3 (L) 13.5 - 17.5 g/dL    Hematocrit 28.6 (L) 41.0 - 52.0 %    MCV 91 80 - 100 fL    MCH 29.6 26.0 - 34.0 pg    MCHC 32.5 32.0 - 36.0 g/dL    RDW 15.2 (H) 11.5 - 14.5 %    Platelets 178 150 - 450 x10*3/uL    MPV 10.6 7.5 - 11.5 fL       Medications:  Scheduled medications  amiodarone, 200 mg, oral, Daily  B complex-vitamin C, 1 tablet, oral, Every other " "day  cholecalciferol, 25 mcg, oral, Every other day  docusate sodium, 100 mg, oral, BID  esomeprazole, 40 mg, nasoduodenal tube, Daily before breakfast   Or  pantoprazole, 40 mg, oral, Daily before breakfast   Or  pantoprazole, 40 mg, intravenous, Daily before breakfast  heparin, 2,000 Units, intra-catheter, After Dialysis  heparin, 2,000 Units, intra-catheter, After Dialysis  hydrALAZINE, 25 mg, oral, BID  [Held by provider] insulin glargine, 12 Units, subcutaneous, Nightly  insulin lispro, 0-10 Units, subcutaneous, Before meals & nightly  isosorbide mononitrate ER, 30 mg, oral, Daily  rosuvastatin, 20 mg, oral, Daily  sertraline, 25 mg, oral, Daily  sodium chloride, 200 mL, intravenous, After Dialysis      Continuous medications       PRN medications  PRN medications: dextrose 10 % in water (D10W), dextrose, glucagon      Assessment/Plan:  Medical Problems       Problem List       * (Principal) Intraparenchymal hemorrhage of brain (CMS/HCC)         Continue current medication measures  Plan to discharge patient to acute rehab facility           Hypertension         Continue medications and measurers           Hyperglycemia         Continue current medications and measurers           Transaminitis         We will monitor transaminase levels.           Acute kidney injury superimposed on chronic kidney disease (CMS/HCC)         Patient has dialysis catheter placed  Nephrology is managing                 Discussed with patient, RN    George Lenz MD   Date: 10/14/23  Time: 9:16 AM    This note was partially created using voice recognition software and is inherently subject to errors including those of syntax and \"sound-alike\" substitutions which may escape proofreading. In such instances, original meaning may be extrapolated by contextual derivation  "

## 2023-10-14 NOTE — PROGRESS NOTES
"Internal Medicine Progress Note    Patient Name: Zack Figueroa          MRN: [id number]  Today's Date: October 13, 2023          Attending: George Lenz MD    Subjective:  Patient was seen and examined at bedside.    Review Of Systems:  GENERAL: Generalized weakness  CARDIOVASCULAR: Negative for chest pain, leg swelling  RESPIRATORY: Negative for shortness of breath, cough, wheezing  GI: No nausea, vomiting, or diarrhea    Objective:  Vitals:    10/13/23 1458 10/13/23 1533 10/13/23 1925 10/13/23 1955   BP: 150/66 139/68     Pulse: 99 66 69 69   Resp: 19 20     Temp:  36 °C (96.8 °F) 36.5 °C (97.7 °F) 36.5 °C (97.7 °F)   TempSrc:  Temporal  Temporal   SpO2: 96% 96%     Weight:       Height:   1.676 m (5' 5.98\") 1.676 m (5' 5.98\")       Physical Exam:   General appearance: Frail appearing, in no distress  Lungs: Clear to auscultation, no wheezing or rhonchi  Heart: No murmur, gallop, or rubs.  No ectopy  Abdomen: Soft, non-tender. Bowel sounds normal.  Extremities: No deformity, no edema  Neuro: Awake, slurred speech, tremors in upper extremities    Labs:  Results for orders placed or performed during the hospital encounter of 10/09/23 (from the past 24 hour(s))   POCT GLUCOSE   Result Value Ref Range    POCT Glucose 196 (H) 74 - 99 mg/dL   CBC   Result Value Ref Range    WBC 15.2 (H) 4.4 - 11.3 x10*3/uL    nRBC 0.0 0.0 - 0.0 /100 WBCs    RBC 3.28 (L) 4.50 - 5.90 x10*6/uL    Hemoglobin 9.9 (L) 13.5 - 17.5 g/dL    Hematocrit 30.0 (L) 41.0 - 52.0 %    MCV 92 80 - 100 fL    MCH 30.2 26.0 - 34.0 pg    MCHC 33.0 32.0 - 36.0 g/dL    RDW 15.1 (H) 11.5 - 14.5 %    Platelets 186 150 - 450 x10*3/uL    MPV 11.5 7.5 - 11.5 fL   Renal function panel   Result Value Ref Range    Glucose 138 (H) 74 - 99 mg/dL    Sodium 126 (L) 136 - 145 mmol/L    Potassium 4.9 3.5 - 5.3 mmol/L    Chloride 94 (L) 98 - 107 mmol/L    Bicarbonate 19 (L) 21 - 32 mmol/L    Anion Gap 18 10 - 20 mmol/L    Urea Nitrogen 78 (H) 6 - 23 mg/dL    " Creatinine 6.14 (H) 0.50 - 1.30 mg/dL    eGFR 8 (L) >60 mL/min/1.73m*2    Calcium 8.5 (L) 8.6 - 10.3 mg/dL    Phosphorus 4.8 2.5 - 4.9 mg/dL    Albumin 3.3 (L) 3.4 - 5.0 g/dL   Magnesium   Result Value Ref Range    Magnesium 2.14 1.60 - 2.40 mg/dL   POCT GLUCOSE   Result Value Ref Range    POCT Glucose 148 (H) 74 - 99 mg/dL   POCT GLUCOSE   Result Value Ref Range    POCT Glucose 169 (H) 74 - 99 mg/dL   POCT GLUCOSE   Result Value Ref Range    POCT Glucose 187 (H) 74 - 99 mg/dL       Medications:  Scheduled medications  amiodarone, 200 mg, oral, Daily  B complex-vitamin C, 1 tablet, oral, Every other day  cholecalciferol, 25 mcg, oral, Every other day  docusate sodium, 100 mg, oral, BID  esomeprazole, 40 mg, nasoduodenal tube, Daily before breakfast   Or  pantoprazole, 40 mg, oral, Daily before breakfast   Or  pantoprazole, 40 mg, intravenous, Daily before breakfast  heparin, 2,000 Units, intra-catheter, After Dialysis  heparin, 2,000 Units, intra-catheter, After Dialysis  hydrALAZINE, 25 mg, oral, BID  [Held by provider] insulin glargine, 12 Units, subcutaneous, Nightly  insulin lispro, 0-10 Units, subcutaneous, Before meals & nightly  isosorbide mononitrate ER, 30 mg, oral, Daily  rosuvastatin, 20 mg, oral, Daily  sertraline, 25 mg, oral, Daily  sodium chloride, 200 mL, intravenous, After Dialysis      Continuous medications       PRN medications  PRN medications: dextrose 10 % in water (D10W), dextrose, glucagon      Assessment/Plan:  Medical Problems       Problem List       * (Principal) Intraparenchymal hemorrhage of brain (CMS/HCC)    Overview Addendum 10/13/2023  9:03 PM by George Lenz MD     Continue monitoring blood pressure  Continue monitoring anticoagulation and antiplatelets  Continue statin  Neurology is following  Patient will be transferred to medical floor telemetry           Hypertension    Overview Addendum 10/13/2023  9:03 PM by George Lenz MD     Continue hydralazine and isosorbide  "mononitrate.           Hyperglycemia    Overview Signed 10/13/2023  9:03 PM by George Lenz MD     Continue current medications  Continue monitoring blood sugar           Transaminitis    Overview Addendum 10/13/2023  9:02 PM by George Lenz MD     We will monitor transaminase levels.           Acute kidney injury superimposed on chronic kidney disease (CMS/HCC)    Overview Signed 10/13/2023  9:02 PM by George Lenz MD     Kidney function is worsening  Patient got temporary dialysis catheter and will be started on dialysis  Nephrology is managing                 Discussed with patient, RN    George Lenz MD   Date: 10/13/23  Time: 9:01 PM    This note was partially created using voice recognition software and is inherently subject to errors including those of syntax and \"sound-alike\" substitutions which may escape proofreading. In such instances, original meaning may be extrapolated by contextual derivation  "

## 2023-10-14 NOTE — PROGRESS NOTES
Occupational Therapy    OT Treatment    Patient Name: Zack Figueroa  MRN: [id number]  Today's Date: 10/14/2023  Time Calculation  Start Time: 1432  Stop Time: 1510  Time Calculation (min): 38 min         Assessment:  Medical Staff Made Aware: Yes  End of Session Communication: Bedside nurse  End of Session Patient Position: Up in chair, Alarm on  Medical Staff Made Aware: Yes  Plan:  OT Frequency: 5 times per week  OT Discharge Recommendations: High intensity level of continued care     Subjective   General:  OT Received On: 10/14/23  Co-Treatment: PT  Prior to Session Communication: Bedside nurse  Patient Position Received: Bed, 3 rail up, Alarm on       Pain:  Pain Assessment  Pain Assessment: 0-10  Pain Score: 0 - No pain    Objective    Activities of Daily Living: Feeding  Feeding Level of Assistance: Setup, Moderate assistance  Feeding Where Assessed: Recliner  Feeding Comments: patient educated on built up utensils for increased eased and indep with feeding/eating         LE Dressing  LE Dressing: Yes  Adult Briefs Level of Assistance: Maximum assistance (donned with max a and dep for pants mgmt)  LE Dressing Where Assessed: Edge of bed    Toileting  Toileting Level of Assistance: Dependent  Where Assessed: Other (Comment) (standing)  Functional Standing Tolerance:  Functional Standing Tolerance Comments: patient stood for a total of 3 mins this date with poor/poor- balance with 2-1 ue support as needed during transfers and ADL tasks  Bed Mobility/Transfers: Bed Mobility 1  Bed Mobility 1: Supine to sitting  Level of Assistance 1: Maximum assistance (x2)  Bed Mobility Comments 1: with verbal cues for hand placement  Bed Mobility 2  Bed Mobility  2: Rolling right, Rolling left  Level of Assistance 2: Moderate assistance (x2)    Transfer 1  Technique 1: Sit to stand  Transfer Device 1: Walker  Transfer Level of Assistance 1: Maximum assistance, +2  Transfers 2  Technique 2: Stand pivot  Transfer Device 2:   (arm in arm)  Transfer Level of Assistance 2: Maximum assistance, +2          Therapy/Activity: Therapeutic Activity  Therapeutic Activity 1: patient sat EOB for a total of 10 mins this date with fir/fair- balance with 2-0 ue support as needed during ADL tasks    Outcome Measures:[facility] Daily Activity  Putting on and taking off regular lower body clothing: A lot  Bathing (including washing, rinsing, drying): A lot  Putting on and taking off regular upper body clothing: A lot  Toileting, which includes using toilet, bedpan or urinal: Total  Taking care of personal grooming such as brushing teeth: A lot  Eating Meals: A lot  Daily Activity - Total Score: 11    Education Documentation  Body Mechanics, taught by BELEN Cardona at 10/14/2023  3:22 PM.  Learner: Patient  Readiness: Acceptance  Method: Explanation  Response: Verbalizes Understanding    ADL Training, taught by BELEN Cardona at 10/14/2023  3:22 PM.  Learner: Patient  Readiness: Acceptance  Method: Explanation  Response: Verbalizes Understanding    Education Comments  No comments found.      OP EDUCATION:  Education  Individual(s) Educated: Patient, Spouse  Education Provided: Ergonomics and postural realignment, Fall precautons, POC discussed and agreed upon  Education Comment: recpetive to recommendations, will require continued education    Goals:  Encounter Problems       Encounter Problems (Active)       OT Goals       Patient will complete functional transfers at CGA level with LRD to facilitate increased independence and safety with home going  (Progressing)       Start:  10/10/23    Expected End:  10/24/23            Patient will complete functional mobility for household distances at CGA level with LRD to facilitate increased independence and safety with home going  (Progressing)       Start:  10/10/23    Expected End:  10/24/23            Patient will complete LB dressing at CGA level to facilitate safety and independence for home going    (Progressing)       Start:  10/10/23    Expected End:  10/24/23            Patient will complete toileting at CGA level to facilitate safety and independence for home going   (Progressing)       Start:  10/10/23    Expected End:  10/24/23            Patient will complete UB dressing at supervision level to facilitate safety and independence for home going   (Progressing)       Start:  10/10/23    Expected End:  10/24/23               Safety       LTG - Patient will adhere to hip precautions during ADL's and transfers       Start:  10/10/23    Expected End:  10/21/23            LTG - Patient will demonstrate safety requirements appropriate to situation/environment       Start:  10/10/23    Expected End:  10/21/23            LTG - Patient will utilize safety techniques       Start:  10/10/23    Expected End:  10/21/23            STG - Patient locks brakes on wheelchair       Start:  10/10/23    Expected End:  10/21/23            STG - Patient uses call light consistently to request assistance with transfers       Start:  10/10/23    Expected End:  10/21/23            STG - Patient uses gait belt during all transfers       Start:  10/10/23    Expected End:  10/21/23            Goal 1       Start:  10/10/23    Expected End:  10/21/23            Goal 2       Start:  10/10/23    Expected End:  10/21/23            Goal 3       Start:  10/10/23    Expected End:  10/21/23

## 2023-10-14 NOTE — PROGRESS NOTES
INPATIENT NEPHROLOGY CONSULT PROGRESS NOTE      Patient Name: Zack Figueroa MRN: [id number]  DATE of SERVICE: October 14, 2023  TIME of SERVICE: 02:40 PM  CONSULTING SERVICE: Nephrology    REASON for CONSULT: DENNISE, CKD IV, dialysis dependent     INTERVAL HISTORY:  For second hemodialysis session today  Temporary hemodialysis catheter placed October 13, 2023    SUMMARY OF STAY:  Mr. Figueroa is a 88 y.o. male who presents [facility] October 10, 2023 for further evaluation of change in mental status associated with right arm and left leg weakness with concern for stroke.  Diagnosed with intraparenchymal hemorrhage.  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 Last hemoglobin A1c 7.6.  History of paroxysmal atrial fibrillation follows with EP physiologist Dr. Chávez and cardiologist Dr. Martinez,, chronic diastolic heart failure, coronary artery disease status post remote CABG, hypertension, hyperlipidemia, history of CVA.    ASSESSMENT:  Acute Kidney injury superimposed on chronic kidney disease stage IV:  -- Acute kidney injury in the setting of intracranial hemorrhage, hemodynamically mediated changes, with component of contrast-induced nephropathy  -- s/p IV nicardipine infusion.  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  -- Recent contrast exposure  -- Oliguria lack of improvement with IV fluid and Lasix trial  -- ATN with high urinary sodium   -- Hemodialysis was started October 13, 2023.    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     Atrial fibrillation: Anticoagulation on hold  on amiodarone     Hypertension:   - Blood pressure on presentation 187/76 s/p on nicardipine infusion  - Pressure readings over the past 12 hours reviewed blood pressure fluctuating between 118/50 to 130/57     Chronic kidney disease stage IV:  -- Longstanding history of diabetes mellitus complicated with triopathy  -- Diabetic nephropathy, hypertensive nephrosclerosis  -- Proteinuric with a protein to creatinine ratio of 2.6  mg/g 7 months ago  -- Baseline serum creatinine 3.3 mg/dL, EGFR 17 mL/min per 1.73 m²  -- serum creatinine 6 mg/dl  BUN of 58 EGFR of 12 mL/min per 1.73 m²     Hyponatremia sodium level 131:  Better with dialysis.    Secondary hyperparathyroidism: Last PTH was more than 600     Acute urinary retention:  Bladder scan 500 cc status post straight cath overnight  External schulte in place      Volume: hypervolemia      PLAN:  For second hemodialysis session today 10/14/23.  For third hemodialysis session on Monday.  Volume status better with fluid removal with hemodialysis.  Blood pressure controlled with hydralazine 25 mg p.o. twice daily Imdur 30 mg p.o. daily.  Heart rate controlled with amiodarone  Will follow, thank you!    SUBJECTIVE:  Seen and evaluated at bedside, sitting up in the chair.  During lunch.  Appetite improved.  External Schulte catheter in place with dark concentrated urine noted in the  canister    Medications:    Current Facility-Administered Medications:     amiodarone (Pacerone) tablet 200 mg, 200 mg, oral, Daily, Jenny Fung PA-C, 200 mg at 10/14/23 0936    B complex-vitamin C tablet 1 tablet, 1 tablet, oral, Every other day, Jenny Fung PA-C, 1 tablet at 10/13/23 0900    cholecalciferol (Vitamin D-3) capsule 25 mcg, 25 mcg, oral, Every other day, Jenny Fung PA-C, 25 mcg at 10/11/23 0900    dextrose 10 % in water (D10W) infusion, 50 mL/hr, intravenous, Once PRN, Jenny Fung PA-C    dextrose 50 % injection 25 g, 25 g, intravenous, q15 min PRN, Jenny Fung PA-C    docusate sodium (Colace) capsule 100 mg, 100 mg, oral, BID, Jenny Fung PA-C, 100 mg at 10/14/23 0935    esomeprazole (NexIUM) suspension 40 mg, 40 mg, nasoduodenal tube, Daily before breakfast **OR** pantoprazole (ProtoNix) EC tablet 40 mg, 40 mg, oral, Daily before breakfast, 40 mg at 10/14/23 0935 **OR** pantoprazole (ProtoNix) injection 40 mg, 40 mg, intravenous, Daily before breakfast, Jenny Fung PA-C    glucagon (Glucagen) injection 1 mg, 1 mg, intramuscular, q15 min PRN, Jenny Fung PA-C    heparin 1,000 unit/mL injection 2,000 Units, 2,000 Units, intra-catheter, After Dialysis, Jenny Fung PA-C    heparin 1,000 unit/mL injection 2,000 Units, 2,000 Units, intra-catheter, After Dialysis, Jenny Fung PA-C    hydrALAZINE (Apresoline) tablet 25 mg, 25 mg, oral, BID, Jenny Fung PA-C, 25 mg at 10/14/23 0935    [Held by provider] insulin glargine (Lantus) injection 12 Units, 12 Units, subcutaneous, Nightly, Jenny Fung PA-C, 12 Units at 10/11/23 2126    insulin lispro (HumaLOG) injection 0-10 Units, 0-10 Units, subcutaneous, Before meals & nightly, Jenny Fung PA-C, 4 Units at 10/14/23 1744    isosorbide mononitrate ER (Imdur) 24 hr tablet 30 mg, 30 mg, oral, Daily, Jenny Fung PA-C, 30 mg at  10/14/23 0935    rosuvastatin (Crestor) tablet 20 mg, 20 mg, oral, Daily, Jenny Fung PA-C, 20 mg at 10/14/23 0935    sertraline (Zoloft) tablet 25 mg, 25 mg, oral, Daily, Jenny Fung PA-C, 25 mg at 10/14/23 0935    sodium chloride 0.9 % bolus 200 mL, 200 mL, intravenous, After Dialysis, Jenny Fung PA-C    PERTINENT ROS:  GENERAL:  positive for fatigue, poor appetite.  No fever/chills  RESPIRATORY:  positive for shortness of breath.  Negative for cough, wheezing.  CARDIOVASCULAR:   Negative for chest pain or palpitation.  GI:  Negative for abdominal pain, diarrhea, heartburn, nausea, vomiting  : negative for dysuria, hematuria    Physical Exam:  Vital signs in last 24 hours:  Temp:  [36.2 °C (97.2 °F)-37.1 °C (98.8 °F)] 36.5 °C (97.7 °F)  Heart Rate:  [69-80] 71  Resp:  [16-18] 16  BP: (138-157)/(63-74) 138/63    General: Awake, cooperative, not in acute distress  HEENT:  NCAT,  mucous membranes moist and pink  NECK:  Elevated JVD, no carotid bruit, supple, no cervical mass or thyromegaly  LUNGS;  Diminished breath sounds, fine Rales  CV:  Distant, regular rate and rhythm, no murmurs  ABDOMEN:  abdomen soft, nontender, BS normal, no masses or organomegaly  EDEMA:  +2 lower extremity edema/dependent edema  SKIN:  dry and normal turgor, no clubbing, cyanosis or petechia.  No rashes noted      Intake/Output last 3 shifts:  I/O last 3 completed shifts:  In: 870 (10.8 mL/kg) [P.O.:120; I.V.:750 (9.3 mL/kg)]  Out: 1 (0 mL/kg) [Stool:1]  Weight: 80.8 kg     DATA:  Diagnotic tests reviewed for Todays visit:  Results from last 7 days   Lab Units 10/14/23  0829   WBC AUTO x10*3/uL 17.0*   RBC AUTO x10*6/uL 3.14*   HEMOGLOBIN g/dL 9.3*   HEMATOCRIT % 28.6*       Results from last 7 days   Lab Units 10/14/23  0829 10/12/23  1601 10/12/23  0506   SODIUM mmol/L 132*   < > 130*   POTASSIUM mmol/L 4.5   < > 4.3   CHLORIDE mmol/L 96*   < > 98   CO2 mmol/L 23   < > 22   BUN mg/dL 66*   < > 64*    CREATININE mg/dL 5.12*   < > 5.33*   CALCIUM mg/dL 8.3*   < > 8.3*   PHOSPHORUS mg/dL 4.5   < >  --    MAGNESIUM mg/dL 2.15   < > 1.97   BILIRUBIN TOTAL mg/dL  --   --  0.6   ALT U/L  --   --  52   AST U/L  --   --  59*    < > = values in this interval not displayed.           IMAGING: CXR reviewed in  images      SIGNATURE: Nereida Navarro MD  Nephrology and Hypertension  [address]  Office phone: [phone]  FAX: [phone]    This note was partially generated using the Dragon voice recognition system, and there may be some incorrect words, spelling's and punctuation that were not noted in checking the note before saving.

## 2023-10-14 NOTE — PROGRESS NOTES
Physical Therapy    Physical Therapy Treatment    Patient Name: Zack Figueroa  MRN: [id number]  Today's Date: 10/14/2023  Time Calculation  Start Time: 1430  Stop Time: 1500  Time Calculation (min): 30 min       Assessment/Plan   PT Assessment  PT Assessment Results: Decreased strength, Decreased endurance, Impaired balance, Decreased mobility  Rehab Prognosis: Good  Evaluation/Treatment Tolerance: Patient tolerated treatment well  Medical Staff Made Aware: Yes  End of Session Communication: Bedside nurse  Assessment Comment: improved overall static and dynamic sitting balance.  improved coordination though still with significant R lean, more so with fatigue  End of Session Patient Position: Up in chair, Alarm on  PT Plan  Inpatient/Swing Bed or Outpatient: Inpatient    PT Plan  Treatment/Interventions: Bed mobility, Therapeutic exercise, Transfer training  PT Plan: Skilled PT  PT Frequency: Daily  PT Discharge Recommendations: High intensity level of continued care  PT Recommended Transfer Status: Assist x2, Assistive device       10/14/23 1430   PT  Visit   PT Received On 10/14/23   Response to Previous Treatment Patient with no complaints from previous session.   General   Reason for Referral S   Family/Caregiver Present   (son arrived at end of session)   Co-Treatment OT   Co-Treatment Reason Pt.safety during session.right side exetremities and trunk wealkness. Degreased control of extemites. Not able to maintain sitting balance. RIght side lean in sitting and standig.   Prior to Session Communication Bedside nurse   Precautions   Precautions Comment fall precautions,   Postural Control   Posture Comment Not able to make postural corrections without assist.   Therapeutic Exercise   Therapeutic Exercise Activity 1 Supine exerices. AP, Heel Slides, HIp Abd./Add. x 10 assisted, arom and light resisance as able   Bed Mobility 1   Bed Mobility 1 Supine to sitting   Level of Assistance 1 Minimum assistance  (x2)    Bed Mobility Comments 1 Verbal cuse, Tactile cues.   Transfer 1   Technique 1 Sit to stand   Transfer Device 1 Walker   Transfer Level of Assistance 1 Arm in arm assistance  (Right knee blocked,  RIght side trunk supported. Cues and assist  to prevent posterior right lean and loss of balance.)   Trials/Comments 1 Stood 2 times.   Transfers 2   Technique 2 Sit pivot  (to patient right side.)   Transfer Device 2 Gait belt   Transfer Level of Assistance 2 Arm in arm assistance   PT Assessment   End of Session Communication Bedside nurse   End of Session Patient Position Up in chair;Alarm on       Outcome Measures:  [facility] Basic Mobility  Turning from your back to your side while in a flat bed without using bedrails: A lot  Moving from lying on your back to sitting on the side of a flat bed without using bedrails: A lot  Moving to and from bed to chair (including a wheelchair): A lot  Standing up from a chair using your arms (e.g. wheelchair or bedside chair): A lot  To walk in hospital room: Total  Climbing 3-5 steps with railing: Total  Basic Mobility - Total Score: 10      Education Documentation  Body Mechanics, taught by Sanya Bertrand PTA at 10/14/2023  7:27 PM.  Learner: Patient  Readiness: Acceptance  Method: Demonstration, Explanation  Response: Needs Reinforcement    Mobility Training, taught by Sanya Bertrand PTA at 10/14/2023  7:27 PM.  Learner: Patient  Readiness: Acceptance  Method: Demonstration, Explanation  Response: Needs Reinforcement    Education Comments  No comments found.        Current Problem:  1. Intraparenchymal hemorrhage of brain (CMS/[facility])  Critical Care    Critical Care      2. Oral phase dysphagia [R13.11]        3. 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 A-fib (CMS/[facility])        5. Anticoagulant long-term use            EDUCATION:  Education  Individual(s) Educated: Patient, Spouse, Child  Education Provided: Fall Risk  Patient/Caregiver  Demonstrated Understanding: yes  Plan of Care Discussed and Agreed Upon: yes  Patient Response to Education: Patient/Caregiver Verbalized Understanding of Information  Education Comment: .      Encounter Problems       Encounter Problems (Active)       PT Problem       Bed mobility (Progressing)       Start:  10/10/23    Expected End:  10/31/23       Pt will be able to complete bed mobility mod I with use of bed HR.            Transfers (Progressing)       Start:  10/10/23    Expected End:  10/31/23       Pt will be able to complete all transfers with FWW CGA demonstrating good safety awareness and proper body mechanics.           Gait (Progressing)       Start:  10/10/23    Expected End:  10/31/23       Pt will be able to ambulate 100 ft with FWW CGA with good safety awareness and ability to maintain COG/GABRIEL in midline.           Strength (Progressing)       Start:  10/10/23    Expected End:  10/31/23       Pt will improve BLE strength with supine, seated and standing exercises to WFL.           Balance (Progressing)       Start:  10/10/23    Expected End:  10/31/23       Pt will demonstrate good static/dynamic standing balance without evidence of right lateral lean or retro LOB.              Pain - Adult          Safety       LTG - Patient will adhere to hip precautions during ADL's and transfers       Start:  10/10/23    Expected End:  10/21/23            LTG - Patient will demonstrate safety requirements appropriate to situation/environment       Start:  10/10/23    Expected End:  10/21/23            LTG - Patient will utilize safety techniques       Start:  10/10/23    Expected End:  10/21/23            STG - Patient locks brakes on wheelchair       Start:  10/10/23    Expected End:  10/21/23            STG - Patient uses call light consistently to request assistance with transfers       Start:  10/10/23    Expected End:  10/21/23            STG - Patient uses gait belt during all transfers       Start:   10/10/23    Expected End:  10/21/23            Goal 1       Start:  10/10/23    Expected End:  10/21/23            Goal 2       Start:  10/10/23    Expected End:  10/21/23            Goal 3       Start:  10/10/23    Expected End:  10/21/23

## 2023-10-14 NOTE — PROGRESS NOTES
"Zack Figueroa is a 88 y.o. male on day 4 of admission presenting with Intraparenchymal hemorrhage of brain (CMS/HCC).      Subjective   No new complaints       Objective     Last Recorded Vitals  Blood pressure 142/74, pulse 72, temperature 36.5 °C (97.7 °F), resp. rate 18, height 1.676 m (5' 5.98\"), weight 80.8 kg (178 lb 2.1 oz), SpO2 97 %.    Neurological Exam  Sleeping in bed    Last Recorded Vitals  Blood pressure 142/74, pulse 72, temperature 36.5 °C (97.7 °F), resp. rate 18, height 1.676 m (5' 5.98\"), weight 80.8 kg (178 lb 2.1 oz), SpO2 97 %.    Relevant Results  NIH Stroke Scale  1A. Level of Consciousness: Alert, Keenly Responsive  1B. Ask Month and Age: 1 Question Right  1C. Blink Eyes & Squeeze Hands: Performs Both Tasks  2. Best Gaze: Normal  3. Visual: No Visual Loss  4. Facial Palsy: Minor Paralysis  5A. Motor - Left Arm: No Drift  5B. Motor - Right Arm: No Drift  6A. Motor - Left Leg: No Drift  6B. Motor - Right Leg: No Drift  7. Limb Ataxia: Absent  8. Sensory Loss: Normal  9. Best Language: No Aphasia  10. Dysarthria: Normal  11. Extinction and Inattention: No Abnormality  NIH Stroke Scale: 2    Results for orders placed or performed during the hospital encounter of 10/09/23 (from the past 24 hour(s))   POCT GLUCOSE   Result Value Ref Range    POCT Glucose 187 (H) 74 - 99 mg/dL   Hepatitis B surface antibody   Result Value Ref Range    Hepatitis B Surface .3 (H) <10.0 mIU/mL   Hepatitis B Core Antibody, Total   Result Value Ref Range    Hepatitis B Core AB- Total Reactive (A) Nonreactive   Hepatitis B surface antigen   Result Value Ref Range    Hepatitis B Surface AG Nonreactive Nonreactive   POCT GLUCOSE   Result Value Ref Range    POCT Glucose 150 (H) 74 - 99 mg/dL   POCT GLUCOSE   Result Value Ref Range    POCT Glucose 156 (H) 74 - 99 mg/dL   CBC   Result Value Ref Range    WBC 17.0 (H) 4.4 - 11.3 x10*3/uL    nRBC 0.0 0.0 - 0.0 /100 WBCs    RBC 3.14 (L) 4.50 - 5.90 x10*6/uL    " Hemoglobin 9.3 (L) 13.5 - 17.5 g/dL    Hematocrit 28.6 (L) 41.0 - 52.0 %    MCV 91 80 - 100 fL    MCH 29.6 26.0 - 34.0 pg    MCHC 32.5 32.0 - 36.0 g/dL    RDW 15.2 (H) 11.5 - 14.5 %    Platelets 178 150 - 450 x10*3/uL    MPV 10.6 7.5 - 11.5 fL   Renal function panel   Result Value Ref Range    Glucose 146 (H) 74 - 99 mg/dL    Sodium 132 (L) 136 - 145 mmol/L    Potassium 4.5 3.5 - 5.3 mmol/L    Chloride 96 (L) 98 - 107 mmol/L    Bicarbonate 23 21 - 32 mmol/L    Anion Gap 18 10 - 20 mmol/L    Urea Nitrogen 66 (H) 6 - 23 mg/dL    Creatinine 5.12 (H) 0.50 - 1.30 mg/dL    eGFR 10 (L) >60 mL/min/1.73m*2    Calcium 8.3 (L) 8.6 - 10.3 mg/dL    Phosphorus 4.5 2.5 - 4.9 mg/dL    Albumin 3.1 (L) 3.4 - 5.0 g/dL   Magnesium   Result Value Ref Range    Magnesium 2.15 1.60 - 2.40 mg/dL   POCT GLUCOSE   Result Value Ref Range    POCT Glucose 157 (H) 74 - 99 mg/dL              Assessment/Plan   Principal Problem:    Intraparenchymal hemorrhage of brain (CMS/HCC)  Active Problems:    Hypertension    Hyperglycemia    Transaminitis    Acute kidney injury superimposed on chronic kidney disease (CMS/HCC)           Impression:  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 If he has ongoing paroxysmal AF then he would at be at high risk of recurrent ischemic stroke if kept off anticoagulation. However it is not clear what burden of atrial flutter he has had after his last cardioversion. Please consult EP for consideration of rhythm monitor and additional opinion on anticoagulation      I spent 25 minutes in the professional and overall care of this patient.      Kris Dickey MD

## 2023-10-15 PROBLEM — T83.511A URINARY TRACT INFECTION ASSOCIATED WITH INDWELLING URETHRAL CATHETER (CMS-HCC): Status: ACTIVE | Noted: 2023-10-15

## 2023-10-15 PROBLEM — N39.0 URINARY TRACT INFECTION ASSOCIATED WITH INDWELLING URETHRAL CATHETER (CMS-HCC): Status: ACTIVE | Noted: 2023-10-15

## 2023-10-15 LAB
ALBUMIN SERPL BCP-MCNC: 2.9 G/DL (ref 3.4–5)
ANION GAP SERPL CALC-SCNC: 13 MMOL/L (ref 10–20)
APPEARANCE UR: ABNORMAL
BACTERIA #/AREA URNS AUTO: ABNORMAL /HPF
BILIRUB UR STRIP.AUTO-MCNC: NEGATIVE MG/DL
BUN SERPL-MCNC: 41 MG/DL (ref 6–23)
CALCIUM SERPL-MCNC: 8.3 MG/DL (ref 8.6–10.3)
CHLORIDE SERPL-SCNC: 98 MMOL/L (ref 98–107)
CO2 SERPL-SCNC: 27 MMOL/L (ref 21–32)
COLOR UR: ABNORMAL
CREAT SERPL-MCNC: 3.83 MG/DL (ref 0.5–1.3)
ERYTHROCYTE [DISTWIDTH] IN BLOOD BY AUTOMATED COUNT: 15.4 % (ref 11.5–14.5)
GFR SERPL CREATININE-BSD FRML MDRD: 15 ML/MIN/1.73M*2
GLUCOSE BLD MANUAL STRIP-MCNC: 135 MG/DL (ref 74–99)
GLUCOSE BLD MANUAL STRIP-MCNC: 223 MG/DL (ref 74–99)
GLUCOSE BLD MANUAL STRIP-MCNC: 227 MG/DL (ref 74–99)
GLUCOSE BLD MANUAL STRIP-MCNC: 242 MG/DL (ref 74–99)
GLUCOSE SERPL-MCNC: 140 MG/DL (ref 74–99)
GLUCOSE UR STRIP.AUTO-MCNC: NEGATIVE MG/DL
HCT VFR BLD AUTO: 28.7 % (ref 41–52)
HGB BLD-MCNC: 9.3 G/DL (ref 13.5–17.5)
HOLD SPECIMEN: NORMAL
KETONES UR STRIP.AUTO-MCNC: NEGATIVE MG/DL
LEUKOCYTE ESTERASE UR QL STRIP.AUTO: ABNORMAL
MAGNESIUM SERPL-MCNC: 2.04 MG/DL (ref 1.6–2.4)
MCH RBC QN AUTO: 29.5 PG (ref 26–34)
MCHC RBC AUTO-ENTMCNC: 32.4 G/DL (ref 32–36)
MCV RBC AUTO: 91 FL (ref 80–100)
METHYLMALONATE SERPL-SCNC: 0.44 UMOL/L (ref 0–0.4)
NITRITE UR QL STRIP.AUTO: NEGATIVE
NRBC BLD-RTO: 0 /100 WBCS (ref 0–0)
PH UR STRIP.AUTO: 5 [PH]
PHOSPHATE SERPL-MCNC: 3.4 MG/DL (ref 2.5–4.9)
PLATELET # BLD AUTO: 165 X10*3/UL (ref 150–450)
PMV BLD AUTO: 10.3 FL (ref 7.5–11.5)
POTASSIUM SERPL-SCNC: 3.7 MMOL/L (ref 3.5–5.3)
PROT UR STRIP.AUTO-MCNC: ABNORMAL MG/DL
RBC # BLD AUTO: 3.15 X10*6/UL (ref 4.5–5.9)
RBC # UR STRIP.AUTO: ABNORMAL /UL
RBC #/AREA URNS AUTO: >20 /HPF
SODIUM SERPL-SCNC: 134 MMOL/L (ref 136–145)
SP GR UR STRIP.AUTO: 1.02
UROBILINOGEN UR STRIP.AUTO-MCNC: <2 MG/DL
WBC # BLD AUTO: 12.3 X10*3/UL (ref 4.4–11.3)
WBC #/AREA URNS AUTO: >50 /HPF
YEAST BUDDING #/AREA UR COMP ASSIST: PRESENT /HPF

## 2023-10-15 PROCEDURE — 2500000004 HC RX 250 GENERAL PHARMACY W/ HCPCS (ALT 636 FOR OP/ED): Performed by: INTERNAL MEDICINE

## 2023-10-15 PROCEDURE — 36415 COLL VENOUS BLD VENIPUNCTURE: CPT | Performed by: NURSE PRACTITIONER

## 2023-10-15 PROCEDURE — 2500000004 HC RX 250 GENERAL PHARMACY W/ HCPCS (ALT 636 FOR OP/ED): Performed by: PHYSICIAN ASSISTANT

## 2023-10-15 PROCEDURE — 2500000001 HC RX 250 WO HCPCS SELF ADMINISTERED DRUGS (ALT 637 FOR MEDICARE OP): Performed by: PHYSICIAN ASSISTANT

## 2023-10-15 PROCEDURE — 87086 URINE CULTURE/COLONY COUNT: CPT | Mod: STJLAB | Performed by: NURSE PRACTITIONER

## 2023-10-15 PROCEDURE — 36415 COLL VENOUS BLD VENIPUNCTURE: CPT | Performed by: PHYSICIAN ASSISTANT

## 2023-10-15 PROCEDURE — 2500000002 HC RX 250 W HCPCS SELF ADMINISTERED DRUGS (ALT 637 FOR MEDICARE OP, ALT 636 FOR OP/ED): Performed by: PHYSICIAN ASSISTANT

## 2023-10-15 PROCEDURE — 99222 1ST HOSP IP/OBS MODERATE 55: CPT | Performed by: INTERNAL MEDICINE

## 2023-10-15 PROCEDURE — 85027 COMPLETE CBC AUTOMATED: CPT | Performed by: PHYSICIAN ASSISTANT

## 2023-10-15 PROCEDURE — 1200000002 HC GENERAL ROOM WITH TELEMETRY DAILY

## 2023-10-15 PROCEDURE — 97112 NEUROMUSCULAR REEDUCATION: CPT | Mod: GO

## 2023-10-15 PROCEDURE — 80069 RENAL FUNCTION PANEL: CPT | Performed by: PHYSICIAN ASSISTANT

## 2023-10-15 PROCEDURE — 97110 THERAPEUTIC EXERCISES: CPT | Mod: GP

## 2023-10-15 PROCEDURE — 96372 THER/PROPH/DIAG INJ SC/IM: CPT | Performed by: PHYSICIAN ASSISTANT

## 2023-10-15 PROCEDURE — 83735 ASSAY OF MAGNESIUM: CPT | Performed by: NURSE PRACTITIONER

## 2023-10-15 PROCEDURE — 99231 SBSQ HOSP IP/OBS SF/LOW 25: CPT | Performed by: STUDENT IN AN ORGANIZED HEALTH CARE EDUCATION/TRAINING PROGRAM

## 2023-10-15 PROCEDURE — 81001 URINALYSIS AUTO W/SCOPE: CPT | Performed by: NURSE PRACTITIONER

## 2023-10-15 PROCEDURE — 82947 ASSAY GLUCOSE BLOOD QUANT: CPT

## 2023-10-15 PROCEDURE — 2500000004 HC RX 250 GENERAL PHARMACY W/ HCPCS (ALT 636 FOR OP/ED): Performed by: NURSE PRACTITIONER

## 2023-10-15 PROCEDURE — 97535 SELF CARE MNGMENT TRAINING: CPT | Mod: GO

## 2023-10-15 RX ORDER — NAPROXEN SODIUM 220 MG/1
81 TABLET, FILM COATED ORAL DAILY
Status: DISCONTINUED | OUTPATIENT
Start: 2023-10-16 | End: 2023-10-19 | Stop reason: HOSPADM

## 2023-10-15 RX ORDER — CEFTRIAXONE 1 G/50ML
1 INJECTION, SOLUTION INTRAVENOUS EVERY 24 HOURS
Status: DISCONTINUED | OUTPATIENT
Start: 2023-10-15 | End: 2023-10-15

## 2023-10-15 RX ADMIN — PANTOPRAZOLE SODIUM 40 MG: 40 TABLET, DELAYED RELEASE ORAL at 06:21

## 2023-10-15 RX ADMIN — INSULIN LISPRO 4 UNITS: 100 INJECTION, SOLUTION INTRAVENOUS; SUBCUTANEOUS at 16:58

## 2023-10-15 RX ADMIN — ISOSORBIDE MONONITRATE 30 MG: 30 TABLET, EXTENDED RELEASE ORAL at 09:48

## 2023-10-15 RX ADMIN — SERTRALINE HYDROCHLORIDE 25 MG: 25 TABLET ORAL at 09:49

## 2023-10-15 RX ADMIN — PIPERACILLIN SODIUM AND TAZOBACTAM SODIUM 3.38 G: 3; .375 INJECTION, SOLUTION INTRAVENOUS at 11:37

## 2023-10-15 RX ADMIN — INSULIN GLARGINE 12 UNITS: 100 INJECTION, SOLUTION SUBCUTANEOUS at 21:48

## 2023-10-15 RX ADMIN — HYDRALAZINE HYDROCHLORIDE 25 MG: 25 TABLET ORAL at 09:49

## 2023-10-15 RX ADMIN — INSULIN LISPRO 4 UNITS: 100 INJECTION, SOLUTION INTRAVENOUS; SUBCUTANEOUS at 21:48

## 2023-10-15 RX ADMIN — PIPERACILLIN SODIUM AND TAZOBACTAM SODIUM 3.38 G: 3; .375 INJECTION, SOLUTION INTRAVENOUS at 23:07

## 2023-10-15 RX ADMIN — ROSUVASTATIN CALCIUM 20 MG: 20 TABLET, FILM COATED ORAL at 09:49

## 2023-10-15 RX ADMIN — Medication 1 TABLET: at 09:48

## 2023-10-15 RX ADMIN — INSULIN LISPRO 4 UNITS: 100 INJECTION, SOLUTION INTRAVENOUS; SUBCUTANEOUS at 11:37

## 2023-10-15 RX ADMIN — Medication 25 MCG: at 09:00

## 2023-10-15 RX ADMIN — HYDRALAZINE HYDROCHLORIDE 25 MG: 25 TABLET ORAL at 21:49

## 2023-10-15 RX ADMIN — CEFTRIAXONE SODIUM 1 G: 1 INJECTION, SOLUTION INTRAVENOUS at 09:49

## 2023-10-15 RX ADMIN — AMIODARONE HYDROCHLORIDE 200 MG: 200 TABLET ORAL at 09:48

## 2023-10-15 ASSESSMENT — COGNITIVE AND FUNCTIONAL STATUS - GENERAL
PERSONAL GROOMING: A LOT
CLIMB 3 TO 5 STEPS WITH RAILING: TOTAL
DAILY ACTIVITIY SCORE: 12
EATING MEALS: A LITTLE
CLIMB 3 TO 5 STEPS WITH RAILING: TOTAL
TURNING FROM BACK TO SIDE WHILE IN FLAT BAD: A LOT
TOILETING: TOTAL
MOVING TO AND FROM BED TO CHAIR: A LOT
MOVING TO AND FROM BED TO CHAIR: A LOT
TOILETING: TOTAL
DRESSING REGULAR UPPER BODY CLOTHING: A LOT
TURNING FROM BACK TO SIDE WHILE IN FLAT BAD: A LOT
MOVING FROM LYING ON BACK TO SITTING ON SIDE OF FLAT BED WITH BEDRAILS: A LOT
MOVING FROM LYING ON BACK TO SITTING ON SIDE OF FLAT BED WITH BEDRAILS: A LOT
DRESSING REGULAR LOWER BODY CLOTHING: A LOT
WALKING IN HOSPITAL ROOM: A LOT
STANDING UP FROM CHAIR USING ARMS: A LOT
DRESSING REGULAR UPPER BODY CLOTHING: A LOT
STANDING UP FROM CHAIR USING ARMS: A LOT
TURNING FROM BACK TO SIDE WHILE IN FLAT BAD: A LOT
HELP NEEDED FOR BATHING: A LOT
EATING MEALS: A LOT
MOBILITY SCORE: 10
MOVING TO AND FROM BED TO CHAIR: A LOT
MOBILITY SCORE: 10
DRESSING REGULAR LOWER BODY CLOTHING: A LOT
EATING MEALS: A LITTLE
PERSONAL GROOMING: A LOT
DRESSING REGULAR UPPER BODY CLOTHING: A LOT
CLIMB 3 TO 5 STEPS WITH RAILING: TOTAL
DRESSING REGULAR LOWER BODY CLOTHING: A LOT
HELP NEEDED FOR BATHING: A LOT
PERSONAL GROOMING: A LOT
WALKING IN HOSPITAL ROOM: TOTAL
TOILETING: A LOT
DAILY ACTIVITIY SCORE: 12
DAILY ACTIVITIY SCORE: 12
MOVING FROM LYING ON BACK TO SITTING ON SIDE OF FLAT BED WITH BEDRAILS: A LOT
HELP NEEDED FOR BATHING: A LOT
MOBILITY SCORE: 11
STANDING UP FROM CHAIR USING ARMS: A LOT
WALKING IN HOSPITAL ROOM: TOTAL

## 2023-10-15 ASSESSMENT — PAIN SCALES - GENERAL
PAINLEVEL_OUTOF10: 0 - NO PAIN

## 2023-10-15 ASSESSMENT — PAIN - FUNCTIONAL ASSESSMENT
PAIN_FUNCTIONAL_ASSESSMENT: 0-10

## 2023-10-15 ASSESSMENT — ACTIVITIES OF DAILY LIVING (ADL): HOME_MANAGEMENT_TIME_ENTRY: 15

## 2023-10-15 NOTE — CONSULTS
"CARDIOLOGY/ELECTROPHYSIOLOGY CONSULTATION    PATIENT NAME: Zack Figueroa  PATIENT MRN: [id number]  SERVICE DATE:  10/15/2023  SERVICE TIME: 10:08 AM    CONSULTANT: Anand Lopez MD  PRIMARY CARE PHYSICIAN: Pippa Bonner MD  ATTENDING PHYSICIAN: George Lenz MD      IMPRESSIONS:  1.  Coronary artery disease, status post remote CABG in 1994, with preserved left ventricular systolic function.  The patient is followed chronically by cardiologist Dr. Blas Mcgowan.  2.  Atrial flutter, persistent in 2022 but now paroxysmal on a \"rhythm control strategy\" with amiodarone 200 mg p.o. daily.  The patient is currently in sinus rhythm with a first-degree AV block, certainly acceptable.  He has a maximal [id number]-WTOu score of 8, and had been on Eliquis anticoagulation at 2.5 mg p.o. twice daily.  This is now contraindicated because of his intracranial hemorrhage.  3.  New left thalamic intracranial hemorrhage, with some right hemiparesis, being managed conservatively to date.  4.  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  The patient has been started on hemodialysis per Dr. Navarro.  Whether or not he will need this long-term remains to be seen.  5.  Remote small stroke in 2014 with some chronic cognitive dysfunction.  6.  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    RECOMMENDATIONS:  1.  The patient will remain on amiodarone 200 mg p.o. daily to see if can maintain sinus rhythm.  2.  His Eliquis has been discontinued, likely permanently.  3.  I spoke with Dr. Blas Mcgowan about treatment strategies going forward.  If the patient makes a good neurologic recovery from his intracranial hemorrhage, I then recommend a left atrial appendage occlusion device to minimize his future stroke risk.  This could be done at the [facility] using a Watchman or Amulet " device, the advantage of the latter device being no need for post-procedure anticoagulation.  4.  If the patient has atrial flutter recurrences in spite of long-term amiodarone, he might also be a reasonable candidate for a simple cavotricuspid isthmus ablation, since flutter was his dominant atrial arrhythmia in the past.    Thank you for this consultation.      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  He has an extensive past history, as detailed below.  Because of his neurologic issues on presentation, he underwent a head CT scan, showing a left-sided intracranial hemorrhage, specifically in the left posterior internal capsule area of the thalamus with a 1.8 x 1.4 cm bleed.  He was given Kcentra to seek to reverse his Eliquis anticoagulation, and his Eliquis discontinued.  He has been seen by neurosurgery but has not required evacuation of the bleed.  Electrophysiology consultation was requested in regard to management of the patient's antiarrhythmic therapy and anticoagulation.  Also over his 6 days in hospital, the patient has been started on hemodialysis by nephrologist Dr. Navarro.    PAST MEDICAL HISTORY: The patient has a history of chronic hypertension but also orthostatic hypotension, multivessel coronary disease, heart failure with preserved ejection fraction, hyperlipidemia, type 2 diabetes with diabetic nephropathy, stage IV chronic kidney disease, iron deficiency anemia, obstructive sleep apnea, and temporal arteritis.  He underwent remote CABG and is followed by cardiologist Dr. Blas Mcgowan in the [facility].  The  patient has had paroxysmal atrial fibrillation and flutter, perhaps or because of a remote stroke in 2014.  He developed persistent atrial flutter in 2022 and was treated with amiodarone therapy in addition to Eliquis anticoagulation.  He was cardioverted at [facility] on 11/4/2022 with success, but was still in and out of atrial flutter thereafter.  He was [place] [facility] [place] and was cardioverted by me on 2/13/2023, and continued on amiodarone and Eliquis.  He is known to have a prominent first-degree AV block but no high-grade AV block.  His history is also remarkable for mild cognitive dysfunction.    PAST SURGICAL HISTORY: Remarkable for appendectomy 1965, CABG in 1994 (details unknown), and temporary dialysis catheter placement on 10/13/2023.    MEDICATIONS:  Current Outpatient Medications on File Prior to Encounter   Medication Sig    albuterol 90 mcg/actuation inhaler Inhale 2 puffs every 6 hours if needed for wheezing.    amiodarone (Pacerone) 200 mg tablet Take 1 tablet (200 mg) by mouth once daily.    apixaban (Eliquis) 2.5 mg tablet Take 1 tablet (2.5 mg) by mouth 2 times a day.    aspirin 81 mg EC tablet Take 1 tablet (81 mg) by mouth once daily.    b complex 0.4 mg tablet Take 1 tablet by mouth every other day.    CARBONYL IRON ORAL Take 1 tablet by mouth every other day.    cholecalciferol (Vitamin D3) 25 MCG (1000 UT) capsule Take 1 capsule (25 mcg) by mouth every other day.    docusate sodium (Colace) 100 mg capsule Take 1 capsule (100 mg) by mouth once daily.    dulaglutide (Trulicity) 3 mg/0.5 mL pen injector Inject 1 Dose under the skin every 7 days. On Sat    fluticasone (Flonase Allergy Relief) 50 mcg/actuation nasal spray Administer 1 spray into each nostril once daily. Shake gently. Before first use, prime pump. After use, clean tip and replace cap.    furosemide (Lasix) 40 mg tablet Take 1 tablet (40 mg) by mouth once daily as needed.    hydrALAZINE  "(Apresoline) 25 mg tablet Take 0.5 tablets (12.5 mg) by mouth 2 times a day.    insulin glargine (Lantus U-100 Insulin) 100 unit/mL injection Inject 12 Units under the skin once daily at bedtime. Take as directed per insulin instructions.    insulin lispro (HumaLOG) 100 unit/mL injection Inject under the skin 3 times a day with meals. 10 units with breakfast  10 units  with lunch  12 units with dinner    isosorbide mononitrate ER (Imdur) 30 mg 24 hr tablet Take 1 tablet (30 mg) by mouth once daily. Do not crush or chew.    omeprazole (PriLOSEC) 40 mg DR capsule Take 1 capsule (40 mg) by mouth once daily in the evening.  Take before meals. Do not crush or chew.    rosuvastatin (Crestor) 20 mg tablet Take 1 tablet (20 mg) by mouth once daily.    sertraline (Zoloft) 25 mg tablet Take 1 tablet (25 mg) by mouth once daily.     ALLERGIES AND DRUG INTOLERANCES: No Known Allergies    FAMILY HISTORY: Not contributory    SOCIAL HISTORY: The patient is  and lives at home with his wife.  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  He does not use tobacco or alcohol.  He has several children, 1 of whom (a son) is an emergency medicine physician, I believe.    COMPLETE REVIEW OF SYSTEMS:  Not easily obtainable from patient due to confusion and mumbling    PHYSICAL EXAM:  /64   Pulse 67   Temp 36.4 °C (97.5 °F)   Resp 16   Ht 1.676 m (5' 5.98\")   Wt 82 kg (180 lb 12.4 oz)   SpO2 95%   BMI 29.19 kg/m²   Gen: Pleasant elderly gentleman in no distress; alert, and able answer simple questions, though memory appears suspect  HEENT: Unremarkable  Neck: No jugular venous distention noted; new temporary dialysis catheter noted in right supraclavicular area  Cardiac: Regular rhythm with grade 1/6 systolic ejection murmur and positive S4   Resp: Coarse rales noted in left base; no wheezing noted  Abd: " Non-distended, with no tenderness, no masses, and no organomegaly noted  Ext: Peripheral pulses intact; no peripheral edema  Neuro: 3+/5 strength in right upper and right lower extremity, with some fasciculations; mildly confused  Skin: No lesions noted    EKG (10/9/2023): Sinus rhythm at 68 beats per minute with first-degree AV block (RI 0.30 seconds), possible old inferior myocardial infarction, and arm lead reversal    ECHOCARDIOGRAM (2/2023): Left ventricular hypertrophy with normal left ventricular systolic function and moderate left atrial enlargement (4.9 cm diameter).    LABS:  Lab Results   Component Value Date    GLUCOSE 140 (H) 10/15/2023    CALCIUM 8.3 (L) 10/15/2023     (L) 10/15/2023    K 3.7 10/15/2023    CO2 27 10/15/2023    CL 98 10/15/2023    BUN 41 (H) 10/15/2023    CREATININE 3.83 (H) 10/15/2023      Lab Results   Component Value Date    WBC 12.3 (H) 10/15/2023    HGB 9.3 (L) 10/15/2023    HCT 28.7 (L) 10/15/2023    MCV 91 10/15/2023     10/15/2023

## 2023-10-15 NOTE — PROGRESS NOTES
Internal Medicine Progress Note    Patient Name: Zack Figueroa          MRN: [id number]  Today's Date: October 15, 2023          Attending: George Lenz MD    Subjective:  Patient was seen and examined at bedside.    Review Of Systems:  GENERAL: Generalized weakness  CARDIOVASCULAR: Negative for chest pain, leg swelling  RESPIRATORY: Negative for shortness of breath, cough, wheezing  GI: No nausea, vomiting, or diarrhea    Objective:  Vitals:    10/15/23 0000 10/15/23 0400 10/15/23 0600 10/15/23 0700   BP: 156/76 155/76  145/64   BP Location: Left arm      Patient Position: Lying Lying     Pulse: 75 71  67   Resp: 18 18  16   Temp: 37 °C (98.6 °F) 37 °C (98.6 °F)  36.4 °C (97.5 °F)   TempSrc: Temporal Temporal     SpO2: 97% 97%  95%   Weight:   82 kg (180 lb 12.4 oz)    Height:           Physical Exam:   General appearance: Frail appearing, in no distress  Lungs: Clear to auscultation  Heart: No murmur, gallop, or rubs.  No ectopy  Abdomen: Soft, non-tender. Bowel sounds normal.  Neuro: Awake, slurred speech    Labs:  Results for orders placed or performed during the hospital encounter of 10/09/23 (from the past 24 hour(s))   POCT GLUCOSE   Result Value Ref Range    POCT Glucose 157 (H) 74 - 99 mg/dL   Blood Culture    Specimen: Peripheral Venipuncture; Blood culture   Result Value Ref Range    Blood Culture Loaded on Instrument - Culture in progress    Blood Culture    Specimen: Peripheral Venipuncture; Blood culture   Result Value Ref Range    Blood Culture Loaded on Instrument - Culture in progress    POCT GLUCOSE   Result Value Ref Range    POCT Glucose 225 (H) 74 - 99 mg/dL   POCT GLUCOSE   Result Value Ref Range    POCT Glucose 188 (H) 74 - 99 mg/dL   Urinalysis with Reflex Microscopic and Culture   Result Value Ref Range    Color, Urine [place] (N) Straw, Yellow    Appearance, Urine Hazy (N) Clear    Specific Gravity, Urine 1.020 1.005 - 1.035    pH, Urine 5.0 5.0, 5.5, 6.0, 6.5, 7.0, 7.5, 8.0     Protein, Urine >=500 (3+) (N) NEGATIVE mg/dL    Glucose, Urine NEGATIVE NEGATIVE mg/dL    Blood, Urine LARGE (3+) (A) NEGATIVE    Ketones, Urine NEGATIVE NEGATIVE mg/dL    Bilirubin, Urine NEGATIVE NEGATIVE    Urobilinogen, Urine <2.0 <2.0 mg/dL    Nitrite, Urine NEGATIVE NEGATIVE    Leukocyte Esterase, Urine LARGE (3+) (A) NEGATIVE   Urinalysis Microscopic Only   Result Value Ref Range    WBC, Urine >50 (A) 1-5, NONE /HPF    RBC, Urine >20 (A) NONE, 1-2, 3-5 /HPF    Bacteria, Urine 1+ (A) NONE SEEN /HPF    Budding Yeast, Urine PRESENT (A) NONE /HPF   Urine Gray Tube   Result Value Ref Range    Extra Tube Hold for add-ons.    CBC   Result Value Ref Range    WBC 12.3 (H) 4.4 - 11.3 x10*3/uL    nRBC 0.0 0.0 - 0.0 /100 WBCs    RBC 3.15 (L) 4.50 - 5.90 x10*6/uL    Hemoglobin 9.3 (L) 13.5 - 17.5 g/dL    Hematocrit 28.7 (L) 41.0 - 52.0 %    MCV 91 80 - 100 fL    MCH 29.5 26.0 - 34.0 pg    MCHC 32.4 32.0 - 36.0 g/dL    RDW 15.4 (H) 11.5 - 14.5 %    Platelets 165 150 - 450 x10*3/uL    MPV 10.3 7.5 - 11.5 fL   POCT GLUCOSE   Result Value Ref Range    POCT Glucose 135 (H) 74 - 99 mg/dL       Medications:  Scheduled medications  amiodarone, 200 mg, oral, Daily  B complex-vitamin C, 1 tablet, oral, Every other day  cefTRIAXone, 1 g, intravenous, q24h  cholecalciferol, 25 mcg, oral, Every other day  docusate sodium, 100 mg, oral, BID  esomeprazole, 40 mg, nasoduodenal tube, Daily before breakfast   Or  pantoprazole, 40 mg, oral, Daily before breakfast   Or  pantoprazole, 40 mg, intravenous, Daily before breakfast  heparin, 2,000 Units, intra-catheter, After Dialysis  heparin, 2,000 Units, intra-catheter, After Dialysis  hydrALAZINE, 25 mg, oral, BID  [Held by provider] insulin glargine, 12 Units, subcutaneous, Nightly  insulin lispro, 0-10 Units, subcutaneous, Before meals & nightly  isosorbide mononitrate ER, 30 mg, oral, Daily  rosuvastatin, 20 mg, oral, Daily  sertraline, 25 mg, oral, Daily  sodium chloride, 200 mL,  "intravenous, After Dialysis      Continuous medications       PRN medications  PRN medications: dextrose 10 % in water (D10W), dextrose, glucagon      Assessment/Plan:  Medical Problems       Problem List       * (Principal) Intraparenchymal hemorrhage of brain (CMS/HCC)    Overview Addendum 10/15/2023  8:44 AM by George Lenz MD     Keep holding anticoagulation antiplatelets  Controlled blood pressure  Neurology is following             Hypertension    Overview Addendum 10/13/2023  9:03 PM by George Lenz MD     Continue hydralazine and isosorbide mononitrate.           Hyperglycemia    Overview Signed 10/13/2023  9:03 PM by George Lenz MD     Continue current medications  Continue monitoring blood sugar           Transaminitis    Overview Addendum 10/13/2023  9:02 PM by George Lenz MD     We will monitor transaminase levels.           Acute kidney injury superimposed on chronic kidney disease (CMS/HCC)    Overview Addendum 10/15/2023  8:43 AM by George Lenz MD     Patient had dialysis session yesterday  Nephrology is managing  Continue monitoring            Urinary tract infection associated with indwelling urethral catheter (CMS/[facility])  Leukocytosis    Overview Signed 10/15/2023  8:43 AM by George Lenz MD     UA showed signs of UTI  Start patient on Rocephin  Waiting for urine culture                     Discussed with patient, RN    George Lenz MD   Date: 10/15/23  Time: 8:41 AM    This note was partially created using voice recognition software and is inherently subject to errors including those of syntax and \"sound-alike\" substitutions which may escape proofreading. In such instances, original meaning may be extrapolated by contextual derivation  "

## 2023-10-15 NOTE — NURSING NOTE
EOS: Pt had restful night. No acute changes in neuro status this shift. No c/o pain. Pt was receiving dialysis upon start of shift, 0.5L removed during dialysis. Pt tolerated well.

## 2023-10-15 NOTE — PROGRESS NOTES
Occupational Therapy    OT Treatment    Patient Name: Zack Figueroa  MRN: [id number]  Today's Date: 10/15/2023  Time Calculation  Start Time: 0950  Stop Time: 1020  Time Calculation (min): 30 min         Assessment:  End of Session Communication: Bedside nurse  End of Session Patient Position: Up in chair, Alarm on (breakfast tray s/u)       Plan:  OT Frequency: 5 times per week  OT Discharge Recommendations: High intensity level of continued care     Subjective     Current Problem:  1. Intraparenchymal hemorrhage of brain (CMS/HCC)  Critical Care    Critical Care      2. Oral phase dysphagia [R13.11]        3. 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 A-fib (CMS/[facility])        5. Anticoagulant long-term use            General:  OT Received On: 10/15/23  Family/Caregiver Present: Yes  Prior to Session Communication: Bedside nurse  Patient Position Received: Bed, 3 rail up  General Comment: Pt pleasant and cooperative, noted blood on pad upon standing, RN present and aware.      Pain:  Pain Assessment  Pain Assessment: 0-10  Pain Score: 0 - No pain  Objective      Activities of Daily Living:                   Toileting  Toileting Level of Assistance: Dependent  Where Assessed: Other (Comment)  Toileting Comments: Roopa care performed upon standing, Dep A      Bed Mobility/Transfers: Bed Mobility  Bed Mobility: Yes  Bed Mobility 1  Bed Mobility 1: Supine to sitting  Level of Assistance 1: Moderate assistance (x2)  Bed Mobility Comments 1:  (cues to scoot to EOB so feet on are on floor to promote safety and balance.)  Transfers  Transfer: Yes  Transfer 1  Transfer From 1: Sit to  Transfer to 1: Stand  Transfer Device 1: Walker, Gait belt  Transfer Level of Assistance 1: Moderate assistance(x2)  Transfer From 2: Bed to  Transfer to 2: Chair with arms  Transfer Device 2: Walker  Transfer Level of Assistance 2: Moderate assistance (x2)  Trials/Comments 2: Pt took few  steps to chair, unsteady, cues for LE sequencing, leaning to R side                  Outcome Measures:[facility] Daily Activity  Putting on and taking off regular lower body clothing: A lot  Bathing (including washing, rinsing, drying): A lot  Putting on and taking off regular upper body clothing: A lot  Toileting, which includes using toilet, bedpan or urinal: Total  Taking care of personal grooming such as brushing teeth: A lot  Eating Meals: A little  Daily Activity - Total Score: 12  Education Documentation  ADL Training, taught by BELEN Cannon at 10/15/2023 12:01 PM.  Learner: Patient  Readiness: Acceptance  Method: Demonstration, Explanation  Response: Needs Reinforcement    Education Comments  No comments found.        EDUCATION:  Education  Individual(s) Educated: Patient, Spouse  Education Provided: Ergonomics and postural realignment, Fall precautons, POC discussed and agreed upon  Education Comment: recpetive to recommendations, will require continued education    Goals:  Encounter Problems       Encounter Problems (Active)       OT Goals       Patient will complete functional transfers at CGA level with LRD to facilitate increased independence and safety with home going  (Progressing)       Start:  10/10/23    Expected End:  10/21/23            Patient will complete functional mobility for household distances at CGA level with LRD to facilitate increased independence and safety with home going  (Progressing)       Start:  10/10/23    Expected End:  10/21/23            Patient will complete LB dressing at CGA level to facilitate safety and independence for home going   (Progressing)       Start:  10/10/23    Expected End:  10/21/23            Patient will complete toileting at CGA level to facilitate safety and independence for home going   (Progressing)       Start:  10/10/23    Expected End:  10/21/23            Patient will complete UB dressing at supervision level to facilitate safety and  independence for home going   (Progressing)       Start:  10/10/23    Expected End:  10/21/23               Safety       LTG - Patient will adhere to hip precautions during ADL's and transfers       Start:  10/10/23    Expected End:  10/21/23            LTG - Patient will demonstrate safety requirements appropriate to situation/environment       Start:  10/10/23    Expected End:  10/21/23            LTG - Patient will utilize safety techniques       Start:  10/10/23    Expected End:  10/21/23            STG - Patient locks brakes on wheelchair       Start:  10/10/23    Expected End:  10/21/23            STG - Patient uses call light consistently to request assistance with transfers       Start:  10/10/23    Expected End:  10/21/23            STG - Patient uses gait belt during all transfers       Start:  10/10/23    Expected End:  10/21/23            Goal 1       Start:  10/10/23    Expected End:  10/21/23            Goal 2       Start:  10/10/23    Expected End:  10/21/23            Goal 3       Start:  10/10/23    Expected End:  10/21/23

## 2023-10-15 NOTE — PROGRESS NOTES
"Physical Therapy    Physical Therapy Treatment    Patient Name: Zack Figueroa  MRN: [id number]  Today's Date: 10/15/2023  Time Calculation  Start Time: 1531  Stop Time: 1554  Time Calculation (min): 23 min       Assessment/Plan   PT Assessment  PT Assessment Results: Decreased strength, Decreased endurance, Impaired balance, Decreased mobility  Rehab Prognosis: Good  Evaluation/Treatment Tolerance: Patient tolerated treatment well  Medical Staff Made Aware: Yes  End of Session Communication: Bedside nurse (location of patient .)  Assessment Comment: improved overall static and dynamic sitting balance.  improved coordination though still with significant R lean, more so with fatigue  End of Session Patient Position: Alarm on, Up in chair  PT Plan  Inpatient/Swing Bed or Outpatient: Inpatient  PT Plan  Treatment/Interventions: Bed mobility, Therapeutic exercise, Transfer training  PT Plan: Skilled PT  PT Frequency: Daily  PT Discharge Recommendations: High intensity level of continued care  PT Recommended Transfer Status: Assist x2, Assistive device      General Visit Information:   PT  Visit  Response to Previous Treatment: Patient with no complaints from previous session.  General  Reason for Referral: Intraparenchymal hemorrhage of brain  ( CMS/HCC)  Family/Caregiver Present: Yes (Family at start of tretament . Son had questions in regards to therapy recommendation at discharge .Wife stateds, \" [facility] .\" may not be appropriate for him ; treatment wise. Son mentioned palitative care .)  Prior to Session Communication: Bedside nurse (Appropriate for therapy , and Nurse stated , \" He is a heavy 2 .\" for bd to chair tranfers .)  Patient Position Received: Alarm on, Up in chair  General Comment:  (Patient agreeable to treatment session.)    Subjective   Precautions:  Precautions  Hearing/Visual Limitations: .  Medical Precautions: Fall precautions  Precautions Comment:  (fall precautions)  Vital Signs:   " "    Objective   Pain:  Pain Assessment  Pain Assessment: 0-10  Pain Score: 0 - No pain  Cognition:  Cognition  Overall Cognitive Status: Within Functional Limits  Orientation Level:  (Oriented x 2 person , place)  Postural Control:     Extremity/Trunk Assessments:                    Activity Tolerance:  Activity Tolerance  Endurance:  (Patient stated , \" I feel tired .\" throughout treatment .)  Treatments:  Therapeutic Exercise  Therapeutic Exercise Performed: Yes  Therapeutic Exercise Activity 1: R/L B strengthening :AAROM  knee flexion , hip ab/add, AROM LAQs , and marches 2 x10                                                           Outcome Measures:             [facility] Basic Mobility  Turning from your back to your side while in a flat bed without using bedrails: A lot  Moving from lying on your back to sitting on the side of a flat bed without using bedrails: A lot  Moving to and from bed to chair (including a wheelchair): A lot  Standing up from a chair using your arms (e.g. wheelchair or bedside chair): A lot  To walk in hospital room: Total  Climbing 3-5 steps with railing: Total  Basic Mobility - Total Score: 10                                                                   Education Documentation  No documentation found.  Education Comments  No comments found.        OP EDUCATION:  Education  Individual(s) Educated: Patient  Education Provided:  (Patient educated on the benefit of therapy to increase B LE strength for ease with tranfers .)  Patient/Caregiver Demonstrated Understanding: yes  Plan of Care Discussed and Agreed Upon: yes  Patient Response to Education: Patient/Caregiver Verbalized Understanding of Information  Education Comment: .    Encounter Problems       Encounter Problems (Active)       PT Problem       Bed mobility (Progressing)       Start:  10/10/23    Expected End:  10/21/23       Pt will be able to complete bed mobility mod I with use of bed HR.            Transfers (Progressing)  "      Start:  10/10/23    Expected End:  10/21/23       Pt will be able to complete all transfers with FWW CGA demonstrating good safety awareness and proper body mechanics.           Gait (Progressing)       Start:  10/10/23    Expected End:  10/21/23       Pt will be able to ambulate 100 ft with FWW CGA with good safety awareness and ability to maintain COG/GABRIEL in midline.           Strength (Progressing)       Start:  10/10/23    Expected End:  10/21/23       Pt will improve BLE strength with supine, seated and standing exercises to WFL.           Balance (Progressing)       Start:  10/10/23    Expected End:  10/21/23       Pt will demonstrate good static/dynamic standing balance without evidence of right lateral lean or retro LOB.              Pain - Adult          Safety       LTG - Patient will adhere to hip precautions during ADL's and transfers       Start:  10/10/23    Expected End:  10/21/23            LTG - Patient will demonstrate safety requirements appropriate to situation/environment       Start:  10/10/23    Expected End:  10/21/23            LTG - Patient will utilize safety techniques       Start:  10/10/23    Expected End:  10/21/23            STG - Patient locks brakes on wheelchair       Start:  10/10/23    Expected End:  10/21/23            STG - Patient uses call light consistently to request assistance with transfers       Start:  10/10/23    Expected End:  10/21/23            STG - Patient uses gait belt during all transfers       Start:  10/10/23    Expected End:  10/21/23            Goal 1       Start:  10/10/23    Expected End:  10/21/23            Goal 2       Start:  10/10/23    Expected End:  10/21/23            Goal 3       Start:  10/10/23    Expected End:  10/21/23

## 2023-10-15 NOTE — PROGRESS NOTES
INPATIENT NEPHROLOGY CONSULT PROGRESS NOTE      Patient Name: Zack Figueroa MRN: [id number]  DATE of SERVICE: October 15, 2023  TIME of SERVICE: 11:37 AM  CONSULTING SERVICE: Nephrology    REASON for CONSULT: DENNISE, CKD IV, dialysis dependent   Post intracranial hemorrhage.    INTERVAL HISTORY:  Family reconsidering goals of care.  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   Temporary hemodialysis catheter placed October 13, 2023    SUMMARY OF STAY:  Mr. Figueroa is a 88 y.o. male who presents [facility] October 10, 2023 for further evaluation of change in mental status associated with right arm and left leg weakness with concern for stroke.  Diagnosed with intraparenchymal hemorrhage.  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 Last hemoglobin A1c 7.6.  History of paroxysmal atrial fibrillation follows with EP physiologist Dr. Chávez and cardiologist Dr. Martinez,, chronic diastolic heart failure, coronary artery disease status post remote CABG, hypertension, hyperlipidemia, history of CVA.    ASSESSMENT:  Acute Kidney injury superimposed on chronic kidney disease stage IV:  -- Acute kidney injury in the setting of intracranial hemorrhage, hemodynamically mediated changes, with component of contrast-induced nephropathy  -- s/p IV nicardipine infusion.  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  -- Recent contrast exposure  -- Oliguria lack of improvement with  IV fluid and Lasix trial  -- ATN with high urinary sodium   -- Hemodialysis was started October 13, 2023.    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     Atrial fibrillation: Anticoagulation on hold  on amiodarone     Hypertension:   - Blood pressure on presentation 187/76 s/p nicardipine infusion  - Pressure readings over the past 12 hours reviewed blood pressure fluctuating between 118/50 to 130/57     Chronic kidney disease stage IV:  -- Longstanding history of diabetes mellitus complicated with triopathy  -- Diabetic nephropathy, hypertensive nephrosclerosis  -- Proteinuric with a protein to creatinine ratio of 2.6  mg/g 7 months ago  -- Baseline serum creatinine 3.3 mg/dL, EGFR 17 mL/min per 1.73 m²  -- serum creatinine 6 mg/dl  BUN of 58 EGFR of 12 mL/min per 1.73 m²     Hyponatremia sodium level 131:  Better with dialysis.    Secondary hyperparathyroidism: Last PTH was more than 600     Acute urinary retention:  Bladder scan 500 cc status post straight cath overnight  External schulte in place      Volume: hypervolemia      PLAN:  Electrolytes and volume status within range no urgent indication for RRT today.     Based on family decision, will evaluate and proceed with either HD or transition to comfort care by tomorrow.   second hemodialysis session 10/14/23.    Volume status better with fluid removal with  hemodialysis.  Blood pressure controlled with hydralazine 25 mg p.o. twice daily Imdur 30 mg p.o. daily.  Heart rate controlled with amiodarone  Will follow, thank you!    SUBJECTIVE:  Seen and evaluated at bedside, sitting up in the chair.  External Fields catheter in place with hematuria   UOP 50 ml/24 hrs    Medications:    Current Facility-Administered Medications:     amiodarone (Pacerone) tablet 200 mg, 200 mg, oral, Daily, Jenny Fung PA-C, 200 mg at 10/15/23 0948    [START ON 10/16/2023] aspirin chewable tablet 81 mg, 81 mg, oral, Daily, MD JOSEPHINE Farooq complex-vitamin C tablet 1 tablet, 1 tablet, oral, Every other day, Jenny Fung PA-C, 1 tablet at 10/15/23 0948    cholecalciferol (Vitamin D-3) capsule 25 mcg, 25 mcg, oral, Every other day, Jenny Fung PA-C, 25 mcg at 10/15/23 0900    dextrose 10 % in water (D10W) infusion, 50 mL/hr, intravenous, Once PRN, Jenny Fung PA-C    dextrose 50 % injection 25 g, 25 g, intravenous, q15 min PRN, Jenny Fung PA-C    docusate sodium (Colace) capsule 100 mg, 100 mg, oral, BID, Jenny Fung PA-C, 100 mg at 10/14/23 0935    esomeprazole (NexIUM) suspension 40 mg, 40 mg, nasoduodenal tube, Daily before breakfast **OR** pantoprazole (ProtoNix) EC tablet 40 mg, 40 mg, oral, Daily before breakfast, 40 mg at 10/15/23 0621 **OR** pantoprazole (ProtoNix) injection 40 mg, 40 mg, intravenous, Daily before breakfast, Jenny Fung PA-C    glucagon (Glucagen) injection 1 mg, 1 mg, intramuscular, q15 min PRN, Jenny Fung PA-C    heparin 1,000 unit/mL injection 2,000 Units, 2,000 Units, intra-catheter, After Dialysis, Jenny Fung PA-C    heparin 1,000 unit/mL injection 2,000 Units, 2,000 Units, intra-catheter, After Dialysis, Jenny Fung PA-C    hydrALAZINE (Apresoline) tablet 25 mg, 25 mg, oral, BID, Jenny Fung PA-C, 25 mg at 10/15/23 0949    insulin glargine (Lantus)  injection 12 Units, 12 Units, subcutaneous, Nightly, Jenny TRISTAN KNEN Fung, 12 Units at 10/11/23 2126    insulin lispro (HumaLOG) injection 0-10 Units, 0-10 Units, subcutaneous, Before meals & nightly, Jenny KUN KENN Fung, 4 Units at 10/15/23 1658    isosorbide mononitrate ER (Imdur) 24 hr tablet 30 mg, 30 mg, oral, Daily, Jenny Fung PA-C, 30 mg at 10/15/23 0948    piperacillin-tazobactam-dextrose (Zosyn) IV 3.375 g, 3.375 g, intravenous, q12h, Sofya Reyes APRN-CNP, Stopped at 10/15/23 1537    rosuvastatin (Crestor) tablet 20 mg, 20 mg, oral, Daily, Jenny Fung PA-C, 20 mg at 10/15/23 0949    sertraline (Zoloft) tablet 25 mg, 25 mg, oral, Daily, Jenny Fung PA-C, 25 mg at 10/15/23 0949    sodium chloride 0.9 % bolus 200 mL, 200 mL, intravenous, After Dialysis, Jenny Fung PA-C    PERTINENT ROS:  GENERAL:  positive for fatigue, poor appetite.  No fever/chills  RESPIRATORY:  positive for shortness of breath.  Negative for cough, wheezing.  CARDIOVASCULAR:   Negative for chest pain or palpitation.  GI:  Negative for abdominal pain, diarrhea, heartburn, nausea, vomiting  : negative for dysuria, hematuria    Physical Exam:  Vital signs in last 24 hours:  Temp:  [36.1 °C (97 °F)-37 °C (98.6 °F)] 36.1 °C (97 °F)  Heart Rate:  [67-75] 71  Resp:  [16-18] 18  BP: (129-156)/(61-76) 129/63    General: Awake, cooperative, not in acute distress  HEENT:  NCAT,  mucous membranes moist and pink  NECK:  Elevated JVD, no carotid bruit, supple, no cervical mass or thyromegaly  LUNGS;  Diminished breath sounds, fine Rales  CV:  Distant, regular rate and rhythm, no murmurs  ABDOMEN:  abdomen soft, nontender, BS normal, no masses or organomegaly  EDEMA:  +2 lower extremity edema/dependent edema  SKIN:  dry and normal turgor, no clubbing, cyanosis or petechia.  No rashes noted      Intake/Output last 3 shifts:  I/O last 3 completed shifts:  In: 1100 (13.4 mL/kg) [P.O.:400;  I.V.:600 (7.3 mL/kg); IV Piggyback:100]  Out: 550 (6.7 mL/kg) [Urine:550 (0.2 mL/kg/hr)]  Weight: 82 kg     DATA:  Diagnotic tests reviewed for Todays visit:  Results from last 7 days   Lab Units 10/15/23  0720   WBC AUTO x10*3/uL 12.3*   RBC AUTO x10*6/uL 3.15*   HEMOGLOBIN g/dL 9.3*   HEMATOCRIT % 28.7*     Results from last 7 days   Lab Units 10/15/23  0720 10/12/23  1601 10/12/23  0506   SODIUM mmol/L 134*   < > 130*   POTASSIUM mmol/L 3.7   < > 4.3   CHLORIDE mmol/L 98   < > 98   CO2 mmol/L 27   < > 22   BUN mg/dL 41*   < > 64*   CREATININE mg/dL 3.83*   < > 5.33*   CALCIUM mg/dL 8.3*   < > 8.3*   PHOSPHORUS mg/dL 3.4   < >  --    MAGNESIUM mg/dL 2.04   < > 1.97   BILIRUBIN TOTAL mg/dL  --   --  0.6   ALT U/L  --   --  52   AST U/L  --   --  59*    < > = values in this interval not displayed.     Results from last 7 days   Lab Units 10/15/23  0544   COLOR U  Lexus*   APPEARANCE U  Hazy*   PH U  5.0   SPEC GRAV UR  1.020   PROTEIN U mg/dL >=500 (3+)*   BLOOD UR  LARGE (3+)*   NITRITE U  NEGATIVE   WBC UR /HPF >50*   BACTERIA UR /HPF 1+*     IMAGING: CXR reviewed in  images      SIGNATURE: Nereida Navarro MD  Nephrology and Hypertension  [address]  Office phone: [phone]  FAX: [phone]    This note was partially generated using the Dragon voice recognition system, and there may be some incorrect words, spelling's and punctuation that were not noted in checking the note before saving.

## 2023-10-15 NOTE — PROGRESS NOTES
"Zack Figueroa is a 88 y.o. male on day 5 of admission presenting with Intraparenchymal hemorrhage of brain (CMS/HCC).      Subjective   No new symptoms       Objective     Last Recorded Vitals  Blood pressure 129/63, pulse 71, temperature 36.1 °C (97 °F), temperature source Temporal, resp. rate 18, height 1.676 m (5' 5.98\"), weight 82 kg (180 lb 12.4 oz), SpO2 99 %.    Neurological Exam  Last Recorded Vitals  Blood pressure 129/63, pulse 71, temperature 36.1 °C (97 °F), temperature source Temporal, resp. rate 18, height 1.676 m (5' 5.98\"), weight 82 kg (180 lb 12.4 oz), SpO2 99 %.    Relevant Results  NIH Stroke Scale  1A. Level of Consciousness: Alert, Keenly Responsive  1B. Ask Month and Age: 1 Question Right  1C. Blink Eyes & Squeeze Hands: Performs Both Tasks  2. Best Gaze: Normal  3. Visual: No Visual Loss  4. Facial Palsy: Minor Paralysis  5A. Motor - Left Arm: No Drift  5B. Motor - Right Arm: No Drift  6A. Motor - Left Leg: No Drift  6B. Motor - Right Leg: No Drift  7. Limb Ataxia: Absent  8. Sensory Loss: Normal  9. Best Language: No Aphasia  10. Dysarthria: Normal  11. Extinction and Inattention: No Abnormality  NIH Stroke Scale: 2    Results for orders placed or performed during the hospital encounter of 10/09/23 (from the past 24 hour(s))   Blood Culture    Specimen: Peripheral Venipuncture; Blood culture   Result Value Ref Range    Blood Culture Loaded on Instrument - Culture in progress    Blood Culture    Specimen: Peripheral Venipuncture; Blood culture   Result Value Ref Range    Blood Culture Loaded on Instrument - Culture in progress    POCT GLUCOSE   Result Value Ref Range    POCT Glucose 225 (H) 74 - 99 mg/dL   POCT GLUCOSE   Result Value Ref Range    POCT Glucose 188 (H) 74 - 99 mg/dL   Urinalysis with Reflex Microscopic and Culture   Result Value Ref Range    Color, Urine Lexus (N) Straw, Yellow    Appearance, Urine Hazy (N) Clear    Specific Gravity, Urine 1.020 1.005 - 1.035    pH, " Urine 5.0 5.0, 5.5, 6.0, 6.5, 7.0, 7.5, 8.0    Protein, Urine >=500 (3+) (N) NEGATIVE mg/dL    Glucose, Urine NEGATIVE NEGATIVE mg/dL    Blood, Urine LARGE (3+) (A) NEGATIVE    Ketones, Urine NEGATIVE NEGATIVE mg/dL    Bilirubin, Urine NEGATIVE NEGATIVE    Urobilinogen, Urine <2.0 <2.0 mg/dL    Nitrite, Urine NEGATIVE NEGATIVE    Leukocyte Esterase, Urine LARGE (3+) (A) NEGATIVE   Urinalysis Microscopic Only   Result Value Ref Range    WBC, Urine >50 (A) 1-5, NONE /HPF    RBC, Urine >20 (A) NONE, 1-2, 3-5 /HPF    Bacteria, Urine 1+ (A) NONE SEEN /HPF    Budding Yeast, Urine PRESENT (A) NONE /HPF   Urine Gray Tube   Result Value Ref Range    Extra Tube Hold for add-ons.    CBC   Result Value Ref Range    WBC 12.3 (H) 4.4 - 11.3 x10*3/uL    nRBC 0.0 0.0 - 0.0 /100 WBCs    RBC 3.15 (L) 4.50 - 5.90 x10*6/uL    Hemoglobin 9.3 (L) 13.5 - 17.5 g/dL    Hematocrit 28.7 (L) 41.0 - 52.0 %    MCV 91 80 - 100 fL    MCH 29.5 26.0 - 34.0 pg    MCHC 32.4 32.0 - 36.0 g/dL    RDW 15.4 (H) 11.5 - 14.5 %    Platelets 165 150 - 450 x10*3/uL    MPV 10.3 7.5 - 11.5 fL   Renal function panel   Result Value Ref Range    Glucose 140 (H) 74 - 99 mg/dL    Sodium 134 (L) 136 - 145 mmol/L    Potassium 3.7 3.5 - 5.3 mmol/L    Chloride 98 98 - 107 mmol/L    Bicarbonate 27 21 - 32 mmol/L    Anion Gap 13 10 - 20 mmol/L    Urea Nitrogen 41 (H) 6 - 23 mg/dL    Creatinine 3.83 (H) 0.50 - 1.30 mg/dL    eGFR 15 (L) >60 mL/min/1.73m*2    Calcium 8.3 (L) 8.6 - 10.3 mg/dL    Phosphorus 3.4 2.5 - 4.9 mg/dL    Albumin 2.9 (L) 3.4 - 5.0 g/dL   Magnesium   Result Value Ref Range    Magnesium 2.04 1.60 - 2.40 mg/dL   POCT GLUCOSE   Result Value Ref Range    POCT Glucose 135 (H) 74 - 99 mg/dL   POCT GLUCOSE   Result Value Ref Range    POCT Glucose 242 (H) 74 - 99 mg/dL   POCT GLUCOSE   Result Value Ref Range    POCT Glucose 223 (H) 74 - 99 mg/dL            Assessment/Plan   Principal Problem:    Intraparenchymal hemorrhage of brain (CMS/HCC)  Active Problems:     Hypertension    Hyperglycemia    Transaminitis    Acute kidney injury superimposed on chronic kidney disease (CMS/HCC)    Urinary tract infection associated with indwelling urethral catheter (CMS/HCC)           Impression:  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 If he has ongoing paroxysmal AF then he would at be at high risk of recurrent ischemic stroke if kept off anticoagulation. It would be reasonable to continue aspirin monotherapy with plan to pursue atrial appendage device if he qualifies. Otherwise if anticoagulation is desired, then would discontinue aspirin.      I spent 25 minutes in the professional and overall care of this patient.      Kris Dickey MD

## 2023-10-15 NOTE — DISCHARGE INSTRUCTIONS
Immediately upon discharge, please call [phone] to schedule an appointment in around 1-2 months with Dr. Kris Dickey, Neurology. You may ask to schedule at either of his office locations:    1) [facility]: [address], [place] [address]   -> starting 11/1/2023  2) [facility] (main campus): [address] Neurology, [place] [address]

## 2023-10-16 ENCOUNTER — APPOINTMENT (OUTPATIENT)
Dept: DIALYSIS | Facility: HOSPITAL | Age: 88
End: 2023-10-16
Payer: MEDICARE

## 2023-10-16 LAB
ALBUMIN SERPL BCP-MCNC: 2.9 G/DL (ref 3.4–5)
ANION GAP SERPL CALC-SCNC: 14 MMOL/L (ref 10–20)
BUN SERPL-MCNC: 51 MG/DL (ref 6–23)
CALCIUM SERPL-MCNC: 8.3 MG/DL (ref 8.6–10.3)
CHLORIDE SERPL-SCNC: 96 MMOL/L (ref 98–107)
CO2 SERPL-SCNC: 25 MMOL/L (ref 21–32)
CREAT SERPL-MCNC: 4.24 MG/DL (ref 0.5–1.3)
ERYTHROCYTE [DISTWIDTH] IN BLOOD BY AUTOMATED COUNT: 14.9 % (ref 11.5–14.5)
GFR SERPL CREATININE-BSD FRML MDRD: 13 ML/MIN/1.73M*2
GLUCOSE BLD MANUAL STRIP-MCNC: 130 MG/DL (ref 74–99)
GLUCOSE BLD MANUAL STRIP-MCNC: 202 MG/DL (ref 74–99)
GLUCOSE BLD MANUAL STRIP-MCNC: 246 MG/DL (ref 74–99)
GLUCOSE SERPL-MCNC: 119 MG/DL (ref 74–99)
HCT VFR BLD AUTO: 29.8 % (ref 41–52)
HGB BLD-MCNC: 9.4 G/DL (ref 13.5–17.5)
MAGNESIUM SERPL-MCNC: 1.92 MG/DL (ref 1.6–2.4)
MCH RBC QN AUTO: 29.4 PG (ref 26–34)
MCHC RBC AUTO-ENTMCNC: 31.5 G/DL (ref 32–36)
MCV RBC AUTO: 93 FL (ref 80–100)
NRBC BLD-RTO: 0 /100 WBCS (ref 0–0)
PHOSPHATE SERPL-MCNC: 3.9 MG/DL (ref 2.5–4.9)
PLATELET # BLD AUTO: 175 X10*3/UL (ref 150–450)
PMV BLD AUTO: 10.6 FL (ref 7.5–11.5)
POTASSIUM SERPL-SCNC: 3.8 MMOL/L (ref 3.5–5.3)
RBC # BLD AUTO: 3.2 X10*6/UL (ref 4.5–5.9)
SODIUM SERPL-SCNC: 131 MMOL/L (ref 136–145)
WBC # BLD AUTO: 11.1 X10*3/UL (ref 4.4–11.3)

## 2023-10-16 PROCEDURE — 2500000004 HC RX 250 GENERAL PHARMACY W/ HCPCS (ALT 636 FOR OP/ED): Performed by: NURSE PRACTITIONER

## 2023-10-16 PROCEDURE — 97110 THERAPEUTIC EXERCISES: CPT | Mod: GO

## 2023-10-16 PROCEDURE — 85027 COMPLETE CBC AUTOMATED: CPT | Performed by: NURSE PRACTITIONER

## 2023-10-16 PROCEDURE — 2500000001 HC RX 250 WO HCPCS SELF ADMINISTERED DRUGS (ALT 637 FOR MEDICARE OP): Performed by: PHYSICIAN ASSISTANT

## 2023-10-16 PROCEDURE — 82947 ASSAY GLUCOSE BLOOD QUANT: CPT

## 2023-10-16 PROCEDURE — 97535 SELF CARE MNGMENT TRAINING: CPT | Mod: GO

## 2023-10-16 PROCEDURE — 80069 RENAL FUNCTION PANEL: CPT | Performed by: NURSE PRACTITIONER

## 2023-10-16 PROCEDURE — 36415 COLL VENOUS BLD VENIPUNCTURE: CPT | Performed by: NURSE PRACTITIONER

## 2023-10-16 PROCEDURE — 97112 NEUROMUSCULAR REEDUCATION: CPT | Mod: GP,CQ

## 2023-10-16 PROCEDURE — 96372 THER/PROPH/DIAG INJ SC/IM: CPT | Performed by: PHYSICIAN ASSISTANT

## 2023-10-16 PROCEDURE — 8010000001 HC DIALYSIS - HEMODIALYSIS PER DAY

## 2023-10-16 PROCEDURE — 83735 ASSAY OF MAGNESIUM: CPT | Performed by: NURSE PRACTITIONER

## 2023-10-16 PROCEDURE — 1200000002 HC GENERAL ROOM WITH TELEMETRY DAILY

## 2023-10-16 PROCEDURE — 2500000002 HC RX 250 W HCPCS SELF ADMINISTERED DRUGS (ALT 637 FOR MEDICARE OP, ALT 636 FOR OP/ED): Performed by: PHYSICIAN ASSISTANT

## 2023-10-16 PROCEDURE — 2500000004 HC RX 250 GENERAL PHARMACY W/ HCPCS (ALT 636 FOR OP/ED): Performed by: PHYSICIAN ASSISTANT

## 2023-10-16 PROCEDURE — 97116 GAIT TRAINING THERAPY: CPT | Mod: GP,CQ

## 2023-10-16 PROCEDURE — 2500000001 HC RX 250 WO HCPCS SELF ADMINISTERED DRUGS (ALT 637 FOR MEDICARE OP): Performed by: STUDENT IN AN ORGANIZED HEALTH CARE EDUCATION/TRAINING PROGRAM

## 2023-10-16 RX ADMIN — SERTRALINE HYDROCHLORIDE 25 MG: 25 TABLET ORAL at 08:55

## 2023-10-16 RX ADMIN — HYDRALAZINE HYDROCHLORIDE 25 MG: 25 TABLET ORAL at 08:55

## 2023-10-16 RX ADMIN — INSULIN LISPRO 4 UNITS: 100 INJECTION, SOLUTION INTRAVENOUS; SUBCUTANEOUS at 15:04

## 2023-10-16 RX ADMIN — INSULIN GLARGINE 12 UNITS: 100 INJECTION, SOLUTION SUBCUTANEOUS at 22:14

## 2023-10-16 RX ADMIN — HEPARIN SODIUM 2000 UNITS: 1000 INJECTION, SOLUTION INTRAVENOUS; SUBCUTANEOUS at 12:00

## 2023-10-16 RX ADMIN — ISOSORBIDE MONONITRATE 30 MG: 30 TABLET, EXTENDED RELEASE ORAL at 08:55

## 2023-10-16 RX ADMIN — DOCUSATE SODIUM 100 MG: 100 CAPSULE, LIQUID FILLED ORAL at 22:16

## 2023-10-16 RX ADMIN — PANTOPRAZOLE SODIUM 40 MG: 40 TABLET, DELAYED RELEASE ORAL at 06:13

## 2023-10-16 RX ADMIN — PIPERACILLIN SODIUM AND TAZOBACTAM SODIUM 3.38 G: 3; .375 INJECTION, SOLUTION INTRAVENOUS at 10:38

## 2023-10-16 RX ADMIN — ASPIRIN 81 MG 81 MG: 81 TABLET ORAL at 08:56

## 2023-10-16 RX ADMIN — HEPARIN SODIUM 2000 UNITS: 1000 INJECTION, SOLUTION INTRAVENOUS; SUBCUTANEOUS at 18:19

## 2023-10-16 RX ADMIN — ROSUVASTATIN CALCIUM 20 MG: 20 TABLET, FILM COATED ORAL at 08:56

## 2023-10-16 RX ADMIN — SODIUM CHLORIDE 200 ML: 9 INJECTION, SOLUTION INTRAVENOUS at 12:00

## 2023-10-16 RX ADMIN — INSULIN LISPRO 4 UNITS: 100 INJECTION, SOLUTION INTRAVENOUS; SUBCUTANEOUS at 22:14

## 2023-10-16 RX ADMIN — HYDRALAZINE HYDROCHLORIDE 25 MG: 25 TABLET ORAL at 22:16

## 2023-10-16 RX ADMIN — AMIODARONE HYDROCHLORIDE 200 MG: 200 TABLET ORAL at 08:56

## 2023-10-16 RX ADMIN — PIPERACILLIN SODIUM AND TAZOBACTAM SODIUM 3.38 G: 3; .375 INJECTION, SOLUTION INTRAVENOUS at 22:15

## 2023-10-16 ASSESSMENT — PAIN SCALES - GENERAL
PAINLEVEL_OUTOF10: 0 - NO PAIN

## 2023-10-16 ASSESSMENT — COGNITIVE AND FUNCTIONAL STATUS - GENERAL
EATING MEALS: A LITTLE
DRESSING REGULAR UPPER BODY CLOTHING: A LOT
DRESSING REGULAR LOWER BODY CLOTHING: TOTAL
MOVING FROM LYING ON BACK TO SITTING ON SIDE OF FLAT BED WITH BEDRAILS: A LOT
MOVING TO AND FROM BED TO CHAIR: A LOT
DAILY ACTIVITIY SCORE: 12
TOILETING: TOTAL
WALKING IN HOSPITAL ROOM: A LOT
STANDING UP FROM CHAIR USING ARMS: A LOT
MOBILITY SCORE: 11
TURNING FROM BACK TO SIDE WHILE IN FLAT BAD: A LOT
CLIMB 3 TO 5 STEPS WITH RAILING: TOTAL
PERSONAL GROOMING: A LITTLE
HELP NEEDED FOR BATHING: A LOT

## 2023-10-16 ASSESSMENT — PAIN - FUNCTIONAL ASSESSMENT
PAIN_FUNCTIONAL_ASSESSMENT: 0-10
PAIN_FUNCTIONAL_ASSESSMENT: 0-10
PAIN_FUNCTIONAL_ASSESSMENT: NO/DENIES PAIN
PAIN_FUNCTIONAL_ASSESSMENT: 0-10
PAIN_FUNCTIONAL_ASSESSMENT: 0-10

## 2023-10-16 ASSESSMENT — ACTIVITIES OF DAILY LIVING (ADL): HOME_MANAGEMENT_TIME_ENTRY: 15

## 2023-10-16 NOTE — PROGRESS NOTES
INPATIENT NEPHROLOGY CONSULT PROGRESS NOTE      Patient Name: Zack Figueroa MRN: [id number]  DATE of SERVICE: October 16, 2023  TIME of SERVICE: 02:49 PM  CONSULTING SERVICE: Nephrology    REASON for CONSULT: DENNISE, CKD IV, dialysis dependent   Post intracranial hemorrhage.    Receiving hemodialysis today October 16, 2023    INTERVAL HISTORY:  Family reconsidering goals of care.  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   Temporary hemodialysis catheter placed October 13, 2023    SUMMARY OF STAY:  Mr. Figueroa is a 88 y.o. male who presents [facility] October 10, 2023 for further evaluation of change in mental status associated with right arm and left leg weakness with concern for stroke.  Diagnosed with intraparenchymal hemorrhage.  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 Last hemoglobin A1c 7.6.  History of paroxysmal atrial fibrillation follows with EP physiologist Dr. Chávez and cardiologist Dr. Martinez,, chronic diastolic heart failure, coronary artery disease status post remote CABG, hypertension, hyperlipidemia, history of CVA.    ASSESSMENT:  Acute Kidney injury superimposed on chronic kidney disease stage IV/V:  -- Acute kidney injury in the setting of intracranial hemorrhage, hemodynamically mediated changes, with component of contrast-induced nephropathy  -- s/p IV nicardipine infusion.  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  -- Recent  contrast exposure  -- Oliguria lack of improvement with IV fluid and Lasix trial  -- ATN with high urinary sodium   -- Hemodialysis was started October 13, 2023.    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     Atrial fibrillation: Anticoagulation on hold  on amiodarone     Hypertension:   - Blood pressure on presentation 187/76 s/p nicardipine infusion  - Pressure readings over the past 12 hours reviewed blood pressure fluctuating between 118/50 to 130/57     Chronic kidney disease stage IV:  -- Longstanding history of diabetes mellitus complicated with triopathy  -- Diabetic nephropathy, hypertensive nephrosclerosis  -- Proteinuric with a protein to creatinine ratio of 2.6  mg/g 7 months ago  -- Baseline serum creatinine 3.3 mg/dL, EGFR 17 mL/min per 1.73 m²  -- serum creatinine 6 mg/dl  BUN of 58 EGFR of 12 mL/min per 1.73 m²     Hyponatremia sodium level 131:  Better with dialysis.    Secondary hyperparathyroidism: Last PTH was more than 600     Acute urinary retention:  Bladder scan 500 cc status post straight cath overnight  External schulte in place      Volume: hypervolemia      PLAN:  Dialysis today postponed it until the afternoon to allow family time to decide about goals of care.  Family met with palliative team today and decided to proceed with short-term hemodialysis.  Skilled nursing facilities with dialysis  on board discussed.    Receiving hemodialysis this afternoon ultrafiltration half a liter as tolerated.    To hold vasoactive medications predialysis on dialysis days.  Family visiting [facility].    SUBJECTIVE:  Seen and evaluated at bedside, sitting up in the chair.  Anuric  Family at bedside question answered    Medications:    Current Facility-Administered Medications:     amiodarone (Pacerone) tablet 200 mg, 200 mg, oral, Daily, Jenny Fung PA-C, 200 mg at 10/16/23 0856    aspirin chewable tablet 81 mg, 81 mg, oral, Daily, Kris Dickey MD, 81 mg at 10/16/23 0856    B complex-vitamin C tablet 1 tablet, 1 tablet, oral, Every other day, Jenny Fung PA-C, 1 tablet at 10/15/23 0948    cholecalciferol (Vitamin D-3) capsule 25 mcg, 25 mcg, oral, Every other day, Jenny Fung PA-C, 25 mcg at 10/15/23 0900    dextrose 10 % in water (D10W) infusion, 50 mL/hr, intravenous, Once PRN, Jenny Fung PA-C    dextrose 50 % injection 25 g, 25 g, intravenous, q15 min PRN, Jenny Fung PA-C    docusate sodium (Colace) capsule 100 mg, 100 mg, oral, BID, Jenny Fung PA-C, 100 mg at 10/14/23 0935    esomeprazole (NexIUM) suspension 40 mg, 40 mg, nasoduodenal tube, Daily before breakfast **OR** pantoprazole (ProtoNix) EC tablet 40 mg, 40 mg, oral, Daily before breakfast, 40 mg at 10/16/23 0613 **OR** pantoprazole (ProtoNix) injection 40 mg, 40 mg, intravenous, Daily before breakfast, Jenny Fung PA-C    glucagon (Glucagen) injection 1 mg, 1 mg, intramuscular, q15 min PRN, Jenny Fung PA-C    heparin 1,000 unit/mL injection 2,000 Units, 2,000 Units, intra-catheter, After Dialysis, Jenny Fung PA-C, 2,000 Units at 10/16/23 1200    heparin 1,000 unit/mL injection 2,000 Units, 2,000 Units, intra-catheter, After Dialysis, Jenny Fung PA-C, 2,000 Units at 10/16/23 1819    hydrALAZINE (Apresoline) tablet 25 mg, 25 mg, oral, BID,  Jenny Fung PA-C, 25 mg at 10/16/23 0855    insulin glargine (Lantus) injection 12 Units, 12 Units, subcutaneous, Nightly, Jenny Fung PA-C, 12 Units at 10/15/23 2148    insulin lispro (HumaLOG) injection 0-10 Units, 0-10 Units, subcutaneous, Before meals & nightly, Jenny Fung PA-C, 4 Units at 10/16/23 1504    isosorbide mononitrate ER (Imdur) 24 hr tablet 30 mg, 30 mg, oral, Daily, Jenny Fung PA-C, 30 mg at 10/16/23 0855    piperacillin-tazobactam-dextrose (Zosyn) IV 3.375 g, 3.375 g, intravenous, q12h, Sofya Reyes APRN-CNP, Stopped at 10/16/23 1438    rosuvastatin (Crestor) tablet 20 mg, 20 mg, oral, Daily, Jenny Fung PA-C, 20 mg at 10/16/23 0856    sertraline (Zoloft) tablet 25 mg, 25 mg, oral, Daily, Jenny Fung PA-C, 25 mg at 10/16/23 0855    sodium chloride 0.9 % bolus 200 mL, 200 mL, intravenous, After Dialysis, Jenny Fung PA-C, Stopped at 10/16/23 1838    PERTINENT ROS:  GENERAL:  positive for fatigue, poor appetite.  No fever/chills  RESPIRATORY:  positive for shortness of breath.  Negative for cough, wheezing.  CARDIOVASCULAR:   Negative for chest pain or palpitation.  GI:  Negative for abdominal pain, diarrhea, heartburn, nausea, vomiting  : negative for dysuria, hematuria    Physical Exam:  Vital signs in last 24 hours:  Temp:  [36.1 °C (97 °F)-36.7 °C (98.1 °F)] 36.7 °C (98.1 °F)  Heart Rate:  [62-75] 62  Resp:  [18] 18  BP: (127-173)/(62-78) 127/62    General: Awake, cooperative, not in acute distress  HEENT:  NCAT,  mucous membranes moist and pink  NECK:  Elevated JVD, no carotid bruit, supple, no cervical mass or thyromegaly  LUNGS;  Diminished breath sounds, fine Rales  CV:  Distant, regular rate and rhythm, no murmurs  ABDOMEN:  abdomen soft, nontender, BS normal, no masses or organomegaly  EDEMA:  +2 lower extremity edema/dependent edema  SKIN:  dry and normal turgor, no clubbing, cyanosis or petechia.  No rashes  noted      Intake/Output last 3 shifts:  I/O last 3 completed shifts:  In: 1300 (15.7 mL/kg) [P.O.:600; I.V.:600 (7.3 mL/kg); IV Piggyback:100]  Out: 100 (1.2 mL/kg) [Urine:100 (0 mL/kg/hr)]  Weight: 82.6 kg     DATA:  Diagnotic tests reviewed for Todays visit:  Results from last 7 days   Lab Units 10/16/23  0623   WBC AUTO x10*3/uL 11.1   RBC AUTO x10*6/uL 3.20*   HEMOGLOBIN g/dL 9.4*   HEMATOCRIT % 29.8*     Results from last 7 days   Lab Units 10/16/23  0623 10/12/23  1601 10/12/23  0506   SODIUM mmol/L 131*   < > 130*   POTASSIUM mmol/L 3.8   < > 4.3   CHLORIDE mmol/L 96*   < > 98   CO2 mmol/L 25   < > 22   BUN mg/dL 51*   < > 64*   CREATININE mg/dL 4.24*   < > 5.33*   CALCIUM mg/dL 8.3*   < > 8.3*   PHOSPHORUS mg/dL 3.9   < >  --    MAGNESIUM mg/dL 1.92   < > 1.97   BILIRUBIN TOTAL mg/dL  --   --  0.6   ALT U/L  --   --  52   AST U/L  --   --  59*    < > = values in this interval not displayed.     Results from last 7 days   Lab Units 10/15/23  0544   COLOR U  Lexus*   APPEARANCE U  Hazy*   PH U  5.0   SPEC GRAV UR  1.020   PROTEIN U mg/dL >=500 (3+)*   BLOOD UR  LARGE (3+)*   NITRITE U  NEGATIVE   WBC UR /HPF >50*   BACTERIA UR /HPF 1+*     IMAGING: CXR reviewed in  images      SIGNATURE: Nereida Navarro MD  Nephrology and Hypertension  [address]  Office phone: [phone]  FAX: [phone]    This note was partially generated using the Dragon voice recognition system, and there may be some incorrect words, spelling's and punctuation that were not noted in checking the note before saving.

## 2023-10-16 NOTE — NURSING NOTE
Pt rested well through the night. No acute changes to neuro status. Pt received zosyn this shift for UTI

## 2023-10-16 NOTE — PROGRESS NOTES
Internal Medicine Progress Note    Patient Name: Zack Figueroa          MRN: [id number]  Today's Date: October 16, 2023          Attending: George Lenz MD    Subjective:  Patient was seen and examined at bedside, patient is more awake today, he denies chest pain, abdominal pain, nausea or vomiting.  I talked to the patient's son who was concerned about swallowing, he states he fed him at lunch and he felt he was coughing more.    Review Of Systems:  GENERAL: Generalized weakness  CARDIOVASCULAR: Negative for chest pain, leg swelling  RESPIRATORY: Negative for shortness of breath  GI: No nausea, vomiting, or diarrhea    Objective:  Vitals:    10/16/23 0600 10/16/23 0800 10/16/23 1200 10/16/23 1544   BP:  166/75 127/62    BP Location:       Patient Position:       Pulse:  64 67 66   Resp:  18 18    Temp:  36.6 °C (97.9 °F) 36.1 °C (97 °F) 36.7 °C (98.1 °F)   TempSrc:    Skin   SpO2:  100% 97%    Weight: 82.6 kg (182 lb 1.6 oz)      Height:           Physical Exam:   General appearance: Frail appearing, in no distress  Lungs: Clear to auscultation  Heart: No murmur, gallop, or rubs.  No ectopy  Abdomen: Soft, non-tender. Bowel sounds normal.  Neuro: Awake, slurred speech    Labs:  Results for orders placed or performed during the hospital encounter of 10/09/23 (from the past 24 hour(s))   POCT GLUCOSE   Result Value Ref Range    POCT Glucose 227 (H) 74 - 99 mg/dL   CBC   Result Value Ref Range    WBC 11.1 4.4 - 11.3 x10*3/uL    nRBC 0.0 0.0 - 0.0 /100 WBCs    RBC 3.20 (L) 4.50 - 5.90 x10*6/uL    Hemoglobin 9.4 (L) 13.5 - 17.5 g/dL    Hematocrit 29.8 (L) 41.0 - 52.0 %    MCV 93 80 - 100 fL    MCH 29.4 26.0 - 34.0 pg    MCHC 31.5 (L) 32.0 - 36.0 g/dL    RDW 14.9 (H) 11.5 - 14.5 %    Platelets 175 150 - 450 x10*3/uL    MPV 10.6 7.5 - 11.5 fL   Renal function panel   Result Value Ref Range    Glucose 119 (H) 74 - 99 mg/dL    Sodium 131 (L) 136 - 145 mmol/L    Potassium 3.8 3.5 - 5.3 mmol/L    Chloride 96 (L) 98 -  107 mmol/L    Bicarbonate 25 21 - 32 mmol/L    Anion Gap 14 10 - 20 mmol/L    Urea Nitrogen 51 (H) 6 - 23 mg/dL    Creatinine 4.24 (H) 0.50 - 1.30 mg/dL    eGFR 13 (L) >60 mL/min/1.73m*2    Calcium 8.3 (L) 8.6 - 10.3 mg/dL    Phosphorus 3.9 2.5 - 4.9 mg/dL    Albumin 2.9 (L) 3.4 - 5.0 g/dL   Magnesium   Result Value Ref Range    Magnesium 1.92 1.60 - 2.40 mg/dL   POCT GLUCOSE   Result Value Ref Range    POCT Glucose 130 (H) 74 - 99 mg/dL   POCT GLUCOSE   Result Value Ref Range    POCT Glucose 246 (H) 74 - 99 mg/dL       Medications:  Scheduled medications  amiodarone, 200 mg, oral, Daily  aspirin, 81 mg, oral, Daily  B complex-vitamin C, 1 tablet, oral, Every other day  cholecalciferol, 25 mcg, oral, Every other day  docusate sodium, 100 mg, oral, BID  esomeprazole, 40 mg, nasoduodenal tube, Daily before breakfast   Or  pantoprazole, 40 mg, oral, Daily before breakfast   Or  pantoprazole, 40 mg, intravenous, Daily before breakfast  heparin, 2,000 Units, intra-catheter, After Dialysis  heparin, 2,000 Units, intra-catheter, After Dialysis  hydrALAZINE, 25 mg, oral, BID  insulin glargine, 12 Units, subcutaneous, Nightly  insulin lispro, 0-10 Units, subcutaneous, Before meals & nightly  isosorbide mononitrate ER, 30 mg, oral, Daily  piperacillin-tazobactam-dextrose, 3.375 g, intravenous, q12h  rosuvastatin, 20 mg, oral, Daily  sertraline, 25 mg, oral, Daily  sodium chloride, 200 mL, intravenous, After Dialysis      Continuous medications       PRN medications  PRN medications: dextrose 10 % in water (D10W), dextrose, glucagon      Assessment/Plan:  Medical Problems       Problem List       * (Principal) Intraparenchymal hemorrhage of brain (CMS/HCC)    Overview Addendum 10/15/2023  8:44 AM by George Lenz MD     Control blood pressure  Neurology is following  We will check with speech therapy to reevaluate swallowing             Hypertension    Overview Addendum 10/13/2023  9:03 PM by George Lenz MD      "Controlled  Continue hydralazine and isosorbide mononitrate.           Hyperglycemia    Overview Signed 10/13/2023  9:03 PM by George Lenz MD     Continue current medications  Continue monitoring blood sugar           Transaminitis    Overview Addendum 10/13/2023  9:02 PM by George Lenz MD     We will monitor transaminase levels.           Acute kidney injury superimposed on chronic kidney disease (CMS/HCC)    Overview Addendum 10/15/2023  8:43 AM by George Lenz MD     Patient is getting dialysis today  Nephrology is managing  Continue monitoring            Urinary tract infection associated with indwelling urethral catheter (CMS/HCC)  Leukocytosis    Overview Signed 10/15/2023  8:43 AM by George Lenz MD     Continue Zosyn  Waiting for urine culture                   Discussed with patient, RN    George Lenz MD   Date: 10/16/23  Time: 4:52 PM    This note was partially created using voice recognition software and is inherently subject to errors including those of syntax and \"sound-alike\" substitutions which may escape proofreading. In such instances, original meaning may be extrapolated by contextual derivation  "

## 2023-10-16 NOTE — NURSING NOTE
10:40 PT/OT at bedside.   11:04 Pt up to chair eating breakfast.   11:53 Palliative at bedside.  1520 Dialysis at bedside.

## 2023-10-16 NOTE — PROGRESS NOTES
Occupational Therapy    OT Treatment    Patient Name: Zack Figueroa  MRN: [id number]  Today's Date: 10/16/2023  Time Calculation  Start Time: 1030  Stop Time: 1100  Time Calculation (min): 30 min         Assessment:  Prognosis: Fair  Evaluation/Treatment Tolerance: Patient tolerated treatment well  End of Session Communication: Bedside nurse  End of Session Patient Position: Up in chair, Alarm on  OT Assessment Results: Decreased ADL status, Decreased upper extremity range of motion, Decreased upper extremity strength, Decreased safe judgment during ADL, Decreased cognition, Decreased endurance, Decreased fine motor control, Decreased functional mobility, Decreased gross motor control, Decreased trunk control for functional activities  Prognosis: Fair  Evaluation/Treatment Tolerance: Patient tolerated treatment well    Plan:  Treatment Interventions: ADL retraining, Functional transfer training, Endurance training, UE strengthening/ROM, Fine motor coordination activities, Compensatory technique education  OT Frequency: 5 times per week  OT Discharge Recommendations: High intensity level of continued care  Treatment Interventions: ADL retraining, Functional transfer training, Endurance training, UE strengthening/ROM, Fine motor coordination activities, Compensatory technique education  Subjective     Current Problem:  1. Intraparenchymal hemorrhage of brain (CMS/HCC)  Critical Care    Critical Care      2. Oral phase dysphagia [R13.11]        3. 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 A-fib (CMS/[facility])        5. Anticoagulant long-term use            General:  OT Received On: 10/16/23  Reason for Referral: Intraparenchymal hemorrhage of brain  ( CMS/[facility])  Prior to Session Communication: Bedside nurse  Patient Position Received: Bed, 3 rail up, Alarm on  Preferred Learning Style: verbal, visual  General Comment: Pt agreeable to therapy    Vital Signs:        Pain:  Pain Assessment  Pain Assessment: 0-10  Pain Score: 0 - No pain  Objective      Activities of Daily Living: Feeding  Feeding Level of Assistance: Minimum assistance  Feeding Where Assessed: Chair  Feeding Comments: Pt required assist to cut up food. Pt using L hand to stab food with fork with minima spillage. Pt drinking out of cup with lid to minimize spillage.  Grooming  Grooming Level of Assistance: Minimum assistance  Grooming Where Assessed: Chair  Grooming Comments: Pt washed hands , needing verbak nd visual cues to complete task              Toileting  Toileting Comments: Pt using pure wick    Functional Standing Tolerance:       Bed Mobility/Transfers: Bed Mobility 1  Bed Mobility Comments 1: Pt seated EOB, at times leaning to R. cues to [employer] self  Transfers  Transfer:  (Pt transfers bed to chair with min/mod A of 2  people. Stepping a few feet to the chair)                Therapy/Activity: Therapeutic Exercise  Therapeutic Exercise Performed: Yes  Therapeutic Exercise Activity 1: L shld flex/ext, ext/int rot 6 x  Therapeutic Exercise Activity 2: L elbow flex/ext, pron/sup  Therapeutic Exercise Activity 3: L wrist flex/ext  Therapeutic Exercise Activity 4: AAROM to R shld flex/ext ext/int rot 6 x  Therapeutic Exercise Activity 5: AAROM to R elbow flex/ext/ pron/sup  Therapeutic Exercise Activity 6: AAROM to wrist flex/ext  Therapeutic Exercise Activity 7: B hand  3 x             Strength:       Other Activity:       Outcome Measures:[facility] Daily Activity  Putting on and taking off regular lower body clothing: Total  Bathing (including washing, rinsing, drying): A lot  Putting on and taking off regular upper body clothing: A lot  Toileting, which includes using toilet, bedpan or urinal: Total  Taking care of personal grooming such as brushing teeth: A little  Eating Meals: A little  Daily Activity - Total Score: 12      EDUCATION:  Education  Individual(s) Educated: Patient  Education Provided:  Ergonomics and postural realignment, Fall precautons, POC discussed and agreed upon  Patient Response to Education: Patient/Caregiver Verbalized Understanding of Information  Education Comment: recpetive to recommendations, will require continued education    Goals:  Encounter Problems       Encounter Problems (Active)       OT Goals       Patient will complete functional transfers at CGA level with LRD to facilitate increased independence and safety with home going  (Progressing)       Start:  10/10/23    Expected End:  10/21/23            Patient will complete functional mobility for household distances at CGA level with LRD to facilitate increased independence and safety with home going  (Progressing)       Start:  10/10/23    Expected End:  10/21/23            Patient will complete LB dressing at CGA level to facilitate safety and independence for home going   (Progressing)       Start:  10/10/23    Expected End:  10/21/23            Patient will complete toileting at CGA level to facilitate safety and independence for home going   (Progressing)       Start:  10/10/23    Expected End:  10/21/23            Patient will complete UB dressing at supervision level to facilitate safety and independence for home going   (Progressing)       Start:  10/10/23    Expected End:  10/21/23               Safety       LTG - Patient will adhere to hip precautions during ADL's and transfers       Start:  10/10/23    Expected End:  10/21/23            LTG - Patient will demonstrate safety requirements appropriate to situation/environment       Start:  10/10/23    Expected End:  10/21/23            LTG - Patient will utilize safety techniques       Start:  10/10/23    Expected End:  10/21/23            STG - Patient locks brakes on wheelchair       Start:  10/10/23    Expected End:  10/21/23            STG - Patient uses call light consistently to request assistance with transfers       Start:  10/10/23    Expected End:  10/21/23             STG - Patient uses gait belt during all transfers       Start:  10/10/23    Expected End:  10/21/23            Goal 1       Start:  10/10/23    Expected End:  10/21/23            Goal 2       Start:  10/10/23    Expected End:  10/21/23            Goal 3       Start:  10/10/23    Expected End:  10/21/23

## 2023-10-16 NOTE — PROGRESS NOTES
Physical Therapy    Physical Therapy Treatment    Patient Name: Zack Figueroa  MRN: [id number]  Today's Date: 10/16/2023  Time Calculation  Start Time: 1020  Stop Time: 1050  Time Calculation (min): 30 min       Assessment/Plan   PT Assessment  PT Assessment Results: Decreased strength, Decreased endurance, Impaired balance, Decreased mobility  Rehab Prognosis: Good  Evaluation/Treatment Tolerance: Patient tolerated treatment well  Medical Staff Made Aware: Yes  End of Session Communication: Bedside nurse  Assessment Comment: improved overall static and dynamic sitting balance.  improved coordination though still with significant R lean, more so with fatigue  End of Session Patient Position: Up in chair, Alarm on  PT Plan  Inpatient/Swing Bed or Outpatient: Inpatient  PT Plan  Treatment/Interventions: Bed mobility, Therapeutic exercise, Transfer training  PT Plan: Skilled PT  PT Frequency: Daily  PT Discharge Recommendations: High intensity level of continued care  PT Recommended Transfer Status: Assist x2     10/16/23 1020   PT  Visit   PT Received On 10/16/23   Response to Previous Treatment Patient with no complaints from previous session.   General   Family/Caregiver Present No   Prior to Session Communication Bedside nurse   Patient Position Received Bed, 3 rail up;Alarm on   Preferred Learning Style verbal;visual   General Comment .   Precautions   Medical Precautions Fall precautions   Pain Assessment   Pain Assessment 0-10   Pain Score 0 - No pain   Clinical Progression Not changed   Cognition   Overall Cognitive Status Impaired   Orientation Level Disoriented to time   Postural Control   Postural Control WFL   Trunk Control improved   Righting Reactions improved   Posture Comment CGA to min assist for righting reactions   Therapeutic Exercise   Therapeutic Exercise Activity 1   (decreased control RLE)   Balance/Neuromuscular Re-Education   Balance/Neuromuscular Re-Education Activity Performed Yes    Balance/Neuromuscular Re-Education Activity 1 reaching across midline contralaterally, to various target heights.  improved coordination RUE.   Bed Mobility   Bed Mobility Yes   Bed Mobility 1   Bed Mobility 1 Supine to sitting   Level of Assistance 1 Moderate assistance   Bed Mobility Comments 1 x1   Ambulation/Gait Training   Ambulation/Gait Training Performed Yes   Ambulation/Gait Training 1   Device 1 Rolling walker   Gait Support Devices Gait belt   Assistance 1 Moderate assistance   Quality of Gait 1 Narrow base of support   Comments/Distance (ft) 1 8', heavy R lean   Transfers   Transfer Yes   Transfer 1   Transfer From 1 Bed to   Transfer to 1 Stand   Technique 1 Sit to stand   Transfer Device 1 Gait belt;Walker   Transfer Level of Assistance 1 Minimum assistance   Trials/Comments 1 x2 assist   Transfers 2   Transfer From 2 Chair with arms to   Transfer to 2 Stand   Technique 2 Stand to sit   Transfer Device 2 Walker   Transfer Level of Assistance 2 Minimum assistance   Trials/Comments 2 x2 assist   Activity Tolerance   Endurance Tolerates 30+ min exercise without fatigue   PT Assessment   PT Assessment Results Decreased strength;Decreased endurance;Impaired balance;Decreased mobility   Rehab Prognosis Good   Evaluation/Treatment Tolerance Patient tolerated treatment well   Medical Staff Made Aware Yes   End of Session Communication Bedside nurse   End of Session Patient Position Up in chair;Alarm on   Education   Individual(s) Educated Patient;Spouse;Child   Education Provided Fall Risk   Patient/Caregiver Demonstrated Understanding yes   Plan of Care Discussed and Agreed Upon yes   Patient Response to Education Patient/Caregiver Verbalized Understanding of Information   PT Plan   Inpatient/Swing Bed or Outpatient Inpatient   PT Plan   PT Plan Skilled PT   PT Frequency Daily   PT Recommended Transfer Status Assist x2     Outcome Measures:  AMPAC Basic Mobility  Turning from your back to your side while in  a flat bed without using bedrails: A lot  Moving from lying on your back to sitting on the side of a flat bed without using bedrails: A lot  Moving to and from bed to chair (including a wheelchair): A lot  Standing up from a chair using your arms (e.g. wheelchair or bedside chair): A lot  To walk in hospital room: A lot  Climbing 3-5 steps with railing: Total  Basic Mobility - Total Score: 11  Education Documentation  Body Mechanics, taught by Ady Beckman PTA at 10/16/2023 11:16 AM.  Learner: Patient  Readiness: Acceptance  Method: Explanation  Response: Verbalizes Understanding, Needs Reinforcement    Mobility Training, taught by Ayd Beckman PTA at 10/16/2023 11:16 AM.  Learner: Patient  Readiness: Acceptance  Method: Explanation  Response: Verbalizes Understanding, Needs Reinforcement    Education Comments  No comments found.        Current Problem:  1. Intraparenchymal hemorrhage of brain (CMS/[facility])  Critical Care    Critical Care      2. Oral phase dysphagia [R13.11]        3. 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 A-fib (CMS/[facility])        5. Anticoagulant long-term use          EDUCATION:  Education  Individual(s) Educated: Patient, Spouse, Child  Education Provided: Fall Risk  Patient/Caregiver Demonstrated Understanding: yes  Plan of Care Discussed and Agreed Upon: yes  Patient Response to Education: Patient/Caregiver Verbalized Understanding of Information  Education Comment: .  Encounter Problems       Encounter Problems (Active)       PT Problem       Bed mobility (Progressing)       Start:  10/10/23    Expected End:  10/21/23       Pt will be able to complete bed mobility mod I with use of bed HR.            Transfers (Progressing)       Start:  10/10/23    Expected End:  10/21/23       Pt will be able to complete all transfers with FWW CGA demonstrating good safety awareness and proper body mechanics.           Gait (Progressing)       Start:  10/10/23     Expected End:  10/21/23       Pt will be able to ambulate 100 ft with FWW CGA with good safety awareness and ability to maintain COG/GABRIEL in midline.           Strength (Progressing)       Start:  10/10/23    Expected End:  10/21/23       Pt will improve BLE strength with supine, seated and standing exercises to WFL.           Balance (Progressing)       Start:  10/10/23    Expected End:  10/21/23       Pt will demonstrate good static/dynamic standing balance without evidence of right lateral lean or retro LOB.              Pain - Adult          Safety       LTG - Patient will adhere to hip precautions during ADL's and transfers       Start:  10/10/23    Expected End:  10/21/23            LTG - Patient will demonstrate safety requirements appropriate to situation/environment       Start:  10/10/23    Expected End:  10/21/23            LTG - Patient will utilize safety techniques       Start:  10/10/23    Expected End:  10/21/23            STG - Patient locks brakes on wheelchair       Start:  10/10/23    Expected End:  10/21/23            STG - Patient uses call light consistently to request assistance with transfers       Start:  10/10/23    Expected End:  10/21/23            STG - Patient uses gait belt during all transfers       Start:  10/10/23    Expected End:  10/21/23            Goal 1       Start:  10/10/23    Expected End:  10/21/23            Goal 2       Start:  10/10/23    Expected End:  10/21/23            Goal 3       Start:  10/10/23    Expected End:  10/21/23

## 2023-10-16 NOTE — CONSULTS
Inpatient consult to Palliative Care  Consult performed by: ARACELIS Oliva-CNS  Consult ordered by: George Lenz MD  Reason for consult: Goals of Care  Assessment/Recommendations: Patient came to the hospital from the community having presented with stroke symptoms at home and is positive for CVA. Patient also has Stage V kidney disease and is now requiring HD. Family requesting to meet with Palliative Care to discuss aggressive interventions vs. Hospice and supportive care.             Primary MD: Pippa Bonner MD        History Of Present Illness  Zack Figueroa is a 88 y.o. male presenting with acute CVA and renal failure.     Past Medical History  He has a past medical history of Atrial fibrillation (CMS/[facility]), CAD (coronary artery disease), CHF (congestive heart failure) (CMS/[facility]), CKD (chronic kidney disease) stage 3, GFR 30-59 ml/min (CMS/[facility]), HLD (hyperlipidemia), HTN (hypertension), JESUS (iron deficiency anemia), Insulin dependent type 2 diabetes mellitus (CMS/[facility]), and Stroke (CMS/[facility]).    Surgical History  He has a past surgical history that includes Coronary artery bypass graft (1994); Appendectomy; and IR CVC nontunneled (10/13/2023).     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     Travel History   Travel since 09/16/23    No documented travel since 09/16/23            Pets: no  Hobbies: unknown    Family History  No family history on file.  Allergies  Patient has no known allergies.     Immunization History   Administered Date(s) Administered    Pfizer Purple Cap SARS-CoV-2 01/29/2021, 02/24/2021, 10/27/2021     Medications  Home medications:  Medications Prior to Admission   Medication Sig Dispense Refill Last Dose    albuterol 90 mcg/actuation inhaler Inhale 2 puffs every 6 hours if needed for wheezing.       amiodarone (Pacerone) 200 mg tablet Take 1 tablet (200 mg) by mouth once  daily.       apixaban (Eliquis) 2.5 mg tablet Take 1 tablet (2.5 mg) by mouth 2 times a day.       aspirin 81 mg EC tablet Take 1 tablet (81 mg) by mouth once daily.       b complex 0.4 mg tablet Take 1 tablet by mouth every other day.       CARBONYL IRON ORAL Take 1 tablet by mouth every other day.       cholecalciferol (Vitamin D3) 25 MCG (1000 UT) capsule Take 1 capsule (25 mcg) by mouth every other day.       docusate sodium (Colace) 100 mg capsule Take 1 capsule (100 mg) by mouth once daily.       dulaglutide (Trulicity) 3 mg/0.5 mL pen injector Inject 1 Dose under the skin every 7 days. On Sat       fluticasone (Flonase Allergy Relief) 50 mcg/actuation nasal spray Administer 1 spray into each nostril once daily. Shake gently. Before first use, prime pump. After use, clean tip and replace cap.       furosemide (Lasix) 40 mg tablet Take 1 tablet (40 mg) by mouth once daily as needed.       hydrALAZINE (Apresoline) 25 mg tablet Take 0.5 tablets (12.5 mg) by mouth 2 times a day.       insulin glargine (Lantus U-100 Insulin) 100 unit/mL injection Inject 12 Units under the skin once daily at bedtime. Take as directed per insulin instructions.       insulin lispro (HumaLOG) 100 unit/mL injection Inject under the skin 3 times a day with meals. 10 units with breakfast  10 units  with lunch  12 units with dinner       isosorbide mononitrate ER (Imdur) 30 mg 24 hr tablet Take 1 tablet (30 mg) by mouth once daily. Do not crush or chew.       omeprazole (PriLOSEC) 40 mg DR capsule Take 1 capsule (40 mg) by mouth once daily in the evening.  Take before meals. Do not crush or chew.       rosuvastatin (Crestor) 20 mg tablet Take 1 tablet (20 mg) by mouth once daily.       sertraline (Zoloft) 25 mg tablet Take 1 tablet (25 mg) by mouth once daily.        Current medications:  Scheduled medications  amiodarone, 200 mg, oral, Daily  aspirin, 81 mg, oral, Daily  B complex-vitamin C, 1 tablet, oral, Every other  day  cholecalciferol, 25 mcg, oral, Every other day  docusate sodium, 100 mg, oral, BID  esomeprazole, 40 mg, nasoduodenal tube, Daily before breakfast   Or  pantoprazole, 40 mg, oral, Daily before breakfast   Or  pantoprazole, 40 mg, intravenous, Daily before breakfast  heparin, 2,000 Units, intra-catheter, After Dialysis  heparin, 2,000 Units, intra-catheter, After Dialysis  hydrALAZINE, 25 mg, oral, BID  insulin glargine, 12 Units, subcutaneous, Nightly  insulin lispro, 0-10 Units, subcutaneous, Before meals & nightly  isosorbide mononitrate ER, 30 mg, oral, Daily  piperacillin-tazobactam-dextrose, 3.375 g, intravenous, q12h  rosuvastatin, 20 mg, oral, Daily  sertraline, 25 mg, oral, Daily  sodium chloride, 200 mL, intravenous, After Dialysis      Continuous medications     PRN medications  PRN medications: dextrose 10 % in water (D10W), dextrose, glucagon    Review of Systems   Unable to perform ROS: Acuity of condition        Objective  Range of Vitals (last 24 hours)  Heart Rate:  [64-75]   Temp:  [36.1 °C (97 °F)-36.6 °C (97.9 °F)]   Resp:  [18]   BP: (127-173)/(62-78)   Weight:  [82.6 kg (182 lb 1.6 oz)]   SpO2:  [97 %-100 %]   Daily Weight  10/16/23 : 82.6 kg (182 lb 1.6 oz)    Body mass index is 29.41 kg/m².     Physical Exam  Vitals and nursing note reviewed.   Constitutional:       Appearance: Normal appearance.   HENT:      Head: Normocephalic.      Nose: Nose normal.      Mouth/Throat:      Mouth: Mucous membranes are moist.   Eyes:      Conjunctiva/sclera: Conjunctivae normal.   Cardiovascular:      Rate and Rhythm: Normal rate.      Heart sounds: Normal heart sounds.   Pulmonary:      Effort: Pulmonary effort is normal.   Abdominal:      General: Bowel sounds are normal.      Palpations: Abdomen is soft.   Musculoskeletal:         General: Swelling present.      Cervical back: Normal range of motion.   Skin:     General: Skin is warm and dry.      Capillary Refill: Capillary refill takes less than 2  "seconds.   Neurological:      Sensory: Sensory deficit present.      Motor: Weakness present.      Coordination: Coordination abnormal.      Gait: Gait abnormal.          Relevant Results  Outside Hospital Results  No  Labs  Results from last 72 hours   Lab Units 10/16/23  0623 10/15/23  0720 10/14/23  0829   WBC AUTO x10*3/uL 11.1 12.3* 17.0*   HEMOGLOBIN g/dL 9.4* 9.3* 9.3*   HEMATOCRIT % 29.8* 28.7* 28.6*   PLATELETS AUTO x10*3/uL 175 165 178     Results from last 72 hours   Lab Units 10/16/23  0623 10/15/23  0720 10/14/23  0829   SODIUM mmol/L 131* 134* 132*   POTASSIUM mmol/L 3.8 3.7 4.5   CHLORIDE mmol/L 96* 98 96*   CO2 mmol/L 25 27 23   BUN mg/dL 51* 41* 66*   CREATININE mg/dL 4.24* 3.83* 5.12*   GLUCOSE mg/dL 119* 140* 146*   CALCIUM mg/dL 8.3* 8.3* 8.3*   ANION GAP mmol/L 14 13 18   EGFR mL/min/1.73m*2 13* 15* 10*   PHOSPHORUS mg/dL 3.9 3.4 4.5     Results from last 72 hours   Lab Units 10/16/23  0623 10/15/23  0720 10/14/23  0829   ALBUMIN g/dL 2.9* 2.9* 3.1*     Estimated Creatinine Clearance: 12.6 mL/min (A) (by C-G formula based on SCr of 4.24 mg/dL (H)).  No results found for: \"CRP\", \"SEDRATE\"  No results found for: \"HIV1X2\", \"HIVCONF\", \"MIFYOL4VA\"  No results found for: \"HEPCABINIT\", \"HEPCAB\", \"HCVPCRQUANT\"  Microbiology  No results found for the last 14 days.      Imaging         Assessment/Plan     Plan at this time is to continue to support the patient and family as they make decisions regarding ongoing care.  Patient's wife is a retired pediatrician and son is an emergency room doctor at [employer] therefore they are very knowledgeable regarding medical conditions interventions and prognosis.  Family did ask for pastoral care to come and assist with paperwork for healthcare power of .  Patient is in agreement to completing these forms.  Family will also discuss CODE STATUS as we reviewed what the different CODE STATUS types were and what that would mean to his care.  Another decision " that they are working on is whether to continue hemodialysis or to forego hemodialysis and pursue a plan of care that would be more supportive in nature and access hospice benefit.  Patient has had support through palliative care since the spring.  Wife will not be able to meet his ADLs, so in the event they select to continue hospice they will be in need of a skilled facility until he is able to regain his strength and manage his own weight and performance of ADLs.  They are able to have some help in the home on a daily basis for some of his care but that would not be 24-hour caregiving which he requires at this time.  Family is very close and there spirituality is important to them and they are relying on family and their spiritual strength as guiding lights throughout this journey.    Thank you for this consult it has been a pleasure working with this patient and family.  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       I spent 90 minutes with this patient and family as his case is very complex and we needed to go through both his medical condition prognosis goals of care and hospice and potential hospice benefit.        Martha Burrows, APRN-CNS

## 2023-10-17 LAB
ALBUMIN SERPL BCP-MCNC: 2.7 G/DL (ref 3.4–5)
ANION GAP SERPL CALC-SCNC: 12 MMOL/L (ref 10–20)
BACTERIA UR CULT: ABNORMAL
BUN SERPL-MCNC: 33 MG/DL (ref 6–23)
CALCIUM SERPL-MCNC: 8 MG/DL (ref 8.6–10.3)
CHLORIDE SERPL-SCNC: 101 MMOL/L (ref 98–107)
CO2 SERPL-SCNC: 28 MMOL/L (ref 21–32)
CREAT SERPL-MCNC: 2.99 MG/DL (ref 0.5–1.3)
ERYTHROCYTE [DISTWIDTH] IN BLOOD BY AUTOMATED COUNT: 14.9 % (ref 11.5–14.5)
GFR SERPL CREATININE-BSD FRML MDRD: 20 ML/MIN/1.73M*2
GLUCOSE BLD MANUAL STRIP-MCNC: 157 MG/DL (ref 74–99)
GLUCOSE BLD MANUAL STRIP-MCNC: 174 MG/DL (ref 74–99)
GLUCOSE BLD MANUAL STRIP-MCNC: 174 MG/DL (ref 74–99)
GLUCOSE BLD MANUAL STRIP-MCNC: 229 MG/DL (ref 74–99)
GLUCOSE SERPL-MCNC: 172 MG/DL (ref 74–99)
HCT VFR BLD AUTO: 29.7 % (ref 41–52)
HGB BLD-MCNC: 9.2 G/DL (ref 13.5–17.5)
MAGNESIUM SERPL-MCNC: 1.86 MG/DL (ref 1.6–2.4)
MCH RBC QN AUTO: 29 PG (ref 26–34)
MCHC RBC AUTO-ENTMCNC: 31 G/DL (ref 32–36)
MCV RBC AUTO: 94 FL (ref 80–100)
NRBC BLD-RTO: 0 /100 WBCS (ref 0–0)
PHOSPHATE SERPL-MCNC: 3.1 MG/DL (ref 2.5–4.9)
PLATELET # BLD AUTO: 168 X10*3/UL (ref 150–450)
PMV BLD AUTO: 10.7 FL (ref 7.5–11.5)
POTASSIUM SERPL-SCNC: 3.8 MMOL/L (ref 3.5–5.3)
RBC # BLD AUTO: 3.17 X10*6/UL (ref 4.5–5.9)
SODIUM SERPL-SCNC: 137 MMOL/L (ref 136–145)
WBC # BLD AUTO: 9.3 X10*3/UL (ref 4.4–11.3)

## 2023-10-17 PROCEDURE — 2500000004 HC RX 250 GENERAL PHARMACY W/ HCPCS (ALT 636 FOR OP/ED): Performed by: PHYSICIAN ASSISTANT

## 2023-10-17 PROCEDURE — 2500000002 HC RX 250 W HCPCS SELF ADMINISTERED DRUGS (ALT 637 FOR MEDICARE OP, ALT 636 FOR OP/ED): Performed by: PHYSICIAN ASSISTANT

## 2023-10-17 PROCEDURE — 2500000001 HC RX 250 WO HCPCS SELF ADMINISTERED DRUGS (ALT 637 FOR MEDICARE OP): Performed by: STUDENT IN AN ORGANIZED HEALTH CARE EDUCATION/TRAINING PROGRAM

## 2023-10-17 PROCEDURE — 83735 ASSAY OF MAGNESIUM: CPT | Performed by: NURSE PRACTITIONER

## 2023-10-17 PROCEDURE — 2500000001 HC RX 250 WO HCPCS SELF ADMINISTERED DRUGS (ALT 637 FOR MEDICARE OP): Performed by: PHYSICIAN ASSISTANT

## 2023-10-17 PROCEDURE — 85027 COMPLETE CBC AUTOMATED: CPT | Performed by: NURSE PRACTITIONER

## 2023-10-17 PROCEDURE — 82947 ASSAY GLUCOSE BLOOD QUANT: CPT

## 2023-10-17 PROCEDURE — 97530 THERAPEUTIC ACTIVITIES: CPT | Mod: GO

## 2023-10-17 PROCEDURE — 1200000002 HC GENERAL ROOM WITH TELEMETRY DAILY

## 2023-10-17 PROCEDURE — 36415 COLL VENOUS BLD VENIPUNCTURE: CPT | Performed by: NURSE PRACTITIONER

## 2023-10-17 PROCEDURE — 2500000004 HC RX 250 GENERAL PHARMACY W/ HCPCS (ALT 636 FOR OP/ED): Performed by: NURSE PRACTITIONER

## 2023-10-17 PROCEDURE — 80069 RENAL FUNCTION PANEL: CPT | Performed by: NURSE PRACTITIONER

## 2023-10-17 PROCEDURE — 92507 TX SP LANG VOICE COMM INDIV: CPT | Mod: GN | Performed by: STUDENT IN AN ORGANIZED HEALTH CARE EDUCATION/TRAINING PROGRAM

## 2023-10-17 PROCEDURE — 97110 THERAPEUTIC EXERCISES: CPT | Mod: GO

## 2023-10-17 PROCEDURE — 97116 GAIT TRAINING THERAPY: CPT | Mod: GP,CQ

## 2023-10-17 PROCEDURE — 92526 ORAL FUNCTION THERAPY: CPT | Mod: GN | Performed by: STUDENT IN AN ORGANIZED HEALTH CARE EDUCATION/TRAINING PROGRAM

## 2023-10-17 PROCEDURE — 96372 THER/PROPH/DIAG INJ SC/IM: CPT | Performed by: PHYSICIAN ASSISTANT

## 2023-10-17 PROCEDURE — 97110 THERAPEUTIC EXERCISES: CPT | Mod: GP,CQ

## 2023-10-17 RX ORDER — CEFTRIAXONE 1 G/50ML
1 INJECTION, SOLUTION INTRAVENOUS EVERY 24 HOURS
Status: DISCONTINUED | OUTPATIENT
Start: 2023-10-17 | End: 2023-10-19 | Stop reason: HOSPADM

## 2023-10-17 RX ADMIN — INSULIN LISPRO 2 UNITS: 100 INJECTION, SOLUTION INTRAVENOUS; SUBCUTANEOUS at 07:00

## 2023-10-17 RX ADMIN — DOCUSATE SODIUM 100 MG: 100 CAPSULE, LIQUID FILLED ORAL at 09:38

## 2023-10-17 RX ADMIN — ISOSORBIDE MONONITRATE 30 MG: 30 TABLET, EXTENDED RELEASE ORAL at 09:38

## 2023-10-17 RX ADMIN — Medication 1 TABLET: at 14:41

## 2023-10-17 RX ADMIN — ROSUVASTATIN CALCIUM 20 MG: 20 TABLET, FILM COATED ORAL at 09:38

## 2023-10-17 RX ADMIN — Medication 25 MCG: at 09:00

## 2023-10-17 RX ADMIN — SERTRALINE HYDROCHLORIDE 25 MG: 25 TABLET ORAL at 09:38

## 2023-10-17 RX ADMIN — INSULIN LISPRO 4 UNITS: 100 INJECTION, SOLUTION INTRAVENOUS; SUBCUTANEOUS at 20:18

## 2023-10-17 RX ADMIN — ASPIRIN 81 MG 81 MG: 81 TABLET ORAL at 09:38

## 2023-10-17 RX ADMIN — PANTOPRAZOLE SODIUM 40 MG: 40 TABLET, DELAYED RELEASE ORAL at 06:41

## 2023-10-17 RX ADMIN — AMIODARONE HYDROCHLORIDE 200 MG: 200 TABLET ORAL at 09:38

## 2023-10-17 RX ADMIN — CEFTRIAXONE SODIUM 1 G: 1 INJECTION, SOLUTION INTRAVENOUS at 14:41

## 2023-10-17 RX ADMIN — HYDRALAZINE HYDROCHLORIDE 25 MG: 25 TABLET ORAL at 09:38

## 2023-10-17 RX ADMIN — INSULIN LISPRO 2 UNITS: 100 INJECTION, SOLUTION INTRAVENOUS; SUBCUTANEOUS at 16:59

## 2023-10-17 RX ADMIN — PIPERACILLIN SODIUM AND TAZOBACTAM SODIUM 3.38 G: 3; .375 INJECTION, SOLUTION INTRAVENOUS at 09:37

## 2023-10-17 RX ADMIN — DOCUSATE SODIUM 100 MG: 100 CAPSULE, LIQUID FILLED ORAL at 20:17

## 2023-10-17 RX ADMIN — HYDRALAZINE HYDROCHLORIDE 25 MG: 25 TABLET ORAL at 20:17

## 2023-10-17 RX ADMIN — INSULIN GLARGINE 12 UNITS: 100 INJECTION, SOLUTION SUBCUTANEOUS at 20:17

## 2023-10-17 RX ADMIN — INSULIN LISPRO 2 UNITS: 100 INJECTION, SOLUTION INTRAVENOUS; SUBCUTANEOUS at 12:25

## 2023-10-17 ASSESSMENT — COGNITIVE AND FUNCTIONAL STATUS - GENERAL
DRESSING REGULAR LOWER BODY CLOTHING: A LOT
TOILETING: A LOT
STANDING UP FROM CHAIR USING ARMS: A LOT
MOVING FROM LYING ON BACK TO SITTING ON SIDE OF FLAT BED WITH BEDRAILS: A LOT
DRESSING REGULAR UPPER BODY CLOTHING: A LOT
MOBILITY SCORE: 11
TURNING FROM BACK TO SIDE WHILE IN FLAT BAD: A LOT
PERSONAL GROOMING: A LITTLE
DAILY ACTIVITIY SCORE: 14
DRESSING REGULAR LOWER BODY CLOTHING: A LOT
CLIMB 3 TO 5 STEPS WITH RAILING: TOTAL
WALKING IN HOSPITAL ROOM: A LOT
TOILETING: A LOT
MOVING FROM LYING ON BACK TO SITTING ON SIDE OF FLAT BED WITH BEDRAILS: A LOT
EATING MEALS: A LITTLE
MOBILITY SCORE: 12
DRESSING REGULAR UPPER BODY CLOTHING: A LOT
PERSONAL GROOMING: A LOT
MOVING TO AND FROM BED TO CHAIR: A LOT
DAILY ACTIVITIY SCORE: 14
CLIMB 3 TO 5 STEPS WITH RAILING: A LOT
HELP NEEDED FOR BATHING: A LOT
MOVING TO AND FROM BED TO CHAIR: A LOT
DAILY ACTIVITIY SCORE: 14
DRESSING REGULAR UPPER BODY CLOTHING: A LOT
STANDING UP FROM CHAIR USING ARMS: A LOT
TURNING FROM BACK TO SIDE WHILE IN FLAT BAD: A LOT
PERSONAL GROOMING: A LITTLE
TOILETING: A LOT
DRESSING REGULAR LOWER BODY CLOTHING: A LOT
WALKING IN HOSPITAL ROOM: A LOT
EATING MEALS: A LITTLE

## 2023-10-17 ASSESSMENT — PAIN SCALES - GENERAL
PAINLEVEL_OUTOF10: 0 - NO PAIN

## 2023-10-17 ASSESSMENT — PAIN - FUNCTIONAL ASSESSMENT
PAIN_FUNCTIONAL_ASSESSMENT: 0-10

## 2023-10-17 ASSESSMENT — ACTIVITIES OF DAILY LIVING (ADL): EFFECT OF PAIN ON DAILY ACTIVITIES: .

## 2023-10-17 NOTE — PROGRESS NOTES
Met with patient and patient's spouse and son at bedside today. Answered questions regarding acute rehab and discharge planning. Plan is for patient to go to [facility] at discharge. Request sent to mirna Jose at [facility] to call patient's spouse as she would like to come tour the acute rehab.     Sarah Webber RN

## 2023-10-17 NOTE — PROGRESS NOTES
Spiritual Care Visit                                                 Taxonomy  Intended Effects: Aligning care plan with patient's values, Build relationship of care and support, Convey a calming presence, Demonstrate caring and concern, Establish rapport and connectedness, Daniela affirmation, Lessen anxiety, Preserve dignity and respect  Methods: Accompany someone in their spiritual/Anabaptist practice outside your daniela tradition, Assist with spiritual/Anabaptist practices, Encouraging spiritual/Anabaptist practices    Son to bring Advance Directives in. Left a Blank Form and my card with the family

## 2023-10-17 NOTE — PROGRESS NOTES
Spiritual Care Visit    Clinical Encounter Type  Visited With: Patient and family together  Routine Visit: Introduction  Continue Visiting: Yes  Referral From: Nurse    Judaism Encounters  Judaism Needs: Prayer                        Advance Directives  Advance Directives Reviewed Date: 10/17/23  Advance Directives Reviewed with: Patient, Child(elton)  Comment: Patient's daughter thinks the son has the documents. I gave them the original and my card so that they could contact me directly to complete onme if they find theydo not have one.              Taxonomy  Intended Effects: Aligning care plan with patient's values, Build relationship of care and support, Establish rapport and connectedness, Daniela affirmation, Preserve dignity and respect  Methods: Offer spiritual/Sabianist support    Offered blessing and support

## 2023-10-17 NOTE — CARE PLAN
The patient's goals for the shift include   Problem: General Stroke  Goal: Controlled blood glucose throughout shift  Outcome: Progressing     Problem: General Stroke  Goal: Out of bed three times today  Outcome: Met     Problem: Fall/Injury  Goal: Use assistive devices by end of the shift  Outcome: Met       The clinical goals for the shift include neuro checks to remain stabel this shift    Over the shift, the patient did make progress toward the following goals

## 2023-10-17 NOTE — PROGRESS NOTES
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 I gave them the original and my card so that they could contact me directly to complete onme if they find theydo not have one.              Taxonomy  Intended Effects: Aligning care plan with patient's values, Build relationship of care and support, Establish rapport and connectedness, Daniela affirmation, Preserve dignity and respect  Methods: Offer spiritual/Christianity support

## 2023-10-17 NOTE — NURSING NOTE
EOS: No acute changes this shift, pt remained AxO x3, pt neurological and vital assessments completed every 4 hours without deviation from baseline, Pt with purewic in place to keep skin integrity intact. Pt attempted BM on BP without success this shift. PT monitored continuously via telemetry monitoring. Pt voiding concentrated urine, care-team aware. Safety maintained, bed alarm on at all times, call light within reach at all times.

## 2023-10-17 NOTE — PROGRESS NOTES
Physical Therapy    Physical Therapy Treatment    Patient Name: Zack Figueroa  MRN: [id number]  Today's Date: 10/17/2023  Time Calculation  Start Time: 0820  Stop Time: 0850  Time Calculation (min): 30 min       Assessment/Plan   PT Assessment  PT Assessment Results: Decreased strength, Decreased endurance, Impaired balance, Decreased mobility  Rehab Prognosis: Good  Evaluation/Treatment Tolerance: Patient tolerated treatment well  Medical Staff Made Aware: Yes  End of Session Communication: Bedside nurse  Assessment Comment: improved overall coordination, strength and accuracy.  still presents with heavy R lean when in static or fynamic stand and gait  End of Session Patient Position: Up in chair, Alarm on  PT Plan  Inpatient/Swing Bed or Outpatient: Inpatient  PT Plan  Treatment/Interventions: Bed mobility, Transfer training, Gait training, Neuromuscular re-education, Therapeutic exercise  PT Plan: Skilled PT  PT Frequency: Daily  PT Discharge Recommendations: High intensity level of continued care  PT Recommended Transfer Status: Assist x2     10/17/23 0820   PT  Visit   PT Received On 10/17/23   Response to Previous Treatment Patient with no complaints from previous session.   General   Prior to Session Communication Bedside nurse   Patient Position Received Up in chair;Alarm on   Preferred Learning Style verbal;visual   General Comment .   Precautions   Hearing/Visual Limitations .   Medical Precautions Fall precautions   Precautions Comment heavy right lean in standing   Pain Assessment   Pain Assessment 0-10   Pain Score 0 - No pain   Pain Radiating Towards .   Effect of Pain on Daily Activities .   Response to Interventions .   Cognition   Overall Cognitive Status WFL   Orientation Level Oriented X4   Postural Control   Posture Comment heavy right lean in standing and gait.  mod assist to correct   Therapeutic Exercise   Therapeutic Exercise Performed Yes   Therapeutic Exercise Activity 1 ankle pumps  x15   Therapeutic Exercise Activity 2 resisted foot press x15   Therapeutic Exercise Activity 3 heel slides x15   Therapeutic Exercise Activity 4 Hip ABD x15   Therapeutic Exercise Activity 5 SAQ x15   Balance/Neuromuscular Re-Education   Balance/Neuromuscular Re-Education Activity Performed Yes   Balance/Neuromuscular Re-Education Activity 1 alternating finger to nose while semi supine in bed. with good coordination and accuracy   Bed Mobility   Bed Mobility Yes   Bed Mobility 1   Bed Mobility 1 Supine to sitting   Level of Assistance 1 Minimum assistance   Bed Mobility Comments 1 mod verbal and tactile cues   Ambulation/Gait Training   Ambulation/Gait Training Performed Yes   Ambulation/Gait Training 1   Surface 1 Level tile   Device 1 Rolling walker   Gait Support Devices Gait belt   Assistance 1 Moderate assistance   Quality of Gait 1 Narrow base of support   Comments/Distance (ft) 1 8', heavy right lean, attempting to self correct   Transfers   Transfer Yes   Transfer 1   Transfer From 1 Bed to   Transfer to 1 Stand   Technique 1 Sit to stand   Transfer Device 1 Gait belt;Walker   Transfer Level of Assistance 1 Moderate assistance   Trials/Comments 1 x1 assist x2 trials.  cues for hand placement.  heavy right lean   Activity Tolerance   Endurance Tolerates 30+ min exercise without fatigue   Early Mobility/Exercise Safety Screen Proceed with mobilization - No exclusion criteria met   PT Assessment   PT Assessment Results Decreased strength;Decreased endurance;Impaired balance;Decreased mobility   Rehab Prognosis Good   Evaluation/Treatment Tolerance Patient tolerated treatment well   Medical Staff Made Aware Yes   End of Session Communication Bedside nurse   Assessment Comment improved overall coordination, strength and accuracy.  still presents with heavy R lean when in static or fynamic stand and gait   End of Session Patient Position Up in chair;Alarm on   Education   Individual(s) Educated Patient    Education Provided Fall Risk   Patient/Caregiver Demonstrated Understanding yes   Plan of Care Discussed and Agreed Upon yes   Patient Response to Education Patient/Caregiver Verbalized Understanding of Information   Education Comment .   PT Plan   Inpatient/Swing Bed or Outpatient Inpatient   PT Plan   Treatment/Interventions Bed mobility;Transfer training;Gait training;Neuromuscular re-education;Therapeutic exercise   PT Plan Skilled PT   PT Frequency Daily   PT Discharge Recommendations High intensity level of continued care   PT Recommended Transfer Status Assist x2     Outcome Measures:  [facility] Basic Mobility  Turning from your back to your side while in a flat bed without using bedrails: A lot  Moving from lying on your back to sitting on the side of a flat bed without using bedrails: A lot  Moving to and from bed to chair (including a wheelchair): A lot  Standing up from a chair using your arms (e.g. wheelchair or bedside chair): A lot  To walk in hospital room: A lot  Climbing 3-5 steps with railing: Total  Basic Mobility - Total Score: 11  Education Documentation  Mobility Training, taught by Ady Beckman PTA at 10/17/2023  8:59 AM.  Learner: Patient  Readiness: Acceptance  Method: Explanation  Response: Verbalizes Understanding, Needs Reinforcement      Education Comments  No comments found.        Current Problem:  1. Intraparenchymal hemorrhage of brain (CMS/[facility])  Critical Care    Critical Care      2. Oral phase dysphagia [R13.11]        3. 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 A-fib (CMS/[facility])        5. Anticoagulant long-term use          EDUCATION:  Education  Individual(s) Educated: Patient  Education Provided: Fall Risk  Patient/Caregiver Demonstrated Understanding: yes  Plan of Care Discussed and Agreed Upon: yes  Patient Response to Education: Patient/Caregiver Verbalized Understanding of Information  Education Comment: .  Encounter Problems        Encounter Problems (Active)       PT Problem       Bed mobility (Progressing)       Start:  10/10/23    Expected End:  10/21/23       Pt will be able to complete bed mobility mod I with use of bed HR.            Transfers (Progressing)       Start:  10/10/23    Expected End:  10/21/23       Pt will be able to complete all transfers with FWW CGA demonstrating good safety awareness and proper body mechanics.           Gait (Progressing)       Start:  10/10/23    Expected End:  10/21/23       Pt will be able to ambulate 100 ft with FWW CGA with good safety awareness and ability to maintain COG/GABRIEL in midline.           Strength (Progressing)       Start:  10/10/23    Expected End:  10/21/23       Pt will improve BLE strength with supine, seated and standing exercises to WFL.           Balance (Progressing)       Start:  10/10/23    Expected End:  10/21/23       Pt will demonstrate good static/dynamic standing balance without evidence of right lateral lean or retro LOB.              Pain - Adult          Safety       LTG - Patient will adhere to hip precautions during ADL's and transfers       Start:  10/10/23    Expected End:  10/21/23            LTG - Patient will demonstrate safety requirements appropriate to situation/environment       Start:  10/10/23    Expected End:  10/21/23            LTG - Patient will utilize safety techniques       Start:  10/10/23    Expected End:  10/21/23            STG - Patient locks brakes on wheelchair       Start:  10/10/23    Expected End:  10/21/23            STG - Patient uses call light consistently to request assistance with transfers       Start:  10/10/23    Expected End:  10/21/23            STG - Patient uses gait belt during all transfers       Start:  10/10/23    Expected End:  10/21/23            Goal 1       Start:  10/10/23    Expected End:  10/21/23            Goal 2       Start:  10/10/23    Expected End:  10/21/23            Goal 3       Start:  10/10/23     Expected End:  10/21/23

## 2023-10-17 NOTE — NURSING NOTE
Patient stable this shift. Neuro check stable. Patient to be npo tonight for tunneled dialysis cath placement tomorrow.

## 2023-10-17 NOTE — PROGRESS NOTES
Speech-Language Pathology    Inpatient  Speech-Language Pathology Treatment     Patient Name: Zack Figueroa  MRN: [id number]  Today's Date: 10/17/2023  Time Calculation  Start Time: 1001  Stop Time: 1012  Time Calculation (min): 11 min         Current Problem:   Patient Active Problem List   Diagnosis    Intraparenchymal hemorrhage of brain (CMS/HCC)    Hypertension    Hyperglycemia    Transaminitis    Acute kidney injury superimposed on chronic kidney disease (CMS/HCC)    Urinary tract infection associated with indwelling urethral catheter (CMS/HCC)         SLP Assessment:  SLP Assessment Results: Motor Speech Deficits  Prognosis: Good  Treatment Provided: No       Plan:  Inpatient/Swing Bed or Outpatient: Inpatient  SLP Frequency: Follow-up visit only  Duration: 1 week  Next Treatment Priority: Diet eugenio and then DC  Discussed POC: Patient, Caregiver/family, Nursing, Physician  Discussed Risks/Benefits: Yes  Patient/Caregiver Agreeable: Yes    Swallow Goals: (ongoing)  Patient will:  Tolerate recommended oral diet absent of overt s/s of aspiration and without pulmonary compromise.  Implement safe swallowing strategies to reduce risk of aspiration in 100% of trials given caregiver assistance/cueing as needed.  Complete a modified barium swallow study (MBSS) as warranted upon further assessment/discussion with medical team for objective assessment of oropharyngeal swallow function, to assess for aspiration, and to guide further recommendations and treatment plan.    Motor Speech Goals:  Patient will:  1. Increase speech intelligibility at the phrase/sentence with use of recommended strategies (reduced rate, increased volume, over-articulation) in 90% of opportunities. GOAL MET  2. Recall intelligibility-enhancing strategies given minimal-moderate cues during treatment session. Goal not met  3. Express wants/needs in the current environment given min cues. GOAL MET    Subjective   Patient's daughter was present  "for the session.     Most Recent Visit:  SLP Received On: 10/17/23    General Visit Information:  Past Medical History Relevant to Rehab: Patient presented to ED for stroke like symptoms and AMS. afib, anemia, DM, CAD s/p CABG, CKD III-IV, HFpEF, JESUS, previous CVA  Patient Seen During This Visit: Yes    Pain Assessment:   Pain Assessment: 0-10  Pain Score: 0 - No pain      Objective   PA contacted speech therapy d/t indirect report of possible aspiration. Prior to treatment the RN did not report any concerns with swallowing. The patient's daughter was present for lunch yesterday with her brother and both her mother and brother were present for dinner yesterday. No reports from family regarding swallow concerns or suspected aspiration. The daughter reports that the patient has been eating well. Patient was seen this date with thin liquid via straw and cup with no overt s/s of aspiration. Occasional anterior loss of water if the lid opening was not fully aligned with his mouth. No changes to diet are indicated. ST to follow for one more visit.    Therapeutic Swallow:  Therapeutic Swallow Intervention : PO Trials  Solid Diet Recommendations: Regular (IDDSI Level 7)  Liquid Diet Recommendations: Thin (IDDSI Level 0)      Motor Speech:   Motor Speech Intervention : Compensatory Speech Strategies (Conversational speech)  Speech Comments: Improvement noted with speech production compared to initial evaluation. The patient was more alert and intelligible to 100%. The patient does not appear to be a \"big talker\" and family has been present regularly to assist communicating wants and needs on behalf of the patient. He is capable of verbalizing his intentions if needed. Motor speech goals to be discontinued.    Education:  Learner:  Patient    Family  Barriers to Learning: None  Method: Verbal  Education - Topic: ST provided patient education regarding role of ST, purpose of assessment, clinical impressions, goals of " treatment, and plan of care. Patient verbalized full comprehension, consistent with cognitive status. Education will be reinforced. ST further coordinated with RN regarding recommendations and precautions per this assessment, with RN verbalizing understanding.  Outcome:  Verbalized understanding and agreement  meets goals/outcomes

## 2023-10-17 NOTE — PROGRESS NOTES
Zack Figueroa is a 88 y.o. male on day 7 of admission presenting with Intraparenchymal hemorrhage of brain (CMS/HCC).      Spoke with RN , no family present at this time, met with pt bedside, pt up in chair ,more alert and awake, able to have full conversation with this writer. Plan for discharge to [facility]. Pastoral Care to complete HPOA with family today. Will follow and discuss outpatient Palliative Care programs with pts family.           Assessment/Plan   Principal Problem:    Intraparenchymal hemorrhage of brain (CMS/HCC)  Active Problems:    Hypertension    Hyperglycemia    Transaminitis    Acute kidney injury superimposed on chronic kidney disease (CMS/HCC)    Urinary tract infection associated with indwelling urethral catheter (CMS/HCC)                    ALVAREZ SawantW

## 2023-10-18 ENCOUNTER — APPOINTMENT (OUTPATIENT)
Dept: DIALYSIS | Facility: HOSPITAL | Age: 88
End: 2023-10-18
Payer: MEDICARE

## 2023-10-18 ENCOUNTER — APPOINTMENT (OUTPATIENT)
Dept: RADIOLOGY | Facility: HOSPITAL | Age: 88
DRG: 064 | End: 2023-10-18
Payer: MEDICARE

## 2023-10-18 PROBLEM — N18.9 ACUTE KIDNEY INJURY SUPERIMPOSED ON CHRONIC KIDNEY DISEASE (CMS-HCC): Status: RESOLVED | Noted: 2023-10-13 | Resolved: 2023-10-18

## 2023-10-18 PROBLEM — R73.9 HYPERGLYCEMIA: Status: RESOLVED | Noted: 2023-10-10 | Resolved: 2023-10-18

## 2023-10-18 PROBLEM — R74.01 TRANSAMINITIS: Status: RESOLVED | Noted: 2023-10-10 | Resolved: 2023-10-18

## 2023-10-18 PROBLEM — N17.9 ACUTE KIDNEY INJURY SUPERIMPOSED ON CHRONIC KIDNEY DISEASE (CMS-HCC): Status: RESOLVED | Noted: 2023-10-13 | Resolved: 2023-10-18

## 2023-10-18 LAB
ALBUMIN SERPL BCP-MCNC: 2.7 G/DL (ref 3.4–5)
ANION GAP SERPL CALC-SCNC: 12 MMOL/L (ref 10–20)
BUN SERPL-MCNC: 44 MG/DL (ref 6–23)
CALCIUM SERPL-MCNC: 8.3 MG/DL (ref 8.6–10.3)
CHLORIDE SERPL-SCNC: 101 MMOL/L (ref 98–107)
CO2 SERPL-SCNC: 28 MMOL/L (ref 21–32)
CREAT SERPL-MCNC: 3.62 MG/DL (ref 0.5–1.3)
ERYTHROCYTE [DISTWIDTH] IN BLOOD BY AUTOMATED COUNT: 15 % (ref 11.5–14.5)
GFR SERPL CREATININE-BSD FRML MDRD: 16 ML/MIN/1.73M*2
GLUCOSE BLD MANUAL STRIP-MCNC: 104 MG/DL (ref 74–99)
GLUCOSE BLD MANUAL STRIP-MCNC: 107 MG/DL (ref 74–99)
GLUCOSE BLD MANUAL STRIP-MCNC: 108 MG/DL (ref 74–99)
GLUCOSE BLD MANUAL STRIP-MCNC: 123 MG/DL (ref 74–99)
GLUCOSE SERPL-MCNC: 110 MG/DL (ref 74–99)
HCT VFR BLD AUTO: 30.1 % (ref 41–52)
HGB BLD-MCNC: 9.3 G/DL (ref 13.5–17.5)
MAGNESIUM SERPL-MCNC: 1.82 MG/DL (ref 1.6–2.4)
MCH RBC QN AUTO: 29 PG (ref 26–34)
MCHC RBC AUTO-ENTMCNC: 30.9 G/DL (ref 32–36)
MCV RBC AUTO: 94 FL (ref 80–100)
NRBC BLD-RTO: 0 /100 WBCS (ref 0–0)
PHOSPHATE SERPL-MCNC: 3.7 MG/DL (ref 2.5–4.9)
PLATELET # BLD AUTO: 181 X10*3/UL (ref 150–450)
PMV BLD AUTO: 10.6 FL (ref 7.5–11.5)
POTASSIUM SERPL-SCNC: 3.8 MMOL/L (ref 3.5–5.3)
RBC # BLD AUTO: 3.21 X10*6/UL (ref 4.5–5.9)
SODIUM SERPL-SCNC: 137 MMOL/L (ref 136–145)
WBC # BLD AUTO: 9.7 X10*3/UL (ref 4.4–11.3)

## 2023-10-18 PROCEDURE — 76937 US GUIDE VASCULAR ACCESS: CPT

## 2023-10-18 PROCEDURE — 2500000001 HC RX 250 WO HCPCS SELF ADMINISTERED DRUGS (ALT 637 FOR MEDICARE OP): Performed by: PHYSICIAN ASSISTANT

## 2023-10-18 PROCEDURE — 36558 INSERT TUNNELED CV CATH: CPT | Performed by: INTERNAL MEDICINE

## 2023-10-18 PROCEDURE — 85027 COMPLETE CBC AUTOMATED: CPT | Performed by: NURSE PRACTITIONER

## 2023-10-18 PROCEDURE — 36010 PLACE CATHETER IN VEIN: CPT | Performed by: STUDENT IN AN ORGANIZED HEALTH CARE EDUCATION/TRAINING PROGRAM

## 2023-10-18 PROCEDURE — 2500000002 HC RX 250 W HCPCS SELF ADMINISTERED DRUGS (ALT 637 FOR MEDICARE OP, ALT 636 FOR OP/ED): Performed by: PHYSICIAN ASSISTANT

## 2023-10-18 PROCEDURE — C1769 GUIDE WIRE: HCPCS

## 2023-10-18 PROCEDURE — 8010000001 HC DIALYSIS - HEMODIALYSIS PER DAY

## 2023-10-18 PROCEDURE — 77001 FLUOROGUIDE FOR VEIN DEVICE: CPT

## 2023-10-18 PROCEDURE — 36558 INSERT TUNNELED CV CATH: CPT

## 2023-10-18 PROCEDURE — 36589 REMOVAL TUNNELED CV CATH: CPT

## 2023-10-18 PROCEDURE — 36581 REPLACE TUNNELED CV CATH: CPT | Performed by: STUDENT IN AN ORGANIZED HEALTH CARE EDUCATION/TRAINING PROGRAM

## 2023-10-18 PROCEDURE — 2500000001 HC RX 250 WO HCPCS SELF ADMINISTERED DRUGS (ALT 637 FOR MEDICARE OP): Performed by: STUDENT IN AN ORGANIZED HEALTH CARE EDUCATION/TRAINING PROGRAM

## 2023-10-18 PROCEDURE — 2780000003 HC OR 278 NO HCPCS

## 2023-10-18 PROCEDURE — 97110 THERAPEUTIC EXERCISES: CPT | Mod: GO

## 2023-10-18 PROCEDURE — 02HV33Z INSERTION OF INFUSION DEVICE INTO SUPERIOR VENA CAVA, PERCUTANEOUS APPROACH: ICD-10-PCS | Performed by: STUDENT IN AN ORGANIZED HEALTH CARE EDUCATION/TRAINING PROGRAM

## 2023-10-18 PROCEDURE — 1200000002 HC GENERAL ROOM WITH TELEMETRY DAILY

## 2023-10-18 PROCEDURE — 2500000004 HC RX 250 GENERAL PHARMACY W/ HCPCS (ALT 636 FOR OP/ED): Performed by: NURSE PRACTITIONER

## 2023-10-18 PROCEDURE — G0500 MOD SEDAT ENDO SERVICE >5YRS: HCPCS | Performed by: STUDENT IN AN ORGANIZED HEALTH CARE EDUCATION/TRAINING PROGRAM

## 2023-10-18 PROCEDURE — 0JH63XZ INSERTION OF TUNNELED VASCULAR ACCESS DEVICE INTO CHEST SUBCUTANEOUS TISSUE AND FASCIA, PERCUTANEOUS APPROACH: ICD-10-PCS | Performed by: STUDENT IN AN ORGANIZED HEALTH CARE EDUCATION/TRAINING PROGRAM

## 2023-10-18 PROCEDURE — 83735 ASSAY OF MAGNESIUM: CPT | Performed by: NURSE PRACTITIONER

## 2023-10-18 PROCEDURE — 97116 GAIT TRAINING THERAPY: CPT | Mod: GP,CQ

## 2023-10-18 PROCEDURE — 2500000004 HC RX 250 GENERAL PHARMACY W/ HCPCS (ALT 636 FOR OP/ED): Performed by: PHYSICIAN ASSISTANT

## 2023-10-18 PROCEDURE — 80069 RENAL FUNCTION PANEL: CPT | Performed by: NURSE PRACTITIONER

## 2023-10-18 PROCEDURE — C1750 CATH, HEMODIALYSIS,LONG-TERM: HCPCS

## 2023-10-18 PROCEDURE — 02PYX3Z REMOVAL OF INFUSION DEVICE FROM GREAT VESSEL, EXTERNAL APPROACH: ICD-10-PCS | Performed by: STUDENT IN AN ORGANIZED HEALTH CARE EDUCATION/TRAINING PROGRAM

## 2023-10-18 PROCEDURE — 82947 ASSAY GLUCOSE BLOOD QUANT: CPT

## 2023-10-18 PROCEDURE — 36010 PLACE CATHETER IN VEIN: CPT

## 2023-10-18 PROCEDURE — 2720000007 HC OR 272 NO HCPCS

## 2023-10-18 PROCEDURE — 2500000004 HC RX 250 GENERAL PHARMACY W/ HCPCS (ALT 636 FOR OP/ED): Performed by: STUDENT IN AN ORGANIZED HEALTH CARE EDUCATION/TRAINING PROGRAM

## 2023-10-18 PROCEDURE — 96372 THER/PROPH/DIAG INJ SC/IM: CPT | Performed by: PHYSICIAN ASSISTANT

## 2023-10-18 PROCEDURE — 77001 FLUOROGUIDE FOR VEIN DEVICE: CPT | Performed by: STUDENT IN AN ORGANIZED HEALTH CARE EDUCATION/TRAINING PROGRAM

## 2023-10-18 PROCEDURE — 36415 COLL VENOUS BLD VENIPUNCTURE: CPT | Performed by: NURSE PRACTITIONER

## 2023-10-18 PROCEDURE — 97530 THERAPEUTIC ACTIVITIES: CPT | Mod: GO

## 2023-10-18 RX ORDER — FENTANYL CITRATE 50 UG/ML
INJECTION, SOLUTION INTRAMUSCULAR; INTRAVENOUS AS NEEDED
Status: COMPLETED | OUTPATIENT
Start: 2023-10-18 | End: 2023-10-18

## 2023-10-18 RX ORDER — INSULIN LISPRO 100 [IU]/ML
0-10 INJECTION, SOLUTION INTRAVENOUS; SUBCUTANEOUS
Qty: 1 EACH | Refills: 3 | Status: SHIPPED | OUTPATIENT
Start: 2023-10-18

## 2023-10-18 RX ORDER — PANTOPRAZOLE SODIUM 40 MG/1
40 TABLET, DELAYED RELEASE ORAL
Qty: 30 TABLET | Refills: 0 | Status: SHIPPED | OUTPATIENT
Start: 2023-10-19

## 2023-10-18 RX ORDER — HYDRALAZINE HYDROCHLORIDE 20 MG/ML
5 INJECTION INTRAMUSCULAR; INTRAVENOUS ONCE
Status: COMPLETED | OUTPATIENT
Start: 2023-10-18 | End: 2023-10-18

## 2023-10-18 RX ORDER — B-COMPLEX WITH VITAMIN C
1 TABLET ORAL EVERY OTHER DAY
Qty: 30 TABLET | Refills: 0 | Status: SHIPPED | OUTPATIENT
Start: 2023-10-19

## 2023-10-18 RX ORDER — ISOSORBIDE MONONITRATE 30 MG/1
30 TABLET, EXTENDED RELEASE ORAL DAILY
Qty: 30 TABLET | Refills: 0 | Status: SHIPPED | OUTPATIENT
Start: 2023-10-19

## 2023-10-18 RX ORDER — CEPHALEXIN 500 MG/1
500 CAPSULE ORAL 2 TIMES DAILY
Start: 2023-10-18 | End: 2023-10-25

## 2023-10-18 RX ORDER — MIDAZOLAM HYDROCHLORIDE 1 MG/ML
INJECTION INTRAMUSCULAR; INTRAVENOUS AS NEEDED
Status: COMPLETED | OUTPATIENT
Start: 2023-10-18 | End: 2023-10-18

## 2023-10-18 RX ORDER — HYDRALAZINE HYDROCHLORIDE 25 MG/1
25 TABLET, FILM COATED ORAL 2 TIMES DAILY
Qty: 30 TABLET | Refills: 0 | Status: SHIPPED | OUTPATIENT
Start: 2023-10-18 | End: 2023-11-08 | Stop reason: HOSPADM

## 2023-10-18 RX ADMIN — ROSUVASTATIN CALCIUM 20 MG: 20 TABLET, FILM COATED ORAL at 09:06

## 2023-10-18 RX ADMIN — AMIODARONE HYDROCHLORIDE 200 MG: 200 TABLET ORAL at 09:05

## 2023-10-18 RX ADMIN — SERTRALINE HYDROCHLORIDE 25 MG: 25 TABLET ORAL at 09:05

## 2023-10-18 RX ADMIN — HYDRALAZINE HYDROCHLORIDE 5 MG: 20 INJECTION INTRAMUSCULAR; INTRAVENOUS at 00:41

## 2023-10-18 RX ADMIN — PANTOPRAZOLE SODIUM 40 MG: 40 TABLET, DELAYED RELEASE ORAL at 09:10

## 2023-10-18 RX ADMIN — DOCUSATE SODIUM 100 MG: 100 CAPSULE, LIQUID FILLED ORAL at 22:42

## 2023-10-18 RX ADMIN — ISOSORBIDE MONONITRATE 30 MG: 30 TABLET, EXTENDED RELEASE ORAL at 09:05

## 2023-10-18 RX ADMIN — ASPIRIN 81 MG 81 MG: 81 TABLET ORAL at 09:05

## 2023-10-18 RX ADMIN — INSULIN GLARGINE 12 UNITS: 100 INJECTION, SOLUTION SUBCUTANEOUS at 22:42

## 2023-10-18 RX ADMIN — DOCUSATE SODIUM 100 MG: 100 CAPSULE, LIQUID FILLED ORAL at 09:06

## 2023-10-18 RX ADMIN — CEFTRIAXONE SODIUM 1 G: 1 INJECTION, SOLUTION INTRAVENOUS at 13:00

## 2023-10-18 RX ADMIN — HYDRALAZINE HYDROCHLORIDE 25 MG: 25 TABLET ORAL at 09:05

## 2023-10-18 RX ADMIN — MIDAZOLAM HYDROCHLORIDE 1 MG: 1 INJECTION, SOLUTION INTRAMUSCULAR; INTRAVENOUS at 14:00

## 2023-10-18 RX ADMIN — FENTANYL CITRATE 50 MCG: 50 INJECTION, SOLUTION INTRAMUSCULAR; INTRAVENOUS at 14:00

## 2023-10-18 RX ADMIN — HYDRALAZINE HYDROCHLORIDE 25 MG: 25 TABLET ORAL at 22:42

## 2023-10-18 ASSESSMENT — PAIN SCALES - GENERAL
PAINLEVEL_OUTOF10: 0 - NO PAIN

## 2023-10-18 ASSESSMENT — COGNITIVE AND FUNCTIONAL STATUS - GENERAL
MOVING TO AND FROM BED TO CHAIR: A LOT
TURNING FROM BACK TO SIDE WHILE IN FLAT BAD: A LOT
TURNING FROM BACK TO SIDE WHILE IN FLAT BAD: A LOT
WALKING IN HOSPITAL ROOM: A LOT
CLIMB 3 TO 5 STEPS WITH RAILING: A LOT
DRESSING REGULAR UPPER BODY CLOTHING: A LITTLE
EATING MEALS: A LOT
TOILETING: A LOT
MOVING TO AND FROM BED TO CHAIR: A LOT
HELP NEEDED FOR BATHING: A LOT
DRESSING REGULAR LOWER BODY CLOTHING: TOTAL
EATING MEALS: A LOT
WALKING IN HOSPITAL ROOM: A LOT
MOVING FROM LYING ON BACK TO SITTING ON SIDE OF FLAT BED WITH BEDRAILS: A LOT
STANDING UP FROM CHAIR USING ARMS: A LOT
STANDING UP FROM CHAIR USING ARMS: A LOT
DAILY ACTIVITIY SCORE: 13
TOILETING: A LOT
MOBILITY SCORE: 12
CLIMB 3 TO 5 STEPS WITH RAILING: A LOT
MOBILITY SCORE: 12
DRESSING REGULAR UPPER BODY CLOTHING: A LITTLE
PERSONAL GROOMING: A LITTLE
DAILY ACTIVITIY SCORE: 13
HELP NEEDED FOR BATHING: A LOT
DRESSING REGULAR LOWER BODY CLOTHING: TOTAL
MOVING FROM LYING ON BACK TO SITTING ON SIDE OF FLAT BED WITH BEDRAILS: A LOT
PERSONAL GROOMING: A LITTLE

## 2023-10-18 ASSESSMENT — ACTIVITIES OF DAILY LIVING (ADL): EFFECT OF PAIN ON DAILY ACTIVITIES: .

## 2023-10-18 ASSESSMENT — PAIN - FUNCTIONAL ASSESSMENT
PAIN_FUNCTIONAL_ASSESSMENT: 0-10
PAIN_FUNCTIONAL_ASSESSMENT: 0-10

## 2023-10-18 NOTE — PROGRESS NOTES
Zack Figueroa is a 88 y.o. male on day 8 of admission presenting with Intraparenchymal hemorrhage of brain (CMS/HCC).    Subjective   Palliative care met with patient's son at bedside today. Patient asleep throughout visit. Patient's son confirmed plan for now is for patient to go to Acute Rehab in [place] and to pursue dialysis for the time being. Patient's son states that he is active with [facility] palliative care. He had some questions regarding the scope of outpatient palliative care and hospice. SW was able to explain in detail. He is happy with [facility] continuing to follow at this time,    We discussed code status once again and while they are going to continue to discuss code status as a family, at this time, they are all in agreement that he should remain full code. SW educated that code status can be changed at any time if they decide they want to address this. We also discussed HPOA paperwork as son states that his mom is still looking to locate this paperwork. SW educated him on [place] laws in [place], which he is well aware of.     Updated clincal information was sent to [facility] palliative care today for continuity of care. No additional palliative care needs have been identified at this time and palliative care will sign off.     Lani Barboza, ALVAREZW

## 2023-10-18 NOTE — NURSING NOTE
Patient stable this shift. Tunneled dialysis cath placed today. Patient currently receiving dialysis. Neuro checks stable this shift.

## 2023-10-18 NOTE — CARE PLAN
The patient's goals for the shift include get a few hours sleep this shift    The clinical goals for the shift include see care plan

## 2023-10-18 NOTE — PROGRESS NOTES
Physical Therapy    Physical Therapy Treatment    Patient Name: Zack Figueroa  MRN: [id number]  Today's Date: 10/18/2023  Time Calculation  Start Time: 1018  Stop Time: 1042  Time Calculation (min): 24 min       Assessment/Plan   PT Assessment  PT Assessment Results: Decreased strength, Decreased endurance, Impaired balance, Decreased mobility  Rehab Prognosis: Good  Evaluation/Treatment Tolerance: Patient tolerated treatment well  Medical Staff Made Aware: Yes  End of Session Communication: Bedside nurse  Assessment Comment: more assist needed with transfers this session, increased fatigue  End of Session Patient Position: Up in chair, Alarm on  PT Plan  Inpatient/Swing Bed or Outpatient: Inpatient  PT Plan  Treatment/Interventions: Bed mobility, Transfer training, Gait training  PT Plan: Skilled PT  PT Frequency: Daily  PT Discharge Recommendations: High intensity level of continued care  PT Recommended Transfer Status: Assist x2     10/18/23 1018   PT  Visit   PT Received On 10/18/23   Response to Previous Treatment Patient with no complaints from previous session.   General   Reason for Referral .   Referred By .   Past Medical History Relevant to Rehab .   Family/Caregiver Present No   Prior to Session Communication Bedside nurse   Patient Position Received Alarm on;Up in chair   Preferred Learning Style verbal;visual   General Comment patient appearing more lethargic this session requiring more assist for transfers   Precautions   Hearing/Visual Limitations .   Medical Precautions Fall precautions   Pain Assessment   Pain Assessment 0-10   Pain Score 0 - No pain   Pain Radiating Towards .   Effect of Pain on Daily Activities .   Response to Interventions .   Cognition   Overall Cognitive Status WFL   Orientation Level Oriented X4   Postural Control   Posture Comment moderate R lean in static sitting   Bed Mobility   Bed Mobility Yes   Bed Mobility 1   Bed Mobility 1 Supine to sitting   Level of  Assistance 1 Moderate assistance   Bed Mobility Comments 1 x2 assist   Ambulation/Gait Training   Ambulation/Gait Training Performed Yes   Ambulation/Gait Training 1   Surface 1 Level tile   Device 1 Rolling walker   Gait Support Devices Gait belt   Assistance 1 Moderate assistance  (x2 assist)   Quality of Gait 1 Narrow base of support   Comments/Distance (ft) 1 15', heavy R lean, assist to correct.  difficulty keeping RUE on walker and unable to push through walker with RUE.   Ambulation/Gait Training 2   Surface 2 Level tile   Device 2 Mickey Walker   Gait Support Devices Gait belt   Assistance 2 Moderate verbal cues  (x2 assist)   Quality of Gait 2 Narrow base of support   Comments/Distance (ft) 2 3-4 ' fwd cues for sequence and assist with weight shift, heavy R lean   Transfers   Transfer Yes   Transfer 1   Transfer From 1 Bed to   Transfer to 1 Stand to Sit    Recliner to Stand to sit   Technique 1 Sit to stand   Transfer Device 1 Walker;Gait belt  Mickey walker; gait belt   Transfer Level of Assistance 1 Moderate assistance  (x2 assist)   Trials/Comments 1 moderate R lean in static stand.  unable to functionally use RUE to support on walker   Activity Tolerance   Endurance Tolerates 30 min exercise with multiple rests   Early Mobility/Exercise Safety Screen Proceed with mobilization - No exclusion criteria met   PT Assessment   PT Assessment Results Decreased strength;Decreased endurance;Impaired balance;Decreased mobility   Rehab Prognosis Good   Evaluation/Treatment Tolerance Patient tolerated treatment well   Medical Staff Made Aware Yes   End of Session Communication Bedside nurse   Assessment Comment more assist needed with transfers this session, increased fatigue   End of Session Patient Position Up in chair;Alarm on   Education   Individual(s) Educated Patient   Education Provided Fall Risk   Patient/Caregiver Demonstrated Understanding yes   Plan of Care Discussed and Agreed Upon yes   Patient Response  to Education Patient/Caregiver Verbalized Understanding of Information   Education Comment .   PT Plan   Inpatient/Swing Bed or Outpatient Inpatient   PT Plan   Treatment/Interventions Bed mobility;Transfer training;Gait training   PT Plan Skilled PT   PT Frequency Daily   PT Discharge Recommendations High intensity level of continued care   PT Recommended Transfer Status Assist x2     Outcome Measures:  [facility] Basic Mobility  Turning from your back to your side while in a flat bed without using bedrails: A lot  Moving from lying on your back to sitting on the side of a flat bed without using bedrails: A lot  Moving to and from bed to chair (including a wheelchair): A lot  Standing up from a chair using your arms (e.g. wheelchair or bedside chair): A lot  To walk in hospital room: A lot  Climbing 3-5 steps with railing: A lot  Basic Mobility - Total Score: 12  Education Documentation  Body Mechanics, taught by Ady Beckman PTA at 10/18/2023 10:58 AM.  Learner: Patient  Readiness: Acceptance  Method: Explanation, Demonstration  Response: Verbalizes Understanding, Needs Reinforcement    Mobility Training, taught by Ady Beckman PTA at 10/18/2023 10:58 AM.  Learner: Patient  Readiness: Acceptance  Method: Explanation, Demonstration  Response: Verbalizes Understanding, Needs Reinforcement    Education Comments  No comments found.        Current Problem:  1. Intraparenchymal hemorrhage of brain (CMS/[facility])  Critical Care    Critical Care      2. Oral phase dysphagia [R13.11]        3. 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 A-fib (CMS/[facility])        5. Anticoagulant long-term use          EDUCATION:  Education  Individual(s) Educated: Patient  Education Provided: Fall Risk  Patient/Caregiver Demonstrated Understanding: yes  Plan of Care Discussed and Agreed Upon: yes  Patient Response to Education: Patient/Caregiver Verbalized Understanding of Information  Education Comment:  .  Encounter Problems       Encounter Problems (Active)       PT Problem       Bed mobility (Progressing)       Start:  10/10/23    Expected End:  10/21/23       Pt will be able to complete bed mobility mod I with use of bed HR.            Transfers (Progressing)       Start:  10/10/23    Expected End:  10/21/23       Pt will be able to complete all transfers with FWW CGA demonstrating good safety awareness and proper body mechanics.           Gait (Progressing)       Start:  10/10/23    Expected End:  10/21/23       Pt will be able to ambulate 100 ft with FWW CGA with good safety awareness and ability to maintain COG/GABRIEL in midline.           Strength (Progressing)       Start:  10/10/23    Expected End:  10/21/23       Pt will improve BLE strength with supine, seated and standing exercises to WFL.           Balance (Progressing)       Start:  10/10/23    Expected End:  10/21/23       Pt will demonstrate good static/dynamic standing balance without evidence of right lateral lean or retro LOB.              Pain - Adult          Safety       LTG - Patient will adhere to hip precautions during ADL's and transfers       Start:  10/10/23    Expected End:  10/21/23            LTG - Patient will demonstrate safety requirements appropriate to situation/environment       Start:  10/10/23    Expected End:  10/21/23            LTG - Patient will utilize safety techniques       Start:  10/10/23    Expected End:  10/21/23            STG - Patient locks brakes on wheelchair       Start:  10/10/23    Expected End:  10/21/23            STG - Patient uses call light consistently to request assistance with transfers       Start:  10/10/23    Expected End:  10/21/23            STG - Patient uses gait belt during all transfers       Start:  10/10/23    Expected End:  10/21/23            Goal 1       Start:  10/10/23    Expected End:  10/21/23            Goal 2       Start:  10/10/23    Expected End:  10/21/23            Goal 3        Start:  10/10/23    Expected End:  10/21/23

## 2023-10-18 NOTE — CARE PLAN
The patient's goals for the shift include   Problem: Diabetes  Goal: Achieve decreasing blood glucose levels by end of shift  Outcome: Met     Problem: General Stroke  Goal: No symptoms of hemorrhage throughout shift  Outcome: Met  Goal: Controlled blood glucose throughout shift  Outcome: Met       The clinical goals for the shift include patient will remain stable this shift    Over the shift, the patient did make progress toward the following goals.

## 2023-10-18 NOTE — PROGRESS NOTES
Internal Medicine Progress Note    Patient Name: Zack Figueroa          MRN: [id number]  Today's Date: October 17, 2023          Attending: George Lenz MD    Subjective:  Patient was seen and examined at bedside, patient was sitting in recliner at bedside, he is more awake today, he denies chest pain, abdominal pain, nausea or vomiting.    Review Of Systems:  GENERAL: Generalized weakness  CARDIOVASCULAR: Negative for chest pain, leg swelling  RESPIRATORY: Negative for shortness of breath  GI: No nausea, vomiting, or diarrhea    Objective:  Vitals:    10/17/23 0800 10/17/23 1200 10/17/23 1600 10/17/23 2000   BP: 171/72 148/68 137/66 163/73   BP Location:   Left arm    Patient Position:   Lying    Pulse: 71 65 65 65   Resp: 18 18 18 16   Temp: 36.9 °C (98.4 °F) 36.6 °C (97.9 °F) 36.3 °C (97.3 °F) 36.5 °C (97.7 °F)   TempSrc:  Temporal Temporal    SpO2: 100% 97% 97% 98%   Weight:       Height:           Physical Exam:   General appearance: Frail appearing, in no distress  Lungs: Clear to auscultation  Heart: No murmur, gallop, or rubs.  No ectopy  Abdomen: Soft, non-tender. Bowel sounds normal.  Neuro: Awake, slurred speech    Labs:  Results for orders placed or performed during the hospital encounter of 10/09/23 (from the past 24 hour(s))   CBC   Result Value Ref Range    WBC 9.3 4.4 - 11.3 x10*3/uL    nRBC 0.0 0.0 - 0.0 /100 WBCs    RBC 3.17 (L) 4.50 - 5.90 x10*6/uL    Hemoglobin 9.2 (L) 13.5 - 17.5 g/dL    Hematocrit 29.7 (L) 41.0 - 52.0 %    MCV 94 80 - 100 fL    MCH 29.0 26.0 - 34.0 pg    MCHC 31.0 (L) 32.0 - 36.0 g/dL    RDW 14.9 (H) 11.5 - 14.5 %    Platelets 168 150 - 450 x10*3/uL    MPV 10.7 7.5 - 11.5 fL   Renal function panel   Result Value Ref Range    Glucose 172 (H) 74 - 99 mg/dL    Sodium 137 136 - 145 mmol/L    Potassium 3.8 3.5 - 5.3 mmol/L    Chloride 101 98 - 107 mmol/L    Bicarbonate 28 21 - 32 mmol/L    Anion Gap 12 10 - 20 mmol/L    Urea Nitrogen 33 (H) 6 - 23 mg/dL    Creatinine 2.99 (H)  0.50 - 1.30 mg/dL    eGFR 20 (L) >60 mL/min/1.73m*2    Calcium 8.0 (L) 8.6 - 10.3 mg/dL    Phosphorus 3.1 2.5 - 4.9 mg/dL    Albumin 2.7 (L) 3.4 - 5.0 g/dL   Magnesium   Result Value Ref Range    Magnesium 1.86 1.60 - 2.40 mg/dL   POCT GLUCOSE   Result Value Ref Range    POCT Glucose 157 (H) 74 - 99 mg/dL   POCT GLUCOSE   Result Value Ref Range    POCT Glucose 174 (H) 74 - 99 mg/dL   POCT GLUCOSE   Result Value Ref Range    POCT Glucose 174 (H) 74 - 99 mg/dL   POCT GLUCOSE   Result Value Ref Range    POCT Glucose 229 (H) 74 - 99 mg/dL       Medications:  Scheduled medications  amiodarone, 200 mg, oral, Daily  aspirin, 81 mg, oral, Daily  B complex-vitamin C, 1 tablet, oral, Every other day  cefTRIAXone, 1 g, intravenous, q24h  cholecalciferol, 25 mcg, oral, Every other day  docusate sodium, 100 mg, oral, BID  esomeprazole, 40 mg, nasoduodenal tube, Daily before breakfast   Or  pantoprazole, 40 mg, oral, Daily before breakfast   Or  pantoprazole, 40 mg, intravenous, Daily before breakfast  heparin, 2,000 Units, intra-catheter, After Dialysis  heparin, 2,000 Units, intra-catheter, After Dialysis  hydrALAZINE, 25 mg, oral, BID  insulin glargine, 12 Units, subcutaneous, Nightly  insulin lispro, 0-10 Units, subcutaneous, Before meals & nightly  isosorbide mononitrate ER, 30 mg, oral, Daily  rosuvastatin, 20 mg, oral, Daily  sertraline, 25 mg, oral, Daily  sodium chloride, 200 mL, intravenous, After Dialysis      Continuous medications       PRN medications  PRN medications: dextrose 10 % in water (D10W), dextrose, glucagon      Assessment/Plan:  Medical Problems       Problem List       * (Principal) Intraparenchymal hemorrhage of brain (CMS/HCC)         Control blood pressure  Neurology is following  We will check with speech therapy to reevaluate swallowing  Discharge plan to acute rehab facility           Hypertension         Controlled  Continue hydralazine and isosorbide mononitrate.           Hyperglycemia          "Continue current medications  Continue monitoring blood sugar           Transaminitis         We will monitor transaminase levels.           Acute kidney injury superimposed on chronic kidney disease (CMS/HCC)         Patient is getting hemodialysis  Nephrology is managing  Continue monitoring            Urinary tract infection associated with indwelling urethral catheter (CMS/HCC)  Leukocytosis        Patient was switched back to Rocephin  Continue monitoring              Discussed with patient, RN    George Lenz MD   Date: 10/17/23  Time: 10:34 PM    This note was partially created using voice recognition software and is inherently subject to errors including those of syntax and \"sound-alike\" substitutions which may escape proofreading. In such instances, original meaning may be extrapolated by contextual derivation  "

## 2023-10-18 NOTE — PROGRESS NOTES
INPATIENT NEPHROLOGY CONSULT PROGRESS NOTE      Patient Name: Zack Figueroa MRN: [id number]  DATE of SERVICE: October 18, 2023  TIME of SERVICE: 04:05 PM  CONSULTING SERVICE: Nephrology    REASON for CONSULT: DENNISE, CKD IV, dialysis dependent   Post intracranial hemorrhage.    INTERVAL HISTORY:  Patient seen and evaluated at bedside sitting up in the chair.  N.p.o. for tunneled dialysis catheter placement today.  Also patient scheduled for hemodialysis today.  Pending discharge to  acute rehab    SUMMARY OF STAY:  Mr. Figueroa is a 88 y.o. male who presents [facility] October 10, 2023 for further evaluation of change in mental status associated with right arm and left leg weakness with concern for stroke.  Diagnosed with intraparenchymal hemorrhage.  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 Last hemoglobin A1c 7.6.  History of paroxysmal atrial fibrillation follows with EP physiologist Dr. Chávez and cardiologist Dr. Martinez,, chronic diastolic heart failure, coronary artery disease status post remote CABG, hypertension, hyperlipidemia, history of CVA.    ASSESSMENT:  Acute Kidney injury superimposed on chronic kidney disease stage IV/V:  -- Acute kidney injury in the setting of intracranial hemorrhage, hemodynamically mediated changes, with component of contrast-induced nephropathy  -- s/p IV nicardipine infusion.  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  -- Recent contrast exposure  -- Oliguria lack of improvement with IV fluid and Lasix trial  -- ATN with high urinary sodium   --  Hemodialysis was started October 13, 2023.    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     Atrial fibrillation: Anticoagulation on hold  on amiodarone     Hypertension:   - Blood pressure on presentation 187/76 s/p nicardipine infusion  - Pressure readings over the past 12 hours reviewed blood pressure fluctuating between 118/50 to 130/57     Chronic kidney disease stage IV:  -- Longstanding history of diabetes mellitus complicated with triopathy  -- Diabetic nephropathy, hypertensive nephrosclerosis  -- Proteinuric with a protein to creatinine ratio of 2.6  mg/g 7 months ago  -- Baseline serum creatinine 3.3 mg/dL, EGFR 17 mL/min per 1.73 m²  -- serum creatinine 6 mg/dl  BUN of 58 EGFR of 12 mL/min per 1.73 m²     Hyponatremia sodium level 131:  Better with dialysis.    Secondary hyperparathyroidism: Last PTH was more than 600     Acute urinary retention:  Bladder scan 500 cc status post straight cath overnight  External schulte in place      Volume: hypervolemia     Procedures:  Temporary hemodialysis catheter placed October 13, 2023  First hemodialysis session October 13, 2023.  Second hemodialysis session October 14, 2023  Received hemodialysis October 16, 2023     PLAN:  Tunneled dialysis catheter placed by interventional radiology.  Scheduled for hemodialysis today 10/18 per Monday Wednesday Friday schedule.  Cleared for discharge after hemodialysis today if remains stable.  Pending discharge to [facility] acute rehab  To hold vasoactive medications predialysis on dialysis days.    Will sign out to Dr. Ayala  to cover while at  rehab.    Thank you!      SUBJECTIVE:  Seen and evaluated at bedside, sitting up in the chair.  Family at bedside.  Patient eating dinner    Medications:    Current Facility-Administered Medications:     amiodarone (Pacerone) tablet 200 mg, 200 mg, oral, Daily, Jenny Fung PA-C, 200 mg at 10/18/23 0905    aspirin chewable tablet 81 mg, 81 mg, oral, Daily, Kris Dickey MD, 81 mg at 10/18/23 0905    B complex-vitamin C tablet 1 tablet, 1 tablet, oral, Every other day, Jenny Fung PA-C, 1 tablet at 10/17/23 1441    cefTRIAXone (Rocephin) IVPB 1 g, 1 g, intravenous, q24h, Brian Graves PA-C, Stopped at 10/18/23 1330    cholecalciferol (Vitamin D-3) capsule 25 mcg, 25 mcg, oral, Every other day, Jenny Fung PA-C, 25 mcg at 10/17/23 0900    dextrose 10 % in water (D10W) infusion, 50 mL/hr, intravenous, Once PRN, Jenny Fung PA-C    dextrose 50 % injection 25 g, 25 g, intravenous, q15 min PRN, Jenny Fung PA-C    docusate sodium (Colace) capsule 100 mg, 100 mg, oral, BID, Jenny Fung PA-C, 100 mg at 10/18/23 0906    esomeprazole (NexIUM) suspension 40 mg, 40 mg, nasoduodenal tube, Daily before breakfast **OR** pantoprazole (ProtoNix) EC tablet 40 mg, 40 mg, oral, Daily before breakfast, 40 mg at 10/18/23 0910 **OR** pantoprazole (ProtoNix) injection 40 mg, 40 mg, intravenous, Daily before breakfast, Jenny Fung PA-C    glucagon (Glucagen) injection 1 mg, 1 mg, intramuscular, q15 min PRN, Jenny Fung PA-C    heparin 1,000 unit/mL injection 2,000 Units, 2,000 Units, intra-catheter, After Dialysis, Jenny Fung PA-C, 2,000 Units at 10/16/23 1200    heparin 1,000 unit/mL injection 2,000 Units, 2,000 Units, intra-catheter, After Dialysis, Jenny Fung PA-C, 2,000 Units at 10/16/23 1819    hydrALAZINE (Apresoline) tablet 25 mg, 25 mg, oral, BID, Jenny Fung PA-C, 25 mg at 10/18/23 0905     insulin glargine (Lantus) injection 12 Units, 12 Units, subcutaneous, Nightly, Jenny Fung PA-C, 12 Units at 10/17/23 2017    insulin lispro (HumaLOG) injection 0-10 Units, 0-10 Units, subcutaneous, Before meals & nightly, Jenny Fung PA-C, 4 Units at 10/17/23 2018    isosorbide mononitrate ER (Imdur) 24 hr tablet 30 mg, 30 mg, oral, Daily, Jenny Fung PA-C, 30 mg at 10/18/23 0905    rosuvastatin (Crestor) tablet 20 mg, 20 mg, oral, Daily, Jenny Fung PA-C, 20 mg at 10/18/23 0906    sertraline (Zoloft) tablet 25 mg, 25 mg, oral, Daily, Jenny Fung PA-C, 25 mg at 10/18/23 0905    sodium chloride 0.9 % bolus 200 mL, 200 mL, intravenous, After Dialysis, Jenny Fung PA-C, Stopped at 10/16/23 1838    PERTINENT ROS:  GENERAL:  positive for fatigue, poor appetite.  No fever/chills  RESPIRATORY:  positive for shortness of breath.  Negative for cough, wheezing.  CARDIOVASCULAR:   Negative for chest pain or palpitation.  GI:  Negative for abdominal pain, diarrhea, heartburn, nausea, vomiting  : negative for dysuria, hematuria    Physical Exam:  Vital signs in last 24 hours:  Temp:  [36.3 °C (97.3 °F)-36.6 °C (97.9 °F)] 36.4 °C (97.5 °F)  Heart Rate:  [58-69] 64  Resp:  [10-20] 10  BP: (148-198)/(62-85) 152/69    General: Awake, cooperative, not in acute distress  HEENT:  NCAT,  mucous membranes moist and pink  NECK:  Elevated JVD, no carotid bruit, supple, no cervical mass or thyromegaly  LUNGS;  Diminished breath sounds, fine Rales  CV:  Distant, regular rate and rhythm, no murmurs  ABDOMEN:  abdomen soft, nontender, BS normal, no masses or organomegaly  EDEMA:  +2 lower extremity edema/dependent edema  SKIN:  dry and normal turgor, no clubbing, cyanosis or petechia.  No rashes noted      Intake/Output last 3 shifts:  I/O last 3 completed shifts:  In: 1105 (12.9 mL/kg) [P.O.:955; IV Piggyback:150]  Out: 1051 (12.2 mL/kg) [Urine:1050 (0.3 mL/kg/hr);  Stool:1]  Weight: 85.8 kg     DATA:  Diagnotic tests reviewed for Todays visit:  Results from last 7 days   Lab Units 10/18/23  1121   WBC AUTO x10*3/uL 9.7   RBC AUTO x10*6/uL 3.21*   HEMOGLOBIN g/dL 9.3*   HEMATOCRIT % 30.1*       Results from last 7 days   Lab Units 10/18/23  1121 10/12/23  1601 10/12/23  0506   SODIUM mmol/L 137   < > 130*   POTASSIUM mmol/L 3.8   < > 4.3   CHLORIDE mmol/L 101   < > 98   CO2 mmol/L 28   < > 22   BUN mg/dL 44*   < > 64*   CREATININE mg/dL 3.62*   < > 5.33*   CALCIUM mg/dL 8.3*   < > 8.3*   PHOSPHORUS mg/dL 3.7   < >  --    MAGNESIUM mg/dL 1.82   < > 1.97   BILIRUBIN TOTAL mg/dL  --   --  0.6   ALT U/L  --   --  52   AST U/L  --   --  59*    < > = values in this interval not displayed.       Results from last 7 days   Lab Units 10/15/23  0544   COLOR U  Lexus*   APPEARANCE U  Hazy*   PH U  5.0   SPEC GRAV UR  1.020   PROTEIN U mg/dL >=500 (3+)*   BLOOD UR  LARGE (3+)*   NITRITE U  NEGATIVE   WBC UR /HPF >50*   BACTERIA UR /HPF 1+*       IMAGING: CXR reviewed in  images      SIGNATURE: Nereida Navarro MD  Nephrology and Hypertension  [address] [facility]  Office phone: [phone]  FAX: [phone]    This note was partially generated using the Dragon voice recognition system, and there may be some incorrect words, spelling's and punctuation that were not noted in checking the note before saving.

## 2023-10-18 NOTE — PRE-PROCEDURE NOTE
Interventional Radiology Preprocedure Note    Indication for procedure: The primary encounter diagnosis was Intraparenchymal hemorrhage of brain (CMS/HCC). 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    Relevant review of systems: NA    Relevant Labs:   Lab Results   Component Value Date    CREATININE 3.62 (H) 10/18/2023    EGFR 16 (L) 10/18/2023    INR 1.2 (H) 10/12/2023    PROTIME 13.8 (H) 10/12/2023       Planned Sedation/Anesthesia: Moderate    Airway assessment: normal    Directed physical examination:      Breathing:  Work of Breathing: normal  Respiratory Rate: Resp: 20  Respiratory Assistance: None    Circulation:  Capillary refill: 2 seconds  Pulses: Present 2+  Skin: Pink, warm, well perfused    Mentation:  Alert and oriented    Mallampati: II (hard and soft palate, upper portion of tonsils anduvula visible)    ASA Score: ASA 2 - Patient with mild systemic disease with no functional limitations    Benefits, risks and alternatives of procedure and planned sedation have been discussed with the patient and/or their representative. All questions answered and they agree to proceed.

## 2023-10-18 NOTE — PROGRESS NOTES
INPATIENT NEPHROLOGY CONSULT PROGRESS NOTE      Patient Name: Zack Figueroa MRN: [id number]  DATE of SERVICE: October 17, 2023  TIME of SERVICE: 04:49 PM  CONSULTING SERVICE: Nephrology    REASON for CONSULT: DENNISE, CKD IV, dialysis dependent   Post intracranial hemorrhage.    INTERVAL HISTORY:  Significant improvement in mental status likely due to improvement in uremic encephalopathy  N.p.o. after midnight for tunneled dialysis catheter placement tomorrow  Temporary hemodialysis catheter placed October 13, 2023    SUMMARY OF STAY:  Mr. Figueroa is a 88 y.o. male who presents [facility] October 10, 2023 for further evaluation of change in mental status associated with right arm and left leg weakness with concern for stroke.  Diagnosed with intraparenchymal hemorrhage.  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 Last hemoglobin A1c 7.6.  History of paroxysmal atrial fibrillation follows with EP physiologist Dr. Chávez and cardiologist Dr. Martinez,, chronic diastolic heart failure, coronary artery disease status post remote CABG, hypertension, hyperlipidemia, history of CVA.    ASSESSMENT:  Acute Kidney injury superimposed on chronic kidney disease stage IV/V:  -- Acute kidney injury in the setting of intracranial hemorrhage, hemodynamically mediated changes, with component of contrast-induced nephropathy  -- s/p IV nicardipine infusion.  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  -- Recent contrast exposure  -- Oliguria lack of improvement with IV fluid and Lasix trial  -- ATN with high  urinary sodium   -- Hemodialysis was started October 13, 2023.    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     Atrial fibrillation: Anticoagulation on hold  on amiodarone     Hypertension:   - Blood pressure on presentation 187/76 s/p nicardipine infusion  - Pressure readings over the past 12 hours reviewed blood pressure fluctuating between 118/50 to 130/57     Chronic kidney disease stage IV:  -- Longstanding history of diabetes mellitus complicated with triopathy  -- Diabetic nephropathy, hypertensive nephrosclerosis  -- Proteinuric with a protein to creatinine ratio of 2.6  mg/g 7 months ago  -- Baseline serum creatinine 3.3 mg/dL, EGFR 17 mL/min per 1.73 m²  -- serum creatinine 6 mg/dl  BUN of 58 EGFR of 12 mL/min per 1.73 m²     Hyponatremia sodium level 131:  Better with dialysis.    Secondary hyperparathyroidism: Last PTH was more than 600     Acute urinary retention:  Bladder scan 500 cc status post straight cath overnight  External schulte in place      Volume: hypervolemia      PLAN:  Significant improvement in mental status likely due to improvement in uremic encephalopathy  N.p.o. after midnight for tunneled dialysis catheter placement tomorrow.    Electrolytes and volume status reviewed for hemodialysis tomorrow    To hold vasoactive medications predialysis on dialysis days.      SUBJECTIVE:  Seen and  evaluated at bedside, sitting up in the chair.  Family at bedside.  Patient eating dinner    Medications:    Current Facility-Administered Medications:     amiodarone (Pacerone) tablet 200 mg, 200 mg, oral, Daily, Jenny Fung PA-C, 200 mg at 10/17/23 0938    aspirin chewable tablet 81 mg, 81 mg, oral, Daily, Kris Dickey MD, 81 mg at 10/17/23 0938    B complex-vitamin C tablet 1 tablet, 1 tablet, oral, Every other day, Jenny Fung PA-C, 1 tablet at 10/17/23 1441    cefTRIAXone (Rocephin) IVPB 1 g, 1 g, intravenous, q24h, Brian Graves PA-C, Stopped at 10/17/23 1511    cholecalciferol (Vitamin D-3) capsule 25 mcg, 25 mcg, oral, Every other day, Jenny Fung PA-C, 25 mcg at 10/17/23 0900    dextrose 10 % in water (D10W) infusion, 50 mL/hr, intravenous, Once PRN, Jenny Fung PA-C    dextrose 50 % injection 25 g, 25 g, intravenous, q15 min PRN, Jenny Fung PA-C    docusate sodium (Colace) capsule 100 mg, 100 mg, oral, BID, Jenny Fung PA-C, 100 mg at 10/17/23 2017    esomeprazole (NexIUM) suspension 40 mg, 40 mg, nasoduodenal tube, Daily before breakfast **OR** pantoprazole (ProtoNix) EC tablet 40 mg, 40 mg, oral, Daily before breakfast, 40 mg at 10/17/23 0641 **OR** pantoprazole (ProtoNix) injection 40 mg, 40 mg, intravenous, Daily before breakfast, Jenny Fung PA-C    glucagon (Glucagen) injection 1 mg, 1 mg, intramuscular, q15 min PRN, Jenny Fung PA-C    heparin 1,000 unit/mL injection 2,000 Units, 2,000 Units, intra-catheter, After Dialysis, Jenny Fung PA-C, 2,000 Units at 10/16/23 1200    heparin 1,000 unit/mL injection 2,000 Units, 2,000 Units, intra-catheter, After Dialysis, Jenny Fung PA-C, 2,000 Units at 10/16/23 1819    hydrALAZINE (Apresoline) tablet 25 mg, 25 mg, oral, BID, Jenny Fung PA-C, 25 mg at 10/17/23 2017    insulin glargine (Lantus) injection 12 Units, 12 Units, subcutaneous,  Nightly, Jenny Fung PA-C, 12 Units at 10/17/23 2017    insulin lispro (HumaLOG) injection 0-10 Units, 0-10 Units, subcutaneous, Before meals & nightly, Jenny Fung PA-C, 4 Units at 10/17/23 2018    isosorbide mononitrate ER (Imdur) 24 hr tablet 30 mg, 30 mg, oral, Daily, Jenny Fung PA-C, 30 mg at 10/17/23 0938    rosuvastatin (Crestor) tablet 20 mg, 20 mg, oral, Daily, Jenny Fung PA-C, 20 mg at 10/17/23 0938    sertraline (Zoloft) tablet 25 mg, 25 mg, oral, Daily, Jenny Fung PA-C, 25 mg at 10/17/23 0938    sodium chloride 0.9 % bolus 200 mL, 200 mL, intravenous, After Dialysis, Jenny Fung PA-C, Stopped at 10/16/23 1838    PERTINENT ROS:  GENERAL:  positive for fatigue, poor appetite.  No fever/chills  RESPIRATORY:  positive for shortness of breath.  Negative for cough, wheezing.  CARDIOVASCULAR:   Negative for chest pain or palpitation.  GI:  Negative for abdominal pain, diarrhea, heartburn, nausea, vomiting  : negative for dysuria, hematuria    Physical Exam:  Vital signs in last 24 hours:  Temp:  [36.3 °C (97.3 °F)-37.2 °C (99 °F)] 36.5 °C (97.7 °F)  Heart Rate:  [65-71] 65  Resp:  [16-18] 16  BP: (137-171)/(66-73) 163/73    General: Awake, cooperative, not in acute distress  HEENT:  NCAT,  mucous membranes moist and pink  NECK:  Elevated JVD, no carotid bruit, supple, no cervical mass or thyromegaly  LUNGS;  Diminished breath sounds, fine Rales  CV:  Distant, regular rate and rhythm, no murmurs  ABDOMEN:  abdomen soft, nontender, BS normal, no masses or organomegaly  EDEMA:  +2 lower extremity edema/dependent edema  SKIN:  dry and normal turgor, no clubbing, cyanosis or petechia.  No rashes noted      Intake/Output last 3 shifts:  I/O last 3 completed shifts:  In: 1785 (21.2 mL/kg) [P.O.:1035; I.V.:600 (7.1 mL/kg); IV Piggyback:150]  Out: 601 (7.1 mL/kg) [Urine:600 (0.2 mL/kg/hr); Stool:1]  Weight: 84.1 kg     DATA:  Diagnotic tests reviewed for  Todays visit:  Results from last 7 days   Lab Units 10/17/23  0734   WBC AUTO x10*3/uL 9.3   RBC AUTO x10*6/uL 3.17*   HEMOGLOBIN g/dL 9.2*   HEMATOCRIT % 29.7*       Results from last 7 days   Lab Units 10/17/23  0734 10/12/23  1601 10/12/23  0506   SODIUM mmol/L 137   < > 130*   POTASSIUM mmol/L 3.8   < > 4.3   CHLORIDE mmol/L 101   < > 98   CO2 mmol/L 28   < > 22   BUN mg/dL 33*   < > 64*   CREATININE mg/dL 2.99*   < > 5.33*   CALCIUM mg/dL 8.0*   < > 8.3*   PHOSPHORUS mg/dL 3.1   < >  --    MAGNESIUM mg/dL 1.86   < > 1.97   BILIRUBIN TOTAL mg/dL  --   --  0.6   ALT U/L  --   --  52   AST U/L  --   --  59*    < > = values in this interval not displayed.       Results from last 7 days   Lab Units 10/15/23  0544   COLOR U  Lexus*   APPEARANCE U  Hazy*   PH U  5.0   SPEC GRAV UR  1.020   PROTEIN U mg/dL >=500 (3+)*   BLOOD UR  LARGE (3+)*   NITRITE U  NEGATIVE   WBC UR /HPF >50*   BACTERIA UR /HPF 1+*       IMAGING: CXR reviewed in  images      SIGNATURE: Nereida Navarro MD  Nephrology and Hypertension  [address]  Office phone: [phone]  FAX: [phone]    This note was partially generated using the Dragon voice recognition system, and there may be some incorrect words, spelling's and punctuation that were not noted in checking the note before saving.

## 2023-10-18 NOTE — POST-PROCEDURE NOTE
Interventional Radiology Brief Postprocedure Note    Attending: Edu Dorsey MD     Assistant: none    Diagnosis: renal failure    Description of procedure: exchange of rt internal jugular  temporary dialysis catheter for a permanent dialysis catheter     Anesthesia:  moderate sedation    Complications: None    Estimated Blood Loss: none    Medications  As of 10/18/23 1434      iohexol (OMNIPaque) 350 mg iodine/mL injection 75 mL (mL) Total volume:  75 mL      Date/Time Rate/Dose/Volume Action       10/09/23  2252 75 mL Given               niCARdipine (Cardene) 40 mg in sodium chloride 200 mL (0.2 mg/mL) infusion (premix) (mg/hr) Total dose:  Cannot be calculated*   *Total user-documented volume 675 mL may contain volume from other administrations     Date/Time Rate/Dose/Volume Action       10/09/23  2334 5 mg/hr - 25 mL/hr New Bag     10/10/23  0328 2.5 mg/hr - 12.5 mL/hr Rate Change      0652 2.5 mg/hr - 12.5 mL/hr Rate Verify      0700 2.5 mg/hr - 12.5 mL/hr Rate Verify      0800 2.5 mg/hr - 12.5 mL/hr Rate Verify      0900 2.5 mg/hr - 12.5 mL/hr Rate Verify      1000 2.5 mg/hr - 12.5 mL/hr Rate Verify      1100 2.5 mg/hr - 12.5 mL/hr Rate Verify      1200 2.5 mg/hr - 12.5 mL/hr Rate Verify      1245 2.5 mg/hr - 12.5 mL/hr New Bag      1300 2.5 mg/hr - 12.5 mL/hr Rate Verify      1400 2.5 mg/hr - 12.5 mL/hr Rate Verify      1500 2.5 mg/hr - 12.5 mL/hr Rate Verify      1600 2.5 mg/hr - 12.5 mL/hr Rate Verify      1700 2.5 mg/hr - 12.5 mL/hr Rate Verify      1800 2.5 mg/hr - 12.5 mL/hr Rate Verify      1900 2.5 mg/hr - 12.5 mL/hr Rate Verify     10/11/23  0325 2.5 mg/hr - 12.5 mL/hr New Bag      0700 2.5 mg/hr - 12.5 mL/hr Rate Verify      0800 2.5 mg/hr - 12.5 mL/hr Rate Verify      0900 2.5 mg/hr - 12.5 mL/hr Rate Verify      1000 2.5 mg/hr - 12.5 mL/hr Rate Verify      1100  Stopped      1330 2.5 mg/hr - 12.5 mL/hr Restarted      1400 2.5 mg/hr - 12.5 mL/hr Rate Verify      1500 2.5 mg/hr - 12.5 mL/hr  Rate Verify      1600 2.5 mg/hr - 12.5 mL/hr Rate Verify      1700 2.5 mg/hr - 12.5 mL/hr Rate Verify      1800 2.5 mg/hr - 12.5 mL/hr Rate Verify      1900 2.5 mg/hr - 12.5 mL/hr Rate Verify      2000 2.5 mg/hr - 12.5 mL/hr Rate Verify      2100 2.5 mg/hr - 12.5 mL/hr Rate Verify      2131 2.5 mg/hr - 12.5 mL/hr New Bag      2200 2.5 mg/hr - 12.5 mL/hr Rate Verify      2300 2.5 mg/hr - 12.5 mL/hr Rate Verify     10/12/23  0000 2.5 mg/hr - 12.5 mL/hr Rate Verify      0048  Stopped      0143 2.5 mg/hr - 12.5 mL/hr Restarted      0200 2.5 mg/hr - 12.5 mL/hr Rate Verify      0300 2.5 mg/hr - 12.5 mL/hr Rate Verify      0400 2.5 mg/hr - 12.5 mL/hr Rate Verify      0505  Stopped               four factor human prothrombin complex concentrate (Kcentra) 2,000 Units in sterile water 80 mL infusion (mL/hr) Total dose:  2,000 Units*   *From user-documented volume     Date/Time Rate/Dose/Volume Action       10/09/23  2347 2,000 Units - 480 mL/hr (over 10 min) New Bag      2357 80 mL [vol] Stopped               dextrose 50 % injection 25 g (g) Total dose:  Cannot be calculated*   *Administration dose not documented     Date/Time Rate/Dose/Volume Action       10/11/23  0804 *25 g Missed     10/13/23  1521 *Not included in total MAR Hold      1553 *Not included in total MAR Unhold               dextrose 50 % injection 25 g (g) Total dose:  25 g Dosing weight:  78.2      Date/Time Rate/Dose/Volume Action       10/12/23  0839 25 g Given               glucagon (Glucagen) injection 1 mg (mg) Total dose:  Cannot be calculated*   *Administration dose not documented     Date/Time Rate/Dose/Volume Action       10/13/23  1521 *Not included in total MAR Hold      1553 *Not included in total MAR Unhold               dextrose 10 % in water (D10W) infusion (mL/hr) Total volume:  Not documented*   *Total volume has not been documented. View each administration to see the amount administered.     Date/Time Rate/Dose/Volume Action       10/13/23   1521 *Not included in total MAR Hold      1553 *Not included in total MAR Unhold               insulin lispro (HumaLOG) injection 0-10 Units (Units) Total dose:  6 Units      Date/Time Rate/Dose/Volume Action       10/10/23  0251 2 Units Given      0555 *Not included in total Missed      0955 *Not included in total Missed      1355 *Not included in total Missed      1747 4 Units Given               insulin lispro (HumaLOG) injection 0-10 Units (Units) Total dose:  44 Units Dosing weight:  78      Date/Time Rate/Dose/Volume Action       10/10/23  2028 4 Units Given     10/11/23  0800 *Not included in total Missed      1100 *Not included in total Missed      1600 *Not included in total Missed      2138 2 Units Given     10/12/23  0700 *Not included in total Missed      1100 *Not included in total Missed      1600 *Not included in total Missed      2148 2 Units Given     10/13/23  0700 *Not included in total Missed      1100 *Not included in total Missed      1521 *Not included in total MAR Hold      1553 *Not included in total MAR Unhold      1600 *Not included in total Missed     10/14/23  0700 *Not included in total Missed      1100 *Not included in total Missed      1744 4 Units Given      2305 2 Units Given     10/15/23  0700 *Not included in total Missed      1137 4 Units Given      1658 4 Units Given      2148 4 Units Given     10/16/23  0700 *Not included in total Missed      1504 4 Units Given      1600 *Not included in total Missed      2214 4 Units Given     10/17/23  0700 2 Units Given      1225 2 Units Given      1659 2 Units Given      2018 4 Units Given     10/18/23  0700 *Not included in total Missed      1100 *Not included in total Missed               ondansetron (Zofran) injection 4 mg (mg) Total dose:  Cannot be calculated* Dosing weight:  78.1   *Administration dose not documented     Date/Time Rate/Dose/Volume Action       10/13/23  1521 *Not included in total MAR Hold      1553 *Not included in  total MAR Unhold               flu vaccine, quadrivalent, high-dose, preservative free, age 65y+ (FLUZONE) (mL) Total dose:  Cannot be calculated* Dosing weight:  78.1   *Administration dose not documented     Date/Time Rate/Dose/Volume Action       10/13/23  1521 *Not included in total MAR Hold      1553 *Not included in total MAR Unhold               docusate sodium (Colace) capsule 100 mg (mg) Total dose:  1,300 mg* Dosing weight:  78.1   *Administration not included in total     Date/Time Rate/Dose/Volume Action       10/10/23  0919 100 mg Given      2029 100 mg Given     10/11/23  0859 100 mg Given      2128 100 mg Given     10/12/23  0817 100 mg Given      2135 100 mg Given     10/13/23  0829 100 mg Given      1521 *Not included in total MAR Hold      1553 *Not included in total MAR Unhold      2239 100 mg Given     10/14/23  0935 100 mg Given      2100 *100 mg Missed     10/15/23  0900 *100 mg Missed      2100 *100 mg Missed     10/16/23  0856 *100 mg Missed      2216 100 mg Given     10/17/23  0938 100 mg Given      2017 100 mg Given     10/18/23  0906 100 mg Given               hydrALAZINE (Apresoline) tablet 12.5 mg (mg) Total dose:  37.5 mg Dosing weight:  78      Date/Time Rate/Dose/Volume Action       10/10/23  1153 12.5 mg Given      2029 12.5 mg Given     10/11/23  0859 12.5 mg Given               hydrALAZINE (Apresoline) tablet 12.5 mg (mg) Total dose:  12.5 mg Dosing weight:  78.3      Date/Time Rate/Dose/Volume Action       10/11/23  1615 12.5 mg Given               hydrALAZINE (Apresoline) tablet 25 mg (mg) Total dose:  350 mg Dosing weight:  78.3      Date/Time Rate/Dose/Volume Action       10/11/23  2128 25 mg Given     10/12/23  0817 25 mg Given      2135 25 mg Given     10/13/23  0830 25 mg Given      1521 *Not included in total MAR Hold      1553 *Not included in total MAR Unhold      2235 25 mg Given     10/14/23  0935 25 mg Given      2153 25 mg Given     10/15/23  0949 25 mg Given       2149 25 mg Given     10/16/23  0855 25 mg Given      2216 25 mg Given     10/17/23  0938 25 mg Given      2017 25 mg Given     10/18/23  0905 25 mg Given               amiodarone (Pacerone) tablet 200 mg (mg) Total dose:  1,800 mg Dosing weight:  78      Date/Time Rate/Dose/Volume Action       10/10/23  1153 200 mg Given     10/11/23  0859 200 mg Given     10/12/23  0817 200 mg Given     10/13/23  0830 200 mg Given      1521 *Not included in total MAR Hold      1553 *Not included in total MAR Unhold     10/14/23  0936 200 mg Given     10/15/23  0948 200 mg Given     10/16/23  0856 200 mg Given     10/17/23  0938 200 mg Given     10/18/23  0905 200 mg Given               rosuvastatin (Crestor) tablet 20 mg (mg) Total dose:  180 mg Dosing weight:  78      Date/Time Rate/Dose/Volume Action       10/10/23  1153 20 mg Given     10/11/23  0859 20 mg Given     10/12/23  0817 20 mg Given     10/13/23  1521 *Not included in total MAR Hold      1553 *Not included in total MAR Unhold      2236 20 mg Given     10/14/23  0935 20 mg Given     10/15/23  0949 20 mg Given     10/16/23  0856 20 mg Given     10/17/23  0938 20 mg Given     10/18/23  0906 20 mg Given               esomeprazole (NexIUM) suspension 40 mg (mg) Total dose:  Cannot be calculated* Dosing weight:  78   *Administration dose not documented     Date/Time Rate/Dose/Volume Action       10/11/23  0920 *Not included in total See Alternative     10/12/23  0819 *Not included in total See Alternative     10/13/23  0829 *Not included in total See Alternative      1521 *Not included in total MAR Hold      1553 *Not included in total MAR Unhold     10/14/23  0935 *Not included in total See Alternative     10/15/23  0621 *Not included in total See Alternative     10/16/23  0613 *Not included in total See Alternative     10/17/23  0641 *Not included in total See Alternative     10/18/23  0910 *Not included in total See Alternative               pantoprazole (ProtoNix) EC  tablet 40 mg (mg) Total dose:  320 mg Dosing weight:  78      Date/Time Rate/Dose/Volume Action       10/11/23  0920 40 mg Given     10/12/23  0819 40 mg Given     10/13/23  0829 40 mg Given      1521 *Not included in total MAR Hold      1553 *Not included in total MAR Unhold     10/14/23  0935 40 mg Given     10/15/23  0621 40 mg Given     10/16/23  0613 40 mg Given     10/17/23  0641 40 mg Given     10/18/23  0910 40 mg Given               pantoprazole (ProtoNix) injection 40 mg (mg) Total dose:  Cannot be calculated* Dosing weight:  78   *Administration dose not documented     Date/Time Rate/Dose/Volume Action       10/11/23  0920 *Not included in total See Alternative     10/12/23  0819 *Not included in total See Alternative     10/13/23  0829 *Not included in total See Alternative      1521 *Not included in total MAR Hold      1553 *Not included in total MAR Unhold     10/14/23  0935 *Not included in total See Alternative     10/15/23  0621 *Not included in total See Alternative     10/16/23  0613 *Not included in total See Alternative     10/17/23  0641 *Not included in total See Alternative     10/18/23  0910 *Not included in total See Alternative               insulin glargine (Lantus) injection 12 Units (Units) Total dose:  60 Units* Dosing weight:  78   *Administration not included in total     Date/Time Rate/Dose/Volume Action       10/10/23  2031 12 Units Given     10/11/23  2126 12 Units Given     10/12/23  0610 *Not included in total Held by provider      2100 *Not included in total Automatically Held     10/13/23  2100 *Not included in total Automatically Held     10/14/23  2100 *12 Units Missed     10/15/23  1018 *Not included in total Unheld by provider      2148 12 Units Given     10/16/23  2214 12 Units Given     10/17/23  2017 12 Units Given               cholecalciferol (Vitamin D-3) capsule 25 mcg (mcg) Total dose:  75 mcg Dosing weight:  78      Date/Time Rate/Dose/Volume Action       10/11/23   0900 25 mcg Given     10/13/23  1521 *Not included in total MAR Hold      1553 *Not included in total MAR Unhold     10/15/23  0900 25 mcg Given     10/17/23  0900 25 mcg Given               B complex-vitamin C tablet 1 tablet (tablet) Total dose:  4 tablet Dosing weight:  78      Date/Time Rate/Dose/Volume Action       10/11/23  0859 1 tablet Given     10/13/23  0900 1 tablet Given      1521 *Not included in total MAR Hold      1553 *Not included in total MAR Unhold     10/15/23  0948 1 tablet Given     10/17/23  1441 1 tablet Given               isosorbide mononitrate ER (Imdur) 24 hr tablet 30 mg (mg) Total dose:  240 mg Dosing weight:  78.3      Date/Time Rate/Dose/Volume Action       10/11/23  0920 30 mg Given     10/12/23  0817 30 mg Given     10/13/23  0829 30 mg Given      1521 *Not included in total MAR Hold      1553 *Not included in total MAR Unhold     10/14/23  0935 30 mg Given     10/15/23  0948 30 mg Given     10/16/23  0855 30 mg Given     10/17/23  0938 30 mg Given     10/18/23  0905 30 mg Given               sertraline (Zoloft) tablet 25 mg (mg) Total dose:  200 mg Dosing weight:  78.3      Date/Time Rate/Dose/Volume Action       10/11/23  1108 25 mg Given     10/12/23  0817 25 mg Given     10/13/23  0829 25 mg Given      1521 *Not included in total MAR Hold      1553 *Not included in total MAR Unhold     10/14/23  0935 25 mg Given     10/15/23  0949 25 mg Given     10/16/23  0855 25 mg Given     10/17/23  0938 25 mg Given     10/18/23  0905 25 mg Given               sodium chloride 0.9% infusion (mL/hr) Total volume:  895 mL* Dosing weight:  78.3   *From user-documented volume     Date/Time Rate/Dose/Volume Action       10/11/23  1404 75 mL/hr New Bag      1500 75 mL/hr Rate Verify      1600 75 mL/hr Rate Verify      1700 75 mL/hr Rate Verify      1800 75 mL/hr Rate Verify      1900 75 mL/hr - 370 mL Rate Verify      2000 75 mL/hr - 75 mL Rate Verify      2100 75 mL/hr - 75 mL Rate Verify       2200 75 mL/hr - 75 mL Rate Verify      2300 75 mL/hr - 75 mL Rate Verify     10/12/23  0000 75 mL/hr - 75 mL Rate Verify      0100 75 mL/hr - 75 mL Rate Verify      0200 75 mL/hr - 75 mL Rate Verify      0202  Stopped      0500 0 mL                labetaloL (Normodyne,Trandate) injection 10 mg (mg) Total dose:  20 mg* Dosing weight:  78.3   *Administration not included in total     Date/Time Rate/Dose/Volume Action       10/12/23  0946 10 mg Given      1302 *10 mg Missed     10/13/23  0346 10 mg Given      1521 *Not included in total MAR Hold      1553 *Not included in total MAR Unhold               labetaloL (Normodyne,Trandate) injection 10 mg (mg) Total dose:  Cannot be calculated* Dosing weight:  78.2   *Administration dose not documented     Date/Time Rate/Dose/Volume Action       10/13/23  1521 *Not included in total MAR Hold      1553 *Not included in total MAR Unhold               hydrOXYzine HCL (Atarax) tablet 25 mg (mg) Total dose:  25 mg Dosing weight:  78.3      Date/Time Rate/Dose/Volume Action       10/11/23  2301 25 mg Given               hydrALAZINE (Apresoline) injection 10 mg (mg) Total dose:  60 mg Dosing weight:  78.2      Date/Time Rate/Dose/Volume Action       10/12/23  1312 10 mg (over 2 min) Given      1434 10 mg (over 2 min) Given      1846 10 mg (over 2 min) Given      2321 10 mg (over 2 min) Given     10/13/23  0128 10 mg (over 2 min) Given      0547 10 mg (over 2 min) Given      1521 *Not included in total MAR Hold      1553 *Not included in total MAR Unhold               hydrALAZINE (Apresoline) injection 5 mg (mg) Total dose:  5 mg Dosing weight:  84.1      Date/Time Rate/Dose/Volume Action       10/18/23  0041 5 mg Given               furosemide (Lasix) injection 80 mg (mg) Total dose:  80 mg Dosing weight:  78.2      Date/Time Rate/Dose/Volume Action       10/12/23  0820 80 mg Given               lactated Ringer's infusion (mL/hr) Total volume:  150 mL* Dosing weight:  78.2   *From  user-documented volume     Date/Time Rate/Dose/Volume Action       10/12/23  1128 75 mL/hr New Bag     10/13/23  0259 150 mL                sodium chloride 0.9 % bolus 200 mL (mL/hr) Total volume:  Not documented* Dosing weight:  83.1   *Total volume has not been documented. View each administration to see the amount administered.     Date/Time Rate/Dose/Volume Action       10/13/23  1521 *Not included in total MAR Hold      1553 *Not included in total MAR Unhold     10/16/23  1200 200 mL - 400 mL/hr (over 30 min) New Bag      1838  (over 30 min) Stopped               heparin 1,000 unit/mL injection 2,000 Units (Units) Total dose:  2,000 Units Dosing weight:  83.1      Date/Time Rate/Dose/Volume Action       10/13/23  1521 *Not included in total MAR Hold      1553 *Not included in total MAR Unhold     10/16/23  1200 2,000 Units Given               heparin 1,000 unit/mL injection 2,000 Units (Units) Total dose:  4,000 Units Dosing weight:  83.1      Date/Time Rate/Dose/Volume Action       10/13/23  1521 *Not included in total MAR Hold      1553 *Not included in total MAR Unhold     10/16/23  1200 2,000 Units Given      1819 2,000 Units Given               cefTRIAXone (Rocephin) IVPB 1 g (mL/hr) Total dose:  1 g* Dosing weight:  82   *From user-documented volume     Date/Time Rate/Dose/Volume Action       10/15/23  0949 1 g - 100 mL/hr (over 30 min) New Bag      1019  (over 30 min) Stopped      1600 50 mL                cefTRIAXone (Rocephin) IVPB 1 g (mL/hr) Total dose:  2 g* Dosing weight:  84.1   *From user-documented volume     Date/Time Rate/Dose/Volume Action       10/17/23  1441 1 g - 100 mL/hr (over 30 min) - 50 mL New Bag      1511 0 mL Stopped     10/18/23  1300 1 g - 100 mL/hr (over 30 min) - 50 mL New Bag      1330  (over 30 min) Stopped               piperacillin-tazobactam-dextrose (Zosyn) IV 2.25 g (g) Total dose:  0 g* Dosing weight:  82   *Administration not included in total     Date/Time  Rate/Dose/Volume Action       10/15/23  1015 *2.25 g - 12.5 mL/hr (over 240 min) Missed               piperacillin-tazobactam-dextrose (Zosyn) IV 3.375 g (mL/hr) Total dose:  10.13 g* Dosing weight:  82   *From user-documented volume     Date/Time Rate/Dose/Volume Action       10/15/23  1137 3.375 g - 12.5 mL/hr (over 240 min) New Bag      1537  (over 240 min) Stopped      1600 50 mL       2307 3.375 g - 12.5 mL/hr (over 240 min) New Bag     10/16/23  0307  (over 240 min) Stopped      1038 3.375 g - 12.5 mL/hr (over 240 min) New Bag      1438  (over 240 min) Stopped      2215 3.375 g - 12.5 mL/hr (over 240 min) New Bag     10/17/23  0215  (over 240 min) Stopped      0602 50 mL       0937 3.375 g - 12.5 mL/hr (over 240 min) New Bag      1335 50 mL       1337  (over 240 min) Stopped               aspirin chewable tablet 81 mg (mg) Total dose:  243 mg Dosing weight:  82      Date/Time Rate/Dose/Volume Action       10/16/23  0856 81 mg Given     10/17/23  0938 81 mg Given     10/18/23  0905 81 mg Given               fentaNYL PF (Sublimaze) injection (mcg) Total dose:  50 mcg      Date/Time Rate/Dose/Volume Action       10/18/23  1400 50 mcg Given               midazolam (Versed) injection (mg) Total dose:  1 mg      Date/Time Rate/Dose/Volume Action       10/18/23  1400 1 mg Given                   No specimens collected      See detailed result report with images in PACS.    The patient tolerated the procedure well without incident or complication and is in stable condition.

## 2023-10-18 NOTE — PROGRESS NOTES
Occupational Therapy    OT Treatment    Patient Name: Zack Figueroa  MRN: [id number]  Today's Date: 10/18/2023  Time Calculation  Start Time: 1015  Stop Time: 1058  Time Calculation (min): 43 min         Assessment:  End of Session Communication: Bedside nurse  End of Session Patient Position: Up in chair, Alarm on       Plan:  OT Frequency: 5 times per week  OT Discharge Recommendations: High intensity level of continued care     Subjective     Current Problem:  1. Intraparenchymal hemorrhage of brain (CMS/[facility])  Critical Care    Critical Care      2. Oral phase dysphagia [R13.11]        3. 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 A-fib (CMS/[facility])        5. Anticoagulant long-term use        6. Resistant hypertension  hydrALAZINE (Apresoline) 25 mg tablet    isosorbide mononitrate ER (Imdur) 30 mg 24 hr tablet      7. Hyperglycemia  insulin lispro (HumaLOG) 100 unit/mL injection      8. Gastroesophageal reflux disease, unspecified whether esophagitis present  pantoprazole (ProtoNix) 40 mg EC tablet      9. Acute kidney injury superimposed on chronic kidney disease (CMS/[facility])  B complex-vitamin C tablet          General:  OT Received On: 10/18/23  Family/Caregiver Present: Yes  Prior to Session Communication: Bedside nurse  Patient Position Received: Alarm on, Bed, 3 rail up           Pain:  Pain Assessment  Pain Assessment: 0-10  Pain Score: 0 - No pain  Objective           Bed Mobility/Transfers: Bed Mobility  Bed Mobility: Yes  Bed Mobility 1  Bed Mobility 1: Supine to sitting  Level of Assistance 1: Moderate assistance (x2)    Transfers  Transfer: Yes  Transfer 1  Transfer From 1: Sit to  Transfer to 1: Stand  Transfer Device 1: Gait belt  Transfer Level of Assistance 1: Moderate assistance (x2)  Trials/Comments 1:  (Pt completed multiple sit<>stands Mod Ax2, FM performed short distances within room with breaks into chair, initially using fww Mod-Max A,  trialed maite walker and Max A during ambulation, cues for LE sequencing and weight shifting to correct R lean)                Therapy/Activity: Therapeutic Exercise  Therapeutic Exercise Performed: Yes  Therapeutic Exercise Activity 1:  (small object/cup manipulation activity to promote fine motor)             Outcome Measures:[facility] Daily Activity  Putting on and taking off regular lower body clothing: Total  Bathing (including washing, rinsing, drying): A lot  Putting on and taking off regular upper body clothing: A little  Toileting, which includes using toilet, bedpan or urinal: A lot  Taking care of personal grooming such as brushing teeth: A little  Eating Meals: A lot  Daily Activity - Total Score: 13  Education Documentation  ADL Training, taught by BELEN Cannon at 10/18/2023  1:26 PM.  Learner: Patient  Readiness: Acceptance  Method: Demonstration, Explanation  Response: Demonstrated Understanding, Needs Reinforcement    ADL Training, taught by BELEN Cannon at 10/17/2023  3:32 PM.  Learner: Patient  Readiness: Acceptance  Method: Demonstration  Response: Needs Reinforcement, Demonstrated Understanding    Education Comments  No comments found.        EDUCATION:  Education  Individual(s) Educated: Patient  Education Provided: Ergonomics and postural realignment, Fall precautons, POC discussed and agreed upon  Patient Response to Education: Patient/Caregiver Verbalized Understanding of Information  Education Comment: recpetive to recommendations, will require continued education    Goals:  Encounter Problems       Encounter Problems (Active)       OT Goals       Patient will complete functional transfers at CGA level with LRD to facilitate increased independence and safety with home going  (Progressing)       Start:  10/10/23    Expected End:  10/21/23            Patient will complete functional mobility for household distances at CGA level with LRD to facilitate increased  independence and safety with home going  (Progressing)       Start:  10/10/23    Expected End:  10/21/23            Patient will complete LB dressing at CGA level to facilitate safety and independence for home going   (Progressing)       Start:  10/10/23    Expected End:  10/21/23            Patient will complete toileting at CGA level to facilitate safety and independence for home going   (Progressing)       Start:  10/10/23    Expected End:  10/21/23            Patient will complete UB dressing at supervision level to facilitate safety and independence for home going   (Progressing)       Start:  10/10/23    Expected End:  10/21/23               Safety       LTG - Patient will adhere to hip precautions during ADL's and transfers       Start:  10/10/23    Expected End:  10/21/23            LTG - Patient will demonstrate safety requirements appropriate to situation/environment       Start:  10/10/23    Expected End:  10/21/23            LTG - Patient will utilize safety techniques       Start:  10/10/23    Expected End:  10/21/23            STG - Patient locks brakes on wheelchair       Start:  10/10/23    Expected End:  10/21/23            STG - Patient uses call light consistently to request assistance with transfers       Start:  10/10/23    Expected End:  10/21/23            STG - Patient uses gait belt during all transfers       Start:  10/10/23    Expected End:  10/21/23            Goal 1       Start:  10/10/23    Expected End:  10/21/23            Goal 2       Start:  10/10/23    Expected End:  10/21/23            Goal 3       Start:  10/10/23    Expected End:  10/21/23

## 2023-10-18 NOTE — PROGRESS NOTES
Internal Medicine Progress Note    Patient Name: Zack Figueroa          MRN: [id number]  Today's Date: October 18, 2023          Attending: George Lenz MD    Subjective:  Patient was seen and examined at bedside, he denies chest pain, abdominal pain, nausea or vomiting.    Review Of Systems:  GENERAL: Generalized weakness  CARDIOVASCULAR: Negative for chest pain, leg swelling  RESPIRATORY: Negative for shortness of breath  GI: No nausea, vomiting, or diarrhea    Objective:  Vitals:    10/18/23 0115 10/18/23 0400 10/18/23 0506 10/18/23 0800   BP: 155/67 166/64  176/77   BP Location: Left arm Left arm  Right arm   Patient Position:  Lying  Lying   Pulse:  67  69   Resp:  18  16   Temp:  36.4 °C (97.5 °F)  36.6 °C (97.9 °F)   TempSrc:  Temporal  Temporal   SpO2:  98%  98%   Weight:   85.8 kg (189 lb 2.5 oz)    Height:           Physical Exam:   General appearance: Frail appearing, in no distress  Lungs: Clear to auscultation  Heart: No murmur, gallop, or rubs.  No ectopy  Abdomen: Soft, non-tender. Bowel sounds normal.  Neuro: Awake, slurred speech    Labs:  Results for orders placed or performed during the hospital encounter of 10/09/23 (from the past 24 hour(s))   POCT GLUCOSE   Result Value Ref Range    POCT Glucose 174 (H) 74 - 99 mg/dL   POCT GLUCOSE   Result Value Ref Range    POCT Glucose 174 (H) 74 - 99 mg/dL   POCT GLUCOSE   Result Value Ref Range    POCT Glucose 229 (H) 74 - 99 mg/dL   POCT GLUCOSE   Result Value Ref Range    POCT Glucose 107 (H) 74 - 99 mg/dL       Medications:  Scheduled medications  amiodarone, 200 mg, oral, Daily  aspirin, 81 mg, oral, Daily  B complex-vitamin C, 1 tablet, oral, Every other day  cefTRIAXone, 1 g, intravenous, q24h  cholecalciferol, 25 mcg, oral, Every other day  docusate sodium, 100 mg, oral, BID  esomeprazole, 40 mg, nasoduodenal tube, Daily before breakfast   Or  pantoprazole, 40 mg, oral, Daily before breakfast   Or  pantoprazole, 40 mg, intravenous, Daily  "before breakfast  heparin, 2,000 Units, intra-catheter, After Dialysis  heparin, 2,000 Units, intra-catheter, After Dialysis  hydrALAZINE, 25 mg, oral, BID  insulin glargine, 12 Units, subcutaneous, Nightly  insulin lispro, 0-10 Units, subcutaneous, Before meals & nightly  isosorbide mononitrate ER, 30 mg, oral, Daily  rosuvastatin, 20 mg, oral, Daily  sertraline, 25 mg, oral, Daily  sodium chloride, 200 mL, intravenous, After Dialysis      Continuous medications       PRN medications  PRN medications: dextrose 10 % in water (D10W), dextrose, glucagon      Assessment/Plan:  Medical Problems       Problem List       * (Principal) Intraparenchymal hemorrhage of brain (CMS/HCC)         Control blood pressure  Neurology is following  We will check with speech therapy to reevaluate swallowing  Discharge plan to acute rehab facility           Hypertension         Controlled  Continue hydralazine and isosorbide mononitrate.           Hyperglycemia         Continue current medications  Continue monitoring blood sugar           Transaminitis         We will monitor transaminase levels.           Acute kidney injury superimposed on chronic kidney disease (CMS/HCC)         Patient is getting hemodialysis  Patient will need tunneled dialysis catheter prior to discharge         Urinary tract infection associated with indwelling urethral catheter (CMS/HCC)  Leukocytosis        Patient was switched back to Rocephin  Continue monitoring              Discussed with patient, RN    George Lenz MD   Date: 10/18/23  Time: 10:25 AM    This note was partially created using voice recognition software and is inherently subject to errors including those of syntax and \"sound-alike\" substitutions which may escape proofreading. In such instances, original meaning may be extrapolated by contextual derivation  "

## 2023-10-19 ENCOUNTER — HOSPITAL ENCOUNTER (OUTPATIENT)
Facility: REHABILITATION | Age: 88
End: 2023-10-19

## 2023-10-19 ENCOUNTER — DOCUMENTATION (OUTPATIENT)
Dept: NEPHROLOGY | Facility: HOSPITAL | Age: 88
End: 2023-10-19
Payer: MEDICARE

## 2023-10-19 VITALS
RESPIRATION RATE: 18 BRPM | BODY MASS INDEX: 30.12 KG/M2 | WEIGHT: 187.39 LBS | TEMPERATURE: 97.5 F | OXYGEN SATURATION: 93 % | SYSTOLIC BLOOD PRESSURE: 154 MMHG | DIASTOLIC BLOOD PRESSURE: 69 MMHG | HEART RATE: 70 BPM | HEIGHT: 66 IN

## 2023-10-19 PROBLEM — N18.6 TYPE 2 DIABETES MELLITUS WITH CHRONIC KIDNEY DISEASE ON CHRONIC DIALYSIS, WITH LONG-TERM CURRENT USE OF INSULIN (MULTI): Status: ACTIVE | Noted: 2023-10-19

## 2023-10-19 PROBLEM — I48.91 ATRIAL FIBRILLATION (MULTI): Status: ACTIVE | Noted: 2023-10-19

## 2023-10-19 PROBLEM — E11.22 TYPE 2 DIABETES MELLITUS WITH CHRONIC KIDNEY DISEASE ON CHRONIC DIALYSIS, WITH LONG-TERM CURRENT USE OF INSULIN (MULTI): Status: ACTIVE | Noted: 2023-10-19

## 2023-10-19 PROBLEM — Z79.4 TYPE 2 DIABETES MELLITUS WITH CHRONIC KIDNEY DISEASE ON CHRONIC DIALYSIS, WITH LONG-TERM CURRENT USE OF INSULIN (MULTI): Status: ACTIVE | Noted: 2023-10-19

## 2023-10-19 PROBLEM — Z99.2 TYPE 2 DIABETES MELLITUS WITH CHRONIC KIDNEY DISEASE ON CHRONIC DIALYSIS, WITH LONG-TERM CURRENT USE OF INSULIN (MULTI): Status: ACTIVE | Noted: 2023-10-19

## 2023-10-19 PROBLEM — I25.10 CORONARY ARTERY DISEASE INVOLVING NATIVE CORONARY ARTERY OF NATIVE HEART WITHOUT ANGINA PECTORIS: Status: ACTIVE | Noted: 2023-10-19

## 2023-10-19 PROBLEM — N17.0 ACUTE RENAL FAILURE WITH TUBULAR NECROSIS (CMS-HCC): Status: ACTIVE | Noted: 2023-10-19

## 2023-10-19 LAB
ALBUMIN SERPL BCP-MCNC: 2.8 G/DL (ref 3.4–5)
ANION GAP SERPL CALC-SCNC: 12 MMOL/L (ref 10–20)
BACTERIA BLD CULT: NORMAL
BACTERIA BLD CULT: NORMAL
BUN SERPL-MCNC: 25 MG/DL (ref 6–23)
CALCIUM SERPL-MCNC: 8.3 MG/DL (ref 8.6–10.3)
CHLORIDE SERPL-SCNC: 101 MMOL/L (ref 98–107)
CO2 SERPL-SCNC: 28 MMOL/L (ref 21–32)
CREAT SERPL-MCNC: 2.4 MG/DL (ref 0.5–1.3)
ERYTHROCYTE [DISTWIDTH] IN BLOOD BY AUTOMATED COUNT: 14.9 % (ref 11.5–14.5)
GFR SERPL CREATININE-BSD FRML MDRD: 26 ML/MIN/1.73M*2
GLUCOSE BLD MANUAL STRIP-MCNC: 120 MG/DL (ref 74–99)
GLUCOSE SERPL-MCNC: 130 MG/DL (ref 74–99)
HCT VFR BLD AUTO: 32.1 % (ref 41–52)
HGB BLD-MCNC: 9.5 G/DL (ref 13.5–17.5)
MAGNESIUM SERPL-MCNC: 1.85 MG/DL (ref 1.6–2.4)
MCH RBC QN AUTO: 28.9 PG (ref 26–34)
MCHC RBC AUTO-ENTMCNC: 29.6 G/DL (ref 32–36)
MCV RBC AUTO: 98 FL (ref 80–100)
NRBC BLD-RTO: 0 /100 WBCS (ref 0–0)
PHOSPHATE SERPL-MCNC: 3 MG/DL (ref 2.5–4.9)
PLATELET # BLD AUTO: 173 X10*3/UL (ref 150–450)
PMV BLD AUTO: 10.3 FL (ref 7.5–11.5)
POTASSIUM SERPL-SCNC: 3.7 MMOL/L (ref 3.5–5.3)
RBC # BLD AUTO: 3.29 X10*6/UL (ref 4.5–5.9)
SODIUM SERPL-SCNC: 137 MMOL/L (ref 136–145)
WBC # BLD AUTO: 10.4 X10*3/UL (ref 4.4–11.3)

## 2023-10-19 PROCEDURE — 36415 COLL VENOUS BLD VENIPUNCTURE: CPT | Performed by: NURSE PRACTITIONER

## 2023-10-19 PROCEDURE — 97110 THERAPEUTIC EXERCISES: CPT | Mod: GO

## 2023-10-19 PROCEDURE — 2500000001 HC RX 250 WO HCPCS SELF ADMINISTERED DRUGS (ALT 637 FOR MEDICARE OP): Performed by: PHYSICIAN ASSISTANT

## 2023-10-19 PROCEDURE — 83735 ASSAY OF MAGNESIUM: CPT | Performed by: NURSE PRACTITIONER

## 2023-10-19 PROCEDURE — 82947 ASSAY GLUCOSE BLOOD QUANT: CPT

## 2023-10-19 PROCEDURE — 2500000002 HC RX 250 W HCPCS SELF ADMINISTERED DRUGS (ALT 637 FOR MEDICARE OP, ALT 636 FOR OP/ED): Performed by: PHYSICIAN ASSISTANT

## 2023-10-19 PROCEDURE — 2500000001 HC RX 250 WO HCPCS SELF ADMINISTERED DRUGS (ALT 637 FOR MEDICARE OP): Performed by: STUDENT IN AN ORGANIZED HEALTH CARE EDUCATION/TRAINING PROGRAM

## 2023-10-19 PROCEDURE — 99222 1ST HOSP IP/OBS MODERATE 55: CPT | Performed by: INTERNAL MEDICINE

## 2023-10-19 PROCEDURE — 80069 RENAL FUNCTION PANEL: CPT | Performed by: NURSE PRACTITIONER

## 2023-10-19 PROCEDURE — 85027 COMPLETE CBC AUTOMATED: CPT | Performed by: NURSE PRACTITIONER

## 2023-10-19 PROCEDURE — 2500000004 HC RX 250 GENERAL PHARMACY W/ HCPCS (ALT 636 FOR OP/ED): Performed by: NURSE PRACTITIONER

## 2023-10-19 PROCEDURE — 99222 1ST HOSP IP/OBS MODERATE 55: CPT | Performed by: STUDENT IN AN ORGANIZED HEALTH CARE EDUCATION/TRAINING PROGRAM

## 2023-10-19 PROCEDURE — 2500000004 HC RX 250 GENERAL PHARMACY W/ HCPCS (ALT 636 FOR OP/ED): Performed by: PHYSICIAN ASSISTANT

## 2023-10-19 RX ORDER — HYDRALAZINE HYDROCHLORIDE 20 MG/ML
5 INJECTION INTRAMUSCULAR; INTRAVENOUS ONCE
Status: COMPLETED | OUTPATIENT
Start: 2023-10-19 | End: 2023-10-19

## 2023-10-19 RX ADMIN — DOCUSATE SODIUM 100 MG: 100 CAPSULE, LIQUID FILLED ORAL at 08:43

## 2023-10-19 RX ADMIN — HYDRALAZINE HYDROCHLORIDE 5 MG: 20 INJECTION INTRAMUSCULAR; INTRAVENOUS at 01:04

## 2023-10-19 RX ADMIN — SERTRALINE HYDROCHLORIDE 25 MG: 25 TABLET ORAL at 08:43

## 2023-10-19 RX ADMIN — Medication 1 TABLET: at 08:43

## 2023-10-19 RX ADMIN — AMIODARONE HYDROCHLORIDE 200 MG: 200 TABLET ORAL at 08:43

## 2023-10-19 RX ADMIN — ROSUVASTATIN CALCIUM 20 MG: 20 TABLET, FILM COATED ORAL at 08:43

## 2023-10-19 RX ADMIN — PANTOPRAZOLE SODIUM 40 MG: 40 TABLET, DELAYED RELEASE ORAL at 06:28

## 2023-10-19 RX ADMIN — ISOSORBIDE MONONITRATE 30 MG: 30 TABLET, EXTENDED RELEASE ORAL at 08:43

## 2023-10-19 RX ADMIN — ASPIRIN 81 MG 81 MG: 81 TABLET ORAL at 08:43

## 2023-10-19 RX ADMIN — Medication 25 MCG: at 09:00

## 2023-10-19 RX ADMIN — HYDRALAZINE HYDROCHLORIDE 25 MG: 25 TABLET ORAL at 08:43

## 2023-10-19 ASSESSMENT — PAIN - FUNCTIONAL ASSESSMENT: PAIN_FUNCTIONAL_ASSESSMENT: 0-10

## 2023-10-19 ASSESSMENT — COGNITIVE AND FUNCTIONAL STATUS - GENERAL
DRESSING REGULAR LOWER BODY CLOTHING: TOTAL
PERSONAL GROOMING: A LITTLE
HELP NEEDED FOR BATHING: A LOT
TOILETING: A LITTLE
DAILY ACTIVITIY SCORE: 14
DRESSING REGULAR UPPER BODY CLOTHING: A LOT
EATING MEALS: A LITTLE

## 2023-10-19 ASSESSMENT — PAIN SCALES - GENERAL: PAINLEVEL_OUTOF10: 0 - NO PAIN

## 2023-10-19 NOTE — NURSING NOTE
0700: Assumed care of this patient. Pt. Planned for dialysis today and then DC to [facility].    1000: This nurse called wife, Jayy, at this time to let her know that the pt. Is being DC at this time to [facility]. Report called to Montse at this time and all questions were addressed. PT. Leaving in WC at this time. IV removed and heart monitor taken off.

## 2023-10-19 NOTE — PROGRESS NOTES
Transport is arranged for 10am today to [facility]. Facility has been updated. Per nursing, patient and family were updated yesterday evening.     Sarah Webber RN

## 2023-10-19 NOTE — PROGRESS NOTES
Occupational Therapy    OT Treatment    Patient Name: Zack Figueroa  MRN: [id number]  Today's Date: 10/19/2023  Time Calculation  Start Time: 0845  Stop Time: 0900  Time Calculation (min): 15 min         Assessment:  End of Session Communication: Bedside nurse  End of Session Patient Position: Up in chair, Alarm on       Plan:  OT Frequency: 5 times per week  OT Discharge Recommendations: High intensity level of continued care     Subjective     Current Problem:  1. Intraparenchymal hemorrhage of brain (CMS/[facility])  Critical Care    Critical Care      2. Oral phase dysphagia [R13.11]        3. 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 A-fib (CMS/[facility])        5. Anticoagulant long-term use        6. Resistant hypertension  hydrALAZINE (Apresoline) 25 mg tablet    isosorbide mononitrate ER (Imdur) 30 mg 24 hr tablet      7. Hyperglycemia  insulin lispro (HumaLOG) 100 unit/mL injection      8. Gastroesophageal reflux disease, unspecified whether esophagitis present  pantoprazole (ProtoNix) 40 mg EC tablet      9. Acute kidney injury superimposed on chronic kidney disease (CMS/[facility])  B complex-vitamin C tablet      10. Urinary tract infection associated with indwelling urethral catheter, initial encounter (CMS/[facility])  cephalexin (Keflex) 500 mg capsule          General:  OT Received On: 10/19/23  Family/Caregiver Present: Yes  Prior to Session Communication: Bedside nurse  Patient Position Received: Alarm on, Bed, 3 rail up  General Comment: Pt pleasant and cooperative, RN came in room and stated pt is d/c soon and kept patient in bed, perforemd fine motor activity    Vital Signs:       Pain:  Pain Assessment  Pain Assessment: 0-10  Pain Score: 0 - No pain  Objective                            Therapy/Activity: Therapeutic Exercise  Therapeutic Exercise Performed: Yes  Therapeutic Exercise Activity 1: Pt performed hand activities to promote fine motor, cup/eraser  activity.                  Outcome Measures:[facility] Daily Activity  Putting on and taking off regular lower body clothing: Total  Bathing (including washing, rinsing, drying): A lot  Putting on and taking off regular upper body clothing: A lot  Toileting, which includes using toilet, bedpan or urinal: A little  Taking care of personal grooming such as brushing teeth: A little  Eating Meals: A little  Daily Activity - Total Score: 14  Education Documentation  Body Mechanics, taught by BELEN Cannon at 10/19/2023 12:43 PM.  Learner: Patient  Readiness: Acceptance  Method: Demonstration, Explanation  Response: Needs Reinforcement    ADL Training, taught by BELEN Cannon at 10/18/2023  1:26 PM.  Learner: Patient  Readiness: Acceptance  Method: Demonstration, Explanation  Response: Demonstrated Understanding, Needs Reinforcement    Education Comments  No comments found.        EDUCATION:  Education  Individual(s) Educated: Patient  Education Provided: Ergonomics and postural realignment, Fall precautons, POC discussed and agreed upon  Patient Response to Education: Patient/Caregiver Verbalized Understanding of Information  Education Comment: recpetive to recommendations, will require continued education    Goals:  Encounter Problems       Encounter Problems (Resolved)       OT Goals       Patient will complete functional transfers at CGA level with LRD to facilitate increased independence and safety with home going  (Adequate for Discharge)       Start:  10/10/23    Expected End:  10/21/23    Resolved:  10/19/23         Patient will complete functional mobility for household distances at CGA level with LRD to facilitate increased independence and safety with home going  (Adequate for Discharge)       Start:  10/10/23    Expected End:  10/21/23    Resolved:  10/19/23         Patient will complete LB dressing at CGA level to facilitate safety and independence for home going   (Adequate for  Discharge)       Start:  10/10/23    Expected End:  10/21/23    Resolved:  10/19/23         Patient will complete toileting at CGA level to facilitate safety and independence for home going   (Adequate for Discharge)       Start:  10/10/23    Expected End:  10/21/23    Resolved:  10/19/23         Patient will complete UB dressing at supervision level to facilitate safety and independence for home going   (Adequate for Discharge)       Start:  10/10/23    Expected End:  10/21/23    Resolved:  10/19/23            Safety       LTG - Patient will adhere to hip precautions during ADL's and transfers (Adequate for Discharge)       Start:  10/10/23    Expected End:  10/21/23    Resolved:  10/19/23         LTG - Patient will demonstrate safety requirements appropriate to situation/environment (Adequate for Discharge)       Start:  10/10/23    Expected End:  10/21/23    Resolved:  10/19/23         LTG - Patient will utilize safety techniques (Adequate for Discharge)       Start:  10/10/23    Expected End:  10/21/23    Resolved:  10/19/23         STG - Patient locks brakes on wheelchair (Adequate for Discharge)       Start:  10/10/23    Expected End:  10/21/23    Resolved:  10/19/23         STG - Patient uses call light consistently to request assistance with transfers (Adequate for Discharge)       Start:  10/10/23    Expected End:  10/21/23    Resolved:  10/19/23         STG - Patient uses gait belt during all transfers (Adequate for Discharge)       Start:  10/10/23    Expected End:  10/21/23    Resolved:  10/19/23         Goal 1 (Adequate for Discharge)       Start:  10/10/23    Expected End:  10/21/23    Resolved:  10/19/23         Goal 2 (Adequate for Discharge)       Start:  10/10/23    Expected End:  10/21/23    Resolved:  10/19/23         Goal 3 (Adequate for Discharge)       Start:  10/10/23    Expected End:  10/21/23    Resolved:  10/19/23

## 2023-10-23 PROBLEM — I48.92 ATRIAL FLUTTER, PAROXYSMAL (MULTI): Status: ACTIVE | Noted: 2023-10-23

## 2023-10-23 PROBLEM — F32.A DEPRESSION: Status: ACTIVE | Noted: 2023-10-23

## 2023-10-23 PROBLEM — Z74.09 IMPAIRED MOBILITY AND ADLS: Status: ACTIVE | Noted: 2023-10-23

## 2023-10-23 PROBLEM — K59.00 CONSTIPATION IN MALE: Status: ACTIVE | Noted: 2023-10-23

## 2023-10-23 PROBLEM — Z78.9 IMPAIRED MOBILITY AND ADLS: Status: ACTIVE | Noted: 2023-10-23

## 2023-11-01 ENCOUNTER — LAB REQUISITION (OUTPATIENT)
Dept: LAB | Facility: HOSPITAL | Age: 88
End: 2023-11-01
Payer: MEDICARE
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
## 2023-11-01 LAB
ANION GAP SERPL CALC-SCNC: 14 MMOL/L (ref 10–20)
BUN SERPL-MCNC: 72 MG/DL (ref 6–23)
CALCIUM SERPL-MCNC: 8.9 MG/DL (ref 8.6–10.3)
CHLORIDE SERPL-SCNC: 91 MMOL/L (ref 98–107)
CO2 SERPL-SCNC: 34 MMOL/L (ref 21–32)
CREAT SERPL-MCNC: 4.58 MG/DL (ref 0.5–1.3)
GFR SERPL CREATININE-BSD FRML MDRD: 12 ML/MIN/1.73M*2
GLUCOSE SERPL-MCNC: 181 MG/DL (ref 74–99)
POTASSIUM SERPL-SCNC: 3.9 MMOL/L (ref 3.5–5.3)
SODIUM SERPL-SCNC: 135 MMOL/L (ref 136–145)

## 2023-11-01 PROCEDURE — 80048 BASIC METABOLIC PNL TOTAL CA: CPT

## 2023-11-01 NOTE — DISCHARGE SUMMARY
Discharge Diagnosis  Intraparenchymal hemorrhage of brain (CMS/HCC)  HTN  Atrial Fibrillation  CAD  HFpEF  Acute kidney injury superimposed CKD      Discharge Meds     Your medication list        START taking these medications        Instructions Last Dose Given Next Dose Due   B complex-vitamin C tablet      Take 1 tablet by mouth every other day. Do not start before October 19, 2023.       cephalexin 500 mg capsule  Commonly known as: Keflex      Take 1 capsule (500 mg) by mouth 2 times a day for 7 days.       pantoprazole 40 mg EC tablet  Commonly known as: ProtoNix      Take 1 tablet (40 mg) by mouth once daily in the morning. Take before meals. Do not crush, chew, or split. Do not start before October 19, 2023.              CHANGE how you take these medications        Instructions Last Dose Given Next Dose Due   hydrALAZINE 25 mg tablet  Commonly known as: Apresoline  What changed: how much to take      Take 1 tablet (25 mg) by mouth 2 times a day.       insulin lispro 100 unit/mL injection  Commonly known as: HumaLOG  What changed:   how much to take  when to take this  additional instructions      Inject 0-0.1 mL (0-10 Units) under the skin 4 times a day before meals. Take as directed per insulin instructions.              CONTINUE taking these medications        Instructions Last Dose Given Next Dose Due   albuterol 90 mcg/actuation inhaler           amiodarone 200 mg tablet  Commonly known as: Pacerone           aspirin 81 mg EC tablet           Colace 100 mg capsule  Generic drug: docusate sodium           isosorbide mononitrate ER 30 mg 24 hr tablet  Commonly known as: Imdur      Take 1 tablet (30 mg) by mouth once daily. Do not crush or chew. Do not start before October 19, 2023.       Lantus U-100 Insulin 100 unit/mL injection  Generic drug: insulin glargine           rosuvastatin 20 mg tablet  Commonly known as: Crestor           sertraline 25 mg tablet  Commonly known as: Zoloft           Vitamin D3  25 MCG (1000 UT) capsule  Generic drug: cholecalciferol                  STOP taking these medications      apixaban 2.5 mg tablet  Commonly known as: Eliquis        b complex 0.4 mg tablet        CARBONYL IRON ORAL        Flonase Allergy Relief 50 mcg/actuation nasal spray  Generic drug: fluticasone        furosemide 40 mg tablet  Commonly known as: Lasix        omeprazole 40 mg DR capsule  Commonly known as: PriLOSEC        Trulicity 3 mg/0.5 mL pen injector  Generic drug: dulaglutide                  Where to Get Your Medications        [id number] - [place], [place] - [address] [address]  [address] [address]      Phone: [phone]   B complex-vitamin C tablet  hydrALAZINE 25 mg tablet  insulin lispro 100 unit/mL injection  isosorbide mononitrate ER 30 mg 24 hr tablet  pantoprazole 40 mg EC tablet       Information about where to get these medications is not yet available    Ask your nurse or doctor about these medications  cephalexin 500 mg capsule         Test Results Pending At Discharge  Pending Labs       Order Current Status    Extra Tubes Preliminary result    Sterile Cup Preliminary result            Hospital Course   Patient is an 88 year old nale with history of A. 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    Discharge time 40 minutes    Pertinent Physical Exam At Time of Discharge  Physical Exam  HENT:      Head: Normocephalic and atraumatic.      Mouth/Throat:      Mouth: Mucous membranes are moist.   Cardiovascular:      Rate and  Rhythm: Normal rate.   Pulmonary:      Effort: Pulmonary effort is normal.      Breath sounds: Normal breath sounds.   Abdominal:      General: Abdomen is flat. Bowel sounds are normal.      Palpations: Abdomen is soft.   Skin:     General: Skin is warm.   Neurological:      Mental Status: He is alert and oriented to person, place, and time.      Motor: Weakness present.      Gait: Gait abnormal.         Outpatient Follow-Up  Future Appointments   Date Time Provider Department Center   1/16/2024  1:00 PM Kris Dickey MD [id number]         George Lenz MD

## 2023-11-04 ENCOUNTER — HOSPITAL ENCOUNTER (OUTPATIENT)
Dept: CARDIOLOGY | Facility: HOSPITAL | Age: 88
Discharge: HOME | End: 2023-11-04
Payer: MEDICARE

## 2023-11-04 ENCOUNTER — HOSPITAL ENCOUNTER (OUTPATIENT)
Facility: HOSPITAL | Age: 88
Setting detail: OBSERVATION
Discharge: NURSING, REHAB OR LONG-TERM CARE | End: 2023-11-08
Attending: STUDENT IN AN ORGANIZED HEALTH CARE EDUCATION/TRAINING PROGRAM | Admitting: INTERNAL MEDICINE
Payer: MEDICARE

## 2023-11-04 ENCOUNTER — APPOINTMENT (OUTPATIENT)
Dept: RADIOLOGY | Facility: HOSPITAL | Age: 88
End: 2023-11-04
Payer: MEDICARE

## 2023-11-04 DIAGNOSIS — N18.5 CHRONIC KIDNEY DISEASE (CKD), STAGE V (MULTI): ICD-10-CM

## 2023-11-04 DIAGNOSIS — I95.9 HYPOTENSION, UNSPECIFIED HYPOTENSION TYPE: Primary | ICD-10-CM

## 2023-11-04 DIAGNOSIS — N18.6 TYPE 2 DIABETES MELLITUS WITH CHRONIC KIDNEY DISEASE ON CHRONIC DIALYSIS, WITH LONG-TERM CURRENT USE OF INSULIN (MULTI): ICD-10-CM

## 2023-11-04 DIAGNOSIS — R53.1 GENERALIZED WEAKNESS: ICD-10-CM

## 2023-11-04 DIAGNOSIS — Z99.2 TYPE 2 DIABETES MELLITUS WITH CHRONIC KIDNEY DISEASE ON CHRONIC DIALYSIS, WITH LONG-TERM CURRENT USE OF INSULIN (MULTI): ICD-10-CM

## 2023-11-04 DIAGNOSIS — I10 PRIMARY HYPERTENSION: ICD-10-CM

## 2023-11-04 DIAGNOSIS — E11.22 TYPE 2 DIABETES MELLITUS WITH CHRONIC KIDNEY DISEASE ON CHRONIC DIALYSIS, WITH LONG-TERM CURRENT USE OF INSULIN (MULTI): ICD-10-CM

## 2023-11-04 DIAGNOSIS — R00.1 BRADYCARDIA: ICD-10-CM

## 2023-11-04 DIAGNOSIS — Z79.4 TYPE 2 DIABETES MELLITUS WITH CHRONIC KIDNEY DISEASE ON CHRONIC DIALYSIS, WITH LONG-TERM CURRENT USE OF INSULIN (MULTI): ICD-10-CM

## 2023-11-04 LAB
ALBUMIN SERPL BCP-MCNC: 3.1 G/DL (ref 3.4–5)
ALP SERPL-CCNC: 127 U/L (ref 33–136)
ALT SERPL W P-5'-P-CCNC: 68 U/L (ref 10–52)
ANION GAP SERPL CALC-SCNC: 12 MMOL/L (ref 10–20)
AST SERPL W P-5'-P-CCNC: 74 U/L (ref 9–39)
BASOPHILS # BLD AUTO: 0.02 X10*3/UL (ref 0–0.1)
BASOPHILS NFR BLD AUTO: 0.3 %
BILIRUB SERPL-MCNC: 0.6 MG/DL (ref 0–1.2)
BUN SERPL-MCNC: 46 MG/DL (ref 6–23)
CALCIUM SERPL-MCNC: 8.6 MG/DL (ref 8.6–10.3)
CARDIAC TROPONIN I PNL SERPL HS: 36 NG/L (ref 0–20)
CARDIAC TROPONIN I PNL SERPL HS: 37 NG/L (ref 0–20)
CHLORIDE SERPL-SCNC: 96 MMOL/L (ref 98–107)
CO2 SERPL-SCNC: 31 MMOL/L (ref 21–32)
CREAT SERPL-MCNC: 3.76 MG/DL (ref 0.5–1.3)
EOSINOPHIL # BLD AUTO: 0.08 X10*3/UL (ref 0–0.4)
EOSINOPHIL NFR BLD AUTO: 1.1 %
ERYTHROCYTE [DISTWIDTH] IN BLOOD BY AUTOMATED COUNT: 15.1 % (ref 11.5–14.5)
GFR SERPL CREATININE-BSD FRML MDRD: 15 ML/MIN/1.73M*2
GLUCOSE SERPL-MCNC: 203 MG/DL (ref 74–99)
HCT VFR BLD AUTO: 30.3 % (ref 41–52)
HGB BLD-MCNC: 9.2 G/DL (ref 13.5–17.5)
IMM GRANULOCYTES # BLD AUTO: 0.02 X10*3/UL (ref 0–0.5)
IMM GRANULOCYTES NFR BLD AUTO: 0.3 % (ref 0–0.9)
LYMPHOCYTES # BLD AUTO: 1.49 X10*3/UL (ref 0.8–3)
LYMPHOCYTES NFR BLD AUTO: 20.8 %
MAGNESIUM SERPL-MCNC: 1.87 MG/DL (ref 1.6–2.4)
MCH RBC QN AUTO: 29.3 PG (ref 26–34)
MCHC RBC AUTO-ENTMCNC: 30.4 G/DL (ref 32–36)
MCV RBC AUTO: 97 FL (ref 80–100)
MONOCYTES # BLD AUTO: 1.02 X10*3/UL (ref 0.05–0.8)
MONOCYTES NFR BLD AUTO: 14.3 %
NEUTROPHILS # BLD AUTO: 4.52 X10*3/UL (ref 1.6–5.5)
NEUTROPHILS NFR BLD AUTO: 63.2 %
NRBC BLD-RTO: 0 /100 WBCS (ref 0–0)
PLATELET # BLD AUTO: 196 X10*3/UL (ref 150–450)
POTASSIUM SERPL-SCNC: 4.1 MMOL/L (ref 3.5–5.3)
PROT SERPL-MCNC: 7 G/DL (ref 6.4–8.2)
RBC # BLD AUTO: 3.14 X10*6/UL (ref 4.5–5.9)
SODIUM SERPL-SCNC: 135 MMOL/L (ref 136–145)
WBC # BLD AUTO: 7.2 X10*3/UL (ref 4.4–11.3)

## 2023-11-04 PROCEDURE — 71045 X-RAY EXAM CHEST 1 VIEW: CPT

## 2023-11-04 PROCEDURE — 93010 ELECTROCARDIOGRAM REPORT: CPT | Performed by: STUDENT IN AN ORGANIZED HEALTH CARE EDUCATION/TRAINING PROGRAM

## 2023-11-04 PROCEDURE — 99285 EMERGENCY DEPT VISIT HI MDM: CPT | Performed by: STUDENT IN AN ORGANIZED HEALTH CARE EDUCATION/TRAINING PROGRAM

## 2023-11-04 PROCEDURE — 2500000004 HC RX 250 GENERAL PHARMACY W/ HCPCS (ALT 636 FOR OP/ED): Performed by: STUDENT IN AN ORGANIZED HEALTH CARE EDUCATION/TRAINING PROGRAM

## 2023-11-04 PROCEDURE — 70450 CT HEAD/BRAIN W/O DYE: CPT

## 2023-11-04 PROCEDURE — 36415 COLL VENOUS BLD VENIPUNCTURE: CPT | Performed by: STUDENT IN AN ORGANIZED HEALTH CARE EDUCATION/TRAINING PROGRAM

## 2023-11-04 PROCEDURE — 80053 COMPREHEN METABOLIC PANEL: CPT | Performed by: STUDENT IN AN ORGANIZED HEALTH CARE EDUCATION/TRAINING PROGRAM

## 2023-11-04 PROCEDURE — 93010 ELECTROCARDIOGRAM REPORT: CPT | Performed by: INTERNAL MEDICINE

## 2023-11-04 PROCEDURE — 93005 ELECTROCARDIOGRAM TRACING: CPT

## 2023-11-04 PROCEDURE — G0378 HOSPITAL OBSERVATION PER HR: HCPCS

## 2023-11-04 PROCEDURE — 70450 CT HEAD/BRAIN W/O DYE: CPT | Performed by: RADIOLOGY

## 2023-11-04 PROCEDURE — 71045 X-RAY EXAM CHEST 1 VIEW: CPT | Performed by: RADIOLOGY

## 2023-11-04 PROCEDURE — 99285 EMERGENCY DEPT VISIT HI MDM: CPT | Mod: 25 | Performed by: STUDENT IN AN ORGANIZED HEALTH CARE EDUCATION/TRAINING PROGRAM

## 2023-11-04 PROCEDURE — 83735 ASSAY OF MAGNESIUM: CPT | Performed by: STUDENT IN AN ORGANIZED HEALTH CARE EDUCATION/TRAINING PROGRAM

## 2023-11-04 PROCEDURE — 84484 ASSAY OF TROPONIN QUANT: CPT | Performed by: STUDENT IN AN ORGANIZED HEALTH CARE EDUCATION/TRAINING PROGRAM

## 2023-11-04 PROCEDURE — 85025 COMPLETE CBC W/AUTO DIFF WBC: CPT | Performed by: STUDENT IN AN ORGANIZED HEALTH CARE EDUCATION/TRAINING PROGRAM

## 2023-11-04 PROCEDURE — 99222 1ST HOSP IP/OBS MODERATE 55: CPT | Performed by: NURSE PRACTITIONER

## 2023-11-04 RX ADMIN — SODIUM CHLORIDE 250 ML: 9 INJECTION, SOLUTION INTRAVENOUS at 18:47

## 2023-11-04 SDOH — SOCIAL STABILITY: SOCIAL INSECURITY: ARE YOU OR HAVE YOU BEEN THREATENED OR ABUSED PHYSICALLY, EMOTIONALLY, OR SEXUALLY BY ANYONE?: NO

## 2023-11-04 SDOH — SOCIAL STABILITY: SOCIAL INSECURITY: DO YOU FEEL UNSAFE GOING BACK TO THE PLACE WHERE YOU ARE LIVING?: NO

## 2023-11-04 SDOH — SOCIAL STABILITY: SOCIAL INSECURITY: WERE YOU ABLE TO COMPLETE ALL THE BEHAVIORAL HEALTH SCREENINGS?: YES

## 2023-11-04 SDOH — SOCIAL STABILITY: SOCIAL INSECURITY: ARE THERE ANY APPARENT SIGNS OF INJURIES/BEHAVIORS THAT COULD BE RELATED TO ABUSE/NEGLECT?: NO

## 2023-11-04 SDOH — SOCIAL STABILITY: SOCIAL INSECURITY: DOES ANYONE TRY TO KEEP YOU FROM HAVING/CONTACTING OTHER FRIENDS OR DOING THINGS OUTSIDE YOUR HOME?: NO

## 2023-11-04 SDOH — SOCIAL STABILITY: SOCIAL INSECURITY: DO YOU FEEL ANYONE HAS EXPLOITED OR TAKEN ADVANTAGE OF YOU FINANCIALLY OR OF YOUR PERSONAL PROPERTY?: NO

## 2023-11-04 SDOH — SOCIAL STABILITY: SOCIAL INSECURITY: HAS ANYONE EVER THREATENED TO HURT YOUR FAMILY OR YOUR PETS?: NO

## 2023-11-04 SDOH — SOCIAL STABILITY: SOCIAL INSECURITY: HAVE YOU HAD THOUGHTS OF HARMING ANYONE ELSE?: NO

## 2023-11-04 SDOH — SOCIAL STABILITY: SOCIAL INSECURITY: ABUSE: ADULT

## 2023-11-04 ASSESSMENT — LIFESTYLE VARIABLES
EVER FELT BAD OR GUILTY ABOUT YOUR DRINKING: NO
EVER HAD A DRINK FIRST THING IN THE MORNING TO STEADY YOUR NERVES TO GET RID OF A HANGOVER: NO
HAVE YOU EVER FELT YOU SHOULD CUT DOWN ON YOUR DRINKING: NO
REASON UNABLE TO ASSESS: NO
HOW OFTEN DO YOU HAVE 6 OR MORE DRINKS ON ONE OCCASION: NEVER
SKIP TO QUESTIONS 9-10: 1
HOW MANY STANDARD DRINKS CONTAINING ALCOHOL DO YOU HAVE ON A TYPICAL DAY: PATIENT DOES NOT DRINK
AUDIT-C TOTAL SCORE: 0
AUDIT-C TOTAL SCORE: 0
HAVE PEOPLE ANNOYED YOU BY CRITICIZING YOUR DRINKING: NO
HOW OFTEN DO YOU HAVE A DRINK CONTAINING ALCOHOL: NEVER

## 2023-11-04 ASSESSMENT — COGNITIVE AND FUNCTIONAL STATUS - GENERAL
WALKING IN HOSPITAL ROOM: A LOT
CLIMB 3 TO 5 STEPS WITH RAILING: A LOT
EATING MEALS: A LOT
MOVING FROM LYING ON BACK TO SITTING ON SIDE OF FLAT BED WITH BEDRAILS: A LOT
PATIENT BASELINE BEDBOUND: NO
HELP NEEDED FOR BATHING: A LOT
PERSONAL GROOMING: A LOT
DRESSING REGULAR LOWER BODY CLOTHING: A LOT
TURNING FROM BACK TO SIDE WHILE IN FLAT BAD: A LOT
TOILETING: A LOT
MOVING TO AND FROM BED TO CHAIR: A LOT
MOBILITY SCORE: 12
DAILY ACTIVITIY SCORE: 12
STANDING UP FROM CHAIR USING ARMS: A LOT
DRESSING REGULAR UPPER BODY CLOTHING: A LOT

## 2023-11-04 ASSESSMENT — ACTIVITIES OF DAILY LIVING (ADL)
WALKS IN HOME: NEEDS ASSISTANCE
HEARING - RIGHT EAR: DIFFICULTY WITH NOISE
DRESSING YOURSELF: NEEDS ASSISTANCE
BATHING: NEEDS ASSISTANCE
LACK_OF_TRANSPORTATION: NO
JUDGMENT_ADEQUATE_SAFELY_COMPLETE_DAILY_ACTIVITIES: YES
HEARING - LEFT EAR: FUNCTIONAL
ASSISTIVE_DEVICE: WHEELCHAIR
PATIENT'S MEMORY ADEQUATE TO SAFELY COMPLETE DAILY ACTIVITIES?: YES
TOILETING: NEEDS ASSISTANCE
ADEQUATE_TO_COMPLETE_ADL: YES
FEEDING YOURSELF: NEEDS ASSISTANCE
GROOMING: NEEDS ASSISTANCE

## 2023-11-04 ASSESSMENT — PATIENT HEALTH QUESTIONNAIRE - PHQ9
SUM OF ALL RESPONSES TO PHQ9 QUESTIONS 1 & 2: 0
1. LITTLE INTEREST OR PLEASURE IN DOING THINGS: NOT AT ALL
2. FEELING DOWN, DEPRESSED OR HOPELESS: NOT AT ALL

## 2023-11-04 ASSESSMENT — PAIN SCALES - GENERAL
PAINLEVEL_OUTOF10: 0 - NO PAIN

## 2023-11-04 ASSESSMENT — COLUMBIA-SUICIDE SEVERITY RATING SCALE - C-SSRS
2. HAVE YOU ACTUALLY HAD ANY THOUGHTS OF KILLING YOURSELF?: NO
6. HAVE YOU EVER DONE ANYTHING, STARTED TO DO ANYTHING, OR PREPARED TO DO ANYTHING TO END YOUR LIFE?: NO
1. IN THE PAST MONTH, HAVE YOU WISHED YOU WERE DEAD OR WISHED YOU COULD GO TO SLEEP AND NOT WAKE UP?: NO

## 2023-11-04 ASSESSMENT — PAIN - FUNCTIONAL ASSESSMENT: PAIN_FUNCTIONAL_ASSESSMENT: 0-10

## 2023-11-04 NOTE — ED PROVIDER NOTES
HPI   Chief Complaint   Patient presents with    Altered Mental Status    Bradycardia       88-year-old male presenting to the ED from nursing facility for bradycardia.  Nursing staff had reported to EMS that the patient was endorsing dizziness and when they checked his vitals they found him to be bradycardic in the 50s with soft blood pressures in the 90s.  Patient is denying any symptoms at this time including chest pain, shortness of breath, dizziness, lightheadedness.  He denies any recent fevers, vomiting, diarrhea or dysuria.  He has a history of ESRD on HD and had a full dialysis session yesterday.  He does still make urine.      History provided by:  Patient and EMS personnel                      Willard Coma Scale Score: 14                  Patient History   Past Medical History:   Diagnosis Date    Atrial fibrillation (CMS/formerly [facility])     CAD (coronary artery disease)     CHF (congestive heart failure) (CMS/formerly [facility])     CKD (chronic kidney disease) stage 3, GFR 30-59 ml/min (CMS/formerly [facility])     HLD (hyperlipidemia)     HTN (hypertension)     JESUS (iron deficiency anemia)     Insulin dependent type 2 diabetes mellitus (CMS/formerly [facility])     Stroke (CMS/formerly [facility])      Past Surgical History:   Procedure Laterality Date    APPENDECTOMY      CORONARY ARTERY BYPASS GRAFT  1994    IR CVC NONTUNNELED  10/13/2023    IR CVC NONTUNNELED 10/13/2023 Raffi Polk MD [facility]    IR CVC TUNNELED  10/18/2023    IR CVC TUNNELED 10/18/2023 Edu Dorsey MD [facility]     No family history on file.  Social History     Tobacco Use    Smoking status: Never    Smokeless tobacco: Not on file   Substance Use Topics    Alcohol use: Never    Drug use: Never       Physical Exam   ED Triage Vitals   Temp Pulse Resp BP   -- -- -- --      SpO2 Temp src Heart Rate Source Patient Position   -- -- -- --      BP Location FiO2 (%)     -- --       Physical Exam  Vitals and nursing note reviewed.   Constitutional:       General: He is not in acute distress.      Appearance: He is not toxic-appearing.   HENT:      Head: Normocephalic and atraumatic.      Nose: Nose normal.      Mouth/Throat:      Mouth: Mucous membranes are moist.   Eyes:      Extraocular Movements: Extraocular movements intact.      Conjunctiva/sclera: Conjunctivae normal.   Cardiovascular:      Rate and Rhythm: Normal rate and regular rhythm.      Pulses: Normal pulses.      Heart sounds: Normal heart sounds.   Pulmonary:      Effort: Pulmonary effort is normal.      Breath sounds: Normal breath sounds.   Abdominal:      General: There is no distension.      Palpations: Abdomen is soft.      Tenderness: There is no abdominal tenderness.   Musculoskeletal:         General: No swelling. Normal range of motion.      Cervical back: Normal range of motion and neck supple.      Right lower leg: Edema (2+) present.      Left lower leg: Edema (1+) present.   Skin:     General: Skin is warm and dry.      Capillary Refill: Capillary refill takes less than 2 seconds.   Neurological:      General: No focal deficit present.      Mental Status: He is alert. Mental status is at baseline.      Cranial Nerves: No cranial nerve deficit.      Motor: No weakness.         ED Course & MDM   Diagnoses as of 11/04/23 1952   Hypotension, unspecified hypotension type       Medical Decision Making  88-year-old male presenting to the ED from nursing facility for bradycardia.  Patient has a history of ESRD and receives dialysis on Friday.  He is awake and does not have focal deficits on exam however he does have slow response and is confused.  Slow responses are new according to the patient's son.  He also reports patient's Zoloft dose was recently doubled, unsure if this is contributing to his change in mental status.    Blood pressure is soft in the /55 and his heart rate is persistently in the 50s.  He has a first-degree AV block on EKG that is old compared to priors.  However chart review shows that his heart rate is  typically in the 70s-80s.    Labs showed initial troponin 37 and delta is 36.  Suspect this is due to ESRD.  Potassium is normal and there are no hyperkalemic changes on EKG.  H/H is consistent with baseline.  Chest x-ray without evidence of pneumonia.  CT head obtained due to recent intracranial bleeding and change in mental status however this does not show any acute changes.    Patient will be admitted for observation and nephrology consultation given his low blood pressures.  There was concern that he will not be able to tolerate his next dialysis session with his hypotension as well as not being able to tolerate physical therapy as he is currently in acute rehab.  Patient was accepted by medicine. Urinalysis pending upon admission.     Amount and/or Complexity of Data Reviewed  External Data Reviewed: labs and notes.  Labs: ordered. Decision-making details documented in ED Course.  ECG/medicine tests: ordered and independent interpretation performed. Decision-making details documented in ED Course.     Details: Has bradycardia with first-degree AV block.  Rate of 51.  QTc 481.  Normal axis.  Low voltage EKG.  Acute injury pattern.        Procedure  Procedures     Nikita Beckman DO  Resident  11/04/23 1953

## 2023-11-05 ENCOUNTER — HOME HEALTH ADMISSION (OUTPATIENT)
Dept: HOME HEALTH SERVICES | Facility: HOME HEALTH | Age: 88
End: 2023-11-05
Payer: MEDICARE

## 2023-11-05 PROBLEM — I10 HYPERTENSION: Status: RESOLVED | Noted: 2023-10-10 | Resolved: 2023-11-05

## 2023-11-05 PROBLEM — Z99.2 TYPE 2 DIABETES MELLITUS WITH CHRONIC KIDNEY DISEASE ON CHRONIC DIALYSIS, WITH LONG-TERM CURRENT USE OF INSULIN (MULTI): Status: RESOLVED | Noted: 2023-10-19 | Resolved: 2023-11-05

## 2023-11-05 PROBLEM — I61.9 INTRAPARENCHYMAL HEMORRHAGE OF BRAIN (MULTI): Status: RESOLVED | Noted: 2023-10-10 | Resolved: 2023-11-05

## 2023-11-05 PROBLEM — I48.92 ATRIAL FLUTTER, PAROXYSMAL (MULTI): Status: RESOLVED | Noted: 2023-10-23 | Resolved: 2023-11-05

## 2023-11-05 PROBLEM — I48.91 ATRIAL FIBRILLATION (MULTI): Status: RESOLVED | Noted: 2023-10-19 | Resolved: 2023-11-05

## 2023-11-05 PROBLEM — T83.511A URINARY TRACT INFECTION ASSOCIATED WITH INDWELLING URETHRAL CATHETER (CMS-HCC): Status: RESOLVED | Noted: 2023-10-15 | Resolved: 2023-11-05

## 2023-11-05 PROBLEM — Z79.4 TYPE 2 DIABETES MELLITUS WITH CHRONIC KIDNEY DISEASE ON CHRONIC DIALYSIS, WITH LONG-TERM CURRENT USE OF INSULIN (MULTI): Status: RESOLVED | Noted: 2023-10-19 | Resolved: 2023-11-05

## 2023-11-05 PROBLEM — N18.6 TYPE 2 DIABETES MELLITUS WITH CHRONIC KIDNEY DISEASE ON CHRONIC DIALYSIS, WITH LONG-TERM CURRENT USE OF INSULIN (MULTI): Status: RESOLVED | Noted: 2023-10-19 | Resolved: 2023-11-05

## 2023-11-05 PROBLEM — N17.0 ACUTE RENAL FAILURE WITH TUBULAR NECROSIS (CMS-HCC): Status: RESOLVED | Noted: 2023-10-19 | Resolved: 2023-11-05

## 2023-11-05 PROBLEM — E11.22 TYPE 2 DIABETES MELLITUS WITH CHRONIC KIDNEY DISEASE ON CHRONIC DIALYSIS, WITH LONG-TERM CURRENT USE OF INSULIN (MULTI): Status: RESOLVED | Noted: 2023-10-19 | Resolved: 2023-11-05

## 2023-11-05 PROBLEM — R00.1 BRADYCARDIA: Status: ACTIVE | Noted: 2023-11-05

## 2023-11-05 PROBLEM — F32.A DEPRESSION: Status: RESOLVED | Noted: 2023-10-23 | Resolved: 2023-11-05

## 2023-11-05 PROBLEM — Z74.09 IMPAIRED MOBILITY AND ADLS: Status: RESOLVED | Noted: 2023-10-23 | Resolved: 2023-11-05

## 2023-11-05 PROBLEM — Z78.9 IMPAIRED MOBILITY AND ADLS: Status: RESOLVED | Noted: 2023-10-23 | Resolved: 2023-11-05

## 2023-11-05 PROBLEM — N39.0 URINARY TRACT INFECTION ASSOCIATED WITH INDWELLING URETHRAL CATHETER (CMS-HCC): Status: RESOLVED | Noted: 2023-10-15 | Resolved: 2023-11-05

## 2023-11-05 PROBLEM — K59.00 CONSTIPATION IN MALE: Status: RESOLVED | Noted: 2023-10-23 | Resolved: 2023-11-05

## 2023-11-05 PROBLEM — G93.40 ENCEPHALOPATHY: Status: ACTIVE | Noted: 2023-11-05

## 2023-11-05 PROBLEM — I25.10 CORONARY ARTERY DISEASE INVOLVING NATIVE CORONARY ARTERY OF NATIVE HEART WITHOUT ANGINA PECTORIS: Status: RESOLVED | Noted: 2023-10-19 | Resolved: 2023-11-05

## 2023-11-05 LAB
ANION GAP SERPL CALC-SCNC: 14 MMOL/L (ref 10–20)
APPEARANCE UR: CLEAR
BACTERIA #/AREA URNS AUTO: ABNORMAL /HPF
BILIRUB UR STRIP.AUTO-MCNC: NEGATIVE MG/DL
BUN SERPL-MCNC: 54 MG/DL (ref 6–23)
CALCIUM SERPL-MCNC: 8.4 MG/DL (ref 8.6–10.3)
CHLORIDE SERPL-SCNC: 97 MMOL/L (ref 98–107)
CO2 SERPL-SCNC: 27 MMOL/L (ref 21–32)
COLOR UR: ABNORMAL
CREAT SERPL-MCNC: 4.29 MG/DL (ref 0.5–1.3)
ERYTHROCYTE [DISTWIDTH] IN BLOOD BY AUTOMATED COUNT: 15.1 % (ref 11.5–14.5)
GFR SERPL CREATININE-BSD FRML MDRD: 13 ML/MIN/1.73M*2
GLUCOSE BLD MANUAL STRIP-MCNC: 129 MG/DL (ref 74–99)
GLUCOSE BLD MANUAL STRIP-MCNC: 170 MG/DL (ref 74–99)
GLUCOSE BLD MANUAL STRIP-MCNC: 173 MG/DL (ref 74–99)
GLUCOSE BLD MANUAL STRIP-MCNC: 178 MG/DL (ref 74–99)
GLUCOSE BLD MANUAL STRIP-MCNC: 182 MG/DL (ref 74–99)
GLUCOSE BLD MANUAL STRIP-MCNC: 266 MG/DL (ref 74–99)
GLUCOSE SERPL-MCNC: 164 MG/DL (ref 74–99)
GLUCOSE UR STRIP.AUTO-MCNC: NEGATIVE MG/DL
HCT VFR BLD AUTO: 32.1 % (ref 41–52)
HGB BLD-MCNC: 9.4 G/DL (ref 13.5–17.5)
HOLD SPECIMEN: NORMAL
KETONES UR STRIP.AUTO-MCNC: NEGATIVE MG/DL
LEUKOCYTE ESTERASE UR QL STRIP.AUTO: NEGATIVE
MCH RBC QN AUTO: 29 PG (ref 26–34)
MCHC RBC AUTO-ENTMCNC: 29.3 G/DL (ref 32–36)
MCV RBC AUTO: 99 FL (ref 80–100)
NITRITE UR QL STRIP.AUTO: POSITIVE
NRBC BLD-RTO: 0 /100 WBCS (ref 0–0)
PH UR STRIP.AUTO: 5 [PH]
PLATELET # BLD AUTO: 167 X10*3/UL (ref 150–450)
POTASSIUM SERPL-SCNC: 4 MMOL/L (ref 3.5–5.3)
PROT UR STRIP.AUTO-MCNC: ABNORMAL MG/DL
RBC # BLD AUTO: 3.24 X10*6/UL (ref 4.5–5.9)
RBC # UR STRIP.AUTO: NEGATIVE /UL
RBC #/AREA URNS AUTO: ABNORMAL /HPF
SODIUM SERPL-SCNC: 134 MMOL/L (ref 136–145)
SP GR UR STRIP.AUTO: 1.01
UROBILINOGEN UR STRIP.AUTO-MCNC: 2 MG/DL
WBC # BLD AUTO: 6.9 X10*3/UL (ref 4.4–11.3)
WBC #/AREA URNS AUTO: ABNORMAL /HPF
YEAST BUDDING #/AREA UR COMP ASSIST: PRESENT /HPF

## 2023-11-05 PROCEDURE — 2500000002 HC RX 250 W HCPCS SELF ADMINISTERED DRUGS (ALT 637 FOR MEDICARE OP, ALT 636 FOR OP/ED): Mod: MUE | Performed by: NURSE PRACTITIONER

## 2023-11-05 PROCEDURE — 82947 ASSAY GLUCOSE BLOOD QUANT: CPT

## 2023-11-05 PROCEDURE — 80151 DRUG ASSAY AMIODARONE: CPT | Performed by: NURSE PRACTITIONER

## 2023-11-05 PROCEDURE — 2500000004 HC RX 250 GENERAL PHARMACY W/ HCPCS (ALT 636 FOR OP/ED): Mod: MUE | Performed by: NURSE PRACTITIONER

## 2023-11-05 PROCEDURE — 85027 COMPLETE CBC AUTOMATED: CPT | Performed by: NURSE PRACTITIONER

## 2023-11-05 PROCEDURE — 2500000002 HC RX 250 W HCPCS SELF ADMINISTERED DRUGS (ALT 637 FOR MEDICARE OP, ALT 636 FOR OP/ED): Performed by: NURSE PRACTITIONER

## 2023-11-05 PROCEDURE — 2500000001 HC RX 250 WO HCPCS SELF ADMINISTERED DRUGS (ALT 637 FOR MEDICARE OP): Performed by: NURSE PRACTITIONER

## 2023-11-05 PROCEDURE — 87086 URINE CULTURE/COLONY COUNT: CPT | Mod: STJLAB | Performed by: NURSE PRACTITIONER

## 2023-11-05 PROCEDURE — 36415 COLL VENOUS BLD VENIPUNCTURE: CPT | Performed by: NURSE PRACTITIONER

## 2023-11-05 PROCEDURE — 81003 URINALYSIS AUTO W/O SCOPE: CPT | Performed by: NURSE PRACTITIONER

## 2023-11-05 PROCEDURE — G0378 HOSPITAL OBSERVATION PER HR: HCPCS

## 2023-11-05 PROCEDURE — 82374 ASSAY BLOOD CARBON DIOXIDE: CPT | Performed by: NURSE PRACTITIONER

## 2023-11-05 RX ORDER — TAMSULOSIN HYDROCHLORIDE 0.4 MG/1
0.4 CAPSULE ORAL
COMMUNITY

## 2023-11-05 RX ORDER — TAMSULOSIN HYDROCHLORIDE 0.4 MG/1
0.4 CAPSULE ORAL
Status: DISCONTINUED | OUTPATIENT
Start: 2023-11-06 | End: 2023-11-08 | Stop reason: HOSPADM

## 2023-11-05 RX ORDER — POLYETHYLENE GLYCOL 3350 17 G/17G
17 POWDER, FOR SOLUTION ORAL DAILY
Status: DISCONTINUED | OUTPATIENT
Start: 2023-11-05 | End: 2023-11-08 | Stop reason: HOSPADM

## 2023-11-05 RX ORDER — ROSUVASTATIN CALCIUM 20 MG/1
20 TABLET, COATED ORAL DAILY
Status: DISCONTINUED | OUTPATIENT
Start: 2023-11-05 | End: 2023-11-08 | Stop reason: HOSPADM

## 2023-11-05 RX ORDER — INSULIN LISPRO 100 [IU]/ML
0-10 INJECTION, SOLUTION INTRAVENOUS; SUBCUTANEOUS
Status: DISCONTINUED | OUTPATIENT
Start: 2023-11-05 | End: 2023-11-08 | Stop reason: HOSPADM

## 2023-11-05 RX ORDER — ONDANSETRON 4 MG/1
4 TABLET, ORALLY DISINTEGRATING ORAL EVERY 8 HOURS PRN
Status: DISCONTINUED | OUTPATIENT
Start: 2023-11-05 | End: 2023-11-08 | Stop reason: HOSPADM

## 2023-11-05 RX ORDER — SODIUM CHLORIDE 0.9 % (FLUSH) 0.9 %
10 SYRINGE (ML) INJECTION EVERY 8 HOURS SCHEDULED
Status: DISCONTINUED | OUTPATIENT
Start: 2023-11-05 | End: 2023-11-08 | Stop reason: HOSPADM

## 2023-11-05 RX ORDER — EPOETIN ALFA-EPBX 10000 [IU]/ML
10000 INJECTION, SOLUTION INTRAVENOUS; SUBCUTANEOUS
COMMUNITY

## 2023-11-05 RX ORDER — VIT C/E/ZN/COPPR/LUTEIN/ZEAXAN 250MG-90MG
1000 CAPSULE ORAL EVERY OTHER DAY
Status: DISCONTINUED | OUTPATIENT
Start: 2023-11-06 | End: 2023-11-06

## 2023-11-05 RX ORDER — AMIODARONE HYDROCHLORIDE 200 MG/1
200 TABLET ORAL DAILY
Status: DISCONTINUED | OUTPATIENT
Start: 2023-11-05 | End: 2023-11-08

## 2023-11-05 RX ORDER — DEXTROSE MONOHYDRATE 100 MG/ML
0.3 INJECTION, SOLUTION INTRAVENOUS ONCE AS NEEDED
Status: DISCONTINUED | OUTPATIENT
Start: 2023-11-05 | End: 2023-11-08 | Stop reason: HOSPADM

## 2023-11-05 RX ORDER — ISOSORBIDE MONONITRATE 30 MG/1
30 TABLET, EXTENDED RELEASE ORAL DAILY
Status: DISCONTINUED | OUTPATIENT
Start: 2023-11-05 | End: 2023-11-08 | Stop reason: HOSPADM

## 2023-11-05 RX ORDER — SERTRALINE HYDROCHLORIDE 50 MG/1
50 TABLET, FILM COATED ORAL DAILY
COMMUNITY
End: 2023-11-08 | Stop reason: HOSPADM

## 2023-11-05 RX ORDER — ACETAMINOPHEN 160 MG/5ML
650 SOLUTION ORAL EVERY 4 HOURS PRN
Status: DISCONTINUED | OUTPATIENT
Start: 2023-11-05 | End: 2023-11-08 | Stop reason: HOSPADM

## 2023-11-05 RX ORDER — ASPIRIN 81 MG/1
81 TABLET ORAL DAILY
Status: DISCONTINUED | OUTPATIENT
Start: 2023-11-05 | End: 2023-11-08 | Stop reason: HOSPADM

## 2023-11-05 RX ORDER — DEXTROSE 50 % IN WATER (D50W) INTRAVENOUS SYRINGE
25
Status: DISCONTINUED | OUTPATIENT
Start: 2023-11-05 | End: 2023-11-08 | Stop reason: HOSPADM

## 2023-11-05 RX ORDER — ACETAMINOPHEN 650 MG/1
650 SUPPOSITORY RECTAL EVERY 4 HOURS PRN
Status: DISCONTINUED | OUTPATIENT
Start: 2023-11-05 | End: 2023-11-08 | Stop reason: HOSPADM

## 2023-11-05 RX ORDER — POLYETHYLENE GLYCOL 3350 17 G/17G
17 POWDER, FOR SOLUTION ORAL DAILY PRN
Status: DISCONTINUED | OUTPATIENT
Start: 2023-11-05 | End: 2023-11-08 | Stop reason: HOSPADM

## 2023-11-05 RX ORDER — HEPARIN SODIUM 1000 [USP'U]/ML
6000 INJECTION, SOLUTION INTRAVENOUS; SUBCUTANEOUS
COMMUNITY
End: 2023-11-08 | Stop reason: HOSPADM

## 2023-11-05 RX ORDER — POLYETHYLENE GLYCOL 3350 17 G/17G
17 POWDER, FOR SOLUTION ORAL DAILY
COMMUNITY

## 2023-11-05 RX ORDER — ONDANSETRON HYDROCHLORIDE 2 MG/ML
4 INJECTION, SOLUTION INTRAVENOUS EVERY 8 HOURS PRN
Status: DISCONTINUED | OUTPATIENT
Start: 2023-11-05 | End: 2023-11-08 | Stop reason: HOSPADM

## 2023-11-05 RX ORDER — TORSEMIDE 100 MG/1
100 TABLET ORAL DAILY
COMMUNITY
End: 2023-11-08 | Stop reason: HOSPADM

## 2023-11-05 RX ORDER — INSULIN GLARGINE 100 [IU]/ML
10 INJECTION, SOLUTION SUBCUTANEOUS EVERY 24 HOURS
Status: DISCONTINUED | OUTPATIENT
Start: 2023-11-06 | End: 2023-11-08 | Stop reason: HOSPADM

## 2023-11-05 RX ORDER — TALC
3 POWDER (GRAM) TOPICAL NIGHTLY PRN
Status: DISCONTINUED | OUTPATIENT
Start: 2023-11-05 | End: 2023-11-08 | Stop reason: HOSPADM

## 2023-11-05 RX ORDER — ACETAMINOPHEN 325 MG/1
650 TABLET ORAL EVERY 4 HOURS PRN
Status: DISCONTINUED | OUTPATIENT
Start: 2023-11-05 | End: 2023-11-08 | Stop reason: HOSPADM

## 2023-11-05 RX ORDER — INSULIN GLARGINE 100 [IU]/ML
20 INJECTION, SOLUTION SUBCUTANEOUS EVERY 24 HOURS
Status: DISCONTINUED | OUTPATIENT
Start: 2023-11-05 | End: 2023-11-05

## 2023-11-05 RX ORDER — PANTOPRAZOLE SODIUM 40 MG/1
40 TABLET, DELAYED RELEASE ORAL
Status: DISCONTINUED | OUTPATIENT
Start: 2023-11-06 | End: 2023-11-08 | Stop reason: HOSPADM

## 2023-11-05 RX ADMIN — INSULIN LISPRO 2 UNITS: 100 INJECTION, SOLUTION INTRAVENOUS; SUBCUTANEOUS at 14:04

## 2023-11-05 RX ADMIN — Medication 10 ML: at 14:12

## 2023-11-05 RX ADMIN — ROSUVASTATIN CALCIUM 20 MG: 20 TABLET, FILM COATED ORAL at 14:04

## 2023-11-05 RX ADMIN — ASPIRIN 81 MG: 81 TABLET, COATED ORAL at 14:04

## 2023-11-05 RX ADMIN — INSULIN LISPRO 6 UNITS: 100 INJECTION, SOLUTION INTRAVENOUS; SUBCUTANEOUS at 17:19

## 2023-11-05 RX ADMIN — Medication 10 ML: at 06:00

## 2023-11-05 RX ADMIN — AMIODARONE HYDROCHLORIDE 200 MG: 200 TABLET ORAL at 14:04

## 2023-11-05 RX ADMIN — ISOSORBIDE MONONITRATE 30 MG: 30 TABLET, EXTENDED RELEASE ORAL at 14:04

## 2023-11-05 RX ADMIN — POLYETHYLENE GLYCOL 3350 17 G: 17 POWDER, FOR SOLUTION ORAL at 14:04

## 2023-11-05 RX ADMIN — INSULIN GLARGINE 20 UNITS: 100 INJECTION, SOLUTION SUBCUTANEOUS at 04:33

## 2023-11-05 RX ADMIN — Medication 10 ML: at 21:34

## 2023-11-05 ASSESSMENT — COGNITIVE AND FUNCTIONAL STATUS - GENERAL
EATING MEALS: A LOT
TOILETING: A LOT
WALKING IN HOSPITAL ROOM: A LOT
CLIMB 3 TO 5 STEPS WITH RAILING: TOTAL
HELP NEEDED FOR BATHING: A LOT
PERSONAL GROOMING: A LOT
MOVING TO AND FROM BED TO CHAIR: A LOT
STANDING UP FROM CHAIR USING ARMS: A LOT
DRESSING REGULAR LOWER BODY CLOTHING: A LOT
TURNING FROM BACK TO SIDE WHILE IN FLAT BAD: A LOT
MOBILITY SCORE: 13
DRESSING REGULAR UPPER BODY CLOTHING: A LOT
DAILY ACTIVITIY SCORE: 12

## 2023-11-05 ASSESSMENT — ENCOUNTER SYMPTOMS
ABDOMINAL PAIN: 0
CONFUSION: 1
NAUSEA: 0
NECK PAIN: 0
SHORTNESS OF BREATH: 0
FATIGUE: 1
HEMATURIA: 0
DIZZINESS: 1
CHILLS: 0
SORE THROAT: 0
NECK STIFFNESS: 0
FEVER: 0
TROUBLE SWALLOWING: 0
WOUND: 0
SPEECH DIFFICULTY: 1

## 2023-11-05 ASSESSMENT — PAIN SCALES - GENERAL
PAINLEVEL_OUTOF10: 0 - NO PAIN
PAINLEVEL_OUTOF10: 0 - NO PAIN

## 2023-11-05 ASSESSMENT — PAIN - FUNCTIONAL ASSESSMENT: PAIN_FUNCTIONAL_ASSESSMENT: 0-10

## 2023-11-05 NOTE — CARE PLAN
The patient's goals for the shift include      The clinical goals for the shift include PT WILL REMAIN HEMODYNAMICALLY STABLR ALL SHIFT    Problem: Fall/Injury  Goal: Not fall by end of shift  Outcome: Progressing  Goal: Be free from injury by end of the shift  Outcome: Progressing  Goal: Verbalize understanding of personal risk factors for fall in the hospital  Outcome: Progressing  Goal: Verbalize understanding of risk factor reduction measures to prevent injury from fall in the home  Outcome: Progressing  Goal: Use assistive devices by end of the shift  Outcome: Progressing  Goal: Pace activities to prevent fatigue by end of the shift  Outcome: Progressing     Problem: Skin  Goal: Decreased wound size/increased tissue granulation at next dressing change  Outcome: Progressing  Goal: Participates in plan/prevention/treatment measures  Outcome: Progressing  Goal: Prevent/manage excess moisture  11/5/2023 1853 by Taylor Graves RN  Outcome: Progressing  11/5/2023 1239 by Taylor Graves RN  Flowsheets (Taken 11/5/2023 1239)  Prevent/manage excess moisture:   Monitor for/manage infection if present   Moisturize dry skin  Goal: Prevent/minimize sheer/friction injuries  Outcome: Progressing  Goal: Promote/optimize nutrition  Outcome: Progressing  Goal: Promote skin healing  Outcome: Progressing     Problem: Dialysis  Goal: I will slow progression of end-stage renal disease through participating in dialysis 3 times a week  Outcome: Progressing     Problem: Pain  Goal: Takes deep breaths with improved pain control throughout the shift  Outcome: Progressing  Goal: Turns in bed with improved pain control throughout the shift  Outcome: Progressing  Goal: Walks with improved pain control throughout the shift  Outcome: Progressing  Goal: Performs ADL's with improved pain control throughout shift  Outcome: Progressing  Goal: Participates in PT with improved pain control throughout the shift  Outcome: Progressing

## 2023-11-05 NOTE — NURSING NOTE
Zack Figueroa  [id number]     MRN: [id number]  88 y.o. male  Hospital Day: 2 (11/4/2023)  Code Status: Full Code  Isolation Status: No active isolations  Allergies: Patient has no known allergies.  Diet: Adult diet Carb Controlled; 60 gram carb/meal, 30 gram Carb evening snack Principal Problem:    Hypotension  Active Problems:    Bradycardia    Encephalopathy   PCP: Pippa Bonner [phone]  Treatment Team:   Attending Provider: George Lenz MD  Consulting Physician: Nereida Navarro MD  Registered Nurse: Tio Rock RN  Utilization Reviewer: Marian Márquez, RN  Registered Nurse: Taylor Graves RN  Charge Nurse: Qian Alaniz   Vitals:  Temp:  [36 °C (96.8 °F)-36.1 °C (97 °F)] 36.1 °C (97 °F)  Heart Rate:  [48-54] 51  Resp:  [16-26] 18  BP: ()/(46-63) 106/54  I/O:  No intake/output data recorded.  Vents:     Meds: DCW:    Scheduled Meds: insulin glargine, 20 Units, subcutaneous, q24h  insulin lispro, 0-10 Units, subcutaneous, TID with meals  sodium chloride 0.9%, 10 mL, intravenous, q8h MAHAD      Continuous Infusions:    PRN Meds: PRN medications: acetaminophen **OR** acetaminophen **OR** acetaminophen, dextrose 10 % in water (D10W), dextrose, glucagon, melatonin, ondansetron ODT **OR** ondansetron, polyethylene glycol Past Medical History:   Diagnosis Date    Atrial flutter (CMS/[facility])     Coronary artery disease     Depression     End-stage renal disease on hemodialysis (CMS/[facility])     First degree AV block     Heart failure with preserved ejection fraction (CMS/[facility])     History of cerebral hemorrhage     History of ischemic stroke     History of temporal arteritis     Hyperlipidemia     Hypertension     Insulin dependent type 2 diabetes mellitus (CMS/[facility])     Iron deficiency anemia     Recurrent urinary tract infection     Right sided weakness

## 2023-11-05 NOTE — H&P
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     He was hospitalized here at [facility]  October 10 through October 19 where he was treated for an ischemic stroke with subsequent hemorrhagic conversion.  He was evaluated by neurology and has some mild right-sided weakness and a slight speech alteration that are reportedly likely to improve with ongoing physical, occupational, and speech therapy interventions.  During that hospitalization he also suffered worsening renal failure and had chest wall catheter placement through which he is receiving intermittent hemodialysis.  He was discharged to a facility with medication changes including holding his apixaban likely indefinitely and increased dose of his Zoloft.    He was reportedly found to be bradycardic and hypotensive at his facility tonight prior to arrival.  His heart rate has been in the high 40s to low 50s here with systolic rate around 100.  Staff at his SNF reportedly said he was dizzy however he has denied this here.  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  Urinalysis is pending to see if there is potentially a UTI contributing to what appears to be an encephalopathy as well.    Initial plan was to discharge from the emergency department after  unremarkable work-up however he remains borderline hypotensive with systolic blood pressure in the 90s to low 100s.  There is concern that he will not be able to tolerate intermittent hemodialysis at his facility due to this low blood pressure so plan is to admit to the hospital with nephrology on consult.    Past Medical History  Past Medical History:   Diagnosis Date    Atrial flutter (CMS/[facility])     Coronary artery disease     Depression     End-stage renal disease on hemodialysis (CMS/[facility])     First degree AV block     Heart failure with preserved ejection fraction (CMS/[facility])     History of cerebral hemorrhage     History of ischemic stroke     History of temporal arteritis     Hyperlipidemia     Hypertension     Insulin dependent type 2 diabetes mellitus (CMS/[facility])     Iron deficiency anemia     Recurrent urinary tract infection     Right sided weakness        Surgical History  Past Surgical History:   Procedure Laterality Date    APPENDECTOMY      CORONARY ARTERY BYPASS GRAFT  1994    IR CVC NONTUNNELED  10/13/2023    IR CVC NONTUNNELED 10/13/2023 Raffi Polk MD [facility]    IR CVC TUNNELED  10/18/2023    IR CVC TUNNELED 10/18/2023 Edu Dorsey MD [facility]        Social History  Social History     Tobacco Use    Smoking status: Never    Smokeless tobacco: Never   Substance Use Topics    Alcohol use: Never    Drug use: Never        Family History  Family History   Problem Relation Name Age of Onset    Diabetes Other      Coronary artery disease Other          Allergies  Patient has no known allergies.    Review of Systems   Constitutional:  Positive for fatigue. Negative for chills and fever.   HENT:  Negative for sore throat and trouble swallowing.    Eyes:  Negative for visual disturbance.   Respiratory:  Negative for shortness of breath.    Cardiovascular:  Negative for chest pain.   Gastrointestinal:  Negative for abdominal pain and nausea.   Genitourinary:  Negative for hematuria.  "  Musculoskeletal:  Negative for neck pain and neck stiffness.   Skin:  Negative for rash and wound.   Neurological:  Positive for dizziness and speech difficulty.   Psychiatric/Behavioral:  Positive for confusion.             Physical Exam  Constitutional:       General: He is not in acute distress.     Appearance: Normal appearance. He is not ill-appearing.   HENT:      Head: Normocephalic.      Right Ear: External ear normal.      Left Ear: External ear normal.      Nose: Nose normal.      Mouth/Throat:      Mouth: Mucous membranes are moist.   Eyes:      General: No scleral icterus.  Cardiovascular:      Rate and Rhythm: Bradycardia present.      Pulses: Normal pulses.   Pulmonary:      Effort: Pulmonary effort is normal.      Breath sounds: Normal breath sounds.   Abdominal:      General: Abdomen is flat. Bowel sounds are normal.      Palpations: Abdomen is soft.   Musculoskeletal:      Cervical back: Normal range of motion.      Right lower leg: No edema.      Left lower leg: No edema.   Skin:     General: Skin is warm and dry.      Capillary Refill: Capillary refill takes less than 2 seconds.   Neurological:      Mental Status: He is alert. He is disoriented.   Psychiatric:         Mood and Affect: Mood normal.         Behavior: Behavior normal.             Last Recorded Vitals  Visit Vitals  /54 (BP Location: Right arm)   Pulse 51   Temp 36 °C (96.8 °F) (Temporal)   Resp 18   Ht 1.753 m (5' 9.02\")   Wt 85.7 kg (189 lb)   SpO2 98%   BMI 27.90 kg/m²   Smoking Status Never   BSA 2.04 m²        Relevant Results  Results for orders placed or performed during the hospital encounter of 11/04/23 (from the past 24 hour(s))   CBC and Auto Differential   Result Value Ref Range    WBC 7.2 4.4 - 11.3 x10*3/uL    nRBC 0.0 0.0 - 0.0 /100 WBCs    RBC 3.14 (L) 4.50 - 5.90 x10*6/uL    Hemoglobin 9.2 (L) 13.5 - 17.5 g/dL    Hematocrit 30.3 (L) 41.0 - 52.0 %    MCV 97 80 - 100 fL    MCH 29.3 26.0 - 34.0 pg    MCHC 30.4 " (L) 32.0 - 36.0 g/dL    RDW 15.1 (H) 11.5 - 14.5 %    Platelets 196 150 - 450 x10*3/uL    Neutrophils % 63.2 40.0 - 80.0 %    Immature Granulocytes %, Automated 0.3 0.0 - 0.9 %    Lymphocytes % 20.8 13.0 - 44.0 %    Monocytes % 14.3 2.0 - 10.0 %    Eosinophils % 1.1 0.0 - 6.0 %    Basophils % 0.3 0.0 - 2.0 %    Neutrophils Absolute 4.52 1.60 - 5.50 x10*3/uL    Immature Granulocytes Absolute, Automated 0.02 0.00 - 0.50 x10*3/uL    Lymphocytes Absolute 1.49 0.80 - 3.00 x10*3/uL    Monocytes Absolute 1.02 (H) 0.05 - 0.80 x10*3/uL    Eosinophils Absolute 0.08 0.00 - 0.40 x10*3/uL    Basophils Absolute 0.02 0.00 - 0.10 x10*3/uL   Comprehensive Metabolic Panel   Result Value Ref Range    Glucose 203 (H) 74 - 99 mg/dL    Sodium 135 (L) 136 - 145 mmol/L    Potassium 4.1 3.5 - 5.3 mmol/L    Chloride 96 (L) 98 - 107 mmol/L    Bicarbonate 31 21 - 32 mmol/L    Anion Gap 12 10 - 20 mmol/L    Urea Nitrogen 46 (H) 6 - 23 mg/dL    Creatinine 3.76 (H) 0.50 - 1.30 mg/dL    eGFR 15 (L) >60 mL/min/1.73m*2    Calcium 8.6 8.6 - 10.3 mg/dL    Albumin 3.1 (L) 3.4 - 5.0 g/dL    Alkaline Phosphatase 127 33 - 136 U/L    Total Protein 7.0 6.4 - 8.2 g/dL    AST 74 (H) 9 - 39 U/L    Bilirubin, Total 0.6 0.0 - 1.2 mg/dL    ALT 68 (H) 10 - 52 U/L   Magnesium   Result Value Ref Range    Magnesium 1.87 1.60 - 2.40 mg/dL   Troponin I, High Sensitivity, Initial   Result Value Ref Range    Troponin I, High Sensitivity 37 (H) 0 - 20 ng/L   Troponin I, High Sensitivity   Result Value Ref Range    Troponin I, High Sensitivity 36 (H) 0 - 20 ng/L      EKG:  Encounter Date: 10/09/23   ECG 12 lead   Result Value    Ventricular Rate 68    Atrial Rate 68    MN Interval 306    QRS Duration 110    QT Interval 432    QTC Calculation(Bazett) 459    R Axis 194    T Axis 206    QRS Count 11    Q Onset 212    P Onset 59    P Offset 90    T Offset 428    QTC Fredericia 450    Narrative    Sinus rhythm with 1st degree AV block  Right superior axis deviation  Inferior  infarct , age undetermined Possible  Abnormal ECG  No previous ECGs available  Confirmed by Lazaro Coley ([id number]) on 10/11/2023 9:27:18 AM         Home Medications  Prior to Admission medications    Medication Sig Start Date End Date Taking? Authorizing Provider   albuterol 90 mcg/actuation inhaler Inhale 2 puffs every 6 hours if needed for wheezing.    Historical Provider, MD   amiodarone (Pacerone) 200 mg tablet Take 1 tablet (200 mg) by mouth once daily.    Historical Provider, MD   aspirin 81 mg EC tablet Take 1 tablet (81 mg) by mouth once daily.    Historical Provider, MD   B complex-vitamin C tablet Take 1 tablet by mouth every other day. Do not start before October 19, 2023. 10/19/23   NITA Duran   cholecalciferol (Vitamin D3) 25 MCG (1000 UT) capsule Take 1 capsule (25 mcg) by mouth every other day.    Historical Provider, MD   docusate sodium (Colace) 100 mg capsule Take 1 capsule (100 mg) by mouth once daily.    Historical Provider, MD   hydrALAZINE (Apresoline) 25 mg tablet Take 1 tablet (25 mg) by mouth 2 times a day. 10/18/23   NITA Duran   insulin glargine (Lantus U-100 Insulin) 100 unit/mL injection Inject 12 Units under the skin once daily at bedtime. Take as directed per insulin instructions.    Historical Provider, MD   insulin lispro (HumaLOG) 100 unit/mL injection Inject 0-0.1 mL (0-10 Units) under the skin 4 times a day before meals. Take as directed per insulin instructions. 10/18/23   NITA Duran   isosorbide mononitrate ER (Imdur) 30 mg 24 hr tablet Take 1 tablet (30 mg) by mouth once daily. Do not crush or chew. Do not start before October 19, 2023. 10/19/23   NITA Duran   pantoprazole (ProtoNix) 40 mg EC tablet Take 1 tablet (40 mg) by mouth once daily in the morning. Take before meals. Do not crush, chew, or split. Do not start before October 19, 2023. 10/19/23   NITA Duran   rosuvastatin (Crestor) 20 mg tablet Take 1  tablet (20 mg) by mouth once daily.    Historical Provider, MD   sertraline (Zoloft) 25 mg tablet Take 1 tablet (25 mg) by mouth once daily.    Historical Provider, MD       Medications  Scheduled medications  insulin glargine, 20 Units, subcutaneous, q24h  insulin lispro, 0-10 Units, subcutaneous, TID with meals  sodium chloride 0.9%, 10 mL, intravenous, q8h MAHAD      Continuous medications     PRN medications  acetaminophen, 650 mg, q4h PRN   Or  acetaminophen, 650 mg, q4h PRN   Or  acetaminophen, 650 mg, q4h PRN  dextrose 10 % in water (D10W), 0.3 g/kg/hr, Once PRN  dextrose, 25 g, q15 min PRN  glucagon, 1 mg, q15 min PRN  melatonin, 3 mg, Nightly PRN  ondansetron, 4 mg, q8h PRN   Or  ondansetron, 4 mg, q8h PRN  polyethylene glycol, 17 g, Daily PRN        Imaging  CT head wo IV contrast    Result Date: 11/4/2023  Interpreted By:  Jenn Hurst, STUDY: CT HEAD WO IV CONTRAST;  11/4/2023 7:04 pm   INDICATION: recent intracranial bleed. now with increased drowsiness.   COMPARISON: 10/10/2023   ACCESSION NUMBER(S): [id number]   ORDERING CLINICIAN: ERIK SARAVIA   TECHNIQUE: Axial noncontrast CT images of the head.   FINDINGS: BRAIN PARENCHYMA: Interval resolution of previously noted left fill-in geovanni/basal ganglia hematoma with residual region of hypoattenuation and mild local mass effect on the adjacent parenchyma without midline shift. Additional moderate to advanced nonspecific white matter changes are present. Evaluation is limited due to moderate to advanced motion artifact predominantly along the anterior and superior frontal lobes. Generalized parenchymal volume loss noted. Atherosclerotic calcifications of the carotid siphons.   HEMORRHAGE: No acute intracranial hemorrhage. VENTRICLES and EXTRA-AXIAL SPACES: The ventricles, sulci and basal cisterns enlarged, concordant with parenchymal volume loss. EXTRACRANIAL SOFT TISSUES: Within normal limits. PARANASAL SINUSES/MASTOIDS: The visualized paranasal sinuses  and mastoid air cells are aerated. CALVARIUM: No depressed skull fracture. No destructive osseous lesion.   OTHER FINDINGS: None.       Evaluation degraded due to moderate motion artifact without definite recurrent or new intracranial hemorrhage. Previously noted region of hematoma demonstrates decreased attenuation with subtle local mass effect which may be related to resolving edema. No midline shift.   At least moderate nonspecific white matter changes and parenchymal volume loss.   MACRO: None.   Signed by: Jenn Hurst 11/4/2023 7:12 PM Dictation workstation:   [id number]    XR chest 1 view    Result Date: 11/4/2023  Interpreted By:  Immanuel Sosa, STUDY: XR CHEST 1 VIEW;  11/4/2023 3:43 pm   INDICATION: Signs/Symptoms:Chest Pain.   COMPARISON: 10/14/2023   ACCESSION NUMBER(S): [id number]   ORDERING CLINICIAN: ERIK SARAVIA   TECHNIQUE: A portable radiograph of the chest is performed.   FINDINGS: Postoperative changes are again noted. A dual lumen central venous catheter is now in place with the tip overlying the junction of the right atrium and superior vena cava.   There are low lung volumes. The heart is enlarged. There is linear atelectasis in the left lung base. There is no airspace consolidation, pleural effusion, or pneumothorax. There is mild central vascular prominence. The osseous structures are mildly demineralized.       Interval placement of a dual lumen central venous catheter. There is no pneumothorax.   Low lung volumes with cardiomegaly and curvilinear left basilar atelectasis.   Signed by: Immanuel Sosa 11/4/2023 3:46 PM Dictation workstation:   [id number]         Assessment/Plan   Principal Problem:    Hypotension  Active Problems:    Bradycardia    Encephalopathy        Plan:  Bradycardia, hypotension  Will need to review home medication list for potential culprits, need to ensure medications have been appropriately renally dosed, telemetry monitoring, consider  electrophysiology consult    Encephalopathy  Check urinalysis, holding Zoloft for now    Nephrology consult for IHD orders    Plan to resume home medications as appropriate, see orders for additional plan elements, management deferred to attending and consult physicians.    VTE prophylaxis:   DVT prophylaxis: SCDs      Code Status  Full code    I spent 45 minutes in the professional and overall care of this patient.      ARACELIS Perez-CNP  [facility] pager [phone]

## 2023-11-05 NOTE — CONSULTS
INPATIENT NEPHROLOGY CONSULT NOTE        CONSULT: Nephrology SERVICE    PATIENT NAME: Zack Figueroa    MRN: [id number]  DATE of SERVICE: November 5, 2023  TIME of SERVICE: 9:30 AM      REASON FOR CONSULT: DENNISE, CKD IV, dialysis dependent   REQUESTING PHYSICIAN:  Dr. Lenz   PRIMARY CARE PHYSICIAN: Pippa Bonner MD    HPI:  Mr. Figueroa is a 88 y.o. male who presents for eval of bradycardia.    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 Last hemoglobin A1c 7.6.  History of paroxysmal atrial fibrillation follows with EP physiologist Dr. Chávez and cardiologist Dr. Martinez,, chronic diastolic heart failure, coronary artery disease status post remote CABG, hypertension, hyperlipidemia, history of CVA.     Patient presented to [facility] November 4, 2023 for further evaluation of dizziness and found to be bradycardic and hypotensive on vitals.  Seen and evaluated bedside reports improvement in weakness and fatigue.  Patient confused however his mental status appears to be improved since presentation.  Denies nausea, vomiting, diarrhea.    Echocardiogram performed today at [facility] February 11, 2023 demonstrated preserved left ventricular ejection fraction increased left ventricular mass, moderately increased left ventricular septal thickness and patient noted to have prior history of syncopal episodes.  Left atrium dilated.  No evidence of valvular heart disease.        ASSESSMENT AND PLAN:     Acute Kidney injury superimposed on chronic kidney disease stage IV/V:  Dialysis dependent      Subacute to acute left intracranial hemorrhage      Atrial  fibrillation: Anticoagulation on hold  Started on amiodarone     Hypertension:   - relative hypotension and bradycardia      Chronic kidney disease stage IV/V:  -- Longstanding history of diabetes mellitus complicated with triopathy  -- Diabetic nephropathy, hypertensive nephrosclerosis  -- Proteinuric with a protein to creatinine ratio of 2.6  mg/g  -- dialysis dependent     Secondary hyperparathyroidism: to cont vit D     Volume: Concern for hypovolemia    Stroke: intracranial hemorrhage      Plan:  Electrolytes and volume status within acceptable range, no urgent indication for intubation therapy today.  Strict input and output to guide volume management.  Continue to monitor for signs of renal recovery.  Hypotension and bradycardia post hemodialysis concern for intradialytic volume depletion versus intolerance.  Medication reviewed and adjusted  Plan of care discussed with patient, patient wife primary care team and bedside nurse      I sincerely, thank you Dr. Lenz for this opportunity to participate in the care of your patient, I will follow from Nephrology point view!    PAST MEDICAL HISTORY:    Past Medical History:   Diagnosis Date    Atrial flutter (CMS/HCC)     Coronary artery disease     Depression     End-stage renal disease on hemodialysis (CMS/[facility])     First degree AV block     Heart failure with preserved ejection fraction (CMS/HCC)     History of cerebral hemorrhage     History of ischemic stroke     History of temporal arteritis     Hyperlipidemia     Hypertension     Insulin dependent type 2 diabetes mellitus (CMS/HCC)     Iron deficiency anemia     Recurrent urinary tract infection     Right sided weakness        PAST SURGICAL HISTORY:    Past Surgical History:   Procedure Laterality Date    APPENDECTOMY      CORONARY ARTERY BYPASS GRAFT  1994    IR CVC NONTUNNELED  10/13/2023    IR CVC NONTUNNELED 10/13/2023 Raffi Polk MD [facility]    IR CVC TUNNELED  10/18/2023    IR CVC TUNNELED  10/18/2023 Edu Dorsey MD [facility]       FAMILY HISTORY:    Family History   Problem Relation Name Age of Onset    Diabetes Other      Coronary artery disease Other         SOCIAL HISTORY:    Social History     Tobacco Use    Smoking status: Never    Smokeless tobacco: Never   Substance Use Topics    Alcohol use: Never    Drug use: Never       MEDICATIONS:  Prior to Admission Medications:  Medications Prior to Admission   Medication Sig Dispense Refill Last Dose    albuterol 90 mcg/actuation inhaler Inhale 2 puffs every 6 hours if needed for wheezing.       amiodarone (Pacerone) 200 mg tablet Take 1 tablet (200 mg) by mouth once daily.       aspirin 81 mg EC tablet Take 1 tablet (81 mg) by mouth once daily.       B complex-vitamin C tablet Take 1 tablet by mouth every other day. Do not start before October 19, 2023. 30 tablet 0     cholecalciferol (Vitamin D3) 25 MCG (1000 UT) capsule Take 1 capsule (25 mcg) by mouth every other day.       docusate sodium (Colace) 100 mg capsule Take 1 capsule (100 mg) by mouth once daily.       hydrALAZINE (Apresoline) 25 mg tablet Take 1 tablet (25 mg) by mouth 2 times a day. 30 tablet 0     insulin glargine (Lantus U-100 Insulin) 100 unit/mL injection Inject 12 Units under the skin once daily at bedtime. Take as directed per insulin instructions.       insulin lispro (HumaLOG) 100 unit/mL injection Inject 0-0.1 mL (0-10 Units) under the skin 4 times a day before meals. Take as directed per insulin instructions. 1 each 3     isosorbide mononitrate ER (Imdur) 30 mg 24 hr tablet Take 1 tablet (30 mg) by mouth once daily. Do not crush or chew. Do not start before October 19, 2023. 30 tablet 0     pantoprazole (ProtoNix) 40 mg EC tablet Take 1 tablet (40 mg) by mouth once daily in the morning. Take before meals. Do not crush, chew, or split. Do not start before October 19, 2023. 30 tablet 0     rosuvastatin (Crestor) 20 mg tablet Take 1 tablet (20 mg) by mouth once daily.        sertraline (Zoloft) 25 mg tablet Take 1 tablet (25 mg) by mouth once daily.          INPATIENT MEDICATIONS:    Current Facility-Administered Medications:     acetaminophen (Tylenol) tablet 650 mg, 650 mg, oral, q4h PRN **OR** acetaminophen (Tylenol) oral liquid 650 mg, 650 mg, nasogastric tube, q4h PRN **OR** acetaminophen (Tylenol) suppository 650 mg, 650 mg, rectal, q4h PRN, NITA Perez    dextrose 10 % in water (D10W) infusion, 0.3 g/kg/hr, intravenous, Once PRN, NITA Perez    dextrose 50 % injection 25 g, 25 g, intravenous, q15 min PRN, NITA Perez    glucagon (Glucagen) injection 1 mg, 1 mg, intramuscular, q15 min PRN, NITA Perez    insulin glargine (Lantus) injection 20 Units, 20 Units, subcutaneous, q24h, NITA Perez, 20 Units at 11/05/23 0433    insulin lispro (HumaLOG) injection 0-10 Units, 0-10 Units, subcutaneous, TID with meals, NITA Perez    melatonin tablet 3 mg, 3 mg, oral, Nightly PRN, NITA Perez    ondansetron ODT (Zofran-ODT) disintegrating tablet 4 mg, 4 mg, oral, q8h PRN **OR** ondansetron (Zofran) injection 4 mg, 4 mg, intravenous, q8h PRN, NITA Perez    polyethylene glycol (Glycolax, Miralax) packet 17 g, 17 g, oral, Daily PRN, NITA Perez    sodium chloride 0.9% flush 10 mL, 10 mL, intravenous, q8h MAHAD, NITA Perez, 10 mL at 11/05/23 0600    ALLERGIES:  No Known Allergies    COMPLETE REVIEW OF SYSTEMS:    A comprehensive 14 point review of systems was obtained.  Constitutional: Reports weakness and fatigue  HENT: Negative for congestion and sore throat.    Eyes: Negative for pain and visual disturbance.  Respiratory: Negative for cough and shortness of breath.    Cardiovascular: Negative for chest pain and palpitations.  Gastrointestinal: Negative for abdominal pain, diarrhea and vomiting.  Genitourinary:  "Oliguria  Musculoskeletal: Positive for back pain and myalgias.  Skin: negative for wound. Negative for color change and rash.  Neurological: Negative for weakness and headaches.  Hematological: Negative for adenopathy. Does not bruise/bleed easily.  Psychiatric/Behavioral: Negative for agitation and confusion.  All other reviewed and negative other than HPI.    PHYSICAL EXAM: Physical exam performed.  Patient Vitals for the past 24 hrs:   BP Temp Temp src Pulse Resp SpO2 Height Weight   11/05/23 0800 119/58 35.4 °C (95.7 °F) Temporal 50 16 98 % -- --   11/05/23 0424 106/54 36.1 °C (97 °F) Temporal -- 18 98 % -- --   11/04/23 2230 110/54 36 °C (96.8 °F) Temporal 51 18 98 % 1.753 m (5' 9.02\") 85.7 kg (189 lb)   11/04/23 2154 98/50 -- -- 54 20 98 % -- --   11/04/23 2100 (!) 92/46 -- -- 50 20 98 % -- --   11/04/23 2000 100/63 -- -- 50 26 98 % -- --   11/04/23 1830 -- -- -- 50 18 99 % -- --   11/04/23 1815 100/55 -- -- 50 20 100 % -- --   11/04/23 1800 -- -- -- 52 23 99 % -- --   11/04/23 1700 110/53 -- -- (!) 48 16 99 % -- --   11/04/23 1600 (!) 105/48 -- -- 50 20 98 % -- --   11/04/23 1553 -- -- -- -- -- -- 1.753 m (5' 9\") 85.7 kg (189 lb)   11/04/23 1550 -- -- -- 52 21 98 % -- --   11/04/23 1545 104/55 -- -- 52 19 100 % -- --   11/04/23 1540 -- -- -- 52 19 98 % -- --   11/04/23 1530 -- -- -- 50 16 98 % -- --     Body mass index is 27.9 kg/m².    CONSTITUTIONAL:  Vital signs reviewed.  Hypertension bradycardia on presentation  GENERAL: Well developed, well nourished.  SKIN: No petechia or ecchymosis  HEAD/SINUSES: Atraumatic  EYES: PERRLA, + pallor  OROPHARYNX: Dry mucous membranes  NECK: +  jugulovenous distention, No carotid bruits, Carotid pulse normal contour, Supple  LUNGS: Diminished air entry bilaterally, no Rales  CARDIAC: distant S1 and S2; no rubs, murmurs, or gallops  ABDOMEN: Abdomen soft, non-tender, BS normal, distended  EXTREMITIES: Good capillary refill, no edema.  NEUROLOGIC: Awake, alert and oriented " "×1-2, No focal deficit  HEMATOLOGIC/Lymphatic/Immunologic: No abnormal bleeding, echymosis, inflammation. No cervical or supraclavicular lymphadenopathy.  Dialysis catheter in place    Intake/Output last 3 shifts:  I/O last 3 completed shifts:  In: 250 (2.9 mL/kg) [IV Piggyback:250]  Out: - (0 mL/kg)   Weight: 85.7 kg     Diagnostic tests reviewed for today's visit:    CBC, Coags, RNP, Mg, Phos   Results from last 7 days   Lab Units 11/05/23  0532   WBC AUTO x10*3/uL 6.9   RBC AUTO x10*6/uL 3.24*   HEMOGLOBIN g/dL 9.4*   HEMATOCRIT % 32.1*     Results from last 7 days   Lab Units 11/05/23  0532 11/04/23  1613   SODIUM mmol/L 134* 135*   POTASSIUM mmol/L 4.0 4.1   CHLORIDE mmol/L 97* 96*   CO2 mmol/L 27 31   BUN mg/dL 54* 46*   CREATININE mg/dL 4.29* 3.76*   CALCIUM mg/dL 8.4* 8.6   MAGNESIUM mg/dL  --  1.87   BILIRUBIN TOTAL mg/dL  --  0.6   ALT U/L  --  68*   AST U/L  --  74*     Results from last 7 days   Lab Units 10/30/23  1520   COLOR U  Yellow   APPEARANCE U  Clear   PH U  6.0   SPEC GRAV UR  1.009   PROTEIN U mg/dL 30 (1+)*   BLOOD UR  NEGATIVE   NITRITE U  NEGATIVE   WBC UR /HPF 6-10*     IMAGING: CXR reviewed in  images.      SIGNATURE: Nereida Navarro MD  Nephrology and Hypertension  [address]  Office phone: [phone]  FAX: [phone]      This note was partially created using voice recognition software and is inherently subject to errors including those of syntax and \"sound-alike\" substitutions which may escape proofreading.  In such instances, original meaning may be extrapolated by contextual derivation.    "

## 2023-11-05 NOTE — H&P
Internal Medicine History and Physical    PATIENT NAME:  Zack Figueroa    MRN: [id number]  DATE of SERVICE: November 5, 2023    Primary Care Physician: Pippa Bonner MD          Chief Complaint: Generalized weakness, bradycardia and hypotension    HPI:  This is a 88 y.o. male who was recently discharged from [facility] to  acute rehab after being admitted with CVA with hemorrhagic transformation, patient had acute on this point chronic kidney failure that required dialysis presents with generalized weakness, fatigue and bradycardia, altered mental status and slow to response which is different from his baseline.    On today's evaluation patient denies headache, dizziness, chest pain, shortness of breath, abdominal pain, nausea vomiting.  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)     Iron deficiency anemia     Recurrent urinary tract infection     Right sided weakness        Past Surgical History:  Past Surgical History:   Procedure Laterality Date    APPENDECTOMY      CORONARY ARTERY BYPASS GRAFT  1994    IR CVC NONTUNNELED  10/13/2023    IR CVC NONTUNNELED 10/13/2023 MD TATY Contreras ANGIO    IR CVC TUNNELED  10/18/2023    IR CVC TUNNELED 10/18/2023 MD TATY Melendrez ANGIO       Family History:  Family History   Problem Relation Name Age of Onset    Diabetes Other      Coronary artery disease Other         Social History:    Social History     Socioeconomic History    Marital status:       Spouse name: Not on file    Number of children: Not on file    Years of education: Not on file    Highest education level: Not on file   Occupational History    Not on file   Tobacco Use    Smoking status: Never    Smokeless tobacco: Never   Substance and Sexual Activity    Alcohol use: Never    Drug use: Never    Sexual activity: Not on file   Other Topics Concern    Not on file   Social History Narrative    Not on file     Social Determinants of Health     Financial Resource Strain: Low Risk  (11/4/2023)    Overall Financial Resource Strain (CARDIA)     Difficulty of Paying Living Expenses: Not hard at all   Food Insecurity: No Food Insecurity (10/11/2023)    Hunger Vital Sign     Worried About Running Out of Food in the Last Year: Never true     Ran Out of Food in the Last Year: Never true   Transportation Needs: No Transportation Needs (11/4/2023)    PRAPARE - Transportation     Lack of Transportation (Medical): No     Lack of Transportation (Non-Medical): No   Physical Activity: Insufficiently Active (10/10/2023)    Exercise Vital Sign     Days of Exercise per Week: 4 days     Minutes of Exercise per Session: 30 min   Stress: No Stress Concern Present (10/10/2023)    Citizen of [place] of Occupational Health - Occupational Stress Questionnaire     Feeling of Stress : Not at all   Social Connections: Moderately Integrated (10/10/2023)    Social Connection and Isolation Panel [NHANES]     Frequency of Communication with Friends and Family: Twice a week     Frequency of Social Gatherings with Friends and Family: Three times a week     Attends Confucianism Services: More than 4 times per year     Active Member of Clubs or Organizations: No     Attends Club or Organization Meetings: Never     Marital Status:    Intimate Partner Violence: Not on file   Housing Stability: Low Risk  (11/4/2023)    Housing Stability Vital Sign     Unable to Pay for Housing in the Last Year: No     Number of Places Lived in the Last  Year: 1     Unstable Housing in the Last Year: No         Prior to Admission Medications:  Medications Prior to Admission   Medication Sig Dispense Refill Last Dose    amiodarone (Pacerone) 200 mg tablet Take 1 tablet (200 mg) by mouth once daily.   11/4/2023    aspirin 81 mg EC tablet Take 1 tablet (81 mg) by mouth once daily.   11/4/2023    B complex-vitamin C tablet Take 1 tablet by mouth every other day. Do not start before October 19, 2023. 30 tablet 0 11/4/2023    cholecalciferol (Vitamin D3) 25 MCG (1000 UT) capsule Take 1 capsule (25 mcg) by mouth every other day.   11/4/2023    docusate sodium (Colace) 100 mg capsule Take 1 capsule (100 mg) by mouth once daily.   11/4/2023    epoetin keagan-epbx (Retacrit) 10,000 unit/mL injection Inject 1 mL (10,000 Units) under the skin every 7 days. Thursday 11/2/2023    heparin 1,000 unit/mL injection 6 mL (6,000 Units) by hemodialysis route once a day on Monday, Wednesday, and Friday.       hydrALAZINE (Apresoline) 25 mg tablet Take 1 tablet (25 mg) by mouth 2 times a day. 30 tablet 0 11/4/2023    insulin glargine (Lantus U-100 Insulin) 100 unit/mL injection Inject 12 Units under the skin once daily at bedtime. Take as directed per insulin instructions.   11/3/2023    insulin lispro (HumaLOG) 100 unit/mL injection Inject 0-0.1 mL (0-10 Units) under the skin 4 times a day before meals. Take as directed per insulin instructions. 1 each 3 11/4/2023    isosorbide mononitrate ER (Imdur) 30 mg 24 hr tablet Take 1 tablet (30 mg) by mouth once daily. Do not crush or chew. Do not start before October 19, 2023. 30 tablet 0 11/4/2023    pantoprazole (ProtoNix) 40 mg EC tablet Take 1 tablet (40 mg) by mouth once daily in the morning. Take before meals. Do not crush, chew, or split. Do not start before October 19, 2023. 30 tablet 0 11/4/2023    polyethylene glycol (Glycolax, Miralax) 17 gram packet Take 17 g by mouth once daily.   11/4/2023    rosuvastatin (Crestor) 20 mg tablet  Take 1 tablet (20 mg) by mouth once daily.   11/4/2023    sertraline (Zoloft) 50 mg tablet Take 1 tablet (50 mg) by mouth once daily.   11/4/2023    tamsulosin (Flomax) 0.4 mg 24 hr capsule Take 1 capsule (0.4 mg) by mouth once daily in the morning. Take before meals.   11/3/2023    torsemide (Demadex) 100 mg tablet Take 1 tablet (100 mg) by mouth once daily. Hold for SBP<110   11/4/2023       Active orders:  Active Orders   Lab    Basic metabolic panel     Frequency: AM draw     Number of Occurrences: Until Specified    CBC     Frequency: AM draw     Number of Occurrences: Until Specified    Urinalysis with Reflex Microscopic and Culture     Frequency: STAT     Number of Occurrences: 1 Occurrences   Diet    Adult diet Carb Controlled; 60 gram carb/meal, 30 gram Carb evening snack     Frequency: Effective now     Number of Occurrences: Until Specified   Nursing    Activity (specify) Out of Bed with Assistance     Frequency: Until discontinued     Number of Occurrences: Until Specified    Diet instructions to nursing: 15 grams oral carbohydrate for low blood glucose     Frequency: Until discontinued     Number of Occurrences: Until Specified     Order Comments: If blood glucose less than 60 mg/dL (or less than 70 mg/dL and symptomatic), give 15 grams of carbohydrates: 4 ounces of juice or soda (not diet). Recheck blood glucose every 15 minutes and repeat 15 grams of carbohydrates until blood glucose is above 80 mg/dL.      Glucose 10-70 mg/dL & CONSCIOUS- Give 15 Grams of Carbohydrates and repeat until blood glucose level reaches 100 mg/dL or greater.     Frequency: Until discontinued     Number of Occurrences: Until Specified     Order Comments: Select 1 of the following:  -1 cup skim milk  -3 or 4 glucose tablets  -4 ounces of fruit juice or regular soda.  Patient MUST be CONSCIOUS and able to eat or drink      Notify provider (specify parameters)     Frequency: Until discontinued     Number of Occurrences: Until  Specified     Order Comments: [place] Hypoglycemia interventions.      Notify provider (specify parameters)     Frequency: Until discontinued     Number of Occurrences: Until Specified     Order Comments: For blood glucose greater than 200 mg/dL twice over 24 hours.  Provider to consider Endocrine Consult.      Notify provider (specify parameters)     Frequency: Until discontinued     Number of Occurrences: Until Specified    Nursing swallow assessment     Frequency: Once     Number of Occurrences: 1 Occurrences    Place sequential compression device     Frequency: Until discontinued     Number of Occurrences: Until Specified    Telemetry monitoring     Frequency: Until discontinued     Number of Occurrences: 3 Days    Vital Signs     Frequency: q8h     Number of Occurrences: Until Specified   Code Status    Full code     Frequency: Continuous     Number of Occurrences: Until Specified   Consult    Inpatient consult to Nephrology     Frequency: Once     Number of Occurrences: 1 Occurrences   ECG    Electrocardiogram, 12-lead PRN ACS symptoms     Frequency: PRN     Number of Occurrences: Until Specified     Order Comments: Notify provider if performed.     Point of Care Testing - Docked Device    POCT Glucose     Frequency: PRN     Number of Occurrences: Until Specified     Order Comments: PRN for hypoglycemia interventions instituted.  Retest blood glucose level every 15 minutes after every hypoglycemic intervention until blood glucose is greater than 100 mg/dL.       Medications    acetaminophen (Tylenol) oral liquid 650 mg     Linked Order: Or     Frequency: q4h PRN     Dose: 650 mg     Route: nasogastric tube    acetaminophen (Tylenol) suppository 650 mg     Linked Order: Or     Frequency: q4h PRN     Dose: 650 mg     Route: rectal    acetaminophen (Tylenol) tablet 650 mg     Linked Order: Or     Frequency: q4h PRN     Dose: 650 mg     Route: oral    dextrose 10 % in water (D10W) infusion     Frequency: Once  "PRN     Dose: 0.3 g/kg/hr     Route: intravenous    dextrose 50 % injection 25 g     Frequency: q15 min PRN     Dose: 25 g     Route: intravenous    glucagon (Glucagen) injection 1 mg     Frequency: q15 min PRN     Dose: 1 mg     Route: intramuscular    insulin glargine (Lantus) injection 20 Units     Frequency: q24h     Dose: 20 Units     Route: subcutaneous    insulin lispro (HumaLOG) injection 0-10 Units     Frequency: TID with meals     Dose: 0-10 Units     Route: subcutaneous    melatonin tablet 3 mg     Frequency: Nightly PRN     Dose: 3 mg     Route: oral    ondansetron (Zofran) injection 4 mg     Linked Order: Or     Frequency: q8h PRN     Dose: 4 mg     Route: intravenous    ondansetron ODT (Zofran-ODT) disintegrating tablet 4 mg     Linked Order: Or     Frequency: q8h PRN     Dose: 4 mg     Route: oral    polyethylene glycol (Glycolax, Miralax) packet 17 g     Frequency: Daily PRN     Dose: 17 g     Route: oral    sodium chloride 0.9% flush 10 mL     Frequency: q8h MAHAD     Dose: 10 mL     Route: intravenous           Allergies:   No Known Allergies    Review of Systems:  A 10 systems review of systems is negative except for HPI.      Vitals:  Patient Vitals for the past 24 hrs:   BP Temp Temp src Pulse Resp SpO2 Height Weight   11/05/23 0800 119/58 35.4 °C (95.7 °F) Temporal 50 16 98 % -- --   11/05/23 0424 106/54 36.1 °C (97 °F) Temporal -- 18 98 % -- --   11/04/23 2230 110/54 36 °C (96.8 °F) Temporal 51 18 98 % 1.753 m (5' 9.02\") 85.7 kg (189 lb)   11/04/23 2154 98/50 -- -- 54 20 98 % -- --   11/04/23 2100 (!) 92/46 -- -- 50 20 98 % -- --   11/04/23 2000 100/63 -- -- 50 26 98 % -- --   11/04/23 1830 -- -- -- 50 18 99 % -- --   11/04/23 1815 100/55 -- -- 50 20 100 % -- --   11/04/23 1800 -- -- -- 52 23 99 % -- --   11/04/23 1700 110/53 -- -- (!) 48 16 99 % -- --   11/04/23 1600 (!) 105/48 -- -- 50 20 98 % -- --   11/04/23 1553 -- -- -- -- -- -- 1.753 m (5' 9\") 85.7 kg (189 lb)   11/04/23 1550 -- -- -- 52 " 21 98 % -- --   11/04/23 1545 104/55 -- -- 52 19 100 % -- --   11/04/23 1540 -- -- -- 52 19 98 % -- --   11/04/23 1530 -- -- -- 50 16 98 % -- --     Body mass index is 27.9 kg/m².         Physical Exam:  General appearance: Well-appearing alert, in no acute distress and well-hydrated, well nourished  Skin: Skin color, texture, turgor normal, no suspicious rashes or lesions  Head: normal  Eyes: Anicteric sclera. Pupils are equally round and reactive to light.  Extraocular movements are intact.   Ears: external ears normal, canals clear, TM's normal  Nose/Sinuses: Negative  Oropharynx: Lips, mucosa, and tongue normal, teeth and gums normal, oropharynx normal  Neck: Supple, no adenopathy; thyroid symmetric, normal size, no bruits  Lungs: Clear to auscultation, no wheezing or rhonchi  Heart: RRR without murmur, gallop, or rubs.  No ectopy  Abdomen: Soft, non-tender. Bowel sounds normal. No masses, organomegaly  Extremities: No deformity, no edema  Peripheral pulses: Normal  Neuro: Alert oriented x3, no focal deficit.      Labs:  Results for orders placed or performed during the hospital encounter of 11/04/23 (from the past 24 hour(s))   CBC and Auto Differential   Result Value Ref Range    WBC 7.2 4.4 - 11.3 x10*3/uL    nRBC 0.0 0.0 - 0.0 /100 WBCs    RBC 3.14 (L) 4.50 - 5.90 x10*6/uL    Hemoglobin 9.2 (L) 13.5 - 17.5 g/dL    Hematocrit 30.3 (L) 41.0 - 52.0 %    MCV 97 80 - 100 fL    MCH 29.3 26.0 - 34.0 pg    MCHC 30.4 (L) 32.0 - 36.0 g/dL    RDW 15.1 (H) 11.5 - 14.5 %    Platelets 196 150 - 450 x10*3/uL    Neutrophils % 63.2 40.0 - 80.0 %    Immature Granulocytes %, Automated 0.3 0.0 - 0.9 %    Lymphocytes % 20.8 13.0 - 44.0 %    Monocytes % 14.3 2.0 - 10.0 %    Eosinophils % 1.1 0.0 - 6.0 %    Basophils % 0.3 0.0 - 2.0 %    Neutrophils Absolute 4.52 1.60 - 5.50 x10*3/uL    Immature Granulocytes Absolute, Automated 0.02 0.00 - 0.50 x10*3/uL    Lymphocytes Absolute 1.49 0.80 - 3.00 x10*3/uL    Monocytes Absolute 1.02  (H) 0.05 - 0.80 x10*3/uL    Eosinophils Absolute 0.08 0.00 - 0.40 x10*3/uL    Basophils Absolute 0.02 0.00 - 0.10 x10*3/uL   Comprehensive Metabolic Panel   Result Value Ref Range    Glucose 203 (H) 74 - 99 mg/dL    Sodium 135 (L) 136 - 145 mmol/L    Potassium 4.1 3.5 - 5.3 mmol/L    Chloride 96 (L) 98 - 107 mmol/L    Bicarbonate 31 21 - 32 mmol/L    Anion Gap 12 10 - 20 mmol/L    Urea Nitrogen 46 (H) 6 - 23 mg/dL    Creatinine 3.76 (H) 0.50 - 1.30 mg/dL    eGFR 15 (L) >60 mL/min/1.73m*2    Calcium 8.6 8.6 - 10.3 mg/dL    Albumin 3.1 (L) 3.4 - 5.0 g/dL    Alkaline Phosphatase 127 33 - 136 U/L    Total Protein 7.0 6.4 - 8.2 g/dL    AST 74 (H) 9 - 39 U/L    Bilirubin, Total 0.6 0.0 - 1.2 mg/dL    ALT 68 (H) 10 - 52 U/L   Magnesium   Result Value Ref Range    Magnesium 1.87 1.60 - 2.40 mg/dL   Troponin I, High Sensitivity, Initial   Result Value Ref Range    Troponin I, High Sensitivity 37 (H) 0 - 20 ng/L   Troponin I, High Sensitivity   Result Value Ref Range    Troponin I, High Sensitivity 36 (H) 0 - 20 ng/L   POCT GLUCOSE   Result Value Ref Range    POCT Glucose 178 (H) 74 - 99 mg/dL   CBC   Result Value Ref Range    WBC 6.9 4.4 - 11.3 x10*3/uL    nRBC 0.0 0.0 - 0.0 /100 WBCs    RBC 3.24 (L) 4.50 - 5.90 x10*6/uL    Hemoglobin 9.4 (L) 13.5 - 17.5 g/dL    Hematocrit 32.1 (L) 41.0 - 52.0 %    MCV 99 80 - 100 fL    MCH 29.0 26.0 - 34.0 pg    MCHC 29.3 (L) 32.0 - 36.0 g/dL    RDW 15.1 (H) 11.5 - 14.5 %    Platelets 167 150 - 450 x10*3/uL   Basic metabolic panel   Result Value Ref Range    Glucose 164 (H) 74 - 99 mg/dL    Sodium 134 (L) 136 - 145 mmol/L    Potassium 4.0 3.5 - 5.3 mmol/L    Chloride 97 (L) 98 - 107 mmol/L    Bicarbonate 27 21 - 32 mmol/L    Anion Gap 14 10 - 20 mmol/L    Urea Nitrogen 54 (H) 6 - 23 mg/dL    Creatinine 4.29 (H) 0.50 - 1.30 mg/dL    eGFR 13 (L) >60 mL/min/1.73m*2    Calcium 8.4 (L) 8.6 - 10.3 mg/dL   POCT GLUCOSE   Result Value Ref Range    POCT Glucose 129 (H) 74 - 99 mg/dL         Imaging:  "  Reviewed          Assessment/Plan:  Medical Problems       Problem List       * (Principal) Hypotension    Bradycardia    Encephalopathy  Stage V CKD  History of hemorrhagic CVA  Diabetes mellitus  Depression        Admit patient regular medical floor with telemetry  Patient received gentle hydration in the emergency room  Monitor blood pressure  Continue prior to admission medications  Consult nephrology  PT/OT  Social service for discharge planning          DVT Prophylaxis- Addressed    Discussed with patient, RN    SIGNATURE: George Lenz MD  DATE: November 5, 2023  TIME: 10:54 AM      This note was partially created using voice recognition software and is inherently subject to errors including those of syntax and \"sound-alike\" substitutions which may escape proofreading. In such instances, original meaning may be extrapolated by contextual derivation    "

## 2023-11-05 NOTE — NURSING NOTE
Consultant Dr. Navarro-Nephrology and Attending Dr. Lenz in to see Pt discussing plan of care.    Pt currently up to chair with assist x2 with a walker, Pt tolerated activity well.

## 2023-11-06 PROBLEM — N18.5 CHRONIC KIDNEY DISEASE (CKD), STAGE V (MULTI): Status: ACTIVE | Noted: 2023-11-06

## 2023-11-06 LAB
ANION GAP SERPL CALC-SCNC: 13 MMOL/L (ref 10–20)
BACTERIA UR CULT: NO GROWTH
BUN SERPL-MCNC: 64 MG/DL (ref 6–23)
CALCIUM SERPL-MCNC: 8.5 MG/DL (ref 8.6–10.3)
CHLORIDE SERPL-SCNC: 99 MMOL/L (ref 98–107)
CO2 SERPL-SCNC: 30 MMOL/L (ref 21–32)
CREAT SERPL-MCNC: 4.48 MG/DL (ref 0.5–1.3)
ERYTHROCYTE [DISTWIDTH] IN BLOOD BY AUTOMATED COUNT: 15.1 % (ref 11.5–14.5)
GFR SERPL CREATININE-BSD FRML MDRD: 12 ML/MIN/1.73M*2
GLUCOSE BLD MANUAL STRIP-MCNC: 124 MG/DL (ref 74–99)
GLUCOSE BLD MANUAL STRIP-MCNC: 190 MG/DL (ref 74–99)
GLUCOSE BLD MANUAL STRIP-MCNC: 212 MG/DL (ref 74–99)
GLUCOSE BLD MANUAL STRIP-MCNC: 275 MG/DL (ref 74–99)
GLUCOSE SERPL-MCNC: 118 MG/DL (ref 74–99)
HCT VFR BLD AUTO: 32.7 % (ref 41–52)
HGB BLD-MCNC: 9.8 G/DL (ref 13.5–17.5)
MCH RBC QN AUTO: 29.5 PG (ref 26–34)
MCHC RBC AUTO-ENTMCNC: 30 G/DL (ref 32–36)
MCV RBC AUTO: 99 FL (ref 80–100)
NRBC BLD-RTO: 0 /100 WBCS (ref 0–0)
PLATELET # BLD AUTO: 188 X10*3/UL (ref 150–450)
POTASSIUM SERPL-SCNC: 4 MMOL/L (ref 3.5–5.3)
RBC # BLD AUTO: 3.32 X10*6/UL (ref 4.5–5.9)
SODIUM SERPL-SCNC: 138 MMOL/L (ref 136–145)
WBC # BLD AUTO: 7.2 X10*3/UL (ref 4.4–11.3)

## 2023-11-06 PROCEDURE — 2500000002 HC RX 250 W HCPCS SELF ADMINISTERED DRUGS (ALT 637 FOR MEDICARE OP, ALT 636 FOR OP/ED): Performed by: NURSE PRACTITIONER

## 2023-11-06 PROCEDURE — 2500000004 HC RX 250 GENERAL PHARMACY W/ HCPCS (ALT 636 FOR OP/ED): Mod: MUE | Performed by: NURSE PRACTITIONER

## 2023-11-06 PROCEDURE — 85027 COMPLETE CBC AUTOMATED: CPT | Performed by: NURSE PRACTITIONER

## 2023-11-06 PROCEDURE — 36415 COLL VENOUS BLD VENIPUNCTURE: CPT | Performed by: NURSE PRACTITIONER

## 2023-11-06 PROCEDURE — 80048 BASIC METABOLIC PNL TOTAL CA: CPT | Performed by: NURSE PRACTITIONER

## 2023-11-06 PROCEDURE — 2500000001 HC RX 250 WO HCPCS SELF ADMINISTERED DRUGS (ALT 637 FOR MEDICARE OP): Performed by: NURSE PRACTITIONER

## 2023-11-06 PROCEDURE — 2500000002 HC RX 250 W HCPCS SELF ADMINISTERED DRUGS (ALT 637 FOR MEDICARE OP, ALT 636 FOR OP/ED): Mod: MUE | Performed by: NURSE PRACTITIONER

## 2023-11-06 PROCEDURE — G0378 HOSPITAL OBSERVATION PER HR: HCPCS

## 2023-11-06 PROCEDURE — 82947 ASSAY GLUCOSE BLOOD QUANT: CPT | Mod: 59

## 2023-11-06 RX ORDER — CHOLECALCIFEROL (VITAMIN D3) 25 MCG
1000 TABLET ORAL EVERY OTHER DAY
Status: DISCONTINUED | OUTPATIENT
Start: 2023-11-06 | End: 2023-11-08 | Stop reason: HOSPADM

## 2023-11-06 RX ADMIN — Medication 10 ML: at 05:57

## 2023-11-06 RX ADMIN — ISOSORBIDE MONONITRATE 30 MG: 30 TABLET, EXTENDED RELEASE ORAL at 08:00

## 2023-11-06 RX ADMIN — PANTOPRAZOLE SODIUM 40 MG: 40 TABLET, DELAYED RELEASE ORAL at 06:44

## 2023-11-06 RX ADMIN — AMIODARONE HYDROCHLORIDE 200 MG: 200 TABLET ORAL at 08:01

## 2023-11-06 RX ADMIN — ASPIRIN 81 MG: 81 TABLET, COATED ORAL at 08:01

## 2023-11-06 RX ADMIN — ROSUVASTATIN CALCIUM 20 MG: 20 TABLET, FILM COATED ORAL at 08:00

## 2023-11-06 RX ADMIN — INSULIN LISPRO 2 UNITS: 100 INJECTION, SOLUTION INTRAVENOUS; SUBCUTANEOUS at 17:58

## 2023-11-06 RX ADMIN — INSULIN LISPRO 4 UNITS: 100 INJECTION, SOLUTION INTRAVENOUS; SUBCUTANEOUS at 12:25

## 2023-11-06 RX ADMIN — Medication 1000 UNITS: at 12:25

## 2023-11-06 RX ADMIN — Medication 10 ML: at 15:43

## 2023-11-06 RX ADMIN — POLYETHYLENE GLYCOL 3350 17 G: 17 POWDER, FOR SOLUTION ORAL at 08:00

## 2023-11-06 RX ADMIN — INSULIN GLARGINE 10 UNITS: 100 INJECTION, SOLUTION SUBCUTANEOUS at 08:00

## 2023-11-06 RX ADMIN — Medication 10 ML: at 22:39

## 2023-11-06 RX ADMIN — TAMSULOSIN HYDROCHLORIDE 0.4 MG: 0.4 CAPSULE ORAL at 06:44

## 2023-11-06 ASSESSMENT — PAIN SCALES - GENERAL
PAINLEVEL_OUTOF10: 0 - NO PAIN
PAINLEVEL_OUTOF10: 0 - NO PAIN

## 2023-11-06 ASSESSMENT — PAIN - FUNCTIONAL ASSESSMENT: PAIN_FUNCTIONAL_ASSESSMENT: 0-10

## 2023-11-06 NOTE — PROGRESS NOTES
INPATIENT NEPHROLOGY CONSULT PROGRESS NOTE      Patient Name: Zack Figueroa MRN: [id number]  DATE of SERVICE: November 6, 2023  TIME of SERVICE: 11:57 AM  CONSULTING SERVICE: Nephrology    REASON for CONSULT: DENNISE, CKD IV/V, dialysis dependent    INTERVAL HISTORY:  Seen and evaluated at bedside, sitting up in the chair.  Denies nausea, vomiting, diarrhea.  Denies dyspnea.  Urine output 900 cc documented.  Blood pressure 120/50 with a heart rate of 59.  Chest x-ray November 4 with no signs of volume overload.  Serum creatinine today 4.4 from 4.3 yesterday.  BUN 64 from 54.  eGFR 12.  Urinalysis again with proteinuria +2.  Hemoglobin stable at 9.8 mg deciliter.  Amiodarone level in process.  Again urine microscopy demonstrated budding yeast urine culture in process.  Current medications including amiodarone, insulin, Imdur, Flomax, rosuvastatin.    To continue to monitor renal parameters without dialysis if remains stable from renal standpoint, tentative plan for patient to be discharged without hemodialysis    Summary of stay:  Mr. Figueroa is a 88 y.o. male who presents for eval of bradycardia.  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 Last hemoglobin A1c 7.6.  History of paroxysmal atrial fibrillation follows with EP physiologist Dr. Chávez and cardiologist Dr. Martinez,, chronic diastolic heart failure, coronary artery disease status post remote CABG, hypertension, hyperlipidemia, history of CVA.    Patient presented to [facility] November 4, 2023 for further evaluation of dizziness and found to be bradycardic and hypotensive on vitals.  Monitoring for signs of renal  recovery.  Amiodarone level pending    Acute Kidney injury superimposed on chronic kidney disease stage IV/V:  Dialysis dependent      Subacute to acute left intracranial hemorrhage      Atrial fibrillation: Anticoagulation on hold  Started on amiodarone     Hypertension:   - relative hypotension and bradycardia      Chronic kidney disease stage IV/V:  -- Longstanding history of diabetes mellitus complicated with triopathy  -- Diabetic nephropathy, hypertensive nephrosclerosis  -- Proteinuric with a protein to creatinine ratio of 2.6  mg/g  -- dialysis dependent      Secondary hyperparathyroidism: to cont vit D     Volume: Concern for hypovolemia     Stroke: intracranial hemorrhage      Plan:  Electrolytes and volume status within acceptable range, no urgent indication for intubation therapy today.  To Continue to hold dialysis.  Demonstrating signs of renal recovery urine output 900 cc in 24 hours  Patient awake and alert no uremic symptoms no hyperkalemia no acidemia no encephalopathy  Strict input and output to guide volume management.  Continue to monitor for signs of renal recovery.  Hypotension and bradycardia post hemodialysis concern for intradialytic volume depletion versus intolerance.  Medication reviewed and adjusted  Plan of care discussed with patient, patient wife primary care team and bedside nurse       Medications:    Current Facility-Administered Medications:     acetaminophen (Tylenol) tablet 650 mg, 650 mg, oral, q4h PRN **OR** acetaminophen (Tylenol) oral liquid 650 mg, 650 mg, nasogastric tube, q4h PRN **OR** acetaminophen (Tylenol) suppository 650 mg, 650 mg, rectal, q4h PRN, NITA Perez    amiodarone (Pacerone) tablet 200 mg, 200 mg, oral, Daily, NITA Montague, 200 mg at 11/06/23 0801    aspirin EC tablet 81 mg, 81 mg, oral, Daily, NITA Montague, 81 mg at 11/06/23 0801    cholecalciferol (Vitamin D-3) tablet 1,000 Units, 1,000 Units, oral, Every other  day, NITA Montague    dextrose 10 % in water (D10W) infusion, 0.3 g/kg/hr, intravenous, Once PRN, NITA Perez    dextrose 50 % injection 25 g, 25 g, intravenous, q15 min PRN, NITA Perez    glucagon (Glucagen) injection 1 mg, 1 mg, intramuscular, q15 min PRN, NITA Perez    insulin glargine (Lantus) injection 10 Units, 10 Units, subcutaneous, q24h, NITA Montague, 10 Units at 11/06/23 0800    insulin lispro (HumaLOG) injection 0-10 Units, 0-10 Units, subcutaneous, TID with meals, NITA Perez, 6 Units at 11/05/23 1719    isosorbide mononitrate ER (Imdur) 24 hr tablet 30 mg, 30 mg, oral, Daily, NITA Montague, 30 mg at 11/06/23 0800    melatonin tablet 3 mg, 3 mg, oral, Nightly PRN, NITA Perez    ondansetron ODT (Zofran-ODT) disintegrating tablet 4 mg, 4 mg, oral, q8h PRN **OR** ondansetron (Zofran) injection 4 mg, 4 mg, intravenous, q8h PRN, NITA Perez    pantoprazole (ProtoNix) EC tablet 40 mg, 40 mg, oral, Daily before breakfast, NITA Montague, 40 mg at 11/06/23 0644    polyethylene glycol (Glycolax, Miralax) packet 17 g, 17 g, oral, Daily PRN, NITA Perez    polyethylene glycol (Glycolax, Miralax) packet 17 g, 17 g, oral, Daily, NITA Montague, 17 g at 11/06/23 0800    rosuvastatin (Crestor) tablet 20 mg, 20 mg, oral, Daily, NITA Montague, 20 mg at 11/06/23 0800    sodium chloride 0.9% flush 10 mL, 10 mL, intravenous, q8h MAHAD, Naomy NOBLE Orjessica, APRN-CNP, 10 mL at 11/06/23 0557    tamsulosin (Flomax) 24 hr capsule 0.4 mg, 0.4 mg, oral, Daily before breakfast, Jennifer TRISTAN Denisa, APRN-CNP, 0.4 mg at 11/06/23 0644    PERTINENT ROS:  GENERAL:  positive for fatigue, poor appetite.  No fever/chills  RESPIRATORY:  positive for shortness of breath.  Negative for cough, wheezing.  CARDIOVASCULAR:   Negative for chest pain or palpitation.  GI:   Negative for abdominal pain, diarrhea, heartburn, nausea, vomiting  : External Fields catheter in place    Physical Exam:  Vital signs in last 24 hours:  Temp:  [35.4 °C (95.7 °F)-36.3 °C (97.3 °F)] 36.3 °C (97.3 °F)  Heart Rate:  [55-59] 58  Resp:  [16-20] 18  BP: (121-141)/(57-63) 121/57    General: Awake, cooperative, not in acute distress  HEENT:  NCAT,  mucous membranes moist and pink  NECK:  no JVD, no carotid bruit, supple, no cervical mass or thyromegaly  LUNGS;  Diminished breath sounds, no Rales  CV:  Distant, regular rate and rhythm, no murmurs  ABDOMEN:  abdomen soft, nontender, BS normal, no masses or organomegaly  EDEMA:  no lower extremity edema/dependent edema  SKIN:  dry and normal turgor, no clubbing, cyanosis or petechia.  No rashes noted  Temporal dialysis catheter in place    Intake/Output last 3 shifts:  I/O last 3 completed shifts:  In: 830 (9.7 mL/kg) [P.O.:580; IV Piggyback:250]  Out: 300 (3.5 mL/kg) [Urine:300 (0.1 mL/kg/hr)]  Weight: 85.7 kg     DATA:  Diagnotic tests reviewed for Todays visit:  Results from last 7 days   Lab Units 11/06/23  0655   WBC AUTO x10*3/uL 7.2   RBC AUTO x10*6/uL 3.32*   HEMOGLOBIN g/dL 9.8*   HEMATOCRIT % 32.7*     Results from last 7 days   Lab Units 11/06/23  0655 11/05/23  0532 11/04/23  1613   SODIUM mmol/L 138   < > 135*   POTASSIUM mmol/L 4.0   < > 4.1   CHLORIDE mmol/L 99   < > 96*   CO2 mmol/L 30   < > 31   BUN mg/dL 64*   < > 46*   CREATININE mg/dL 4.48*   < > 3.76*   CALCIUM mg/dL 8.5*   < > 8.6   MAGNESIUM mg/dL  --   --  1.87   BILIRUBIN TOTAL mg/dL  --   --  0.6   ALT U/L  --   --  68*   AST U/L  --   --  74*    < > = values in this interval not displayed.     Results from last 7 days   Lab Units 11/05/23  1151   COLOR U  Lexus*   APPEARANCE U  Clear   PH U  5.0   SPEC GRAV UR  1.011   PROTEIN U mg/dL 100 (2+)*   BLOOD UR  NEGATIVE   NITRITE U  POSITIVE*   WBC UR /HPF 1-5   BACTERIA UR /HPF 1+*     IMAGING: CXR reviewed in  images      SIGNATURE:  Nereida Navarro MD  Nephrology and Hypertension  [address]  Office phone: [phone]  FAX: [phone]    This note was partially generated using the Dragon voice recognition system, and there may be some incorrect words, spelling's and punctuation that were not noted in checking the note before saving.

## 2023-11-06 NOTE — PROGRESS NOTES
Internal Medicine Progress Note    Patient Name: Zack Figueroa          MRN: [id number]  Today's Date: November 6, 2023          Attending: George Lenz MD    Subjective:  Patient was seen and examined at bedside.    Review Of Systems:  GENERAL: No malaise or fevers.  CARDIOVASCULAR: Negative for chest pain, leg swelling  RESPIRATORY: Negative for shortness of breath, cough, wheezing  GI: No nausea, vomiting, or diarrhea  MUSCULOSKELETAL: Negative for joint pain or swelling, back pain or muscle pain    Objective:  Vitals:    11/05/23 1600 11/05/23 1950 11/06/23 0000 11/06/23 0800   BP: 129/58 133/60 141/63 121/57   BP Location: Left arm Left arm Left arm Left arm   Patient Position: Lying   Lying   Pulse: 55 58 59 58   Resp: 16 16 20 18   Temp: 35.4 °C (95.7 °F) 35.8 °C (96.4 °F) 35.8 °C (96.4 °F) 36.3 °C (97.3 °F)   TempSrc: Temporal Temporal Temporal Temporal   SpO2: 100% 100% 100% 97%   Weight:       Height:             Physical Exam:   General appearance: Well-appearing alert, in no acute distress   Lungs: Clear to auscultation, no wheezing or rhonchi  Heart: RRR without murmur, gallop, or rubs.  No ectopy  Abdomen: Soft, non-tender. Bowel sounds normal.  Extremities: No deformity, no edema  Neuro: Alert, cooperative, oriented x3  Labs:  Results for orders placed or performed during the hospital encounter of 11/04/23 (from the past 24 hour(s))   POCT GLUCOSE   Result Value Ref Range    POCT Glucose 173 (H) 74 - 99 mg/dL   Urinalysis with Reflex Microscopic and Culture   Result Value Ref Range    Color, Urine Lexus (N) Straw, Yellow    Appearance, Urine Clear Clear    Specific Gravity, Urine 1.011 1.005 - 1.035    pH, Urine 5.0 5.0, 5.5, 6.0, 6.5, 7.0, 7.5, 8.0    Protein, Urine 100 (2+) (N) NEGATIVE mg/dL    Glucose, Urine NEGATIVE NEGATIVE mg/dL    Blood, Urine NEGATIVE NEGATIVE    Ketones, Urine NEGATIVE NEGATIVE mg/dL    Bilirubin, Urine NEGATIVE NEGATIVE    Urobilinogen, Urine 2.0 (N) <2.0 mg/dL     Nitrite, Urine POSITIVE (A) NEGATIVE    Leukocyte Esterase, Urine NEGATIVE NEGATIVE   Extra Urine Gray Tube   Result Value Ref Range    Extra Tube Hold for add-ons.    Microscopic Only, Urine   Result Value Ref Range    WBC, Urine 1-5 1-5, NONE /HPF    RBC, Urine NONE NONE, 1-2, 3-5 /HPF    Bacteria, Urine 1+ (A) NONE SEEN /HPF    Budding Yeast, Urine PRESENT (A) NONE /HPF   POCT GLUCOSE   Result Value Ref Range    POCT Glucose 182 (H) 74 - 99 mg/dL   POCT GLUCOSE   Result Value Ref Range    POCT Glucose 266 (H) 74 - 99 mg/dL   POCT GLUCOSE   Result Value Ref Range    POCT Glucose 170 (H) 74 - 99 mg/dL   CBC   Result Value Ref Range    WBC 7.2 4.4 - 11.3 x10*3/uL    nRBC 0.0 0.0 - 0.0 /100 WBCs    RBC 3.32 (L) 4.50 - 5.90 x10*6/uL    Hemoglobin 9.8 (L) 13.5 - 17.5 g/dL    Hematocrit 32.7 (L) 41.0 - 52.0 %    MCV 99 80 - 100 fL    MCH 29.5 26.0 - 34.0 pg    MCHC 30.0 (L) 32.0 - 36.0 g/dL    RDW 15.1 (H) 11.5 - 14.5 %    Platelets 188 150 - 450 x10*3/uL   Basic metabolic panel   Result Value Ref Range    Glucose 118 (H) 74 - 99 mg/dL    Sodium 138 136 - 145 mmol/L    Potassium 4.0 3.5 - 5.3 mmol/L    Chloride 99 98 - 107 mmol/L    Bicarbonate 30 21 - 32 mmol/L    Anion Gap 13 10 - 20 mmol/L    Urea Nitrogen 64 (H) 6 - 23 mg/dL    Creatinine 4.48 (H) 0.50 - 1.30 mg/dL    eGFR 12 (L) >60 mL/min/1.73m*2    Calcium 8.5 (L) 8.6 - 10.3 mg/dL   POCT GLUCOSE   Result Value Ref Range    POCT Glucose 124 (H) 74 - 99 mg/dL       Medications:  Scheduled medications  amiodarone, 200 mg, oral, Daily  aspirin, 81 mg, oral, Daily  cholecalciferol, 1,000 Units, oral, Every other day  insulin glargine, 10 Units, subcutaneous, q24h  insulin lispro, 0-10 Units, subcutaneous, TID with meals  isosorbide mononitrate ER, 30 mg, oral, Daily  pantoprazole, 40 mg, oral, Daily before breakfast  polyethylene glycol, 17 g, oral, Daily  rosuvastatin, 20 mg, oral, Daily  sodium chloride 0.9%, 10 mL, intravenous, q8h MAHAD  tamsulosin, 0.4 mg, oral,  "Daily before breakfast      Continuous medications     PRN medications  PRN medications: acetaminophen **OR** acetaminophen **OR** acetaminophen, dextrose 10 % in water (D10W), dextrose, glucagon, melatonin, ondansetron ODT **OR** ondansetron, polyethylene glycol      Assessment/Plan:  Medical Problems       Problem List       * (Principal) Hypotension    Overview Signed 11/6/2023  9:10 AM by George Lenz MD     Blood pressure looks better  Continue monitoring           Coronary artery disease involving native coronary artery of native heart without angina pectoris    Overview Signed 11/6/2023  9:12 AM by George Lenz MD     Continue prior to admission medications           Type 2 diabetes mellitus with chronic kidney disease on chronic dialysis, with long-term current use of insulin (CMS/[facility])    Overview Signed 11/6/2023  9:12 AM by George Lenz MD     Continue Lantus and Humalog         Depression    Overview Signed 11/6/2023  9:11 AM by George Lenz MD     Continue prior to admission medications         Bradycardia    Overview Signed 11/6/2023  9:11 AM by George Lenz MD     Heart rate is running in the 50s  Continue monitoring         Encephalopathy    Overview Addendum 11/6/2023  9:11 AM by George Lenz MD     Improving  Continue monitoring         Chronic kidney disease (CKD), stage V (CMS/[facility])    Overview Addendum 11/6/2023  9:11 AM by George Lenz MD     Patient was on intermittent hemodialysis  Nightly is not hyperkalemic, his GFR around his baseline  Nephrology is following               Discussed with patient, LIBRA Lenz MD   Date: 11/06/23  Time: 9:12 AM    This note was partially created using voice recognition software and is inherently subject to errors including those of syntax and \"sound-alike\" substitutions which may escape proofreading. In such instances, original meaning may be extrapolated by contextual derivation    "

## 2023-11-07 PROBLEM — R53.1 GENERALIZED WEAKNESS: Status: ACTIVE | Noted: 2023-11-07

## 2023-11-07 LAB
ANION GAP SERPL CALC-SCNC: 14 MMOL/L (ref 10–20)
BUN SERPL-MCNC: 68 MG/DL (ref 6–23)
CALCIUM SERPL-MCNC: 8.6 MG/DL (ref 8.6–10.3)
CHLORIDE SERPL-SCNC: 99 MMOL/L (ref 98–107)
CO2 SERPL-SCNC: 28 MMOL/L (ref 21–32)
CREAT SERPL-MCNC: 4.61 MG/DL (ref 0.5–1.3)
ERYTHROCYTE [DISTWIDTH] IN BLOOD BY AUTOMATED COUNT: 15.1 % (ref 11.5–14.5)
GFR SERPL CREATININE-BSD FRML MDRD: 12 ML/MIN/1.73M*2
GLUCOSE BLD MANUAL STRIP-MCNC: 150 MG/DL (ref 74–99)
GLUCOSE BLD MANUAL STRIP-MCNC: 161 MG/DL (ref 74–99)
GLUCOSE BLD MANUAL STRIP-MCNC: 179 MG/DL (ref 74–99)
GLUCOSE SERPL-MCNC: 121 MG/DL (ref 74–99)
HCT VFR BLD AUTO: 29.9 % (ref 41–52)
HGB BLD-MCNC: 9 G/DL (ref 13.5–17.5)
MCH RBC QN AUTO: 29.2 PG (ref 26–34)
MCHC RBC AUTO-ENTMCNC: 30.1 G/DL (ref 32–36)
MCV RBC AUTO: 97 FL (ref 80–100)
NRBC BLD-RTO: 0 /100 WBCS (ref 0–0)
PLATELET # BLD AUTO: 185 X10*3/UL (ref 150–450)
POTASSIUM SERPL-SCNC: 3.9 MMOL/L (ref 3.5–5.3)
RBC # BLD AUTO: 3.08 X10*6/UL (ref 4.5–5.9)
SODIUM SERPL-SCNC: 137 MMOL/L (ref 136–145)
WBC # BLD AUTO: 7.2 X10*3/UL (ref 4.4–11.3)

## 2023-11-07 PROCEDURE — 97165 OT EVAL LOW COMPLEX 30 MIN: CPT | Mod: GO | Performed by: OCCUPATIONAL THERAPIST

## 2023-11-07 PROCEDURE — 2500000002 HC RX 250 W HCPCS SELF ADMINISTERED DRUGS (ALT 637 FOR MEDICARE OP, ALT 636 FOR OP/ED): Performed by: NURSE PRACTITIONER

## 2023-11-07 PROCEDURE — 97161 PT EVAL LOW COMPLEX 20 MIN: CPT | Mod: GP

## 2023-11-07 PROCEDURE — 82947 ASSAY GLUCOSE BLOOD QUANT: CPT | Mod: 59

## 2023-11-07 PROCEDURE — 85027 COMPLETE CBC AUTOMATED: CPT | Performed by: NURSE PRACTITIONER

## 2023-11-07 PROCEDURE — 2500000001 HC RX 250 WO HCPCS SELF ADMINISTERED DRUGS (ALT 637 FOR MEDICARE OP): Performed by: NURSE PRACTITIONER

## 2023-11-07 PROCEDURE — 36415 COLL VENOUS BLD VENIPUNCTURE: CPT | Performed by: NURSE PRACTITIONER

## 2023-11-07 PROCEDURE — G0378 HOSPITAL OBSERVATION PER HR: HCPCS

## 2023-11-07 PROCEDURE — 80048 BASIC METABOLIC PNL TOTAL CA: CPT | Performed by: INTERNAL MEDICINE

## 2023-11-07 PROCEDURE — 2500000004 HC RX 250 GENERAL PHARMACY W/ HCPCS (ALT 636 FOR OP/ED): Performed by: NURSE PRACTITIONER

## 2023-11-07 PROCEDURE — 36415 COLL VENOUS BLD VENIPUNCTURE: CPT | Performed by: INTERNAL MEDICINE

## 2023-11-07 RX ORDER — INSULIN GLARGINE 100 [IU]/ML
10 INJECTION, SOLUTION SUBCUTANEOUS EVERY 24 HOURS
Start: 2023-11-08

## 2023-11-07 RX ORDER — FUROSEMIDE 40 MG/1
40 TABLET ORAL DAILY PRN
Status: DISCONTINUED | OUTPATIENT
Start: 2023-11-09 | End: 2023-11-08 | Stop reason: HOSPADM

## 2023-11-07 RX ORDER — FUROSEMIDE 40 MG/1
40 TABLET ORAL DAILY
Status: DISCONTINUED | OUTPATIENT
Start: 2023-11-08 | End: 2023-11-07

## 2023-11-07 RX ORDER — ALBUMIN HUMAN 50 G/1000ML
12.5 SOLUTION INTRAVENOUS ONCE
Status: DISCONTINUED | OUTPATIENT
Start: 2023-11-07 | End: 2023-11-07

## 2023-11-07 RX ADMIN — TAMSULOSIN HYDROCHLORIDE 0.4 MG: 0.4 CAPSULE ORAL at 06:54

## 2023-11-07 RX ADMIN — INSULIN LISPRO 4 UNITS: 100 INJECTION, SOLUTION INTRAVENOUS; SUBCUTANEOUS at 09:11

## 2023-11-07 RX ADMIN — PANTOPRAZOLE SODIUM 40 MG: 40 TABLET, DELAYED RELEASE ORAL at 06:54

## 2023-11-07 RX ADMIN — ASPIRIN 81 MG: 81 TABLET, COATED ORAL at 09:10

## 2023-11-07 RX ADMIN — ROSUVASTATIN CALCIUM 20 MG: 20 TABLET, FILM COATED ORAL at 09:10

## 2023-11-07 RX ADMIN — INSULIN GLARGINE 10 UNITS: 100 INJECTION, SOLUTION SUBCUTANEOUS at 09:10

## 2023-11-07 RX ADMIN — POLYETHYLENE GLYCOL 3350 17 G: 17 POWDER, FOR SOLUTION ORAL at 09:09

## 2023-11-07 RX ADMIN — Medication 10 ML: at 16:27

## 2023-11-07 RX ADMIN — INSULIN LISPRO 2 UNITS: 100 INJECTION, SOLUTION INTRAVENOUS; SUBCUTANEOUS at 17:52

## 2023-11-07 RX ADMIN — AMIODARONE HYDROCHLORIDE 200 MG: 200 TABLET ORAL at 09:10

## 2023-11-07 RX ADMIN — Medication 10 ML: at 22:00

## 2023-11-07 RX ADMIN — POLYETHYLENE GLYCOL 3350 17 G: 17 POWDER, FOR SOLUTION ORAL at 09:00

## 2023-11-07 RX ADMIN — ISOSORBIDE MONONITRATE 30 MG: 30 TABLET, EXTENDED RELEASE ORAL at 09:10

## 2023-11-07 SDOH — ECONOMIC STABILITY: INCOME INSECURITY: IN THE LAST 12 MONTHS, WAS THERE A TIME WHEN YOU WERE NOT ABLE TO PAY THE MORTGAGE OR RENT ON TIME?: NO

## 2023-11-07 SDOH — SOCIAL STABILITY: SOCIAL NETWORK: ARE YOU MARRIED, WIDOWED, DIVORCED, SEPARATED, NEVER MARRIED, OR LIVING WITH A PARTNER?: MARRIED

## 2023-11-07 SDOH — ECONOMIC STABILITY: INCOME INSECURITY: HOW HARD IS IT FOR YOU TO PAY FOR THE VERY BASICS LIKE FOOD, HOUSING, MEDICAL CARE, AND HEATING?: NOT HARD AT ALL

## 2023-11-07 ASSESSMENT — COGNITIVE AND FUNCTIONAL STATUS - GENERAL
TOILETING: A LOT
TURNING FROM BACK TO SIDE WHILE IN FLAT BAD: A LOT
EATING MEALS: A LITTLE
WALKING IN HOSPITAL ROOM: A LOT
DAILY ACTIVITIY SCORE: 15
DRESSING REGULAR UPPER BODY CLOTHING: A LITTLE
MOVING TO AND FROM BED TO CHAIR: A LOT
HELP NEEDED FOR BATHING: A LOT
TURNING FROM BACK TO SIDE WHILE IN FLAT BAD: A LOT
CLIMB 3 TO 5 STEPS WITH RAILING: A LOT
CLIMB 3 TO 5 STEPS WITH RAILING: A LOT
MOVING TO AND FROM BED TO CHAIR: A LOT
STANDING UP FROM CHAIR USING ARMS: A LITTLE
PERSONAL GROOMING: A LITTLE
PERSONAL GROOMING: A LITTLE
MOVING FROM LYING ON BACK TO SITTING ON SIDE OF FLAT BED WITH BEDRAILS: A LITTLE
DRESSING REGULAR LOWER BODY CLOTHING: A LOT
TOILETING: A LOT
HELP NEEDED FOR BATHING: A LOT
STANDING UP FROM CHAIR USING ARMS: A LITTLE
DAILY ACTIVITIY SCORE: 15
MOVING FROM LYING ON BACK TO SITTING ON SIDE OF FLAT BED WITH BEDRAILS: A LITTLE
MOBILITY SCORE: 14
EATING MEALS: A LITTLE
DRESSING REGULAR LOWER BODY CLOTHING: A LOT
MOBILITY SCORE: 14
DRESSING REGULAR UPPER BODY CLOTHING: A LITTLE
WALKING IN HOSPITAL ROOM: A LOT

## 2023-11-07 ASSESSMENT — ACTIVITIES OF DAILY LIVING (ADL)
ADL_ASSISTANCE: INDEPENDENT
ADL_ASSISTANCE: NEEDS ASSISTANCE
LACK_OF_TRANSPORTATION: NO

## 2023-11-07 ASSESSMENT — PAIN SCALES - GENERAL
PAINLEVEL_OUTOF10: 0 - NO PAIN

## 2023-11-07 ASSESSMENT — PAIN - FUNCTIONAL ASSESSMENT
PAIN_FUNCTIONAL_ASSESSMENT: 0-10

## 2023-11-07 NOTE — PROGRESS NOTES
Internal Medicine Progress Note    Patient Name: Zack Figueroa          MRN: [id number]  Today's Date: November 7, 2023          Attending: George Lenz MD    Subjective:  Patient was seen and examined at bedside, yesterday I spoke to patient's son who was concerned about patient having phimosis.    Review Of Systems:  GENERAL: No malaise or fevers.  CARDIOVASCULAR: Negative for chest pain, leg swelling  RESPIRATORY: Negative for shortness of breath, cough, wheezing  GI: No nausea, vomiting, or diarrhea  MUSCULOSKELETAL: Negative for joint pain or swelling, back pain or muscle pain    Objective:  Vitals:    11/06/23 0800 11/06/23 1600 11/06/23 2000 11/07/23 0557   BP: 121/57 134/58 110/56 139/63   BP Location: Left arm Right arm     Patient Position: Lying Sitting Sitting Lying   Pulse: 58 60 58 67   Resp: 18 18 18 17   Temp: 36.3 °C (97.3 °F) 36.3 °C (97.3 °F) 36.3 °C (97.3 °F) 36.1 °C (97 °F)   TempSrc: Temporal Temporal Temporal Temporal   SpO2: 97% 99% 97% 97%   Weight:       Height:             Physical Exam:   General appearance: Well-appearing alert, in no acute distress   Lungs: Clear to auscultation, no wheezing or rhonchi  Heart: RRR without murmur, gallop, or rubs.  No ectopy  Abdomen: Soft, non-tender. Bowel sounds normal.  Extremities: No deformity, no edema  Neuro: Alert, cooperative, oriented x3  Genitourinary: Normal appearance, no phimosis   Labs:  Results for orders placed or performed during the hospital encounter of 11/04/23 (from the past 24 hour(s))   POCT GLUCOSE   Result Value Ref Range    POCT Glucose 212 (H) 74 - 99 mg/dL   POCT GLUCOSE   Result Value Ref Range    POCT Glucose 190 (H) 74 - 99 mg/dL   POCT GLUCOSE   Result Value Ref Range    POCT Glucose 275 (H) 74 - 99 mg/dL   POCT GLUCOSE   Result Value Ref Range    POCT Glucose 161 (H) 74 - 99 mg/dL       Medications:  Scheduled medications  amiodarone, 200 mg, oral, Daily  aspirin, 81 mg, oral, Daily  cholecalciferol, 1,000 Units,  oral, Every other day  insulin glargine, 10 Units, subcutaneous, q24h  insulin lispro, 0-10 Units, subcutaneous, TID with meals  isosorbide mononitrate ER, 30 mg, oral, Daily  pantoprazole, 40 mg, oral, Daily before breakfast  polyethylene glycol, 17 g, oral, Daily  rosuvastatin, 20 mg, oral, Daily  sodium chloride 0.9%, 10 mL, intravenous, q8h MAHAD  tamsulosin, 0.4 mg, oral, Daily before breakfast      Continuous medications     PRN medications  PRN medications: acetaminophen **OR** acetaminophen **OR** acetaminophen, dextrose 10 % in water (D10W), dextrose, glucagon, melatonin, ondansetron ODT **OR** ondansetron, polyethylene glycol      Assessment/Plan:  Medical Problems       Problem List       * (Principal) Hypotension    Overview Addendum 11/7/2023  8:24 AM by George Lenz MD     Improving   Continue monitoring           Coronary artery disease involving native coronary artery of native heart without angina pectoris    Overview Signed 11/6/2023  9:12 AM by George Lenz MD     Continue prior to admission medications           Type 2 diabetes mellitus with chronic kidney disease on chronic dialysis, with long-term current use of insulin (CMS/[facility])    Overview Addendum 11/7/2023  8:27 AM by George Lenz MD     Continue Lantus and Humalog  Monitor blood sugar         Depression    Overview Addendum 11/7/2023  8:29 AM by George Lenz MD     Continue monitoring         Bradycardia    Overview Addendum 11/7/2023  8:29 AM by George Lenz MD     Continue monitoring         Encephalopathy    Overview Addendum 11/7/2023  8:29 AM by George Lenz MD     Resolved  Continue monitoring         Chronic kidney disease (CKD), stage V (CMS/HCC)    Overview Addendum 11/7/2023  8:29 AM by George Lenz MD     Continue monitoring kidney function  Nephrology is following         Generalized weakness    Overview Signed 11/7/2023  8:30 AM by George Lenz MD     PT/OT                 Discussed with patient, LIBRA Lenz MD  "  Date: 11/07/23  Time: 8:27 AM    This note was partially created using voice recognition software and is inherently subject to errors including those of syntax and \"sound-alike\" substitutions which may escape proofreading. In such instances, original meaning may be extrapolated by contextual derivation  "

## 2023-11-07 NOTE — PROGRESS NOTES
Occupational Therapy    Evaluation    Patient Name: Zack Figueroa  MRN: [id number]  Today's Date: 11/7/2023  Time Calculation  Start Time: 1023  Stop Time: 1035  Time Calculation (min): 12 min    Assessment  IP OT Assessment  Prognosis: Good  Evaluation/Treatment Tolerance: Patient tolerated treatment well  Medical Staff Made Aware: Yes  End of Session Patient Position: Up in chair, Alarm on    Plan:  Treatment Interventions: UE strengthening/ROM, ADL retraining, Cognitive reorientation, Functional transfer training, Patient/family training  OT Frequency: 1 time per day until discharge  OT Discharge Recommendations: High intensity level of continued care    Subjective     Current Problem:  1. Hypotension, unspecified hypotension type        2. Bradycardia            General:  General  Reason for Referral: Braydcardia, Hyptensive, weakness   Recent Intraparemchymal hemorrhage of brain. HTN, A-fib.  Referred By: Yoanna Vazquez MD  Past Medical History Relevant to Rehab: PMH: AV block Rt side affected CVA. DENNISE, CKD. starting some dialysis. Last time Friday 11/3/23.  Family/Caregiver Present: Yes (Spouse)  Co-Treatment: PT  Prior to Session Communication: Bedside nurse  Patient Position Received: Up in chair  General Comment: Pt chair check  pure wick,    Precautions:  UE Weight Bearing Status: Weight Bearing as Tolerated  Medical Precautions: Fall precautions  Precautions Comment: Pt is [place] and needs commands Repeated.    Vital Signs:       Pain:  Pain Assessment  Pain Assessment: 0-10  Pain Score: 0 - No pain    Objective     Cognition:  Overall Cognitive Status: Within Functional Limits ([place]  has hearing aides does not wear)  Orientation Level: Oriented X4             Home Living:  Type of Home: Acute Rehab  Lives With: Spouse, Significant other, Alone (was at 2 story home staying down stairs with walker, WI shower with seat and grab bars. Pt has ramp and steps into house. Pt has W/C.)  Home Adaptive Equipment:  Walker rolling or standard  Home Layout: Two level  Home Access: Stairs to enter with rails (ramp)  Entrance Stairs-Number of Steps: 1 step in  Bathroom Equipment: Shower chair with back, Grab bars in shower     Prior Function:  Level of [place]: Needs assistance with ADLs  ADL Assistance: Needs assistance    IADL History:  Homemaking Responsibilities: No  Mode of Transportation: Car (spouse)    ADL:  Eating Assistance: Stand by (set up)  Grooming Assistance: Stand by (set up)  UE Dressing Assistance: Minimal  LE Dressing Assistance: Minimal  Toileting Assistance with Device: Minimal    Activity Tolerance:  Endurance: Tolerates less than 10 min exercise with changes in vital signs (start to lean Rt after fatigued)    Bed Mobility/Transfers:   Bed Mobility  Bed Mobility: Yes  Transfers  Transfer: Yes  Transfer 1  Transfer From 1: Sit to  Transfer to 1: Stand  Transfer Device 1: Walker  Transfer Level of Assistance 1: Minimum assistance (to mod A when tired)  Trials/Comments 1: cues [place]  Transfers 2  Transfer From 2: Sit to  Transfer to 2: Stand  Transfer Device 2: Walker  Transfer Level of Assistance 2: Minimum assistance  Trials/Comments 2:  (ambulated 25 ft Min A with walekr to Mod A tires and leans to Rt more then.)    Ambulation/Gait Training:       Sitting Balance:       Standing Balance:       Vision: Vision - Basic Assessment  Current Vision: No visual deficits   and      Sensation:  Light Touch: No apparent deficits    Strength:       Perception:  Inattention/Neglect: Appears intact    Coordination:  Movements are Fluid and Coordinated: Yes     Hand Function:  Hand Function  Gross Grasp: Functional (RT   F+, G-   LT  Good)  Coordination:  ([facility] WFLS both hands  Pt Rt handed)    Extremities: RUE   RUE : Exceptions to WFL (90 degrees  Shoulder Flx S/PCVA    Strength 4-/5) and LUE   LUE: Within Functional Limits (strefnth 4+/5/5)    Outcome Measures: [facility] Daily Activity  Putting on and taking off  regular lower body clothing: A lot  Bathing (including washing, rinsing, drying): A lot  Putting on and taking off regular upper body clothing: A little  Toileting, which includes using toilet, bedpan or urinal: A lot  Taking care of personal grooming such as brushing teeth: A little  Eating Meals: A little  Daily Activity - Total Score: 15                    EDUCATION:  Education  Individual(s) Educated: Spouse  Education Provided: Fall precautons, Risk and benefits of OT discussed with patient or other  Education Documentation  Precautions, taught by Bryant Page OT at 11/7/2023 12:28 PM.  Learner: Significant Other, Patient  Readiness: Acceptance  Method: Explanation, Demonstration  Response: Verbalizes Understanding  Comment: Needs safety cues [place].    Body Mechanics, taught by Bryant Page OT at 11/7/2023 12:28 PM.  Learner: Significant Other, Patient  Readiness: Acceptance  Method: Explanation, Demonstration  Response: Verbalizes Understanding  Comment: Needs safety cues [place].    ADL Training, taught by Bryant Page OT at 11/7/2023 12:28 PM.  Learner: Significant Other, Patient  Readiness: Acceptance  Method: Explanation, Demonstration  Response: Verbalizes Understanding  Comment: Needs safety cues [place].    Precautions, taught by Bryant Page OT at 11/7/2023 12:28 PM.  Learner: Significant Other, Patient  Readiness: Acceptance  Method: Explanation, Demonstration  Response: Verbalizes Understanding  Comment: Needs safety cues [place].    Body Mechanics, taught by Bryant Page OT at 11/7/2023 12:28 PM.  Learner: Significant Other, Patient  Readiness: Acceptance  Method: Explanation, Demonstration  Response: Verbalizes Understanding  Comment: Needs safety cues [place].    Mobility Training, taught by Bryant Page OT at 11/7/2023 12:28 PM.  Learner: Significant Other, Patient  Readiness: Acceptance  Method: Explanation, Demonstration  Response: Verbalizes Understanding  Comment: Needs safety cues  SRINATH.    Education Comments  No comments found.        Goals:   Encounter Problems       Encounter Problems (Active)       Dressing Upper Extremities       STG 2 (Progressing)       Start:  11/07/23       Pt will perform UE dressing with setup up cues and supervision.             Dressings Lower Extremities       STG 1 (Progressing)       Start:  11/07/23       Pt will perform LE dressing with contact guard and set up with cues.             Mobility       Goal 3 (Progressing)       Start:  11/07/23       Pt will perform sinkside ADLs 5-10 minutes with device contact guard safely for assist with ADLs and cues.  Pt will increase Rt arm strength with 2-3# for assist with ADLs and increase Rt  strength to Good with  exercises.             Safety       LTG - Patient will adhere to hip precautions during ADL's and transfers (Progressing)       Start:  11/04/23    Expected End:  11/11/23            LTG - Patient will demonstrate safety requirements appropriate to situation/environment (Progressing)       Start:  11/04/23    Expected End:  11/11/23            LTG - Patient will utilize safety techniques (Progressing)       Start:  11/04/23    Expected End:  11/11/23            STG - Patient locks brakes on wheelchair (Progressing)       Start:  11/04/23    Expected End:  11/11/23            STG - Patient uses call light consistently to request assistance with transfers (Progressing)       Start:  11/04/23    Expected End:  11/11/23            STG - Patient uses gait belt during all transfers (Progressing)       Start:  11/04/23    Expected End:  11/11/23            Goal 1 (Progressing)       Start:  11/04/23    Expected End:  11/11/23            Goal 2 (Progressing)       Start:  11/04/23    Expected End:  11/11/23            Goal 3 (Progressing)       Start:  11/04/23    Expected End:  11/11/23               Safety       Goal 4 (Progressing)       Start:  11/07/23       Pt will be oriented x 4 with cues and  supervision and follow 1-2 step commands with cues and supervision safely for assist with ADLs.             Toileting       STG -4 (Progressing)       Start:  11/07/23       Pt will perform toileting tasks with contact guard assist and set up.             Transfers       Goal 5 (Progressing)       Start:  11/07/23       Pt will transfer to chair. Commode with device contact guard assist safely with cues.

## 2023-11-07 NOTE — PROGRESS NOTES
Physical Therapy    Physical Therapy    Physical Therapy Evaluation    Patient Name: Zack Figueroa  MRN: [id number]  Today's Date: 11/7/2023   Time Calculation  Start Time: 1022  Stop Time: 1040  Time Calculation (min): 18 min    Assessment/Plan   PT Assessment  PT Assessment Results: Decreased strength, Decreased range of motion, Decreased endurance, Impaired balance, Decreased mobility, Decreased safety awareness, Decreased cognition  Rehab Prognosis: Good  Evaluation/Treatment Tolerance: Patient limited by fatigue  Medical Staff Made Aware: Yes  End of Session Communication: Bedside nurse  Assessment Comment: Pt presents today below baseline level of function and requires continued PT during hospital stay. Pt requires 24 hour physical assist for all mobility to prevent falls. Pt is unsafe to navigate home environment at this time with available support and assist.   Pt requires PT at a high intensity level at discharge to maximize functional mobility and safety. Anticipate pt will tolerate 3 hours of therapy daily. Pt is motivated to participate and requires daily, intensive, and short term therapy to return to high prior functional level.       End of Session Patient Position: Up in chair, Alarm on  IP OR SWING BED PT PLAN  Inpatient or Swing Bed: Inpatient  PT Plan  Treatment/Interventions: Bed mobility, Transfer training, Gait training, Balance training, Neuromuscular re-education, Strengthening, Endurance training, Range of motion, Therapeutic exercise, Therapeutic activity, Home exercise program, Stair training  PT Plan: Skilled PT  PT Frequency: Daily  PT Discharge Recommendations: High intensity level of continued care  PT Recommended Transfer Status: Assist x1 (FWW, gait belt)    Subjective     Current Problem:  Patient Active Problem List   Diagnosis    Coronary artery disease involving native coronary artery of native heart without angina pectoris    Type 2 diabetes mellitus with chronic kidney  disease on chronic dialysis, with long-term current use of insulin (CMS/HCC)    Depression    Hypotension    Bradycardia    Encephalopathy    Chronic kidney disease (CKD), stage V (CMS/HCC)    Generalized weakness       General Visit Information:  General  Reason for Referral: impaired mobility  Referred By: George Riojas MD  Past Medical History Relevant to Rehab: Pt admitted with AMS, bradycardia, and hypotension. Pt arrived from [facility]. Pt with recent history of CVA with right sided weakness.  Family/Caregiver Present: Yes  Co-Treatment: OT  Co-Treatment Reason: to facilitate pt safety and functional mobility  Prior to Session Communication: Bedside nurse  Patient Position Received: Up in chair, Alarm on  Preferred Learning Style: verbal  General Comment: Pt agreeable to PT, nursing cleared for treatment.    Home Living:  Home Living  Type of Home: House  Lives With: Spouse, Adult children  Home Adaptive Equipment: Walker rolling or standard, Wheelchair-manual  Home Layout: Two level (one step down to bedroom and WIS)  Home Access: Stairs to enter with rails, Ramped entrance (spouse states wheelchair cannot fit on ramp)  Entrance Stairs-Rails: Both  Entrance Stairs-Number of Steps: 2  Bathroom Equipment: Grab bars in shower, Shower chair with back  Home Living Comments: spouse able to assist    Prior Level of Function:  Prior Function Per Pt/Caregiver Report  ADL Assistance: Independent  Homemaking Assistance:  (spouse completes, pt does not drive)  Ambulatory Assistance:  (mod I with FWW. Pt was ambulating with FWW at [place])    Precautions:  Precautions  Hearing/Visual Limitations: [place]  Medical Precautions: Fall precautions    Vital Signs:     Objective     Pain:  Pain Assessment  Pain Assessment: 0-10  Pain Score: 0 - No pain    Cognition:  Cognition  Overall Cognitive Status: Impaired  Orientation Level: Disoriented to time    General Assessments:      Activity Tolerance  Endurance: Decreased tolerance  for upright activites           Coordination  Alternating Toe Taps: Intact (delayed, requires repeated cues)     Static Sitting Balance  Static Sitting-Comment/Number of Minutes: good  Dynamic Sitting Balance  Dynamic Sitting-Comments: fair (right sided lean)  Static Standing Balance  Static Standing-Comment/Number of Minutes: fair  Dynamic Standing Balance  Dynamic Standing-Comments: fair    Functional Assessments:     Bed Mobility  Bed Mobility: No  Transfers  Transfer: Yes  Transfer 1  Transfer From 1: Sit to  Transfer to 1: Stand  Transfer Device 1: Walker  Transfer Level of Assistance 1: Minimum assistance  Trials/Comments 1: requires repeated cues for hand placement  Transfers 2  Transfer From 2: Stand to  Transfer to 2: Sit  Transfer Device 2: Walker  Transfer Level of Assistance 2: Moderate assistance  Ambulation/Gait Training  Ambulation/Gait Training Performed: Yes  Ambulation/Gait Training 1  Device 1: Rolling walker  Gait Support Devices: Gait belt  Assistance 1: Minimum assistance, Moderate assistance (increased assist needed for second half of walk)  Quality of Gait 1:  (Slow pace, decreased stability of RLE and increased difficulty with right foot placement. Right sided lean noted.)  Comments/Distance (ft) 1: 30 feet x 2          Extremity/Trunk Assessments:        RLE   RLE : Exceptions to WFL  AROM RLE (degrees)  RLE AROM Comment: WFL except hip flexion 75%  Strength RLE  R Hip Flexion: 3-/5  R Knee Flexion: 3+/5  R Knee Extension: 3+/5  R Ankle Dorsiflexion: 4/5  R Ankle Plantar Flexion: 4/5  LLE   LLE : Exceptions to WFL  AROM LLE (degrees)  LLE AROM Comment: WFL  Strength LLE  LLE Overall Strength:  (grossly 4/5 throughout)    Outcome Measures:  [facility] Basic Mobility  Turning from your back to your side while in a flat bed without using bedrails: A little  Moving from lying on your back to sitting on the side of a flat bed without using bedrails: A lot  Moving to and from bed to chair (including a  wheelchair): A lot  Standing up from a chair using your arms (e.g. wheelchair or bedside chair): A little  To walk in hospital room: A lot  Climbing 3-5 steps with railing: A lot  Basic Mobility - Total Score: 14                            Goals:  Encounter Problems       Encounter Problems (Active)       PT Problem       Pt will perform bed mobility with SBA.        Start:  11/07/23    Expected End:  11/21/23            Pt will complete sit <> stand and bed <> chair transfers with SBA.        Start:  11/07/23    Expected End:  11/21/23            Pt will ambulate 150 feet CGA using FWW with no significant gait deviations.         Start:  11/07/23    Expected End:  11/21/23            Pt will ascend/descend at least 2 stairs using rails min A in order to navigate home environment.         Start:  11/07/23    Expected End:  11/21/23            Pt will progress to completing 3 x 20 supine/seated exercises in order to increase strength and improve gait mechanics.         Start:  11/07/23    Expected End:  11/21/23                   Education Documentation  Precautions, taught by Sylwia Garnett PT at 11/7/2023 11:02 AM.  Learner: Significant Other, Patient  Readiness: Acceptance  Method: Explanation  Response: Needs Reinforcement    Body Mechanics, taught by Sylwia Garnett PT at 11/7/2023 11:02 AM.  Learner: Significant Other, Patient  Readiness: Acceptance  Method: Explanation  Response: Needs Reinforcement    Mobility Training, taught by Sylwia Garnett PT at 11/7/2023 11:02 AM.  Learner: Significant Other, Patient  Readiness: Acceptance  Method: Explanation  Response: Needs Reinforcement    Education Comments  No comments found.

## 2023-11-07 NOTE — PROGRESS NOTES
Spiritual Care Visit    Clinical Encounter Type  Visited With: Patient                                            Taxonomy  Intended Effects: Convey a calming presence    Offered support

## 2023-11-07 NOTE — PROGRESS NOTES
11/7/23 2451 Care Transitions: Met with patient at the bedside. Patient arrived from [facility] (message out to [facility]mer to see if able to accept back if needed). Patient had recent CVA and new dialysis and was discharged to [facility] just recently and presented here with low BP. Awaiting plan for further dialysis from [facility].  Patient states he is planning to return home with his wife at [place] and not return to rehab. Awaiting therapy evals. States he has no issues with transportation and does have family support. States he does not need to use stairs at home. States he does not use a walker typically. States he is able to obtain and afford his medications at home. Will await therapy evals and plan for dialysis to proceed with planning. Also patient did give permission to this worker to speak with family if needed.  Alana Chow RN [facility]

## 2023-11-07 NOTE — CARE PLAN
Medical house paged by nursing staff for patient fall. According to nursing, patient was standing up with assistance to get washed up, tripped on his pants and fell to his knees. Patient denies hitting head. Patient is on a baby aspirin daily. Patient with skin tear to right elbow and right knee with fall.    ROS  Denies chest pain, palpitations, lightheadedness/dizziness, shortness of breath, abdominal pain, nausea/vomiting.     Physical exam  Neuro: Alert and oriented to person/place/time. Right side weakness which is chronic per wife report.   Respiratory: Lungs clear  Cardiovascular: Regular rhythm  Musculoskeletal: Denies pain. ROM intact to all extremities. No neck or back spinal tenderness with palpation. There are no obvious bone deformities.   Skin: right elbow w/small skin tear; right knee with skin tear    Vitals:  Temp 35.9  /59  HR 63  RR 18  Pulse ox 100% RA    Plan:  Maintain fall precautions  Bed/chair alarm  Room close to nurses desk  Encourage family to stay with patient if necessary  Notify med house if there is a change in patient's condition  Imaging none   Ice to right knee as needed  PT to continue therapy  Monitor labs

## 2023-11-08 ENCOUNTER — APPOINTMENT (OUTPATIENT)
Dept: CARDIOLOGY | Facility: HOSPITAL | Age: 88
End: 2023-11-08
Payer: MEDICARE

## 2023-11-08 ENCOUNTER — HOME CARE VISIT (OUTPATIENT)
Dept: HOME HEALTH SERVICES | Facility: HOME HEALTH | Age: 88
End: 2023-11-08

## 2023-11-08 VITALS
SYSTOLIC BLOOD PRESSURE: 139 MMHG | TEMPERATURE: 96.4 F | RESPIRATION RATE: 17 BRPM | DIASTOLIC BLOOD PRESSURE: 61 MMHG | WEIGHT: 189 LBS | HEIGHT: 69 IN | OXYGEN SATURATION: 100 % | BODY MASS INDEX: 27.99 KG/M2 | HEART RATE: 58 BPM

## 2023-11-08 PROBLEM — I95.9 HYPOTENSION, UNSPECIFIED HYPOTENSION TYPE: Status: ACTIVE | Noted: 2023-11-08

## 2023-11-08 LAB
ANION GAP SERPL CALC-SCNC: 11 MMOL/L (ref 10–20)
ATRIAL RATE: 51 BPM
ATRIAL RATE: 52 BPM
BUN SERPL-MCNC: 68 MG/DL (ref 6–23)
CALCIUM SERPL-MCNC: 8.8 MG/DL (ref 8.6–10.3)
CHLORIDE SERPL-SCNC: 97 MMOL/L (ref 98–107)
CO2 SERPL-SCNC: 28 MMOL/L (ref 21–32)
CREAT SERPL-MCNC: 4.28 MG/DL (ref 0.5–1.3)
ERYTHROCYTE [DISTWIDTH] IN BLOOD BY AUTOMATED COUNT: 15.3 % (ref 11.5–14.5)
GFR SERPL CREATININE-BSD FRML MDRD: 13 ML/MIN/1.73M*2
GLUCOSE BLD MANUAL STRIP-MCNC: 124 MG/DL (ref 74–99)
GLUCOSE BLD MANUAL STRIP-MCNC: 166 MG/DL (ref 74–99)
GLUCOSE BLD MANUAL STRIP-MCNC: 207 MG/DL (ref 74–99)
GLUCOSE SERPL-MCNC: 194 MG/DL (ref 74–99)
HCT VFR BLD AUTO: 29 % (ref 41–52)
HGB BLD-MCNC: 8.6 G/DL (ref 13.5–17.5)
MCH RBC QN AUTO: 29.2 PG (ref 26–34)
MCHC RBC AUTO-ENTMCNC: 29.7 G/DL (ref 32–36)
MCV RBC AUTO: 98 FL (ref 80–100)
NRBC BLD-RTO: 0 /100 WBCS (ref 0–0)
P AXIS: 54 DEGREES
P AXIS: 91 DEGREES
P OFFSET: 106 MS
P OFFSET: 111 MS
P ONSET: 39 MS
P ONSET: 47 MS
PLATELET # BLD AUTO: 199 X10*3/UL (ref 150–450)
POTASSIUM SERPL-SCNC: 4.1 MMOL/L (ref 3.5–5.3)
PR INTERVAL: 340 MS
PR INTERVAL: 350 MS
Q ONSET: 214 MS
Q ONSET: 217 MS
QRS COUNT: 8 BEATS
QRS COUNT: 9 BEATS
QRS DURATION: 110 MS
QRS DURATION: 110 MS
QT INTERVAL: 510 MS
QT INTERVAL: 526 MS
QTC CALCULATION(BAZETT): 470 MS
QTC CALCULATION(BAZETT): 489 MS
QTC FREDERICIA: 483 MS
QTC FREDERICIA: 501 MS
R AXIS: -11 DEGREES
R AXIS: 5 DEGREES
RBC # BLD AUTO: 2.95 X10*6/UL (ref 4.5–5.9)
SODIUM SERPL-SCNC: 132 MMOL/L (ref 136–145)
T AXIS: 40 DEGREES
T AXIS: 44 DEGREES
T OFFSET: 469 MS
T OFFSET: 480 MS
T3FREE SERPL-MCNC: 1.7 PG/ML (ref 2.3–4.2)
T4 FREE SERPL-MCNC: 1.27 NG/DL (ref 0.61–1.12)
TSH SERPL-ACNC: 0.36 MIU/L (ref 0.44–3.98)
VENTRICULAR RATE: 51 BPM
VENTRICULAR RATE: 52 BPM
WBC # BLD AUTO: 7.4 X10*3/UL (ref 4.4–11.3)

## 2023-11-08 PROCEDURE — 84443 ASSAY THYROID STIM HORMONE: CPT | Performed by: INTERNAL MEDICINE

## 2023-11-08 PROCEDURE — 85027 COMPLETE CBC AUTOMATED: CPT | Performed by: NURSE PRACTITIONER

## 2023-11-08 PROCEDURE — 97116 GAIT TRAINING THERAPY: CPT | Mod: GP,CQ

## 2023-11-08 PROCEDURE — 36415 COLL VENOUS BLD VENIPUNCTURE: CPT | Mod: STJLAB | Performed by: INTERNAL MEDICINE

## 2023-11-08 PROCEDURE — 84439 ASSAY OF FREE THYROXINE: CPT | Performed by: INTERNAL MEDICINE

## 2023-11-08 PROCEDURE — 36415 COLL VENOUS BLD VENIPUNCTURE: CPT | Performed by: INTERNAL MEDICINE

## 2023-11-08 PROCEDURE — 2500000004 HC RX 250 GENERAL PHARMACY W/ HCPCS (ALT 636 FOR OP/ED): Performed by: NURSE PRACTITIONER

## 2023-11-08 PROCEDURE — 82947 ASSAY GLUCOSE BLOOD QUANT: CPT | Mod: 59

## 2023-11-08 PROCEDURE — 2500000001 HC RX 250 WO HCPCS SELF ADMINISTERED DRUGS (ALT 637 FOR MEDICARE OP): Performed by: NURSE PRACTITIONER

## 2023-11-08 PROCEDURE — 84481 FREE ASSAY (FT-3): CPT | Mod: STJLAB | Performed by: INTERNAL MEDICINE

## 2023-11-08 PROCEDURE — 80048 BASIC METABOLIC PNL TOTAL CA: CPT | Performed by: INTERNAL MEDICINE

## 2023-11-08 PROCEDURE — 99233 SBSQ HOSP IP/OBS HIGH 50: CPT | Performed by: INTERNAL MEDICINE

## 2023-11-08 PROCEDURE — 97535 SELF CARE MNGMENT TRAINING: CPT | Mod: GO,CO

## 2023-11-08 PROCEDURE — 97112 NEUROMUSCULAR REEDUCATION: CPT | Mod: GO,CO

## 2023-11-08 PROCEDURE — 2500000002 HC RX 250 W HCPCS SELF ADMINISTERED DRUGS (ALT 637 FOR MEDICARE OP, ALT 636 FOR OP/ED): Performed by: NURSE PRACTITIONER

## 2023-11-08 PROCEDURE — G0378 HOSPITAL OBSERVATION PER HR: HCPCS

## 2023-11-08 PROCEDURE — 97530 THERAPEUTIC ACTIVITIES: CPT | Mod: GP,CQ

## 2023-11-08 PROCEDURE — 93005 ELECTROCARDIOGRAM TRACING: CPT

## 2023-11-08 RX ORDER — AMIODARONE HYDROCHLORIDE 200 MG/1
100 TABLET ORAL DAILY
Status: DISCONTINUED | OUTPATIENT
Start: 2023-11-09 | End: 2023-11-08 | Stop reason: HOSPADM

## 2023-11-08 RX ORDER — AMIODARONE HYDROCHLORIDE 100 MG/1
100 TABLET ORAL DAILY
Qty: 90 TABLET | Refills: 0 | Status: SHIPPED | OUTPATIENT
Start: 2023-11-09 | End: 2024-02-07

## 2023-11-08 RX ORDER — FUROSEMIDE 40 MG/1
40 TABLET ORAL DAILY PRN
Qty: 30 TABLET | Refills: 1 | Status: SHIPPED | OUTPATIENT
Start: 2023-11-09

## 2023-11-08 RX ADMIN — ASPIRIN 81 MG: 81 TABLET, COATED ORAL at 09:31

## 2023-11-08 RX ADMIN — INSULIN LISPRO 4 UNITS: 100 INJECTION, SOLUTION INTRAVENOUS; SUBCUTANEOUS at 17:37

## 2023-11-08 RX ADMIN — POLYETHYLENE GLYCOL 3350 17 G: 17 POWDER, FOR SOLUTION ORAL at 09:30

## 2023-11-08 RX ADMIN — ROSUVASTATIN CALCIUM 20 MG: 20 TABLET, FILM COATED ORAL at 09:32

## 2023-11-08 RX ADMIN — INSULIN GLARGINE 10 UNITS: 100 INJECTION, SOLUTION SUBCUTANEOUS at 09:32

## 2023-11-08 RX ADMIN — INSULIN LISPRO 4 UNITS: 100 INJECTION, SOLUTION INTRAVENOUS; SUBCUTANEOUS at 11:52

## 2023-11-08 RX ADMIN — AMIODARONE HYDROCHLORIDE 200 MG: 200 TABLET ORAL at 09:37

## 2023-11-08 RX ADMIN — Medication 10 ML: at 14:12

## 2023-11-08 RX ADMIN — TAMSULOSIN HYDROCHLORIDE 0.4 MG: 0.4 CAPSULE ORAL at 09:31

## 2023-11-08 RX ADMIN — ISOSORBIDE MONONITRATE 30 MG: 30 TABLET, EXTENDED RELEASE ORAL at 09:32

## 2023-11-08 RX ADMIN — Medication 1000 UNITS: at 09:31

## 2023-11-08 RX ADMIN — Medication 10 ML: at 06:00

## 2023-11-08 RX ADMIN — PANTOPRAZOLE SODIUM 40 MG: 40 TABLET, DELAYED RELEASE ORAL at 09:31

## 2023-11-08 ASSESSMENT — COGNITIVE AND FUNCTIONAL STATUS - GENERAL
WALKING IN HOSPITAL ROOM: A LOT
MOVING FROM LYING ON BACK TO SITTING ON SIDE OF FLAT BED WITH BEDRAILS: A LITTLE
PERSONAL GROOMING: A LITTLE
DAILY ACTIVITIY SCORE: 14
DRESSING REGULAR UPPER BODY CLOTHING: A LITTLE
DRESSING REGULAR LOWER BODY CLOTHING: TOTAL
STANDING UP FROM CHAIR USING ARMS: A LOT
MOVING TO AND FROM BED TO CHAIR: A LOT
EATING MEALS: A LITTLE
HELP NEEDED FOR BATHING: A LOT
TOILETING: A LOT
CLIMB 3 TO 5 STEPS WITH RAILING: A LOT
MOBILITY SCORE: 13
TURNING FROM BACK TO SIDE WHILE IN FLAT BAD: A LOT

## 2023-11-08 ASSESSMENT — PAIN SCALES - GENERAL
PAINLEVEL_OUTOF10: 0 - NO PAIN

## 2023-11-08 ASSESSMENT — PAIN - FUNCTIONAL ASSESSMENT
PAIN_FUNCTIONAL_ASSESSMENT: 0-10
PAIN_FUNCTIONAL_ASSESSMENT: 0-10

## 2023-11-08 ASSESSMENT — ACTIVITIES OF DAILY LIVING (ADL)
HOME_MANAGEMENT_TIME_ENTRY: 12
EFFECT OF PAIN ON DAILY ACTIVITIES: .

## 2023-11-08 NOTE — PROGRESS NOTES
Patient is medically ready for discharge to the [facility] facility. Message to [facility]sc to arrange for stretcher transport this afteroon. Jill Graham, virtual liaison with the facility included in the message to keep her updated on the time. Family is aware he is transferring today.

## 2023-11-08 NOTE — PROGRESS NOTES
Physical Therapy    Physical Therapy    Physical Therapy Treatment    Patient Name: Zack Figueroa  MRN: [id number]  Today's Date: 11/8/2023  Time Calculation  Start Time: 0944  Stop Time: 1009  Time Calculation (min): 25 min      11/08/23 0944   PT  Visit   PT Received On 11/08/23   Response to Previous Treatment Patient with no complaints from previous session.   General   Prior to Session Communication Bedside nurse   Patient Position Received Up in chair;Alarm on   General Comment Pt agreeable to therapy and cleared by nurse.   Precautions   Medical Precautions Fall precautions   Pain Assessment   Pain Assessment 0-10   Pain Score 0 - No pain   Effect of Pain on Daily Activities .   Cognition   Overall Cognitive Status WFL   Therapeutic Exercise   Therapeutic Exercise Performed Yes   Therapeutic Activity   Therapeutic Activity Performed Yes   Therapeutic Activity 1 Standing activities with unilateral and intermittent no UE support, min  with chair behind pt for safety d/t high fall risk.   Ambulation/Gait Training   Ambulation/Gait Training Performed Yes   Ambulation/Gait Training 1   Surface 1 Level tile   Device 1 Rolling walker   Gait Support Devices Gait belt   Assistance 1 Minimum assistance  (x2)   Comments/Distance (ft) 1 15'x2, 30'x1- close chair follow, shuffling gait, flexed posture with downward gaze, max cues to facilitate upright stance, fair follow through.   Transfers   Transfer Yes   Transfer 1   Transfer From 1 Sit to   Transfer to 1 Stand   Technique 1 Sit to stand;Stand to sit   Transfer Device 1 Gait belt;Walker   Transfer Level of Assistance 1 Minimum assistance;+2   Trials/Comments 1 retroplsive upon initial stand with mild lightheadedness, max cues for hand placement, sequencing and safety, fair follow through   Activity Tolerance   Endurance Tolerates less than 10 min exercise, no significant change in vital signs   PT Assessment   PT Assessment Results Decreased strength;Decreased  endurance;Impaired balance;Decreased mobility   Rehab Prognosis Good   End of Session Communication Bedside nurse   Assessment Comment good effort put forth by pt, max cues for safety throughout. Mildly retropulsive.   End of Session Patient Position Up in chair;Alarm on       Assessment/Plan   PT Assessment  PT Assessment Results: Decreased strength, Decreased endurance, Impaired balance, Decreased mobility  Rehab Prognosis: Good  Evaluation/Treatment Tolerance: Patient limited by fatigue  Medical Staff Made Aware: Yes  End of Session Communication: Bedside nurse  Assessment Comment: good effort put forth by pt, max cues for safety throughout. Mildly retropulsive.  End of Session Patient Position: Up in chair, Alarm on  PT Plan  Inpatient/Swing Bed or Outpatient: Inpatient  PT Plan  Treatment/Interventions: Bed mobility, Transfer training, Gait training, Balance training, Neuromuscular re-education, Strengthening, Endurance training, Range of motion, Therapeutic exercise, Therapeutic activity, Home exercise program, Stair training  PT Plan: Skilled PT  PT Frequency: Daily  PT Discharge Recommendations: High intensity level of continued care  PT Recommended Transfer Status: Assist x1 (FWW, gait belt)    Outcome Measures:  [facility] Basic Mobility  Turning from your back to your side while in a flat bed without using bedrails: A little  Moving from lying on your back to sitting on the side of a flat bed without using bedrails: A lot  Moving to and from bed to chair (including a wheelchair): A lot  Standing up from a chair using your arms (e.g. wheelchair or bedside chair): A lot  To walk in hospital room: A lot  Climbing 3-5 steps with railing: A lot  Basic Mobility - Total Score: 13  Education Documentation  Precautions, taught by Shayna Jo PTA at 11/8/2023 11:19 AM.  Learner: Patient  Readiness: Acceptance  Method: Explanation, Demonstration  Response: Verbalizes Understanding, Demonstrated Understanding, Needs  Reinforcement    Body Mechanics, taught by Shayna Jo PTA at 11/8/2023 11:19 AM.  Learner: Patient  Readiness: Acceptance  Method: Explanation, Demonstration  Response: Verbalizes Understanding, Demonstrated Understanding, Needs Reinforcement    ADL Training, taught by Shayna Jo PTA at 11/8/2023 11:19 AM.  Learner: Patient  Readiness: Acceptance  Method: Explanation, Demonstration  Response: Verbalizes Understanding, Demonstrated Understanding, Needs Reinforcement    Precautions, taught by Shayna Jo PTA at 11/8/2023 11:19 AM.  Learner: Patient  Readiness: Acceptance  Method: Explanation, Demonstration  Response: Verbalizes Understanding, Demonstrated Understanding, Needs Reinforcement    Body Mechanics, taught by Shayna Jo PTA at 11/8/2023 11:19 AM.  Learner: Patient  Readiness: Acceptance  Method: Explanation, Demonstration  Response: Verbalizes Understanding, Demonstrated Understanding, Needs Reinforcement    Mobility Training, taught by Shayna Jo PTA at 11/8/2023 11:19 AM.  Learner: Patient  Readiness: Acceptance  Method: Explanation, Demonstration  Response: Verbalizes Understanding, Demonstrated Understanding, Needs Reinforcement    Education Comments  No comments found.            EDUCATION:  Education  Individual(s) Educated: Patient  Education Provided: Fall Risk    GOALS:  Encounter Problems       Encounter Problems (Active)       Mobility       Goal 3 (Progressing)       Start:  11/07/23       Pt will perform sinkside ADLs 5-10 minutes with device contact guard safely for assist with ADLs and cues.  Pt will increase Rt arm strength with 2-3# for assist with ADLs and increase Rt  strength to Good with  exercises.             PT Problem       Pt will perform bed mobility with SBA.  (Progressing)       Start:  11/07/23    Expected End:  11/21/23            Pt will complete sit <> stand and bed <> chair transfers with SBA.  (Progressing)       Start:  11/07/23    Expected  End:  11/21/23            Pt will ambulate 150 feet CGA using FWW with no significant gait deviations.   (Progressing)       Start:  11/07/23    Expected End:  11/21/23            Pt will ascend/descend at least 2 stairs using rails min A in order to navigate home environment.   (Progressing)       Start:  11/07/23    Expected End:  11/21/23            Pt will progress to completing 3 x 20 supine/seated exercises in order to increase strength and improve gait mechanics.   (Progressing)       Start:  11/07/23    Expected End:  11/21/23               Pain - Adult          Safety       LTG - Patient will adhere to hip precautions during ADL's and transfers (Progressing)       Start:  11/04/23    Expected End:  11/11/23            LTG - Patient will demonstrate safety requirements appropriate to situation/environment (Progressing)       Start:  11/04/23    Expected End:  11/11/23            LTG - Patient will utilize safety techniques (Progressing)       Start:  11/04/23    Expected End:  11/11/23            STG - Patient locks brakes on wheelchair (Progressing)       Start:  11/04/23    Expected End:  11/11/23            STG - Patient uses call light consistently to request assistance with transfers (Progressing)       Start:  11/04/23    Expected End:  11/11/23            STG - Patient uses gait belt during all transfers (Progressing)       Start:  11/04/23    Expected End:  11/11/23            Goal 1 (Progressing)       Start:  11/04/23    Expected End:  11/11/23            Goal 2 (Progressing)       Start:  11/04/23    Expected End:  11/11/23            Goal 3 (Progressing)       Start:  11/04/23    Expected End:  11/11/23               Safety       Goal 4 (Progressing)       Start:  11/07/23       Pt will be oriented x 4 with cues and supervision and follow 1-2 step commands with cues and supervision safely for assist with ADLs.             Transfers       Goal 5 (Progressing)       Start:  11/07/23       Pt will  transfer to chair. Commode with device contact guard assist safely with cues.

## 2023-11-08 NOTE — PROGRESS NOTES
INPATIENT NEPHROLOGY CONSULT PROGRESS NOTE      Patient Name: Zack Figueroa MRN: [id number]  DATE of SERVICE: November 7, 2023  TIME of SERVICE: 04:58 PM  CONSULTING SERVICE: Nephrology    REASON for CONSULT: DENNISE, CKD IV/V, dialysis dependent    INTERVAL HISTORY:  Patient seen and evaluated at bedside,   Sitting up in the chair family at bedside.  Blood pressure 116/58 with holding hydralazine home dose.  Urine output 900 cc mild trace lower extremity edema to start Lasix 40 mg p.o. daily.  Renal parameters stable serum creatinine 4.6 mg deciliter BUN 68 and EGFR of 12  To continue to hold dialysis    Urinalysis again with proteinuria +2.  Hemoglobin stable at 9.8 mg deciliter.  Again urine microscopy demonstrated budding yeast urine culture in process.  Current medications including amiodarone, insulin, Imdur, Flomax, rosuvastatin.    To continue to monitor renal parameters without dialysis if remains stable from renal standpoint, tentative plan for patient to be discharged without hemodialysis    Summary of stay:  Mr. Figueroa is a 88 y.o. male who presents for eval of bradycardia.  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 Last hemoglobin A1c 7.6.  History of paroxysmal atrial fibrillation follows with EP physiologist Dr. Chávez and cardiologist Dr. Martinez,, chronic diastolic heart failure, coronary artery disease status post remote CABG, hypertension, hyperlipidemia, history of CVA.    Patient presented to [facility] November 4, 2023 for further evaluation of dizziness and found to be bradycardic and hypotensive on vitals.  Monitoring for signs of renal  recovery.  Amiodarone level pending    Acute Kidney injury superimposed on chronic kidney disease stage IV/V:  Dialysis dependent      Subacute to acute left intracranial hemorrhage      Atrial fibrillation: Anticoagulation on hold  Started on amiodarone     Hypertension:   - relative hypotension and bradycardia      Chronic kidney disease stage IV/V:  -- Longstanding history of diabetes mellitus complicated with triopathy  -- Diabetic nephropathy, hypertensive nephrosclerosis  -- Proteinuric with a protein to creatinine ratio of 2.6  mg/g  -- dialysis dependent      Secondary hyperparathyroidism: to cont vit D     Volume: Concern for hypovolemia     Stroke: intracranial hemorrhage      Plan:  Electrolytes and volume status within acceptable range, no urgent indication for intubation therapy today.  To Continue to hold dialysis.  Demonstrating signs of renal recovery urine output 800 cc in 24 hours  no uremic symptoms no hyperkalemia no acidemia no encephalopathy  Strict input and output to guide volume management.  Continue to monitor for signs of renal recovery.  Hypotension and bradycardia post hemodialysis concern for intradialytic volume depletion versus intolerance.  Medication reviewed and adjusted  Plan of care discussed with patient, patient wife primary care team and bedside nurse       Medications:    Current Facility-Administered Medications:     acetaminophen (Tylenol) tablet 650 mg, 650 mg, oral, q4h PRN **OR** acetaminophen (Tylenol) oral liquid 650 mg, 650 mg, nasogastric tube, q4h PRN **OR** acetaminophen (Tylenol) suppository 650 mg, 650 mg, rectal, q4h PRN, NITA Perez    amiodarone (Pacerone) tablet 200 mg, 200 mg, oral, Daily, NITA Montague, 200 mg at 11/07/23 0910    aspirin EC tablet 81 mg, 81 mg, oral, Daily, NITA Montague, 81 mg at 11/07/23 0910    cholecalciferol (Vitamin D-3) tablet 1,000 Units, 1,000 Units, oral, Every other day, Jennifer Crawley  APRN-CNP, 1,000 Units at 11/06/23 1225    dextrose 10 % in water (D10W) infusion, 0.3 g/kg/hr, intravenous, Once PRN, NITA Perez    dextrose 50 % injection 25 g, 25 g, intravenous, q15 min PRN, NITA Perez    [START ON 11/8/2023] furosemide (Lasix) tablet 40 mg, 40 mg, oral, Daily, Nereida Navarro MD    glucagon (Glucagen) injection 1 mg, 1 mg, intramuscular, q15 min PRN, NITA Perez    insulin glargine (Lantus) injection 10 Units, 10 Units, subcutaneous, q24h, NITA Montague, 10 Units at 11/07/23 0910    insulin lispro (HumaLOG) injection 0-10 Units, 0-10 Units, subcutaneous, TID with meals, NITA Perez, 2 Units at 11/07/23 1752    isosorbide mononitrate ER (Imdur) 24 hr tablet 30 mg, 30 mg, oral, Daily, NITA Montague, 30 mg at 11/07/23 0910    melatonin tablet 3 mg, 3 mg, oral, Nightly PRN, NITA Perez    ondansetron ODT (Zofran-ODT) disintegrating tablet 4 mg, 4 mg, oral, q8h PRN **OR** ondansetron (Zofran) injection 4 mg, 4 mg, intravenous, q8h PRN, NITA Perez    pantoprazole (ProtoNix) EC tablet 40 mg, 40 mg, oral, Daily before breakfast, NITA Montague, 40 mg at 11/07/23 0654    polyethylene glycol (Glycolax, Miralax) packet 17 g, 17 g, oral, Daily PRN, NITA Perez, 17 g at 11/07/23 0909    polyethylene glycol (Glycolax, Miralax) packet 17 g, 17 g, oral, Daily, ARACELIS Montague-CNP, 17 g at 11/07/23 0900    rosuvastatin (Crestor) tablet 20 mg, 20 mg, oral, Daily, ARACELIS Montague-CNP, 20 mg at 11/07/23 0910    sodium chloride 0.9% flush 10 mL, 10 mL, intravenous, q8h MAHAD, Naomy Cotto, APRN-CNP, 10 mL at 11/07/23 1627    tamsulosin (Flomax) 24 hr capsule 0.4 mg, 0.4 mg, oral, Daily before breakfast, ARACELIS Montague-CNP, 0.4 mg at 11/07/23 0654    PERTINENT ROS:  GENERAL:  positive for fatigue, poor appetite.  No fever/chills  RESPIRATORY:   positive for shortness of breath.  Negative for cough, wheezing.  CARDIOVASCULAR:   Negative for chest pain or palpitation.  GI:  Negative for abdominal pain, diarrhea, heartburn, nausea, vomiting  : External Fields catheter in place    Physical Exam:  Vital signs in last 24 hours:  Temp:  [35.4 °C (95.7 °F)-36.3 °C (97.3 °F)] 35.6 °C (96.1 °F)  Heart Rate:  [58-67] 62  Resp:  [17-18] 17  BP: (110-160)/(56-67) 133/63    General: Awake, cooperative, not in acute distress  HEENT:  NCAT,  mucous membranes moist and pink  NECK:  no JVD, no carotid bruit, supple, no cervical mass or thyromegaly  LUNGS;  Diminished breath sounds, no Rales  CV:  Distant, regular rate and rhythm, no murmurs  ABDOMEN:  abdomen soft, nontender, BS normal, no masses or organomegaly  EDEMA:  no lower extremity edema/dependent edema  SKIN:  dry and normal turgor, no clubbing, cyanosis or petechia.  No rashes noted  Temporal dialysis catheter in place    Intake/Output last 3 shifts:  I/O last 3 completed shifts:  In: 1510 (17.6 mL/kg) [P.O.:1500; I.V.:10 (0.1 mL/kg)]  Out: 1602 (18.7 mL/kg) [Urine:1600 (0.5 mL/kg/hr); Stool:2]  Weight: 85.7 kg     DATA:  Diagnotic tests reviewed for Todays visit:  Results from last 7 days   Lab Units 11/07/23  1127   WBC AUTO x10*3/uL 7.2   RBC AUTO x10*6/uL 3.08*   HEMOGLOBIN g/dL 9.0*   HEMATOCRIT % 29.9*       Results from last 7 days   Lab Units 11/07/23  1127 11/05/23  0532 11/04/23  1613   SODIUM mmol/L 137   < > 135*   POTASSIUM mmol/L 3.9   < > 4.1   CHLORIDE mmol/L 99   < > 96*   CO2 mmol/L 28   < > 31   BUN mg/dL 68*   < > 46*   CREATININE mg/dL 4.61*   < > 3.76*   CALCIUM mg/dL 8.6   < > 8.6   MAGNESIUM mg/dL  --   --  1.87   BILIRUBIN TOTAL mg/dL  --   --  0.6   ALT U/L  --   --  68*   AST U/L  --   --  74*    < > = values in this interval not displayed.       Results from last 7 days   Lab Units 11/05/23  1151   COLOR U  Lexus*   APPEARANCE U  Clear   PH U  5.0   SPEC GRAV UR  1.011   PROTEIN U  mg/dL 100 (2+)*   BLOOD UR  NEGATIVE   NITRITE U  POSITIVE*   WBC UR /HPF 1-5   BACTERIA UR /HPF 1+*       IMAGING: CXR reviewed in  images      SIGNATURE: Nereida Navarro MD  Nephrology and Hypertension  [address]  Office phone: [phone]  FAX: [phone]    This note was partially generated using the Dragon voice recognition system, and there may be some incorrect words, spelling's and punctuation that were not noted in checking the note before saving.

## 2023-11-08 NOTE — PROGRESS NOTES
Occupational Therapy    OT Treatment    Patient Name: Zack Figueroa  MRN: [id number]  Today's Date: 11/8/2023  Time Calculation  Start Time: 0943  Stop Time: 1010  Time Calculation (min): 27 min         Assessment:  End of Session Communication: Bedside nurse  End of Session Patient Position: Up in chair, Alarm on       Plan:  OT Frequency: 1 time per day until discharge  OT Discharge Recommendations: High intensity level of continued care     Subjective      11/08/23 0943   OT Last Visit   OT Received On 11/08/23   General   Prior to Session Communication Bedside nurse   Patient Position Received Up in chair;Alarm on   General Comment Pt pleasant and cooperative.   Pain Assessment   Pain Assessment 0-10   Pain Score 0 - No pain   Cognition   Overall Cognitive Status WFL   Grooming   Grooming Level of Assistance Moderate assistance   Grooming Where Assessed Standing sinkside   Grooming Comments Pt completed grooming standing at sink, initially Min A to maintain balance however fatigued and Mod A to maintain balance while performing ADLs at sink.   Transfers   Transfer Yes   Transfer 1   Transfer From 1 Sit to   Transfer to 1 Stand   Transfer Device 1 Walker;Gait belt   Transfer Level of Assistance 1 Minimum assistance  (x2)   Trials/Comments 1 Pt completed multiple sit<>stands Min Ax2, cues for UE placement. FM performed within household distances Min Ax2 with close chair follow, chair<>doorwayx3 with rest breaks.   Dynamic Standing Balance   Dynamic Standing-Balance Reaching for objects   Dynamic Standing-Comments Mod Ax1-2   IP OT Assessment   End of Session Communication Bedside nurse   End of Session Patient Position Up in chair;Alarm on   Inpatient Plan   OT Frequency 1 time per day until discharge   OT Discharge Recommendations High intensity level of continued care       Outcome Measures:[facility] Daily Activity  Putting on and taking off regular lower body clothing: Total  Bathing (including washing,  rinsing, drying): A lot  Putting on and taking off regular upper body clothing: A little  Toileting, which includes using toilet, bedpan or urinal: A lot  Taking care of personal grooming such as brushing teeth: A little  Eating Meals: A little  Daily Activity - Total Score: 14  Education Documentation  Body Mechanics, taught by BELEN Cannon at 11/8/2023 12:00 PM.  Learner: Patient  Readiness: Acceptance  Method: Demonstration, Explanation  Response: Needs Reinforcement    ADL Training, taught by BELEN Cannon at 11/8/2023 12:00 PM.  Learner: Patient  Readiness: Acceptance  Method: Demonstration, Explanation  Response: Needs Reinforcement    Education Comments  No comments found.            Goals:  Encounter Problems       Encounter Problems (Active)       Dressing Upper Extremities       STG 2 (Progressing)       Start:  11/07/23       Pt will perform UE dressing with setup up cues and supervision.             Dressings Lower Extremities       STG 1 (Progressing)       Start:  11/07/23       Pt will perform LE dressing with contact guard and set up with cues.             Mobility       Goal 3 (Progressing)       Start:  11/07/23       Pt will perform sinkside ADLs 5-10 minutes with device contact guard safely for assist with ADLs and cues.  Pt will increase Rt arm strength with 2-3# for assist with ADLs and increase Rt  strength to Good with  exercises.             Safety       LTG - Patient will adhere to hip precautions during ADL's and transfers (Progressing)       Start:  11/04/23    Expected End:  11/11/23            LTG - Patient will demonstrate safety requirements appropriate to situation/environment (Progressing)       Start:  11/04/23    Expected End:  11/11/23            LTG - Patient will utilize safety techniques (Progressing)       Start:  11/04/23    Expected End:  11/11/23            STG - Patient locks brakes on wheelchair (Progressing)       Start:  11/04/23     Expected End:  11/11/23            STG - Patient uses call light consistently to request assistance with transfers (Progressing)       Start:  11/04/23    Expected End:  11/11/23            STG - Patient uses gait belt during all transfers (Progressing)       Start:  11/04/23    Expected End:  11/11/23            Goal 1 (Progressing)       Start:  11/04/23    Expected End:  11/11/23            Goal 2 (Progressing)       Start:  11/04/23    Expected End:  11/11/23            Goal 3 (Progressing)       Start:  11/04/23    Expected End:  11/11/23               Safety       Goal 4 (Progressing)       Start:  11/07/23       Pt will be oriented x 4 with cues and supervision and follow 1-2 step commands with cues and supervision safely for assist with ADLs.             Toileting       STG -4 (Progressing)       Start:  11/07/23       Pt will perform toileting tasks with contact guard assist and set up.             Transfers       Goal 5 (Progressing)       Start:  11/07/23       Pt will transfer to chair. Commode with device contact guard assist safely with cues.

## 2023-11-08 NOTE — PROGRESS NOTES
" ELECTROPHYSIOLOGY PROGRESS NOTE    PATIENT NAME: Zack Figueroa  PATIENT MRN: [id number]  SERVICE DATE:  11/8/2023  SERVICE TIME: 3:58 PM    CONSULTANT: Anand Lopez MD  PRIMARY CARE PHYSICIAN: Pippa Bonner MD  ATTENDING PHYSICIAN: George Lenz MD      IMPRESSIONS:  1.  Coronary artery disease, status post 1994 CABG, with preserved LV systolic function, followed long-term by Dr. Blas Mcgowan ([facility]).  2.  Paroxysmal atrial fibrillation and flutter, on a \"rhythm control strategy\" with amiodarone 200 mg daily, with good suppression of this rhythm.  He has a maximal [id number] score of 8 but is felt to be a dismal candidate for resumption of direct oral anticoagulation because of his disabling intracranial hemorrhage last month.  3.  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  These problems previously necessitated discontinuation of beta-blockers.  The amiodarone therapy may be contributing to these issues.  There is not yet a strong indication for permanent pacing.  4.  Longstanding diabetes with diabetic nephropathy, now on hemodialysis, though this may be weaned in the next few weeks.  5.  Remote small stroke in 2014, with chronic cognitive dysfunction.  6.  Other medical problems, including hyperlipidemia, iron deficiency anemia, obstructive sleep apnea, and prior temporal arteritis.    RECOMMENDATIONS:  1.  The patient will not be anticoagulated.  Instead, he will follow-up with Dr. Mcgowan and may be referred to [facility] for placement of an Amulet left atrial appendage occlusion device as the best way to reduce his future stroke risk.  2.  In regard to his sinus bradycardia and first-degree AV block, his amiodarone dose will be cut back to 100 mg daily.  I wish to avoid permanent pacing at present.  3.  The patient is free for discharge back to a rehab or skilled nursing facility.      SIGNATURE: " Anand Lopez MD   OFFICE: [phone]    ================================================================    SUBJECTIVE: The patient is an 88-year-old gentleman who was recently seen by me in consultation on 10/15/2023 and who presented from a rehab facility because of modest bradycardia around 50 bpm.  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  He has a history of hypertension, orthostatic hypotension, multivessel coronary disease with remote CABG, a small stroke in 2014, and paroxysmal atrial fibrillation and flutter.  He developed persistent atrial flutter in 2022 and was treated with amiodarone in addition to his Eliquis anticoagulation.  He was cardioverted in November 2022 and in February 2023, but has apparently continued to go in and out of atrial flutter alternating with sinus rhythm with a prominent first-degree AV block.  He was hospitalized from 10/9/2023 to 10/19/2023 because of confusion and right-sided weakness found to be due to a left-sided intracranial hemorrhage.  His Eliquis anticoagulation was reversed and discontinued permanently.  The patient was also started on hemodialysis during that stay, and has continued on this per Dr. Navarro, though his renal function has improved in the right chest tunneled dialysis catheter may be removed in the near future.  Electrophysiology input was requested in regard to some ongoing sinus bradycardia and also to discuss anticoagulation issues.    CURRENT CARDIOVASCULAR MEDICATIONS:    amiodarone (Pacerone) tablet 200 mg, 200 mg, oral, Daily    aspirin EC tablet 81 mg, 81 mg, oral, Daily    isosorbide mononitrate ER (Imdur) 24 hr tablet 30 mg, 30 mg, oral, Daily    rosuvastatin (Crestor) tablet 20 mg, 20 mg, oral, Daily     PHYSICAL EXAM:  /65 (BP Location: Left arm, Patient Position: Sitting)   Pulse 59   Temp 35.9 °C (96.6 °F) (Temporal)   Resp 17   Ht  "1.753 m (5' 9.02\")   Wt 85.7 kg (189 lb)   SpO2 99%   BMI 27.90 kg/m²   Gen: Elderly gentleman who is still modestly confused  Neck: No JVD  Cardiac: Regular rhythm in the 50s, with positive S4 and grade 1/6 functional flow murmur along left sternal border without murmurs or gallops  Resp: Clear to auscultation  Abd: Unremarkable  Ext: No peripheral edema  Neuro: Ongoing fasciculations in right upper extremity with mild right hemiparesis    EKG's: Sinus bradycardia in 50s with prominent first-degree AV block (WA 0.32-0.35 seconds), possible old inferior infarct, and nonspecific ST abnormalities    LABS:  Lab Results   Component Value Date    WBC 7.4 11/08/2023    HGB 8.6 (L) 11/08/2023    HCT 29.0 (L) 11/08/2023    MCV 98 11/08/2023     11/08/2023      Lab Results   Component Value Date    GLUCOSE 194 (H) 11/08/2023    CALCIUM 8.8 11/08/2023     (L) 11/08/2023    K 4.1 11/08/2023    CO2 28 11/08/2023    CL 97 (L) 11/08/2023    BUN 68 (H) 11/08/2023    CREATININE 4.28 (H) 11/08/2023          "

## 2023-11-08 NOTE — CARE PLAN
The patient's goals for the shift include  comfort    The clinical goals for the shift include no fall    Problem: Fall/Injury  Goal: Not fall by end of shift  Outcome: Progressing  Goal: Be free from injury by end of the shift  Outcome: Progressing  Goal: Verbalize understanding of personal risk factors for fall in the hospital  Outcome: Progressing  Goal: Verbalize understanding of risk factor reduction measures to prevent injury from fall in the home  Outcome: Progressing  Goal: Use assistive devices by end of the shift  Outcome: Progressing  Goal: Pace activities to prevent fatigue by end of the shift  Outcome: Progressing     Problem: Skin  Goal: Decreased wound size/increased tissue granulation at next dressing change  11/8/2023 0602 by Syl Doty RN  Outcome: Progressing  11/8/2023 0601 by Syl Doty RN  Outcome: Progressing  Goal: Participates in plan/prevention/treatment measures  11/8/2023 0602 by Syl Doty RN  Outcome: Progressing  11/8/2023 0601 by Syl Doty RN  Outcome: Progressing  Goal: Prevent/manage excess moisture  11/8/2023 0602 by Syl Doty RN  Outcome: Progressing  11/8/2023 0601 by Syl Doty RN  Outcome: Progressing  Goal: Prevent/minimize sheer/friction injuries  11/8/2023 0602 by Syl Doty RN  Outcome: Progressing  11/8/2023 0601 by Syl Doty RN  Outcome: Progressing  Goal: Promote/optimize nutrition  11/8/2023 0602 by Syl Doty RN  Outcome: Progressing  11/8/2023 0601 by Syl Doty RN  Outcome: Progressing  Goal: Promote skin healing  11/8/2023 0602 by Syl Doty RN  Outcome: Progressing  11/8/2023 0601 by Syl Doty RN  Outcome: Progressing     Problem: Dialysis  Goal: I will slow progression of end-stage renal disease through participating in dialysis 3 times a week  11/8/2023 0602 by Syl Doty RN  Outcome: Progressing  11/8/2023 0601 by Syl Doty RN  Outcome: Progressing       Problem: Pain  Goal: Takes deep breaths with  improved pain control throughout the shift  11/8/2023 0602 by Syl Doty RN  Outcome: Progressing  11/8/2023 0601 by Syl Doty RN  Outcome: Progressing  Goal: Turns in bed with improved pain control throughout the shift  11/8/2023 0602 by Syl Doty RN  Outcome: Progressing  11/8/2023 0601 by Syl Doty RN  Outcome: Progressing  Goal: Walks with improved pain control throughout the shift  11/8/2023 0602 by Syl Doty RN  Outcome: Progressing  11/8/2023 0601 by Syl Doty RN  Outcome: Progressing  Goal: Performs ADL's with improved pain control throughout shift  11/8/2023 0602 by Syl Doty RN  Outcome: Progressing  11/8/2023 0601 by Syl Doty RN  Outcome: Progressing  Goal: Participates in PT with improved pain control throughout the shift  11/8/2023 0602 by Syl Doty RN  Outcome: Progressing  11/8/2023 0601 by Syl Doty RN  Outcome: Progressing     Problem: Dressings Lower Extremities  Goal: STG 1  11/8/2023 0602 by Syl Doty RN  Outcome: Progressing  11/8/2023 0601 by Syl Doty RN  Outcome: Progressing     Problem: Toileting  Goal: STG -4  11/8/2023 0602 by Syl Doty RN  Outcome: Progressing  11/8/2023 0601 by Syl Doty RN  Outcome: Progressing     Problem: Pain - Adult  Goal: Verbalizes/displays adequate comfort level or baseline comfort level  11/8/2023 0602 by Syl Doty RN  Outcome: Progressing  11/8/2023 0601 by Syl Doty RN  Outcome: Progressing     Problem: Safety - Adult  Goal: Free from fall injury  11/8/2023 0602 by Syl Doty RN  Outcome: Progressing  11/8/2023 0601 by Syl Doty RN  Outcome: Progressing     Problem: Chronic Conditions and Co-morbidities  Goal: Patient's chronic conditions and co-morbidity symptoms are monitored and maintained or improved  11/8/2023 0602 by Syl Doty RN  Outcome: Progressing  11/8/2023 0601 by Syl Soren, RN  Outcome: Progressing     Problem: Diabetes  Goal: Achieve decreasing blood  glucose levels by end of shift  11/8/2023 0602 by Syl Doty RN  Outcome: Progressing  11/8/2023 0601 by Syl Doty RN  Outcome: Progressing  Goal: Increase stability of blood glucose readings by end of shift  11/8/2023 0602 by Syl Doty RN  Outcome: Progressing  11/8/2023 0601 by Syl Doty RN  Outcome: Progressing  Goal: Decrease in ketones present in urine by end of shift  11/8/2023 0602 by Syl Doty RN  Outcome: Progressing  11/8/2023 0601 by Syl Doty RN  Outcome: Progressing  Goal: Maintain electrolyte levels within acceptable range throughout shift  11/8/2023 0602 by Syl Doty RN  Outcome: Progressing  11/8/2023 0601 by Syl Doty RN  Outcome: Progressing  Goal: Maintain glucose levels >70mg/dl to <250mg/dl throughout shift  11/8/2023 0602 by Syl Doty RN  Outcome: Progressing  11/8/2023 0601 by Syl Doty RN  Outcome: Progressing  Goal: No changes in neurological exam by end of shift  11/8/2023 0602 by Syl Doty RN  Outcome: Progressing  11/8/2023 0601 by Syl Doty RN  Outcome: Progressing  Goal: Learn about and adhere to nutrition recommendations by end of shift  11/8/2023 0602 by Syl Doty RN  Outcome: Progressing  11/8/2023 0601 by Syl Doty RN  Outcome: Progressing  Goal: Vital signs within normal range for age by end of shift  11/8/2023 0602 by Syl Doty RN  Outcome: Progressing  11/8/2023 0601 by Syl Doty RN  Outcome: Progressing  Goal: Increase self care and/or family involovement by end of shift  11/8/2023 0602 by Syl Doty RN  Outcome: Progressing  11/8/2023 0601 by Syl Doty RN  Outcome: Progressing  Goal: Receive DSME education by end of shift  11/8/2023 0602 by Syl Doty RN  Outcome: Progressing  11/8/2023 0601 by Syl Doty RN  Outcome: Progressing

## 2023-11-08 NOTE — PROGRESS NOTES
INPATIENT NEPHROLOGY CONSULT PROGRESS NOTE      Patient Name: Zack Figueroa MRN: [id number]  DATE of SERVICE: November 8, 2023  TIME of SERVICE: 10:57 AM  CONSULTING SERVICE: Nephrology    REASON for CONSULT: DENNISE, CKD IV/V serum creatinine stable with holding dialysis.      INTERVAL HISTORY:  Patient seen and evaluated at bedside,   Sitting up in the chair family at bedside.  Urine output 1 L  Oral intake 700 ml  No need for diuretics to change Lasix to be given as needed for worsening dyspnea or weight gain of 5 pounds or more.    If remains stable in 1 week okay to remove dialysis catheter    Summary of stay:  Mr. Figueroa is a 88 y.o. male who presents for eval of bradycardia.  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 Last hemoglobin A1c 7.6.  History of paroxysmal atrial fibrillation follows with EP physiologist Dr. Chávez and cardiologist Dr. Martinez,, chronic diastolic heart failure, coronary artery disease status post remote CABG, hypertension, hyperlipidemia, history of CVA.    Patient [facility] 4, 2023 for further evaluation of dizziness and found to be bradycardic and hypotensive on vitals.  Monitoring for signs of renal recovery.  Amiodarone level pending    Acute Kidney injury superimposed on chronic kidney disease stage IV/V:  Dialysis dependent      Subacute to acute left intracranial hemorrhage      Atrial fibrillation: Anticoagulation on hold  Started on amiodarone     Hypertension:   - relative hypotension and bradycardia      Chronic kidney disease stage IV/V:  -- Longstanding history of diabetes mellitus complicated with  Cardiac Angioplasty/Stent Discharge Instructions - Radial    After you go home:      Have an adult stay with you until tomorrow.    Drink extra fluids for 2 days.    You may resume your normal diet.    No smoking       For 24 hours - due to the sedation you received:    Relax and take it easy.    Do NOT make any important or legal decisions.    Do NOT drive or operate machines at home or at work.    Do NOT drink alcohol.    Care of Wrist Puncture Site:      For the first 24 hrs - check the puncture site every 1-2 hours while awake.    It is normal to have soreness at the puncture site and mild tingling in your hand for up to 3 days.    Remove the bandaid after 24 hours. If there is minor oozing, apply another bandaid and remove it after 12 hours.    You may shower tomorrow.  Do NOT take a bath, or use a hot tub or pool for at least 3 days. Do NOT scrub the site. Do not use lotion or powder near the puncture site.           Activity:          For 2 days:     do not use your hand or arm to support your weight (such as rising from a chair)     do not bend your wrist (such as lifting a garage door).    do not lift more than 5 pounds or exercise your arm (such as tennis, golf or bowling).    Do NOT do any heavy activity such as exercise, lifting, or straining.     Bleeding:      If you start bleeding from the site in your wrist, sit down and press firmly on/above the site for 10 minutes.     Once bleeding stops, keep arm still for 2 hours.     Call [facility] as soon as you can.       Call 911 right away if you have heavy bleeding or bleeding that does not stop.      Medicines:      You are taking an antiplatelet medications - Brilinta - do not stop taking it until you talk to your cardiologist.        Take your medications, including blood thinners, unless your provider tells you not to.     If you have stopped any medicines, check with your provider about when to restart them.    Follow Up Appointments:      Follow up  triopathy  -- Diabetic nephropathy, hypertensive nephrosclerosis  -- Proteinuric with a protein to creatinine ratio of 2.6  mg/g  -- dialysis dependent      Secondary hyperparathyroidism: to cont vit D     Volume: Concern for hypovolemia     Stroke: intracranial hemorrhage      Plan:  Electrolytes and volume status within acceptable range no uremic symptoms patient awake and alert sitting up in the chair.  Urine output increasing up to 1 L.  No need for diuretics.  Lasix as needed.  If renal parameters remained stable okay to remove dialysis catheter in 1 week  Patient on aspirin only not on Eliquis family was questioning anticoagulation, decision deferred to cardiology  Plan of care discussed with patient, patient wife primary care team and bedside nurse       Medications:    Current Facility-Administered Medications:     acetaminophen (Tylenol) tablet 650 mg, 650 mg, oral, q4h PRN **OR** acetaminophen (Tylenol) oral liquid 650 mg, 650 mg, nasogastric tube, q4h PRN **OR** acetaminophen (Tylenol) suppository 650 mg, 650 mg, rectal, q4h PRN, NITA Perez    amiodarone (Pacerone) tablet 200 mg, 200 mg, oral, Daily, NITA Montague, 200 mg at 11/08/23 0937    aspirin EC tablet 81 mg, 81 mg, oral, Daily, NITA Montague, 81 mg at 11/08/23 0931    cholecalciferol (Vitamin D-3) tablet 1,000 Units, 1,000 Units, oral, Every other day, NITA Montague, 1,000 Units at 11/08/23 0931    dextrose 10 % in water (D10W) infusion, 0.3 g/kg/hr, intravenous, Once PRN, NITA Perez    dextrose 50 % injection 25 g, 25 g, intravenous, q15 min PRN, NITA Perez    [START ON 11/9/2023] furosemide (Lasix) tablet 40 mg, 40 mg, oral, Daily PRN, Nereida Navarro MD    glucagon (Glucagen) injection 1 mg, 1 mg, intramuscular, q15 min PRN, NITA Perez    insulin glargine (Lantus) injection 10 Units, 10 Units, subcutaneous, q24h, Jennifer Crawley, APRN-CNP,  with [facility] Nurse Practitioner at [facility] @ [facility] on Friday morning @ 1000 for labs & 1100 appt with CNP.    Cardiac Rehab will contact you for follow up care.    Call the clinic if:      You have a large or growing hard lump around the site.    The site is red, swollen, hot or tender.    Blood or fluid is draining from the site.    You have chills or a fever greater than 101 F (38 C).    Your arm turns feels numb, cool or changes color.    You have hives, a rash or unusual itching.    Any questions or concerns.    Other Instructions:      If you received a stent - carry your stent card with you at all times.      [facility] [place]:    [phone] [facility] (7 days a week)         10 Units at 11/08/23 0932    insulin lispro (HumaLOG) injection 0-10 Units, 0-10 Units, subcutaneous, TID with meals, NITA Perez, 2 Units at 11/07/23 1752    isosorbide mononitrate ER (Imdur) 24 hr tablet 30 mg, 30 mg, oral, Daily, NITA Montague, 30 mg at 11/08/23 0932    melatonin tablet 3 mg, 3 mg, oral, Nightly PRN, NITA Perez    ondansetron ODT (Zofran-ODT) disintegrating tablet 4 mg, 4 mg, oral, q8h PRN **OR** ondansetron (Zofran) injection 4 mg, 4 mg, intravenous, q8h PRN, NITA Perez    pantoprazole (ProtoNix) EC tablet 40 mg, 40 mg, oral, Daily before breakfast, NITA Montague, 40 mg at 11/08/23 0931    polyethylene glycol (Glycolax, Miralax) packet 17 g, 17 g, oral, Daily PRN, ARACELIS Perez-CNP, 17 g at 11/07/23 0909    polyethylene glycol (Glycolax, Miralax) packet 17 g, 17 g, oral, Daily, ARACELIS Montague-CNP, 17 g at 11/08/23 0930    rosuvastatin (Crestor) tablet 20 mg, 20 mg, oral, Daily, NITA Montague, 20 mg at 11/08/23 0932    sodium chloride 0.9% flush 10 mL, 10 mL, intravenous, q8h MAHAD, NITA Perez, 10 mL at 11/08/23 0600    tamsulosin (Flomax) 24 hr capsule 0.4 mg, 0.4 mg, oral, Daily before breakfast, NITA Montague, 0.4 mg at 11/08/23 0931    PERTINENT ROS:  GENERAL:  positive for fatigue, poor appetite.  No fever/chills  RESPIRATORY:  positive for shortness of breath.  Negative for cough, wheezing.  CARDIOVASCULAR:   Negative for chest pain or palpitation.  GI:  Negative for abdominal pain, diarrhea, heartburn, nausea, vomiting  : External Fields catheter in place    Physical Exam:  Vital signs in last 24 hours:  Temp:  [35.4 °C (95.7 °F)-36.1 °C (97 °F)] 35.7 °C (96.3 °F)  Heart Rate:  [60-66] 66  Resp:  [17-18] 17  BP: (116-153)/(58-65) 153/65    General: Awake, cooperative, not in acute distress  HEENT:  NCAT,  mucous membranes moist and pink  NECK:  no JVD, no  carotid bruit, supple, no cervical mass or thyromegaly  LUNGS;  Diminished breath sounds, no Rales  CV:  Distant, regular rate and rhythm, no murmurs  ABDOMEN:  abdomen soft, nontender, BS normal, no masses or organomegaly  EDEMA:  no lower extremity edema/dependent edema  SKIN:  dry and normal turgor, no clubbing, cyanosis or petechia.  No rashes noted  Temporal dialysis catheter in place    Intake/Output last 3 shifts:  I/O last 3 completed shifts:  In: 790 (9.2 mL/kg) [P.O.:780; I.V.:10 (0.1 mL/kg)]  Out: 1002 (11.7 mL/kg) [Urine:1000 (0.3 mL/kg/hr); Stool:2]  Weight: 85.7 kg     DATA:  Diagnotic tests reviewed for Todays visit:  Results from last 7 days   Lab Units 11/07/23  1127   WBC AUTO x10*3/uL 7.2   RBC AUTO x10*6/uL 3.08*   HEMOGLOBIN g/dL 9.0*   HEMATOCRIT % 29.9*       Results from last 7 days   Lab Units 11/07/23  1127 11/05/23  0532 11/04/23  1613   SODIUM mmol/L 137   < > 135*   POTASSIUM mmol/L 3.9   < > 4.1   CHLORIDE mmol/L 99   < > 96*   CO2 mmol/L 28   < > 31   BUN mg/dL 68*   < > 46*   CREATININE mg/dL 4.61*   < > 3.76*   CALCIUM mg/dL 8.6   < > 8.6   MAGNESIUM mg/dL  --   --  1.87   BILIRUBIN TOTAL mg/dL  --   --  0.6   ALT U/L  --   --  68*   AST U/L  --   --  74*    < > = values in this interval not displayed.       Results from last 7 days   Lab Units 11/05/23  1151   COLOR U  Lexus*   APPEARANCE U  Clear   PH U  5.0   SPEC GRAV UR  1.011   PROTEIN U mg/dL 100 (2+)*   BLOOD UR  NEGATIVE   NITRITE U  POSITIVE*   WBC UR /HPF 1-5   BACTERIA UR /HPF 1+*       IMAGING: CXR reviewed in  images      SIGNATURE: Nereida Navarro MD  Nephrology and Hypertension  [address]  Office phone: [phone]  FAX: [phone]    This note was partially generated using the Dragon voice recognition system, and there may be some incorrect words, spelling's and punctuation that were not noted in checking the note before saving.

## 2023-11-09 NOTE — DISCHARGE SUMMARY
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  Hx hemorrhagic CVA  Bradycardia  Diabetes mellitus  Atrial fibrillation      Test Results Pending At Discharge  Pending Labs       Order Current Status    Troponin, High Sensitivity, 1 Hour Collected (11/04/23 5414)    Troponin I Series, High Sensitivity (0, 1 HR) In process            Hospital Course   Patient is an 88 year old male, with history of stage 5 CKD, hemorrhagic stroke, atrial fibrillation, admitted to the hospital with hypotension and bradycardia, patient was evaluated by nephrology, Electrophysiology, Amiodarone dose was adjusted, patient condition improved and he was discharged back to Acute rehab facility.    Discharge time 35 minutes    Pertinent Physical Exam At Time of Discharge  Physical Exam  Constitutional:       Appearance: Normal appearance.   HENT:      Head: Normocephalic and atraumatic.      Nose: Nose normal.      Mouth/Throat:      Mouth: Mucous membranes are moist.   Eyes:      Extraocular Movements: Extraocular movements intact.      Pupils: Pupils are equal, round, and reactive to light.   Cardiovascular:      Rate and Rhythm: Normal rate and regular rhythm.   Pulmonary:      Effort: Pulmonary effort is normal.      Breath sounds: Normal breath sounds.   Abdominal:      General: Abdomen is flat. Bowel sounds are normal.      Palpations: Abdomen is soft.   Neurological:      Mental Status: He is alert. Mental status is at baseline.         Home Medications     Medication List      START taking these medications     furosemide 40 mg tablet; Commonly known as: Lasix; Take 1 tablet (40 mg)   by mouth once daily as needed (FOR WORSENING DYSPNEA OR WEIGHT GAIN OF 5   LBS OR MORE). Do not start before November 9, 2023.     CHANGE how you take these medications     amiodarone 100 mg tablet; Commonly known as: Pacerone; Take 1 tablet   (100 mg) by mouth once daily. Do not start before November 9, 2023.; What   changed: medication  strength, how much to take   insulin glargine 100 unit/mL injection; Commonly known as: Lantus;   Inject 10 Units under the skin once every 24 hours. Take as directed per   insulin instructions. Do not start before November 8, 2023.; What changed:   how much to take, when to take this     CONTINUE taking these medications     aspirin 81 mg EC tablet   B complex-vitamin C tablet; Take 1 tablet by mouth every other day. Do   not start before October 19, 2023.   Colace 100 mg capsule; Generic drug: docusate sodium   insulin lispro 100 unit/mL injection; Commonly known as: HumaLOG; Inject   0-0.1 mL (0-10 Units) under the skin 4 times a day before meals. Take as   directed per insulin instructions.   isosorbide mononitrate ER 30 mg 24 hr tablet; Commonly known as: Imdur;   Take 1 tablet (30 mg) by mouth once daily. Do not crush or chew. Do not   start before October 19, 2023.   pantoprazole 40 mg EC tablet; Commonly known as: ProtoNix; Take 1 tablet   (40 mg) by mouth once daily in the morning. Take before meals. Do not   crush, chew, or split. Do not start before October 19, 2023.   polyethylene glycol 17 gram packet; Commonly known as: Glycolax, Miralax   Retacrit 10,000 unit/mL injection; Generic drug: epoetin keagan-epbx   rosuvastatin 20 mg tablet; Commonly known as: Crestor   tamsulosin 0.4 mg 24 hr capsule; Commonly known as: Flomax   Vitamin D3 25 MCG (1000 UT) capsule; Generic drug: cholecalciferol     STOP taking these medications     heparin 1,000 unit/mL injection   hydrALAZINE 25 mg tablet; Commonly known as: Apresoline   sertraline 50 mg tablet; Commonly known as: Zoloft   torsemide 100 mg tablet; Commonly known as: Demadex       Outpatient Follow-Up  Future Appointments   Date Time Provider Department Center   11/13/2023 To Be Determined Jill Katz RN [facility]   11/14/2023 To Be Determined Karen Wisdom, PT [facility]   11/15/2023 To Be Determined SAURAV Peñaloza [facility]   11/15/2023   2:00 PM Carol Trammell OT [facility]   11/15/2023 To Be Determined Sarah Maier, PARAS-SLP [facility]   1/16/2024  1:00 PM Kris Dickey MD [id number]       George Lenz MD

## 2023-11-09 NOTE — CARE PLAN
Labs reviewed.  Serum creatinine down to 4.2 from 4.6 mg deciliter EGFR up to 13 no need for renal replacement therapy.  Tentative plan to remove dialysis catheter in 1 week    Creatinine  0.50 - 1.30 mg/dL 4.28 High  4.61 High  4.48 High  4.29 High  3.76 High  4.87 High  4.58 High    eGFR  >60 mL/min/1.73m*2 13 Low  12 Low

## 2023-11-10 ENCOUNTER — PATIENT OUTREACH (OUTPATIENT)
Dept: HOME HEALTH SERVICES | Age: 88
End: 2023-11-10

## 2023-11-10 LAB
AMIODARONE SERPL-MCNC: 0.7 UG/ML (ref 0.5–2)
DESETHYLAMIODARONE SERPL-MCNC: 0.9 UG/ML

## 2023-11-14 ENCOUNTER — HOME CARE VISIT (OUTPATIENT)
Dept: HOME HEALTH SERVICES | Facility: HOME HEALTH | Age: 88
End: 2023-11-14

## 2023-11-15 ENCOUNTER — HOME CARE VISIT (OUTPATIENT)
Dept: HOME HEALTH SERVICES | Facility: HOME HEALTH | Age: 88
End: 2023-11-15

## 2023-11-15 ENCOUNTER — PATIENT OUTREACH (OUTPATIENT)
Dept: CARE COORDINATION | Age: 88
End: 2023-11-15

## 2023-11-15 RX ORDER — SODIUM CHLORIDE 9 MG/ML
50 INJECTION, SOLUTION INTRAVENOUS CONTINUOUS
Status: CANCELLED | OUTPATIENT
Start: 2023-11-15

## 2023-11-17 ENCOUNTER — HOSPITAL ENCOUNTER (OUTPATIENT)
Dept: RADIOLOGY | Facility: HOSPITAL | Age: 88
Discharge: HOME | End: 2023-11-17
Payer: MEDICARE

## 2023-11-17 VITALS
OXYGEN SATURATION: 96 % | BODY MASS INDEX: 27.99 KG/M2 | RESPIRATION RATE: 16 BRPM | HEIGHT: 69 IN | SYSTOLIC BLOOD PRESSURE: 143 MMHG | DIASTOLIC BLOOD PRESSURE: 63 MMHG | HEART RATE: 79 BPM | TEMPERATURE: 97.8 F | WEIGHT: 189 LBS

## 2023-11-17 DIAGNOSIS — K59.00 CONSTIPATION IN MALE: ICD-10-CM

## 2023-11-17 DIAGNOSIS — Z74.09 IMPAIRED MOBILITY AND ADLS: ICD-10-CM

## 2023-11-17 DIAGNOSIS — I48.91 ATRIAL FIBRILLATION, UNSPECIFIED TYPE (MULTI): ICD-10-CM

## 2023-11-17 DIAGNOSIS — Z78.9 IMPAIRED MOBILITY AND ADLS: ICD-10-CM

## 2023-11-17 DIAGNOSIS — I10 HYPERTENSION, UNSPECIFIED TYPE: ICD-10-CM

## 2023-11-17 DIAGNOSIS — I50.32 CHRONIC DIASTOLIC (CONGESTIVE) HEART FAILURE (MULTI): ICD-10-CM

## 2023-11-17 DIAGNOSIS — E11.22 TYPE 2 DIABETES MELLITUS WITH STAGE 4 CHRONIC KIDNEY DISEASE, WITH LONG-TERM CURRENT USE OF INSULIN (MULTI): ICD-10-CM

## 2023-11-17 DIAGNOSIS — N18.4 TYPE 2 DIABETES MELLITUS WITH STAGE 4 CHRONIC KIDNEY DISEASE, WITH LONG-TERM CURRENT USE OF INSULIN (MULTI): ICD-10-CM

## 2023-11-17 DIAGNOSIS — N31.9 NEUROGENIC BLADDER: ICD-10-CM

## 2023-11-17 DIAGNOSIS — N17.9 ACUTE KIDNEY INJURY (CMS-HCC): ICD-10-CM

## 2023-11-17 DIAGNOSIS — I25.10 CORONARY ARTERY DISEASE INVOLVING NATIVE HEART, UNSPECIFIED VESSEL OR LESION TYPE, UNSPECIFIED WHETHER ANGINA PRESENT: ICD-10-CM

## 2023-11-17 DIAGNOSIS — I61.9 INTRAPARENCHYMAL HEMORRHAGE OF BRAIN (MULTI): Primary | ICD-10-CM

## 2023-11-17 DIAGNOSIS — Z79.4 TYPE 2 DIABETES MELLITUS WITH STAGE 4 CHRONIC KIDNEY DISEASE, WITH LONG-TERM CURRENT USE OF INSULIN (MULTI): ICD-10-CM

## 2023-11-17 LAB
ATRIAL RATE: 51 BPM
P AXIS: 66 DEGREES
P OFFSET: 92 MS
P ONSET: 51 MS
PR INTERVAL: 326 MS
Q ONSET: 214 MS
QRS COUNT: 9 BEATS
QRS DURATION: 108 MS
QT INTERVAL: 522 MS
QTC CALCULATION(BAZETT): 481 MS
QTC FREDERICIA: 495 MS
R AXIS: -9 DEGREES
T AXIS: 29 DEGREES
T OFFSET: 475 MS
VENTRICULAR RATE: 51 BPM

## 2023-11-17 PROCEDURE — 7100000001 HC RECOVERY ROOM TIME - INITIAL BASE CHARGE

## 2023-11-17 PROCEDURE — 36589 REMOVAL TUNNELED CV CATH: CPT | Mod: RT

## 2023-11-17 PROCEDURE — 99232 SBSQ HOSP IP/OBS MODERATE 35: CPT | Performed by: STUDENT IN AN ORGANIZED HEALTH CARE EDUCATION/TRAINING PROGRAM

## 2023-11-17 PROCEDURE — 7100000002 HC RECOVERY ROOM TIME - EACH INCREMENTAL 1 MINUTE

## 2023-11-17 PROCEDURE — 2720000007 HC OR 272 NO HCPCS

## 2023-11-17 PROCEDURE — 36589 REMOVAL TUNNELED CV CATH: CPT | Performed by: NURSE PRACTITIONER

## 2023-11-17 RX ORDER — ACETAMINOPHEN 325 MG/1
650 TABLET ORAL ONCE
Status: COMPLETED | OUTPATIENT
Start: 2023-11-17 | End: 2023-11-17

## 2023-11-17 RX ADMIN — ACETAMINOPHEN 650 MG: 325 TABLET ORAL at 11:45

## 2023-11-17 ASSESSMENT — PAIN - FUNCTIONAL ASSESSMENT
PAIN_FUNCTIONAL_ASSESSMENT: 0-10
PAIN_FUNCTIONAL_ASSESSMENT: 0-10

## 2023-11-17 ASSESSMENT — PAIN SCALES - GENERAL
PAINLEVEL_OUTOF10: 0 - NO PAIN
PAINLEVEL_OUTOF10: 0 - NO PAIN

## 2023-11-17 NOTE — POST-PROCEDURE NOTE
Interventional Radiology Brief Postprocedure Note    Procedure: Tunneled Right HD catheter removal     Provider: ARACELIS Fontana-CNP    Assistant: None    Diagnosis: s/p DENNISE requiring HD    Description of procedure: The risks of the procedure were discussed with the patient including but not limited to bleeding, damage to blood vessel, infection, air embolism, and retained cuff. The patient consented to the procedure. A time out was performed. The patient was placed in the supine position. The patient's skin was prepped with chlorhexidine and draped in sterile manner. Lidocaine 1% with epinephrine was given at the site of catheter insertion. Using blunt dissection, the catheter cuff was externalized. Patient was instructed to hum continuously and the catheter was removed while pressure was held at the insertion site and the internal jugular vein. Hemostasis was achieved and no hematoma was appreciated at the neck. Gauze and tegaderm were applied. Sterile technique was used throughout entirety of procedure.         Medications (Filter: Administrations occurring from 1110 to 1110 on 11/17/23)      None            Complications: None    Estimated Blood Loss: none        See detailed result report with images in PACS.    The patient tolerated the procedure well without incident or complication and is in stable condition.

## 2023-11-20 ENCOUNTER — HOME HEALTH ADMISSION (OUTPATIENT)
Dept: HOME HEALTH SERVICES | Facility: HOME HEALTH | Age: 88
End: 2023-11-20
Payer: MEDICARE

## 2023-11-24 ENCOUNTER — HOME CARE VISIT (OUTPATIENT)
Dept: HOME HEALTH SERVICES | Facility: HOME HEALTH | Age: 88
End: 2023-11-24
Payer: MEDICARE

## 2023-11-24 VITALS
HEART RATE: 66 BPM | OXYGEN SATURATION: 94 % | DIASTOLIC BLOOD PRESSURE: 60 MMHG | RESPIRATION RATE: 20 BRPM | SYSTOLIC BLOOD PRESSURE: 138 MMHG

## 2023-11-24 PROCEDURE — 1090000002 HH PPS REVENUE DEBIT

## 2023-11-24 PROCEDURE — 1090000001 HH PPS REVENUE CREDIT

## 2023-11-24 PROCEDURE — 0023 HH SOC

## 2023-11-24 PROCEDURE — 169592 NO-PAY CLAIM PROCEDURE

## 2023-11-24 PROCEDURE — G0299 HHS/HOSPICE OF RN EA 15 MIN: HCPCS | Mod: HHH

## 2023-11-24 SDOH — ECONOMIC STABILITY: FOOD INSECURITY: SNACKS PER DAY: 1

## 2023-11-24 SDOH — ECONOMIC STABILITY: FOOD INSECURITY: MEALS PER DAY: 3

## 2023-11-24 ASSESSMENT — ENCOUNTER SYMPTOMS
STOOL FREQUENCY: DAILY
PAIN LOCATION - PAIN DURATION: RANDOM
PAIN LOCATION - RELIEVING FACTORS: REST,MEDS
BOWEL PATTERN NORMAL: 1
LAST BOWEL MOVEMENT: 66801
PERSON REPORTING PAIN: PATIENT
FATIGUES EASILY: 1
PAIN: 1
PAIN LOCATION: RIGHT SHOULDER
PAIN LOCATION - PAIN SEVERITY: 4/10
HIGHEST PAIN SEVERITY IN PAST 24 HOURS: 6/10
DESCRIPTION OF MEMORY LOSS: LONG TERM
SUBJECTIVE PAIN PROGRESSION: GRADUALLY IMPROVING
DESCRIPTION OF MEMORY LOSS: SHORT TERM
APPETITE LEVEL: GOOD
LOSS OF SENSATION IN FEET: 1
PAIN LOCATION - PAIN FREQUENCY: WITH ACTIVITY
LOWEST PAIN SEVERITY IN PAST 24 HOURS: 2/10
OCCASIONAL FEELINGS OF UNSTEADINESS: 1
DEPRESSION: 0
MUSCLE WEAKNESS: 1
PAIN LOCATION - EXACERBATING FACTORS: MOVEMENT
PAIN LOCATION - PAIN QUALITY: ACHING
FATIGUE: 1
PAIN SEVERITY GOAL: 0/10
FACIAL ASYMMETRY: 1

## 2023-11-24 ASSESSMENT — ACTIVITIES OF DAILY LIVING (ADL)
FEEDING ASSESSED: 1
AMBULATION ASSISTANCE: 1
GROOMING ASSESSED: 1
DRESSING_UB_CURRENT_FUNCTION: MAXIMUM ASSIST
CURRENT_FUNCTION: MAXIMUM ASSIST
ORAL_CARE_CURRENT_FUNCTION: NEEDS ASSISTANCE
AMBULATION ASSISTANCE: TWO PERSON
FEEDING: CONTACT GUARD ASSIST
OASIS_M1830: 05
DRESSING_LB_CURRENT_FUNCTION: MAXIMUM ASSIST
ORAL_CARE_ASSESSED: 1
BATHING_CURRENT_FUNCTION: MAXIMUM ASSIST
GROOMING_CURRENT_FUNCTION: MODERATE ASSIST
ENTERING_EXITING_HOME: TOTAL DEPENDENCE
PHYSICAL TRANSFERS ASSESSED: 1
BATHING ASSESSED: 1
PHYSICAL_TRANSFERS_DEVICES: WALKER,GAIT BELT

## 2023-11-24 ASSESSMENT — PAIN SCALES - PAIN ASSESSMENT IN ADVANCED DEMENTIA (PAINAD)
NEGVOCALIZATION: 0
NEGVOCALIZATION: 0 - NONE.
CONSOLABILITY: 0 - NO NEED TO CONSOLE.
FACIALEXPRESSION: 0
BREATHING: 0
FACIALEXPRESSION: 0 - SMILING OR INEXPRESSIVE.
TOTALSCORE: 0
BODYLANGUAGE: 0
BODYLANGUAGE: 0 - RELAXED.
CONSOLABILITY: 0

## 2023-11-24 NOTE — HOME HEALTH
Snv for soc. 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 Currently receiving 24 hour care per Amber. Pt has rt sided paresis,very little ambulation. Afebrile.vss. Wife very supportive. Uses walker for transferring,W/C at home. sn,pt,ot,st and chart/Patient

## 2023-11-25 ENCOUNTER — HOME CARE VISIT (OUTPATIENT)
Dept: HOME HEALTH SERVICES | Facility: HOME HEALTH | Age: 88
End: 2023-11-25
Payer: MEDICARE

## 2023-11-25 VITALS
RESPIRATION RATE: 16 BRPM | OXYGEN SATURATION: 97 % | HEART RATE: 50 BPM | TEMPERATURE: 97.3 F | DIASTOLIC BLOOD PRESSURE: 54 MMHG | SYSTOLIC BLOOD PRESSURE: 118 MMHG

## 2023-11-25 PROCEDURE — G0151 HHCP-SERV OF PT,EA 15 MIN: HCPCS | Mod: HHH

## 2023-11-25 PROCEDURE — 1090000002 HH PPS REVENUE DEBIT

## 2023-11-25 PROCEDURE — 1090000001 HH PPS REVENUE CREDIT

## 2023-11-25 SDOH — HEALTH STABILITY: PHYSICAL HEALTH: PHYSICAL EXERCISE: SEATED

## 2023-11-25 SDOH — HEALTH STABILITY: PHYSICAL HEALTH: EXERCISE ACTIVITY: ANKLE DF/PF

## 2023-11-25 SDOH — HEALTH STABILITY: PHYSICAL HEALTH: EXERCISE ACTIVITIES SETS: 1

## 2023-11-25 SDOH — HEALTH STABILITY: PHYSICAL HEALTH: EXERCISE TYPE: LE EXERCISES

## 2023-11-25 SDOH — HEALTH STABILITY: PHYSICAL HEALTH: EXERCISE ACTIVITY: MARCHING

## 2023-11-25 SDOH — HEALTH STABILITY: PHYSICAL HEALTH: PHYSICAL EXERCISE: 15

## 2023-11-25 SDOH — HEALTH STABILITY: PHYSICAL HEALTH: EXERCISE ACTIVITY: LAQ

## 2023-11-25 ASSESSMENT — BALANCE ASSESSMENTS
TURNING 360 DEGREES STEPS: 0 - DISCONTINUOUS STEPS
ARISES: 0 - UNABLE WITHOUT HELP
ARISING SCORE: 0
SITTING DOWN: 1 - USES ARMS OR NOT SMOOTH MOTION
SITTING BALANCE: 1 - STEADY, SAFE
STANDING BALANCE: 0 - UNSTEADY
ATTEMPTS TO ARISE: 0 - UNABLE WITHOUT HELP
IMMEDIATE STANDING BALANCE FIRST 5 SECONDS: 0 - UNSTEADY (STAGGERS, MOVES FEET, TRUNK SWAY)
NUDGED SCORE: 0
EYES CLOSED AT MAXIMUM POSITION NUDGED: 0 - UNSTEADY
NUDGED: 0 - BEGINS TO FALL
BALANCE SCORE: 2

## 2023-11-25 ASSESSMENT — ENCOUNTER SYMPTOMS
OCCASIONAL FEELINGS OF UNSTEADINESS: 1
PAIN LOCATION - PAIN FREQUENCY: INTERMITTENT
PAIN LOCATION - PAIN SEVERITY: 0/10
PERSON REPORTING PAIN: PATIENT
PAIN LOCATION - EXACERBATING FACTORS: MOVEMENT
HIGHEST PAIN SEVERITY IN PAST 24 HOURS: 5/10
PAIN LOCATION - RELIEVING FACTORS: REST, TYLENOL
PAIN SEVERITY GOAL: 0/10
SUBJECTIVE PAIN PROGRESSION: GRADUALLY IMPROVING
PAIN LOCATION - PAIN QUALITY: ACHING
PAIN LOCATION: RIGHT SHOULDER
PAIN: 1
MUSCLE WEAKNESS: 1
LOWEST PAIN SEVERITY IN PAST 24 HOURS: 0/10

## 2023-11-25 ASSESSMENT — GAIT ASSESSMENTS
STEP SYMMETRY: 1 - RIGHT AND LEFT STEP LENGTH APPEAR EQUAL
PATH: 0 - MARKED DEVIATION
WALKING STANCE: 1 - HEELS ALMOST TOUCHING WHILE WALKING
GAIT SCORE: 7
TRUNK SCORE: 0
TRUNK: 0 - MARKED SWAY OR USES WALKING AID
PATH SCORE: 0
INITIATION OF GAIT IMMEDIATELY AFTER GO: 0 - ANY HESITANCY OR MULTIPLE ATTEMPTS TO START
STEP CONTINUITY: 1 - STEPS APPEAR CONTINUOUS
BALANCE AND GAIT SCORE: 9

## 2023-11-25 ASSESSMENT — ACTIVITIES OF DAILY LIVING (ADL)
AMBULATION_DISTANCE/DURATION_TOLERATED: 20 FT
CURRENT_FUNCTION: ONE PERSON
AMBULATION ASSISTANCE: ONE PERSON

## 2023-11-26 PROCEDURE — 1090000001 HH PPS REVENUE CREDIT

## 2023-11-26 PROCEDURE — 1090000002 HH PPS REVENUE DEBIT

## 2023-11-27 ENCOUNTER — HOME CARE VISIT (OUTPATIENT)
Dept: HOME HEALTH SERVICES | Facility: HOME HEALTH | Age: 88
End: 2023-11-27
Payer: MEDICARE

## 2023-11-27 ENCOUNTER — PATIENT OUTREACH (OUTPATIENT)
Dept: HOME HEALTH SERVICES | Age: 88
End: 2023-11-27
Payer: MEDICARE

## 2023-11-27 PROCEDURE — G0152 HHCP-SERV OF OT,EA 15 MIN: HCPCS | Mod: HHH

## 2023-11-27 PROCEDURE — 1090000002 HH PPS REVENUE DEBIT

## 2023-11-27 PROCEDURE — 1090000001 HH PPS REVENUE CREDIT

## 2023-11-27 ASSESSMENT — ENCOUNTER SYMPTOMS: DENIES PAIN: 1

## 2023-11-27 NOTE — PROGRESS NOTES
Daily Call Note:   Patient's wife, Jayy Figueroa declined Healthy at Home.  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    Pt Education:   Barriers:   Topics for Daily Review:   Pt demonstrates clear understanding:     Daily Weight:  There were no vitals filed for this visit.   Last 3 Weights:  Wt Readings from Last 7 Encounters:   11/17/23 85.7 kg (189 lb)   11/04/23 85.7 kg (189 lb)   10/19/23 85 kg (187 lb 6.3 oz)       Masimo Device:    Masimo Clinical Impression:     Virtual Visits--Scheduled (Most Recent Date at Top)  Follow up Appointments  Recent Visits  No visits were found meeting these conditions.  Showing recent visits within past 30 days and meeting all other requirements  Future Appointments  No visits were found meeting these conditions.  Showing future appointments within next 90 days and meeting all other requirements       Frequency of RN Calls & Virtual Visits per Team Agreement:     Medication issues Addressed (what was done):     Follow up appointments scheduled by [facility] Staff:   Referrals made by [facility] staff:       [facility] Care Transitions Assessment    Patient's Address:   [address] [place] [address]  **  If this is not the address patient will receive services - alert team and address in EMR**       Patient Contacts:  Extended Emergency Contact Information  Primary Emergency Contact: Jayy Figueroa  Home Phone: [phone]  Relation: Spouse  Secondary Emergency Contact: Barber Figueroa   [place]  Mobile Phone: [phone]  Relation: Son  Guardian: JAYY BEST  Mobile Phone: [phone]  Relation: Spouse  Preferred language: English   needed? No                                Patient's Preferred Phone: [phone]  Patient's E-mail: [email or web address]

## 2023-11-28 ENCOUNTER — HOME CARE VISIT (OUTPATIENT)
Dept: HOME HEALTH SERVICES | Facility: HOME HEALTH | Age: 88
End: 2023-11-28
Payer: MEDICARE

## 2023-11-28 VITALS — TEMPERATURE: 97.8 F | RESPIRATION RATE: 18 BRPM | OXYGEN SATURATION: 95 % | HEART RATE: 59 BPM

## 2023-11-28 PROCEDURE — G0153 HHCP-SVS OF S/L PATH,EA 15MN: HCPCS | Mod: HHH

## 2023-11-28 PROCEDURE — G0151 HHCP-SERV OF PT,EA 15 MIN: HCPCS | Mod: HHH

## 2023-11-28 PROCEDURE — 1090000001 HH PPS REVENUE CREDIT

## 2023-11-28 PROCEDURE — 1090000002 HH PPS REVENUE DEBIT

## 2023-11-28 SDOH — HEALTH STABILITY: PHYSICAL HEALTH: EXERCISE COMMENTS: SEE INTERVENTIONS

## 2023-11-28 ASSESSMENT — ENCOUNTER SYMPTOMS
PAIN LOCATION - PAIN SEVERITY: 5/10
PAIN LOCATION: RIGHT SHOULDER
DENIES PAIN: 1
ANGER WITHIN DEFINED LIMITS: 1
PAIN: 1
AGGRESSION WITHIN DEFINED LIMITS: 1
PERSON REPORTING PAIN: PATIENT
SUBJECTIVE PAIN PROGRESSION: UNCHANGED

## 2023-11-28 ASSESSMENT — ACTIVITIES OF DAILY LIVING (ADL)
DRESSING_UB_CURRENT_FUNCTION: MINIMUM ASSIST
TRANSPORTATION: DEPENDENT
TOILETING: MODERATE ASSIST
AMBULATION_DISTANCE/DURATION_TOLERATED: 50 FT X 2
BATHING ASSESSED: 1
AMBULATION ASSISTANCE ON FLAT SURFACES: 1
DRESSING_LB_CURRENT_FUNCTION: MODERATE ASSIST
BATHING EQUIPMENT USED: SPONGE BATHING
BATHING_CURRENT_FUNCTION: MAXIMUM ASSIST
TOILETING: 1
TRANSPORTATION ASSESSED: 1

## 2023-11-28 ASSESSMENT — PAIN SCALES - PAIN ASSESSMENT IN ADVANCED DEMENTIA (PAINAD)
BODYLANGUAGE: 0
TOTALSCORE: 0
FACIALEXPRESSION: 0 - SMILING OR INEXPRESSIVE.
NEGVOCALIZATION: 0 - NONE.
NEGVOCALIZATION: 0
CONSOLABILITY: 0
BREATHING: 0
FACIALEXPRESSION: 0
CONSOLABILITY: 0 - NO NEED TO CONSOLE.
BODYLANGUAGE: 0 - RELAXED.

## 2023-11-29 ENCOUNTER — HOME CARE VISIT (OUTPATIENT)
Dept: HOME HEALTH SERVICES | Facility: HOME HEALTH | Age: 88
End: 2023-11-29
Payer: MEDICARE

## 2023-11-29 VITALS
DIASTOLIC BLOOD PRESSURE: 60 MMHG | OXYGEN SATURATION: 95 % | SYSTOLIC BLOOD PRESSURE: 134 MMHG | TEMPERATURE: 97.5 F | RESPIRATION RATE: 20 BRPM | HEART RATE: 66 BPM

## 2023-11-29 PROCEDURE — G0155 HHCP-SVS OF CSW,EA 15 MIN: HCPCS | Mod: HHH

## 2023-11-29 PROCEDURE — 1090000002 HH PPS REVENUE DEBIT

## 2023-11-29 PROCEDURE — 1090000001 HH PPS REVENUE CREDIT

## 2023-11-29 PROCEDURE — G0299 HHS/HOSPICE OF RN EA 15 MIN: HCPCS | Mod: HHH

## 2023-11-29 SDOH — ECONOMIC STABILITY: GENERAL

## 2023-11-29 SDOH — SOCIAL STABILITY: SOCIAL NETWORK: HELP FROM FAMILY/FRIENDS: SON

## 2023-11-29 SDOH — ECONOMIC STABILITY: FOOD INSECURITY: MEALS PER DAY: 3

## 2023-11-29 ASSESSMENT — ACTIVITIES OF DAILY LIVING (ADL)
TRANSPORTATION ASSESSED: 1
DRESSING_REQUIRES_ASSISTANCE: 1
CURRENT_FUNCTION: MODERATE ASSIST
GROOMING_REQUIRES_ASSISTANCE: 1
BATHING_REQUIRES_ASSISTANCE: 1
MONEY MANAGEMENT (EXPENSES/BILLS): TOTALLY DEPENDENT
PHYSICAL TRANSFERS ASSESSED: 1
GROOMING_CURRENT_FUNCTION: MAXIMUM ASSIST
AMBULATION ASSISTANCE: ONE PERSON
GROOMING ASSESSED: 1
SHOPPING_REQUIRES_ASSISTANCE: 1
AMBULATION ASSISTANCE: 1
AMBULATION_REQUIRES_ASSISTANCE: 1
SHOPPING ASSESSED: 1
TOILETING_REQUIRES_ASSISTANCE: 1
LAUNDRY_REQUIRES_ASSISTANCE: 1

## 2023-11-29 ASSESSMENT — ENCOUNTER SYMPTOMS
MUSCLE WEAKNESS: 1
PAIN LOCATION - EXACERBATING FACTORS: MOVEMENT
BLURRED VISION: 1
APPETITE LEVEL: FAIR
LOWEST PAIN SEVERITY IN PAST 24 HOURS: 2/10
HIGHEST PAIN SEVERITY IN PAST 24 HOURS: 5/10
PAIN LOCATION - PAIN FREQUENCY: INTERMITTENT
LOSS OF SENSATION IN FEET: 0
PERSON REPORTING PAIN: CAREGIVER
PAIN SEVERITY GOAL: 0/10
PAIN LOCATION: RIGHT SHOULDER
CHANGE IN APPETITE: UNCHANGED
FORGETFULNESS: 1
PAIN: 1
ARTHRALGIAS: 1
PAIN LOCATION - PAIN SEVERITY: 4/10
DEPRESSION: 0
DESCRIPTION OF MEMORY LOSS: SHORT TERM
PAIN LOCATION - PAIN DURATION: RANDOM
DESCRIPTION OF MEMORY LOSS: LONG TERM
PAIN LOCATION - RELIEVING FACTORS: REST,MEDS
FATIGUE: 1
SUBJECTIVE PAIN PROGRESSION: GRADUALLY IMPROVING
PAIN LOCATION - PAIN QUALITY: ACHING
OCCASIONAL FEELINGS OF UNSTEADINESS: 1

## 2023-11-29 ASSESSMENT — PAIN SCALES - PAIN ASSESSMENT IN ADVANCED DEMENTIA (PAINAD)
BODYLANGUAGE: 0 - RELAXED.
TOTALSCORE: 1
FACIALEXPRESSION: 0
CONSOLABILITY: 0
NEGVOCALIZATION: 1 - OCCASIONAL MOAN OR GROAN. LOW-LEVEL SPEECH WITH A NEGATIVE OR DISAPPROVING QUALITY.
NEGVOCALIZATION: 1
FACIALEXPRESSION: 0 - SMILING OR INEXPRESSIVE.
BREATHING: 0
CONSOLABILITY: 0 - NO NEED TO CONSOLE.
BODYLANGUAGE: 0

## 2023-11-30 ENCOUNTER — HOME CARE VISIT (OUTPATIENT)
Dept: HOME HEALTH SERVICES | Facility: HOME HEALTH | Age: 88
End: 2023-11-30
Payer: MEDICARE

## 2023-11-30 ENCOUNTER — TELEPHONE (OUTPATIENT)
Dept: NEPHROLOGY | Facility: HOSPITAL | Age: 88
End: 2023-11-30
Payer: MEDICARE

## 2023-11-30 VITALS — SYSTOLIC BLOOD PRESSURE: 112 MMHG | DIASTOLIC BLOOD PRESSURE: 56 MMHG | TEMPERATURE: 96.5 F | HEART RATE: 60 BPM

## 2023-11-30 PROCEDURE — G0151 HHCP-SERV OF PT,EA 15 MIN: HCPCS | Mod: HHH

## 2023-11-30 PROCEDURE — 1090000002 HH PPS REVENUE DEBIT

## 2023-11-30 PROCEDURE — 1090000001 HH PPS REVENUE CREDIT

## 2023-12-01 ENCOUNTER — LAB REQUISITION (OUTPATIENT)
Dept: LAB | Facility: HOSPITAL | Age: 88
End: 2023-12-01
Payer: MEDICARE

## 2023-12-01 ENCOUNTER — HOME CARE VISIT (OUTPATIENT)
Dept: HOME HEALTH SERVICES | Facility: HOME HEALTH | Age: 88
End: 2023-12-01
Payer: MEDICARE

## 2023-12-01 VITALS
TEMPERATURE: 96.8 F | SYSTOLIC BLOOD PRESSURE: 126 MMHG | OXYGEN SATURATION: 95 % | RESPIRATION RATE: 18 BRPM | DIASTOLIC BLOOD PRESSURE: 64 MMHG | HEART RATE: 58 BPM

## 2023-12-01 DIAGNOSIS — I69.320 APHASIA FOLLOWING CEREBRAL INFARCTION: ICD-10-CM

## 2023-12-01 LAB
HOLD SPECIMEN: NORMAL
IRON SATN MFR SERPL: 8 % (ref 25–45)
IRON SERPL-MCNC: 27 UG/DL (ref 35–150)
TIBC SERPL-MCNC: 355 UG/DL (ref 240–445)
UIBC SERPL-MCNC: 328 UG/DL (ref 110–370)

## 2023-12-01 PROCEDURE — 1090000001 HH PPS REVENUE CREDIT

## 2023-12-01 PROCEDURE — 83540 ASSAY OF IRON: CPT

## 2023-12-01 PROCEDURE — 82728 ASSAY OF FERRITIN: CPT

## 2023-12-01 PROCEDURE — 1090000002 HH PPS REVENUE DEBIT

## 2023-12-01 PROCEDURE — 85027 COMPLETE CBC AUTOMATED: CPT

## 2023-12-01 PROCEDURE — G0300 HHS/HOSPICE OF LPN EA 15 MIN: HCPCS | Mod: HHH

## 2023-12-01 PROCEDURE — 83550 IRON BINDING TEST: CPT

## 2023-12-01 SDOH — ECONOMIC STABILITY: GENERAL

## 2023-12-01 ASSESSMENT — ENCOUNTER SYMPTOMS
LIMITED RANGE OF MOTION: 1
APPETITE LEVEL: GOOD
CHANGE IN APPETITE: UNCHANGED
LOSS OF SENSATION IN FEET: 0
LOWER EXTREMITY EDEMA: 1
OCCASIONAL FEELINGS OF UNSTEADINESS: 1
DENIES PAIN: 1
DEPRESSION: 0
MUSCLE WEAKNESS: 1
PERSON REPORTING PAIN: PATIENT

## 2023-12-01 ASSESSMENT — ACTIVITIES OF DAILY LIVING (ADL)
FEEDING: MINIMUM ASSIST
BATHING ASSESSED: 1
AMBULATION ASSISTANCE: ONE PERSON
AMBULATION ASSISTANCE: 1
FEEDING ASSESSED: 1
BATHING_CURRENT_FUNCTION: ONE PERSON
TOILETING: ONE PERSON
DRESSING_UB_CURRENT_FUNCTION: MINIMUM ASSIST
AMBULATION ASSISTANCE: STAND BY ASSIST
TOILETING: 1
MONEY MANAGEMENT (EXPENSES/BILLS): NEEDS ASSISTANCE
DRESSING_LB_CURRENT_FUNCTION: MINIMUM ASSIST

## 2023-12-01 NOTE — HOME HEALTH
Blood draw performed this visit per verbal request of Physician. Eugenio Ayala MD. RFP, Ferritin, Iron & TIBC, and CBC. Complete on second attempt. Right hand, no bruising. Patient tolerated well. No other concerns this visit. Vitals WNL.

## 2023-12-02 PROCEDURE — 1090000001 HH PPS REVENUE CREDIT

## 2023-12-02 PROCEDURE — 1090000002 HH PPS REVENUE DEBIT

## 2023-12-03 PROCEDURE — 1090000001 HH PPS REVENUE CREDIT

## 2023-12-03 PROCEDURE — 1090000002 HH PPS REVENUE DEBIT

## 2023-12-04 ENCOUNTER — HOME CARE VISIT (OUTPATIENT)
Dept: HOME HEALTH SERVICES | Facility: HOME HEALTH | Age: 88
End: 2023-12-04
Payer: MEDICARE

## 2023-12-04 VITALS — SYSTOLIC BLOOD PRESSURE: 140 MMHG | DIASTOLIC BLOOD PRESSURE: 60 MMHG | HEART RATE: 64 BPM

## 2023-12-04 PROCEDURE — 1090000002 HH PPS REVENUE DEBIT

## 2023-12-04 PROCEDURE — G0151 HHCP-SERV OF PT,EA 15 MIN: HCPCS | Mod: HHH

## 2023-12-04 PROCEDURE — 1090000001 HH PPS REVENUE CREDIT

## 2023-12-04 PROCEDURE — G0153 HHCP-SVS OF S/L PATH,EA 15MN: HCPCS | Mod: HHH

## 2023-12-04 SDOH — HEALTH STABILITY: PHYSICAL HEALTH: EXERCISE COMMENTS: SEE INTERVENTION

## 2023-12-04 ASSESSMENT — PAIN SCALES - PAIN ASSESSMENT IN ADVANCED DEMENTIA (PAINAD)
FACIALEXPRESSION: 0 - SMILING OR INEXPRESSIVE.
BREATHING: 0
TOTALSCORE: 0
BODYLANGUAGE: 0
FACIALEXPRESSION: 0
CONSOLABILITY: 0 - NO NEED TO CONSOLE.
CONSOLABILITY: 0
BODYLANGUAGE: 0 - RELAXED.
NEGVOCALIZATION: 0 - NONE.
NEGVOCALIZATION: 0

## 2023-12-04 ASSESSMENT — ENCOUNTER SYMPTOMS
AGGRESSION WITHIN DEFINED LIMITS: 1
ANGER WITHIN DEFINED LIMITS: 1
PERSON REPORTING PAIN: PATIENT
DENIES PAIN: 1
DENIES PAIN: 1

## 2023-12-05 PROCEDURE — 1090000002 HH PPS REVENUE DEBIT

## 2023-12-05 PROCEDURE — 1090000001 HH PPS REVENUE CREDIT

## 2023-12-06 ENCOUNTER — HOME CARE VISIT (OUTPATIENT)
Dept: HOME HEALTH SERVICES | Facility: HOME HEALTH | Age: 88
End: 2023-12-06
Payer: MEDICARE

## 2023-12-06 PROCEDURE — G0151 HHCP-SERV OF PT,EA 15 MIN: HCPCS | Mod: HHH

## 2023-12-06 PROCEDURE — 1090000002 HH PPS REVENUE DEBIT

## 2023-12-06 PROCEDURE — 1090000001 HH PPS REVENUE CREDIT

## 2023-12-06 ASSESSMENT — PAIN SCALES - PAIN ASSESSMENT IN ADVANCED DEMENTIA (PAINAD)
BREATHING: 0
FACIALEXPRESSION: 0 - SMILING OR INEXPRESSIVE.
BODYLANGUAGE: 0 - RELAXED.
CONSOLABILITY: 0 - NO NEED TO CONSOLE.
NEGVOCALIZATION: 0 - NONE.
CONSOLABILITY: 0
NEGVOCALIZATION: 0
BODYLANGUAGE: 0
TOTALSCORE: 0
FACIALEXPRESSION: 0

## 2023-12-06 NOTE — HOME HEALTH
S: caregiver reports that patient had a very bad night. He did not sleep at all and therefore she did not sleep well either. They are planning on having a family meeting to discuss current situation. Patient’s wife is overwhelmed.  O: see interventions   A: very limited participation today from patient primary focus of visit was caregiver education, support and planning patients wife is also a physician and very knowledgeable, patie nt son is an ER physician and they are all in touch with patient care team to determine best possible course of action at this time.   P: I will call patient twice this weekend to see what family decided

## 2023-12-07 PROCEDURE — 1090000002 HH PPS REVENUE DEBIT

## 2023-12-07 PROCEDURE — 1090000001 HH PPS REVENUE CREDIT

## 2023-12-08 ENCOUNTER — HOME CARE VISIT (OUTPATIENT)
Dept: HOME HEALTH SERVICES | Facility: HOME HEALTH | Age: 88
End: 2023-12-08
Payer: MEDICARE

## 2023-12-08 ENCOUNTER — APPOINTMENT (OUTPATIENT)
Dept: HOME HEALTH SERVICES | Facility: HOME HEALTH | Age: 88
End: 2023-12-08
Payer: MEDICARE

## 2023-12-08 VITALS
HEART RATE: 68 BPM | DIASTOLIC BLOOD PRESSURE: 70 MMHG | RESPIRATION RATE: 20 BRPM | OXYGEN SATURATION: 97 % | TEMPERATURE: 97 F | SYSTOLIC BLOOD PRESSURE: 106 MMHG

## 2023-12-08 PROCEDURE — 1090000001 HH PPS REVENUE CREDIT

## 2023-12-08 PROCEDURE — G0299 HHS/HOSPICE OF RN EA 15 MIN: HCPCS | Mod: HHH

## 2023-12-08 PROCEDURE — G0153 HHCP-SVS OF S/L PATH,EA 15MN: HCPCS | Mod: HHH

## 2023-12-08 PROCEDURE — 1090000002 HH PPS REVENUE DEBIT

## 2023-12-08 SDOH — ECONOMIC STABILITY: GENERAL

## 2023-12-08 ASSESSMENT — ENCOUNTER SYMPTOMS
FORGETFULNESS: 1
FATIGUES EASILY: 1
ANGER WITHIN DEFINED LIMITS: 1
PAIN LOCATION - PAIN FREQUENCY: INTERMITTENT
CHANGE IN APPETITE: UNCHANGED
PAIN LOCATION - RELIEVING FACTORS: NOT USING IT
APPETITE LEVEL: GOOD
PAIN LOCATION - RELIEVING FACTORS: MEDS
PAIN: 1
PAIN LOCATION - PAIN SEVERITY: 4/10
HIGHEST PAIN SEVERITY IN PAST 24 HOURS: 5/10
PAIN SEVERITY GOAL: 1/10
PAIN LOCATION - PAIN QUALITY: ACHING
PAIN: 1
PERSON REPORTING PAIN: PATIENT
PAIN LOCATION: RIGHT SHOULDER
PERSON REPORTING PAIN: PATIENT
PAIN LOCATION - PAIN SEVERITY: 5/10
DESCRIPTION OF MEMORY LOSS: LONG TERM
AGGRESSION WITHIN DEFINED LIMITS: 1
FATIGUE: 1
SUBJECTIVE PAIN PROGRESSION: GRADUALLY IMPROVING
DEPRESSION: 0
PAIN LOCATION - PAIN DURATION: INTER
OCCASIONAL FEELINGS OF UNSTEADINESS: 1
LOWEST PAIN SEVERITY IN PAST 24 HOURS: 3/10
MUSCLE WEAKNESS: 1
PAIN LOCATION: RIGHT SHOULDER
PAIN LOCATION - EXACERBATING FACTORS: RANDOM
DESCRIPTION OF MEMORY LOSS: SHORT TERM
LOSS OF SENSATION IN FEET: 0

## 2023-12-08 ASSESSMENT — PAIN SCALES - PAIN ASSESSMENT IN ADVANCED DEMENTIA (PAINAD)
CONSOLABILITY: 0
NEGVOCALIZATION: 0 - NONE.
CONSOLABILITY: 0
NEGVOCALIZATION: 0
BREATHING: 0
CONSOLABILITY: 0 - NO NEED TO CONSOLE.
BODYLANGUAGE: 0
NEGVOCALIZATION: 0 - NONE.
CONSOLABILITY: 0 - NO NEED TO CONSOLE.
TOTALSCORE: 0
FACIALEXPRESSION: 0 - SMILING OR INEXPRESSIVE.
BODYLANGUAGE: 0 - RELAXED.
FACIALEXPRESSION: 0
TOTALSCORE: 0
NEGVOCALIZATION: 0
BREATHING: 0
FACIALEXPRESSION: 0
BODYLANGUAGE: 0
BODYLANGUAGE: 0 - RELAXED.
FACIALEXPRESSION: 0 - SMILING OR INEXPRESSIVE.

## 2023-12-08 ASSESSMENT — ACTIVITIES OF DAILY LIVING (ADL)
PHYSICAL_TRANSFERS_DEVICES: WALKER
PHYSICAL TRANSFERS ASSESSED: 1
GROOMING ASSESSED: 1
AMBULATION ASSISTANCE: 1
GROOMING_CURRENT_FUNCTION: MAXIMUM ASSIST
MONEY MANAGEMENT (EXPENSES/BILLS): TOTALLY DEPENDENT
AMBULATION ASSISTANCE: TWO PERSON
CURRENT_FUNCTION: MAXIMUM ASSIST

## 2023-12-09 PROCEDURE — 1090000002 HH PPS REVENUE DEBIT

## 2023-12-09 PROCEDURE — 1090000001 HH PPS REVENUE CREDIT

## 2023-12-09 NOTE — HOME HEALTH
Snv for follow up visit. Pt remains weak, all disciplines still involved. Spoke with wife today regarding home visiting md.

## 2023-12-10 PROCEDURE — 1090000001 HH PPS REVENUE CREDIT

## 2023-12-10 PROCEDURE — 1090000002 HH PPS REVENUE DEBIT

## 2023-12-11 ENCOUNTER — HOME CARE VISIT (OUTPATIENT)
Dept: HOME HEALTH SERVICES | Facility: HOME HEALTH | Age: 88
End: 2023-12-11
Payer: MEDICARE

## 2023-12-11 PROCEDURE — G0151 HHCP-SERV OF PT,EA 15 MIN: HCPCS | Mod: HHH

## 2023-12-11 PROCEDURE — 1090000002 HH PPS REVENUE DEBIT

## 2023-12-11 PROCEDURE — 1090000001 HH PPS REVENUE CREDIT

## 2023-12-11 ASSESSMENT — ENCOUNTER SYMPTOMS
PAIN LOCATION - PAIN SEVERITY: 2/10
PAIN LOCATION: RIGHT SHOULDER
PAIN: 1

## 2023-12-12 ENCOUNTER — LAB (OUTPATIENT)
Dept: LAB | Facility: LAB | Age: 88
End: 2023-12-12
Payer: MEDICARE

## 2023-12-12 ENCOUNTER — HOME CARE VISIT (OUTPATIENT)
Dept: HOME HEALTH SERVICES | Facility: HOME HEALTH | Age: 88
End: 2023-12-12
Payer: MEDICARE

## 2023-12-12 DIAGNOSIS — N39.0 UTI (URINARY TRACT INFECTION): Primary | ICD-10-CM

## 2023-12-12 PROCEDURE — 1090000002 HH PPS REVENUE DEBIT

## 2023-12-12 PROCEDURE — 87086 URINE CULTURE/COLONY COUNT: CPT

## 2023-12-12 PROCEDURE — G0153 HHCP-SVS OF S/L PATH,EA 15MN: HCPCS | Mod: HHH

## 2023-12-12 PROCEDURE — 1090000001 HH PPS REVENUE CREDIT

## 2023-12-12 ASSESSMENT — ENCOUNTER SYMPTOMS
AGGRESSION WITHIN DEFINED LIMITS: 1
PERSON REPORTING PAIN: PATIENT
ANGER WITHIN DEFINED LIMITS: 1
DENIES PAIN: 1

## 2023-12-12 ASSESSMENT — PAIN SCALES - PAIN ASSESSMENT IN ADVANCED DEMENTIA (PAINAD)
BREATHING: 0
TOTALSCORE: 0
NEGVOCALIZATION: 0
FACIALEXPRESSION: 0 - SMILING OR INEXPRESSIVE.
BODYLANGUAGE: 0 - RELAXED.
BODYLANGUAGE: 0
FACIALEXPRESSION: 0
CONSOLABILITY: 0 - NO NEED TO CONSOLE.
CONSOLABILITY: 0
NEGVOCALIZATION: 0 - NONE.

## 2023-12-13 ENCOUNTER — HOME CARE VISIT (OUTPATIENT)
Dept: HOME HEALTH SERVICES | Facility: HOME HEALTH | Age: 88
End: 2023-12-13
Payer: MEDICARE

## 2023-12-13 LAB — HOLD SPECIMEN: NORMAL

## 2023-12-13 PROCEDURE — 1090000001 HH PPS REVENUE CREDIT

## 2023-12-13 PROCEDURE — 1090000002 HH PPS REVENUE DEBIT

## 2023-12-13 NOTE — HOME HEALTH
S: caregiver states that sleeping is still an issue. Patient is up most of the night. Wife reports she hasn’t slept well in several days. Caregiver is open to the option of using a lift chair.  O: please refer to interventions for today’s visit primary purpose of today’s visit was for caregiver education regarding transfer techniques on an off the couch as patient is refusing to sleep in the hospital bed  A: transfers required max assist plus contact guard assist of another constant cues for set up for safety and sequencing. At this point, I feel a lift chair with benefit both Patient and Caregivers both for sleeping and transfer purposes. Patient seems to be a little more impulsive than usual. Ataxia noted with left lower extremity when performing transfers.  P: if patient has lift chair available at next visit, will educate caregivers in transfers from lift chair. If it is not available, will continue to practice and refine transfers from couch to wheelchair for safety and efficiency

## 2023-12-14 ENCOUNTER — HOME CARE VISIT (OUTPATIENT)
Dept: HOME HEALTH SERVICES | Facility: HOME HEALTH | Age: 88
End: 2023-12-14
Payer: MEDICARE

## 2023-12-14 VITALS
DIASTOLIC BLOOD PRESSURE: 62 MMHG | SYSTOLIC BLOOD PRESSURE: 110 MMHG | OXYGEN SATURATION: 99 % | RESPIRATION RATE: 18 BRPM | HEART RATE: 61 BPM

## 2023-12-14 VITALS — OXYGEN SATURATION: 93 % | HEART RATE: 63 BPM

## 2023-12-14 LAB — BACTERIA UR CULT: NORMAL

## 2023-12-14 PROCEDURE — 1090000001 HH PPS REVENUE CREDIT

## 2023-12-14 PROCEDURE — G0158 HHC OT ASSISTANT EA 15: HCPCS | Mod: HHH

## 2023-12-14 PROCEDURE — 1090000002 HH PPS REVENUE DEBIT

## 2023-12-14 PROCEDURE — G0300 HHS/HOSPICE OF LPN EA 15 MIN: HCPCS | Mod: HHH

## 2023-12-14 SDOH — ECONOMIC STABILITY: GENERAL

## 2023-12-14 ASSESSMENT — ENCOUNTER SYMPTOMS
LOSS OF SENSATION IN FEET: 0
MUSCLE WEAKNESS: 1
LOWEST PAIN SEVERITY IN PAST 24 HOURS: 0/10
PAIN LOCATION: RIGHT SHOULDER
HIGHEST PAIN SEVERITY IN PAST 24 HOURS: 5/10
LIMITED RANGE OF MOTION: 1
PERSON REPORTING PAIN: PATIENT
SUBJECTIVE PAIN PROGRESSION: WAXING AND WANING
CHANGE IN APPETITE: UNCHANGED
DEPRESSION: 0
OCCASIONAL FEELINGS OF UNSTEADINESS: 1
APPETITE LEVEL: GOOD
PAIN: 1
PERSON REPORTING PAIN: PATIENT
DENIES PAIN: 1
DESCRIPTION OF MEMORY LOSS: SHORT TERM
DESCRIPTION OF MEMORY LOSS: IMMEDIATE

## 2023-12-14 ASSESSMENT — ACTIVITIES OF DAILY LIVING (ADL)
FEEDING: INDEPENDENT
FEEDING ASSESSED: 1
AMBULATION ASSISTANCE: ONE PERSON
MONEY MANAGEMENT (EXPENSES/BILLS): NEEDS ASSISTANCE
AMBULATION ASSISTANCE: 1
BATHING_CURRENT_FUNCTION: MODERATE ASSIST
BATHING_CURRENT_FUNCTION: ONE PERSON
TOILETING: MODERATE ASSIST
TOILETING: ONE PERSON
BATHING ASSESSED: 1
TOILETING: 1
AMBULATION ASSISTANCE: MODERATE ASSIST
DRESSING_UB_CURRENT_FUNCTION: MODERATE ASSIST
DRESSING_LB_CURRENT_FUNCTION: MODERATE ASSIST

## 2023-12-14 NOTE — HOME HEALTH
Patient was seen for routine nursing visit. Upon arrival I was informed of blisters present on patients left leg pretibial. Xeroform applied and covered with island bordered gauze. Change every other day. Wound care order entered into chart. Patient tolerated well. Patient vital signs WNL. Still needs moderate assist for most ADL's. Patient is slowly imporoving with transfers per home care giver. No further concerns at this time.

## 2023-12-15 ENCOUNTER — HOME CARE VISIT (OUTPATIENT)
Dept: HOME HEALTH SERVICES | Facility: HOME HEALTH | Age: 88
End: 2023-12-15

## 2023-12-15 PROCEDURE — 1090000001 HH PPS REVENUE CREDIT

## 2023-12-15 PROCEDURE — 1090000002 HH PPS REVENUE DEBIT

## 2023-12-16 PROCEDURE — 1090000001 HH PPS REVENUE CREDIT

## 2023-12-16 PROCEDURE — 1090000002 HH PPS REVENUE DEBIT

## 2023-12-17 PROCEDURE — 1090000002 HH PPS REVENUE DEBIT

## 2023-12-17 PROCEDURE — 1090000001 HH PPS REVENUE CREDIT

## 2023-12-18 ENCOUNTER — HOME CARE VISIT (OUTPATIENT)
Dept: HOME HEALTH SERVICES | Facility: HOME HEALTH | Age: 88
End: 2023-12-18
Payer: MEDICARE

## 2023-12-18 PROCEDURE — G0151 HHCP-SERV OF PT,EA 15 MIN: HCPCS | Mod: HHH

## 2023-12-18 PROCEDURE — 1090000002 HH PPS REVENUE DEBIT

## 2023-12-18 PROCEDURE — 1090000001 HH PPS REVENUE CREDIT

## 2023-12-18 ASSESSMENT — PAIN SCALES - PAIN ASSESSMENT IN ADVANCED DEMENTIA (PAINAD)
BREATHING: 0
FACIALEXPRESSION: 0
CONSOLABILITY: 0
CONSOLABILITY: 0 - NO NEED TO CONSOLE.
FACIALEXPRESSION: 0 - SMILING OR INEXPRESSIVE.
BODYLANGUAGE: 0 - RELAXED.
BODYLANGUAGE: 0

## 2023-12-18 ASSESSMENT — ENCOUNTER SYMPTOMS: DENIES PAIN: 1

## 2023-12-19 ENCOUNTER — HOME CARE VISIT (OUTPATIENT)
Dept: HOME HEALTH SERVICES | Facility: HOME HEALTH | Age: 88
End: 2023-12-19
Payer: MEDICARE

## 2023-12-19 PROCEDURE — G0153 HHCP-SVS OF S/L PATH,EA 15MN: HCPCS | Mod: HHH

## 2023-12-19 PROCEDURE — 1090000001 HH PPS REVENUE CREDIT

## 2023-12-19 PROCEDURE — 1090000002 HH PPS REVENUE DEBIT

## 2023-12-19 ASSESSMENT — PAIN SCALES - PAIN ASSESSMENT IN ADVANCED DEMENTIA (PAINAD)
TOTALSCORE: 0
CONSOLABILITY: 0
NEGVOCALIZATION: 0
CONSOLABILITY: 0 - NO NEED TO CONSOLE.
FACIALEXPRESSION: 0
BODYLANGUAGE: 0 - RELAXED.
FACIALEXPRESSION: 0 - SMILING OR INEXPRESSIVE.
BODYLANGUAGE: 0
NEGVOCALIZATION: 0 - NONE.
BREATHING: 0

## 2023-12-19 ASSESSMENT — ENCOUNTER SYMPTOMS
PERSON REPORTING PAIN: PATIENT
ANGER WITHIN DEFINED LIMITS: 1
AGGRESSION WITHIN DEFINED LIMITS: 1
DENIES PAIN: 1

## 2023-12-20 ENCOUNTER — HOME CARE VISIT (OUTPATIENT)
Dept: HOME HEALTH SERVICES | Facility: HOME HEALTH | Age: 88
End: 2023-12-20
Payer: MEDICARE

## 2023-12-20 PROCEDURE — G0151 HHCP-SERV OF PT,EA 15 MIN: HCPCS | Mod: HHH

## 2023-12-20 PROCEDURE — 1090000002 HH PPS REVENUE DEBIT

## 2023-12-20 PROCEDURE — 1090000001 HH PPS REVENUE CREDIT

## 2023-12-20 SDOH — HEALTH STABILITY: PHYSICAL HEALTH
EXERCISE COMMENTS: SEATED : R HIP FELXION 2X 8  KNEE EXTENSION WITH CUES FOR TERMINAL KNEE EXTENSION 2X8  ANKLE DF 2X8  HIP ADD /ABD 2X8 CUES FOR FULL ROM

## 2023-12-20 ASSESSMENT — BALANCE ASSESSMENTS
STANDING BALANCE: 1 - STEADY BUT WIDE STANCE AND USES CANE OR OTHER SUPPORT
ATTEMPTS TO ARISE: 0 - UNABLE WITHOUT HELP
TURNING 360 DEGREES STEPS: 0 - DISCONTINUOUS STEPS
SITTING DOWN: 1 - USES ARMS OR NOT SMOOTH MOTION
SITTING BALANCE: 1 - STEADY, SAFE
ARISES: 0 - UNABLE WITHOUT HELP
BALANCE SCORE: 3
NUDGED: 0 - BEGINS TO FALL
EYES CLOSED AT MAXIMUM POSITION NUDGED: 0 - UNSTEADY
ARISING SCORE: 0
NUDGED SCORE: 0
IMMEDIATE STANDING BALANCE FIRST 5 SECONDS: 0 - UNSTEADY (STAGGERS, MOVES FEET, TRUNK SWAY)

## 2023-12-20 ASSESSMENT — GAIT ASSESSMENTS
INITIATION OF GAIT IMMEDIATELY AFTER GO: 1 - NO HESITANCY
PATH SCORE: 1
TRUNK SCORE: 0
STEP CONTINUITY: 1 - STEPS APPEAR CONTINUOUS
WALKING STANCE: 0 - HEELS APART
PATH: 1 - MILD/MODERATE DEVIATION OR USES WALKING AID
TRUNK: 0 - MARKED SWAY OR USES WALKING AID

## 2023-12-20 ASSESSMENT — ACTIVITIES OF DAILY LIVING (ADL): AMBULATION ASSISTANCE ON FLAT SURFACES: 1

## 2023-12-20 ASSESSMENT — ENCOUNTER SYMPTOMS
OCCASIONAL FEELINGS OF UNSTEADINESS: 1
DENIES PAIN: 1

## 2023-12-21 ENCOUNTER — HOME CARE VISIT (OUTPATIENT)
Dept: HOME HEALTH SERVICES | Facility: HOME HEALTH | Age: 88
End: 2023-12-21
Payer: MEDICARE

## 2023-12-21 VITALS
TEMPERATURE: 97.3 F | DIASTOLIC BLOOD PRESSURE: 44 MMHG | RESPIRATION RATE: 16 BRPM | HEART RATE: 67 BPM | SYSTOLIC BLOOD PRESSURE: 101 MMHG | OXYGEN SATURATION: 96 %

## 2023-12-21 PROCEDURE — G0299 HHS/HOSPICE OF RN EA 15 MIN: HCPCS | Mod: HHH

## 2023-12-21 PROCEDURE — 1090000001 HH PPS REVENUE CREDIT

## 2023-12-21 PROCEDURE — G0152 HHCP-SERV OF OT,EA 15 MIN: HCPCS | Mod: HHH

## 2023-12-21 PROCEDURE — 1090000002 HH PPS REVENUE DEBIT

## 2023-12-21 SDOH — ECONOMIC STABILITY: GENERAL

## 2023-12-21 ASSESSMENT — ENCOUNTER SYMPTOMS
MUSCLE WEAKNESS: 1
SUBJECTIVE PAIN PROGRESSION: UNCHANGED
PERSON REPORTING PAIN: PATIENT
PAIN LOCATION: RIGHT ARM
LIMITED RANGE OF MOTION: 1
PAIN LOCATION - PAIN QUALITY: ACHING
HIGHEST PAIN SEVERITY IN PAST 24 HOURS: 4/10
LOWEST PAIN SEVERITY IN PAST 24 HOURS: 1/10
DESCRIPTION OF MEMORY LOSS: SHORT TERM
PAIN: 1
PAIN LOCATION - PAIN FREQUENCY: CONSTANT
PAIN SEVERITY GOAL: 1/10
PAIN LOCATION - PAIN SEVERITY: 4/10
DESCRIPTION OF MEMORY LOSS: IMMEDIATE
CHANGE IN APPETITE: UNCHANGED

## 2023-12-21 ASSESSMENT — ACTIVITIES OF DAILY LIVING (ADL)
CURRENT_FUNCTION: ONE PERSON
MONEY MANAGEMENT (EXPENSES/BILLS): INDEPENDENT
AMBULATION ASSISTANCE: ONE PERSON

## 2023-12-22 ENCOUNTER — HOME CARE VISIT (OUTPATIENT)
Dept: HOME HEALTH SERVICES | Facility: HOME HEALTH | Age: 88
End: 2023-12-22
Payer: MEDICARE

## 2023-12-22 PROCEDURE — 1090000001 HH PPS REVENUE CREDIT

## 2023-12-22 PROCEDURE — 1090000002 HH PPS REVENUE DEBIT

## 2023-12-23 PROCEDURE — 1090000002 HH PPS REVENUE DEBIT

## 2023-12-23 PROCEDURE — 1090000003 HH PPS REVENUE ADJ

## 2023-12-23 PROCEDURE — 1090000001 HH PPS REVENUE CREDIT

## 2023-12-23 ASSESSMENT — ENCOUNTER SYMPTOMS: DENIES PAIN: 1

## 2023-12-23 ASSESSMENT — ACTIVITIES OF DAILY LIVING (ADL)
TOILETING: 1
BATHING ASSESSED: 1
TOILETING: MODERATE ASSIST
DRESSING_UB_CURRENT_FUNCTION: MODERATE ASSIST
BATHING_CURRENT_FUNCTION: MAXIMUM ASSIST
DRESSING_LB_CURRENT_FUNCTION: MODERATE ASSIST
DRESSING_UB_CURRENT_FUNCTION: MINIMUM ASSIST

## 2023-12-24 PROCEDURE — 1090000001 HH PPS REVENUE CREDIT

## 2023-12-24 PROCEDURE — 1090000002 HH PPS REVENUE DEBIT

## 2023-12-25 PROCEDURE — 1090000002 HH PPS REVENUE DEBIT

## 2023-12-25 PROCEDURE — 1090000001 HH PPS REVENUE CREDIT

## 2023-12-26 ENCOUNTER — HOME CARE VISIT (OUTPATIENT)
Dept: HOME HEALTH SERVICES | Facility: HOME HEALTH | Age: 88
End: 2023-12-26
Payer: MEDICARE

## 2023-12-26 VITALS — HEART RATE: 64 BPM | SYSTOLIC BLOOD PRESSURE: 142 MMHG | DIASTOLIC BLOOD PRESSURE: 60 MMHG

## 2023-12-26 PROCEDURE — 1090000002 HH PPS REVENUE DEBIT

## 2023-12-26 PROCEDURE — G0151 HHCP-SERV OF PT,EA 15 MIN: HCPCS | Mod: HHH

## 2023-12-26 PROCEDURE — 1090000001 HH PPS REVENUE CREDIT

## 2023-12-26 PROCEDURE — 0023 HH SOC

## 2023-12-26 SDOH — HEALTH STABILITY: PHYSICAL HEALTH
EXERCISE COMMENTS: SEATED: HIP FLEXION, KNEE EXTENSION, HIP ROTATION 2X8 R LE  VISUAL , VERBAL AND MANUAL CUES , TRUNL ROTATION WITH UNILATERAL SUPPORT   COORDINATION: STANDING: HEEL AND TOE TAPS R AND L   ECCENTRICS: STAGGERED SQUATS WITH HOLD AND ECCENTRIC CONTROL FO

## 2023-12-26 SDOH — HEALTH STABILITY: PHYSICAL HEALTH: EXERCISE COMMENTS: R LOWERING, BL UE SUPPORT IN FRONT OF WC

## 2023-12-26 ASSESSMENT — PAIN SCALES - PAIN ASSESSMENT IN ADVANCED DEMENTIA (PAINAD)
FACIALEXPRESSION: 0
CONSOLABILITY: 0
TOTALSCORE: 0
BODYLANGUAGE: 0 - RELAXED.
BREATHING: 0
NEGVOCALIZATION: 0 - NONE.
BODYLANGUAGE: 0
CONSOLABILITY: 0 - NO NEED TO CONSOLE.
NEGVOCALIZATION: 0
FACIALEXPRESSION: 0 - SMILING OR INEXPRESSIVE.

## 2023-12-26 ASSESSMENT — ENCOUNTER SYMPTOMS: DENIES PAIN: 1

## 2023-12-26 ASSESSMENT — ACTIVITIES OF DAILY LIVING (ADL)
AMBULATION ASSISTANCE ON FLAT SURFACES: 1
AMBULATION_DISTANCE/DURATION_TOLERATED: 75FT X 2

## 2023-12-27 ENCOUNTER — HOME CARE VISIT (OUTPATIENT)
Dept: HOME HEALTH SERVICES | Facility: HOME HEALTH | Age: 88
End: 2023-12-27
Payer: MEDICARE

## 2023-12-27 VITALS
OXYGEN SATURATION: 96 % | TEMPERATURE: 98 F | SYSTOLIC BLOOD PRESSURE: 142 MMHG | RESPIRATION RATE: 18 BRPM | DIASTOLIC BLOOD PRESSURE: 61 MMHG | HEART RATE: 64 BPM

## 2023-12-27 PROCEDURE — 1090000001 HH PPS REVENUE CREDIT

## 2023-12-27 PROCEDURE — G0299 HHS/HOSPICE OF RN EA 15 MIN: HCPCS | Mod: HHH

## 2023-12-27 PROCEDURE — 1090000002 HH PPS REVENUE DEBIT

## 2023-12-27 SDOH — ECONOMIC STABILITY: GENERAL

## 2023-12-27 ASSESSMENT — ENCOUNTER SYMPTOMS
DENIES PAIN: 1
PERSON REPORTING PAIN: PATIENT
HIGHEST PAIN SEVERITY IN PAST 24 HOURS: 0/10
CHANGE IN APPETITE: UNCHANGED
SUBJECTIVE PAIN PROGRESSION: UNCHANGED
APPETITE LEVEL: GOOD

## 2023-12-27 ASSESSMENT — ACTIVITIES OF DAILY LIVING (ADL)
MONEY MANAGEMENT (EXPENSES/BILLS): NEEDS ASSISTANCE
CURRENT_FUNCTION: STAND BY ASSIST

## 2023-12-28 ENCOUNTER — HOME CARE VISIT (OUTPATIENT)
Dept: HOME HEALTH SERVICES | Facility: HOME HEALTH | Age: 88
End: 2023-12-28
Payer: MEDICARE

## 2023-12-28 PROCEDURE — 1090000001 HH PPS REVENUE CREDIT

## 2023-12-28 PROCEDURE — 1090000002 HH PPS REVENUE DEBIT

## 2023-12-28 PROCEDURE — G0151 HHCP-SERV OF PT,EA 15 MIN: HCPCS | Mod: HHH

## 2023-12-28 SDOH — HEALTH STABILITY: PHYSICAL HEALTH: EXERCISE COMMENTS: STANDING: HEEL RAISES WITH BL UE SUPPORT, ALTERNATE MARCHING

## 2023-12-28 ASSESSMENT — ACTIVITIES OF DAILY LIVING (ADL): AMBULATION ASSISTANCE ON FLAT SURFACES: 1

## 2023-12-28 ASSESSMENT — ENCOUNTER SYMPTOMS
PAIN LOCATION: RIGHT SHOULDER
HIGHEST PAIN SEVERITY IN PAST 24 HOURS: 5/10
PAIN: 1

## 2023-12-29 PROCEDURE — 1090000002 HH PPS REVENUE DEBIT

## 2023-12-29 PROCEDURE — 1090000001 HH PPS REVENUE CREDIT

## 2023-12-30 PROCEDURE — 1090000002 HH PPS REVENUE DEBIT

## 2023-12-30 PROCEDURE — 1090000001 HH PPS REVENUE CREDIT

## 2023-12-31 PROCEDURE — 1090000001 HH PPS REVENUE CREDIT

## 2023-12-31 PROCEDURE — 1090000002 HH PPS REVENUE DEBIT

## 2024-01-01 PROCEDURE — 1090000002 HH PPS REVENUE DEBIT

## 2024-01-01 PROCEDURE — 1090000001 HH PPS REVENUE CREDIT

## 2024-01-02 ENCOUNTER — HOME CARE VISIT (OUTPATIENT)
Dept: HOME HEALTH SERVICES | Facility: HOME HEALTH | Age: 89
End: 2024-01-02
Payer: MEDICARE

## 2024-01-02 PROCEDURE — 1090000001 HH PPS REVENUE CREDIT

## 2024-01-02 PROCEDURE — G0151 HHCP-SERV OF PT,EA 15 MIN: HCPCS | Mod: HHH

## 2024-01-02 PROCEDURE — 1090000002 HH PPS REVENUE DEBIT

## 2024-01-02 SDOH — HEALTH STABILITY: PHYSICAL HEALTH: EXERCISE COMMENTS: X AND EXTENSION

## 2024-01-02 SDOH — HEALTH STABILITY: PHYSICAL HEALTH
EXERCISE COMMENTS: STANDING: WITH BL UE SUPPORT   MARCHING , MARCHING WITH END ROM HOLD AND SLOW DESCENT , PARTIAL SQUATS WITH ECCENTRIC HOLD AND CONCENTRIC BURST   SEATED: MODIFIED CHOP AND LIFT AND TARGETED REACHING, RUE ISOMETRICS WITH ARM IN NEUTRAL ER.IR, ABD, FLE

## 2024-01-02 ASSESSMENT — ACTIVITIES OF DAILY LIVING (ADL)
AMBULATION ASSISTANCE ON FLAT SURFACES: 1
AMBULATION_DISTANCE/DURATION_TOLERATED: 150 FT

## 2024-01-03 PROCEDURE — 1090000002 HH PPS REVENUE DEBIT

## 2024-01-03 PROCEDURE — 1090000001 HH PPS REVENUE CREDIT

## 2024-01-04 ENCOUNTER — HOME CARE VISIT (OUTPATIENT)
Dept: HOME HEALTH SERVICES | Facility: HOME HEALTH | Age: 89
End: 2024-01-04
Payer: MEDICARE

## 2024-01-04 PROCEDURE — 1090000002 HH PPS REVENUE DEBIT

## 2024-01-04 PROCEDURE — G0151 HHCP-SERV OF PT,EA 15 MIN: HCPCS | Mod: HHH

## 2024-01-04 PROCEDURE — 1090000001 HH PPS REVENUE CREDIT

## 2024-01-04 SDOH — HEALTH STABILITY: PHYSICAL HEALTH
EXERCISE COMMENTS: WITH RED T BAND : HIP FLEXION, KNEE EXTENSION 1X8 BL, CUES FOR FULL ROM AND END ROM HOLD, MANUAL RESISTANCE DF 2X5   UE: PNF CHOP AND LIFT, ROTATION REACHES, TARGETED REACHING  ECCENTRIC PARTIAL SQUATS 2X5

## 2024-01-04 ASSESSMENT — ENCOUNTER SYMPTOMS
PAIN LOCATION: RIGHT SHOULDER
PAIN LOCATION - PAIN SEVERITY: 2/10

## 2024-01-04 ASSESSMENT — ACTIVITIES OF DAILY LIVING (ADL)
AMBULATION_DISTANCE/DURATION_TOLERATED: 50 FT X 2
AMBULATION ASSISTANCE ON FLAT SURFACES: 1

## 2024-01-05 PROCEDURE — 1090000002 HH PPS REVENUE DEBIT

## 2024-01-05 PROCEDURE — 1090000001 HH PPS REVENUE CREDIT

## 2024-01-06 PROCEDURE — 1090000001 HH PPS REVENUE CREDIT

## 2024-01-06 PROCEDURE — 1090000002 HH PPS REVENUE DEBIT

## 2024-01-07 PROCEDURE — 1090000001 HH PPS REVENUE CREDIT

## 2024-01-07 PROCEDURE — 1090000002 HH PPS REVENUE DEBIT

## 2024-01-08 PROCEDURE — 1090000001 HH PPS REVENUE CREDIT

## 2024-01-08 PROCEDURE — 1090000002 HH PPS REVENUE DEBIT

## 2024-01-09 ENCOUNTER — HOME CARE VISIT (OUTPATIENT)
Dept: HOME HEALTH SERVICES | Facility: HOME HEALTH | Age: 89
End: 2024-01-09
Payer: MEDICARE

## 2024-01-09 VITALS — SYSTOLIC BLOOD PRESSURE: 130 MMHG | DIASTOLIC BLOOD PRESSURE: 56 MMHG | HEART RATE: 66 BPM

## 2024-01-09 PROCEDURE — G0151 HHCP-SERV OF PT,EA 15 MIN: HCPCS | Mod: HHH

## 2024-01-09 PROCEDURE — 1090000001 HH PPS REVENUE CREDIT

## 2024-01-09 PROCEDURE — 1090000002 HH PPS REVENUE DEBIT

## 2024-01-09 SDOH — HEALTH STABILITY: PHYSICAL HEALTH
EXERCISE COMMENTS: SEATED IN WC: 2X 8 HIP FLEXION, KNEE EXTENSION, HIP ADD ISOMETRICS, HIP ABD ISOMETRICS, ANKLE DF, HIP ROTATION COMBO  SECOND SET WITH 5 SEC END ROM HOLD

## 2024-01-09 ASSESSMENT — ENCOUNTER SYMPTOMS
PAIN SEVERITY GOAL: 4/10
SUBJECTIVE PAIN PROGRESSION: WAXING AND WANING
PAIN LOCATION - PAIN SEVERITY: 5/10
PERSON REPORTING PAIN: PATIENT
PAIN LOCATION: RIGHT SHOULDER
PAIN: 1

## 2024-01-10 PROCEDURE — 1090000001 HH PPS REVENUE CREDIT

## 2024-01-10 PROCEDURE — 1090000002 HH PPS REVENUE DEBIT

## 2024-01-11 ENCOUNTER — HOME CARE VISIT (OUTPATIENT)
Dept: HOME HEALTH SERVICES | Facility: HOME HEALTH | Age: 89
End: 2024-01-11
Payer: MEDICARE

## 2024-01-11 VITALS — SYSTOLIC BLOOD PRESSURE: 130 MMHG | DIASTOLIC BLOOD PRESSURE: 52 MMHG | HEART RATE: 62 BPM

## 2024-01-11 PROCEDURE — 1090000002 HH PPS REVENUE DEBIT

## 2024-01-11 PROCEDURE — 1090000001 HH PPS REVENUE CREDIT

## 2024-01-11 PROCEDURE — G0151 HHCP-SERV OF PT,EA 15 MIN: HCPCS | Mod: HHH

## 2024-01-11 SDOH — HEALTH STABILITY: PHYSICAL HEALTH
EXERCISE COMMENTS: CAP, C7/T1 TO REDUCE MUSCLE HOLDING  INST IN GENTLE PENDULUMS  INSTR TO KEEP SHOULDER ROM IN SAFE ZONE ( IN FRONT AND BELOW)

## 2024-01-11 SDOH — HEALTH STABILITY: PHYSICAL HEALTH
EXERCISE COMMENTS: RED T BAND : HIP FLEXION, KNEE EXTENSION, HAM CURLS , HIP ABD 2X8  STANDING WITH BL UE SUPPORT: HEEL RAISES, PARTIAL SQUATS WITH CGA IN FRONT OF WC FOR SAFETY   INSTR IN SCAP SETTING, LAT ISOMETRIC ON R WITH PILLOW    INSTR WIFE IN GENTLE STM ALONG S

## 2024-01-11 ASSESSMENT — ACTIVITIES OF DAILY LIVING (ADL): AMBULATION ASSISTANCE ON FLAT SURFACES: 1

## 2024-01-11 ASSESSMENT — ENCOUNTER SYMPTOMS
PAIN LOCATION: RIGHT SHOULDER
PAIN LOCATION - PAIN SEVERITY: 3/10
PERSON REPORTING PAIN: PATIENT
PAIN: 1

## 2024-01-12 PROCEDURE — 1090000001 HH PPS REVENUE CREDIT

## 2024-01-12 PROCEDURE — 1090000002 HH PPS REVENUE DEBIT

## 2024-01-13 PROCEDURE — 1090000001 HH PPS REVENUE CREDIT

## 2024-01-13 PROCEDURE — 1090000002 HH PPS REVENUE DEBIT

## 2024-01-14 PROCEDURE — 1090000001 HH PPS REVENUE CREDIT

## 2024-01-14 PROCEDURE — 1090000002 HH PPS REVENUE DEBIT

## 2024-01-15 PROCEDURE — 1090000002 HH PPS REVENUE DEBIT

## 2024-01-15 PROCEDURE — 1090000001 HH PPS REVENUE CREDIT

## 2024-01-16 ENCOUNTER — APPOINTMENT (OUTPATIENT)
Dept: NEUROLOGY | Facility: HOSPITAL | Age: 89
End: 2024-01-16
Payer: MEDICARE

## 2024-01-16 PROCEDURE — 1090000002 HH PPS REVENUE DEBIT

## 2024-01-16 PROCEDURE — 1090000001 HH PPS REVENUE CREDIT

## 2024-01-17 PROCEDURE — 1090000002 HH PPS REVENUE DEBIT

## 2024-01-17 PROCEDURE — 1090000001 HH PPS REVENUE CREDIT

## 2024-01-18 LAB
ALBUMIN SERPL BCP-MCNC: 2.9 G/DL (ref 3.4–5)
ANION GAP SERPL CALC-SCNC: 13 MMOL/L (ref 10–20)
BUN SERPL-MCNC: 64 MG/DL (ref 6–23)
CALCIUM SERPL-MCNC: 8 MG/DL (ref 8.6–10.3)
CHLORIDE SERPL-SCNC: 104 MMOL/L (ref 98–107)
CO2 SERPL-SCNC: 26 MMOL/L (ref 21–32)
CREAT SERPL-MCNC: 3.56 MG/DL (ref 0.5–1.3)
EGFRCR SERPLBLD CKD-EPI 2021: 16 ML/MIN/1.73M*2
ERYTHROCYTE [DISTWIDTH] IN BLOOD BY AUTOMATED COUNT: 15.1 % (ref 11.5–14.5)
FERRITIN SERPL-MCNC: 31 NG/ML (ref 20–300)
GLUCOSE SERPL-MCNC: 191 MG/DL (ref 74–99)
HCT VFR BLD AUTO: 29.3 % (ref 41–52)
HGB BLD-MCNC: 8.9 G/DL (ref 13.5–17.5)
MCH RBC QN AUTO: 28.5 PG (ref 26–34)
MCHC RBC AUTO-ENTMCNC: 30.4 G/DL (ref 32–36)
MCV RBC AUTO: 94 FL (ref 80–100)
NRBC BLD-RTO: 0 /100 WBCS (ref 0–0)
PHOSPHATE SERPL-MCNC: 4.8 MG/DL (ref 2.5–4.9)
PLATELET # BLD AUTO: 174 X10*3/UL (ref 150–450)
POTASSIUM SERPL-SCNC: 3.8 MMOL/L (ref 3.5–5.3)
RBC # BLD AUTO: 3.12 X10*6/UL (ref 4.5–5.9)
SODIUM SERPL-SCNC: 139 MMOL/L (ref 136–145)
WBC # BLD AUTO: 6.5 X10*3/UL (ref 4.4–11.3)

## 2024-01-18 PROCEDURE — 1090000002 HH PPS REVENUE DEBIT

## 2024-01-18 PROCEDURE — 1090000001 HH PPS REVENUE CREDIT

## 2024-01-19 ENCOUNTER — HOME CARE VISIT (OUTPATIENT)
Dept: HOME HEALTH SERVICES | Facility: HOME HEALTH | Age: 89
End: 2024-01-19
Payer: MEDICARE

## 2024-01-19 VITALS
SYSTOLIC BLOOD PRESSURE: 120 MMHG | HEART RATE: 61 BPM | DIASTOLIC BLOOD PRESSURE: 70 MMHG | TEMPERATURE: 98.1 F | OXYGEN SATURATION: 98 %

## 2024-01-19 PROCEDURE — 1090000002 HH PPS REVENUE DEBIT

## 2024-01-19 PROCEDURE — G0159 HHC PT MAINT EA 15 MIN: HCPCS | Mod: HHH

## 2024-01-19 PROCEDURE — 1090000001 HH PPS REVENUE CREDIT

## 2024-01-19 PROCEDURE — G0151 HHCP-SERV OF PT,EA 15 MIN: HCPCS | Mod: HHH

## 2024-01-19 ASSESSMENT — ACTIVITIES OF DAILY LIVING (ADL)
AMBULATION ASSISTANCE: 1
AMBULATION ASSISTANCE: ONE PERSON
PHYSICAL TRANSFERS ASSESSED: 1
OASIS_M1830: 05
ENTERING_EXITING_HOME: ONE PERSON
AMBULATION ASSISTANCE ON FLAT SURFACES: 1
CURRENT_FUNCTION: ONE PERSON

## 2024-01-19 ASSESSMENT — ENCOUNTER SYMPTOMS
PERSON REPORTING PAIN: CAREGIVER
PAIN: 1
MUSCLE WEAKNESS: 1
LIMITED RANGE OF MOTION: 1
PAIN LOCATION: RIGHT SHOULDER

## 2024-01-19 NOTE — HOME HEALTH
PT OASIS Recertification completed this date. Pt currently requires x1 person assist for transfers, ambulation, and bed mobility, varying from CGA-Joelle, which appears to be maximum potential at this point. Pt lives with wife and has private duty aide every AM. Due to medical conditions and cognitive function, pt does not respond well to family instruction of functional mobility program/home exercise program. Pt responds very well to therapists and demonstrates excellent participation and motivation with PT routine. Pt has received home PT services previously and has hx of declining following cessation of PT services. Pt would benefit from continued PT services via maintenance program to reduce fall risk, to lower the risk of re-hospitalization, to prevent decline in functional mobility skills, and to overall maintain current level of function. Recommend transition to maintenance program 1x/week effective 1/23/24.

## 2024-01-20 PROCEDURE — 1090000001 HH PPS REVENUE CREDIT

## 2024-01-20 PROCEDURE — 1090000002 HH PPS REVENUE DEBIT

## 2024-01-21 PROCEDURE — 1090000001 HH PPS REVENUE CREDIT

## 2024-01-21 PROCEDURE — 1090000002 HH PPS REVENUE DEBIT

## 2024-01-22 PROCEDURE — 1090000001 HH PPS REVENUE CREDIT

## 2024-01-22 PROCEDURE — 1090000003 HH PPS REVENUE ADJ

## 2024-01-22 PROCEDURE — 1090000002 HH PPS REVENUE DEBIT

## 2024-01-23 PROCEDURE — 1090000002 HH PPS REVENUE DEBIT

## 2024-01-23 PROCEDURE — 1090000001 HH PPS REVENUE CREDIT

## 2024-01-24 PROCEDURE — 1090000002 HH PPS REVENUE DEBIT

## 2024-01-24 PROCEDURE — 1090000001 HH PPS REVENUE CREDIT

## 2024-01-25 LAB
CHOLEST SERPL-MCNC: 114 MG/DL (ref 0–199)
CHOLESTEROL/HDL RATIO: 1.8
HDLC SERPL-MCNC: 63.8 MG/DL
LDLC SERPL CALC-MCNC: 40 MG/DL
NON HDL CHOLESTEROL: 50 MG/DL (ref 0–149)
TRIGL SERPL-MCNC: 52 MG/DL (ref 0–149)
VLDL: 10 MG/DL (ref 0–40)

## 2024-01-25 PROCEDURE — 1090000001 HH PPS REVENUE CREDIT

## 2024-01-25 PROCEDURE — 1090000002 HH PPS REVENUE DEBIT

## 2024-01-26 ENCOUNTER — HOME CARE VISIT (OUTPATIENT)
Dept: HOME HEALTH SERVICES | Facility: HOME HEALTH | Age: 89
End: 2024-01-26
Payer: MEDICARE

## 2024-01-26 VITALS
TEMPERATURE: 98.2 F | DIASTOLIC BLOOD PRESSURE: 48 MMHG | OXYGEN SATURATION: 96 % | SYSTOLIC BLOOD PRESSURE: 102 MMHG | HEART RATE: 55 BPM

## 2024-01-26 PROCEDURE — 400014 HH F/U

## 2024-01-26 PROCEDURE — 1090000002 HH PPS REVENUE DEBIT

## 2024-01-26 PROCEDURE — G0159 HHC PT MAINT EA 15 MIN: HCPCS | Mod: HHH

## 2024-01-26 PROCEDURE — 1090000001 HH PPS REVENUE CREDIT

## 2024-01-26 ASSESSMENT — ENCOUNTER SYMPTOMS
PAIN LOCATION - RELIEVING FACTORS: REST
PERSON REPORTING PAIN: PATIENT
PAIN LOCATION - PAIN SEVERITY: 5/10
PAIN LOCATION: RIGHT SHOULDER
PAIN: 1

## 2024-01-27 PROCEDURE — 1090000002 HH PPS REVENUE DEBIT

## 2024-01-27 PROCEDURE — 1090000001 HH PPS REVENUE CREDIT

## 2024-01-28 PROCEDURE — 1090000002 HH PPS REVENUE DEBIT

## 2024-01-28 PROCEDURE — 1090000001 HH PPS REVENUE CREDIT

## 2024-01-29 ENCOUNTER — HOME CARE VISIT (OUTPATIENT)
Dept: HOME HEALTH SERVICES | Facility: HOME HEALTH | Age: 89
End: 2024-01-29
Payer: MEDICARE

## 2024-01-29 VITALS — SYSTOLIC BLOOD PRESSURE: 120 MMHG | TEMPERATURE: 97.3 F | DIASTOLIC BLOOD PRESSURE: 52 MMHG | HEART RATE: 60 BPM

## 2024-01-29 PROCEDURE — 1090000001 HH PPS REVENUE CREDIT

## 2024-01-29 PROCEDURE — G0151 HHCP-SERV OF PT,EA 15 MIN: HCPCS | Mod: HHH

## 2024-01-29 PROCEDURE — 1090000002 HH PPS REVENUE DEBIT

## 2024-01-29 SDOH — HEALTH STABILITY: PHYSICAL HEALTH
EXERCISE COMMENTS: INSTR IN AND PERFORMED LOW LOAD LONG DURATION HAMSTRING STRETCH BL, SEATED  INSTR IN AND PERFOMED R LE ER WITH RED T BAND 2X5    INSTR TO FOCUS ON BL HAMSTRING STRETCHES, BUT TO CONCERNTRATE STRENGTH AND COORDIBNATION EFFECTS ON RLE

## 2024-01-29 ASSESSMENT — ACTIVITIES OF DAILY LIVING (ADL): AMBULATION ASSISTANCE ON FLAT SURFACES: 1

## 2024-01-29 ASSESSMENT — ENCOUNTER SYMPTOMS
PAIN LOCATION - PAIN SEVERITY: 5/10
PAIN: 1
PAIN LOCATION: RIGHT SHOULDER
HIGHEST PAIN SEVERITY IN PAST 24 HOURS: 6/10

## 2024-01-29 NOTE — CASE COMMUNICATION
FYI: Pt did have reported fall on 1.26.24 during bedside commode transfer. Pt slid to the ground and was able to be assisted back into .  Pt did not hit his head, no injuries sustained.

## 2024-01-30 PROCEDURE — 1090000002 HH PPS REVENUE DEBIT

## 2024-01-30 PROCEDURE — 1090000001 HH PPS REVENUE CREDIT

## 2024-01-31 PROCEDURE — 1090000002 HH PPS REVENUE DEBIT

## 2024-01-31 PROCEDURE — 1090000001 HH PPS REVENUE CREDIT

## 2024-02-01 PROCEDURE — 1090000002 HH PPS REVENUE DEBIT

## 2024-02-01 PROCEDURE — 1090000001 HH PPS REVENUE CREDIT

## 2024-02-02 PROCEDURE — 1090000002 HH PPS REVENUE DEBIT

## 2024-02-02 PROCEDURE — 1090000001 HH PPS REVENUE CREDIT

## 2024-02-03 PROCEDURE — 1090000001 HH PPS REVENUE CREDIT

## 2024-02-03 PROCEDURE — 1090000002 HH PPS REVENUE DEBIT

## 2024-02-04 PROCEDURE — 1090000002 HH PPS REVENUE DEBIT

## 2024-02-04 PROCEDURE — 1090000001 HH PPS REVENUE CREDIT

## 2024-02-05 ENCOUNTER — HOME CARE VISIT (OUTPATIENT)
Dept: HOME HEALTH SERVICES | Facility: HOME HEALTH | Age: 89
End: 2024-02-05
Payer: MEDICARE

## 2024-02-05 VITALS — TEMPERATURE: 96.3 F | SYSTOLIC BLOOD PRESSURE: 130 MMHG | DIASTOLIC BLOOD PRESSURE: 56 MMHG | HEART RATE: 66 BPM

## 2024-02-05 PROCEDURE — 1090000002 HH PPS REVENUE DEBIT

## 2024-02-05 PROCEDURE — G0151 HHCP-SERV OF PT,EA 15 MIN: HCPCS | Mod: HHH

## 2024-02-05 PROCEDURE — 1090000001 HH PPS REVENUE CREDIT

## 2024-02-05 SDOH — HEALTH STABILITY: PHYSICAL HEALTH
EXERCISE COMMENTS: SEATED: R HIP FLEXION, LOW LOAD LOING DURATION HAMSTRING STRETCH ON R, LAQ, RESISTED DF 2X5 BL WITH RED T BAND

## 2024-02-05 ASSESSMENT — ENCOUNTER SYMPTOMS
PAIN: 1
PAIN LOCATION: RIGHT SHOULDER
PAIN LOCATION - PAIN SEVERITY: 5/10
PERSON REPORTING PAIN: PATIENT

## 2024-02-05 ASSESSMENT — ACTIVITIES OF DAILY LIVING (ADL)
AMBULATION_DISTANCE/DURATION_TOLERATED: 10 FT X 2
AMBULATION ASSISTANCE ON FLAT SURFACES: 1

## 2024-02-06 PROCEDURE — 1090000002 HH PPS REVENUE DEBIT

## 2024-02-06 PROCEDURE — 1090000001 HH PPS REVENUE CREDIT

## 2024-02-07 PROCEDURE — 1090000002 HH PPS REVENUE DEBIT

## 2024-02-07 PROCEDURE — 1090000001 HH PPS REVENUE CREDIT

## 2024-02-08 PROCEDURE — 1090000001 HH PPS REVENUE CREDIT

## 2024-02-08 PROCEDURE — 1090000002 HH PPS REVENUE DEBIT

## 2024-02-09 PROCEDURE — 1090000002 HH PPS REVENUE DEBIT

## 2024-02-09 PROCEDURE — 1090000001 HH PPS REVENUE CREDIT

## 2024-02-10 PROCEDURE — 1090000001 HH PPS REVENUE CREDIT

## 2024-02-10 PROCEDURE — 1090000002 HH PPS REVENUE DEBIT

## 2024-02-11 PROCEDURE — 1090000002 HH PPS REVENUE DEBIT

## 2024-02-11 PROCEDURE — 1090000001 HH PPS REVENUE CREDIT

## 2024-02-12 ENCOUNTER — HOME CARE VISIT (OUTPATIENT)
Dept: HOME HEALTH SERVICES | Facility: HOME HEALTH | Age: 89
End: 2024-02-12
Payer: MEDICARE

## 2024-02-12 VITALS — HEART RATE: 67 BPM | SYSTOLIC BLOOD PRESSURE: 120 MMHG | DIASTOLIC BLOOD PRESSURE: 58 MMHG

## 2024-02-12 PROCEDURE — G0151 HHCP-SERV OF PT,EA 15 MIN: HCPCS | Mod: HHH

## 2024-02-12 PROCEDURE — 1090000002 HH PPS REVENUE DEBIT

## 2024-02-12 PROCEDURE — 1090000001 HH PPS REVENUE CREDIT

## 2024-02-12 SDOH — HEALTH STABILITY: PHYSICAL HEALTH
EXERCISE COMMENTS: CONSOLIDATED HEP FOR BETTER COMPLIANCE   INSTR AND PERFORMED WITH CG: AAROM R UE ELEVATION IN PLANE OF SCAPTION , BELOW 90 DEGREES  RECLINED LOW LOAD LONG DURATION HAMSTRING STRETCH, HIP STABILITY SLR FOR COORDINATION AND STRENGTH  SEATED TRUNK STRET

## 2024-02-12 SDOH — HEALTH STABILITY: PHYSICAL HEALTH: EXERCISE COMMENTS: CH, STANDING POSTERIOR CHAIN ACTIVATION  INSTR TO TRY TO STAND EVERY HOUR

## 2024-02-12 ASSESSMENT — ENCOUNTER SYMPTOMS
OCCASIONAL FEELINGS OF UNSTEADINESS: 1
PAIN LOCATION - PAIN SEVERITY: 1/10
PERSON REPORTING PAIN: PATIENT
DEPRESSION: 0
PAIN: 1
LOSS OF SENSATION IN FEET: 1
PAIN LOCATION: RIGHT SHOULDER

## 2024-02-12 ASSESSMENT — ACTIVITIES OF DAILY LIVING (ADL)
AMBULATION ASSISTANCE ON FLAT SURFACES: 1
AMBULATION_DISTANCE/DURATION_TOLERATED: 10 FT X 2

## 2024-02-13 PROCEDURE — 1090000001 HH PPS REVENUE CREDIT

## 2024-02-13 PROCEDURE — 1090000002 HH PPS REVENUE DEBIT

## 2024-02-14 PROCEDURE — 1090000002 HH PPS REVENUE DEBIT

## 2024-02-14 PROCEDURE — 1090000001 HH PPS REVENUE CREDIT

## 2024-02-15 PROCEDURE — 1090000001 HH PPS REVENUE CREDIT

## 2024-02-15 PROCEDURE — 1090000002 HH PPS REVENUE DEBIT

## 2024-02-16 PROCEDURE — 1090000002 HH PPS REVENUE DEBIT

## 2024-02-16 PROCEDURE — 1090000001 HH PPS REVENUE CREDIT

## 2024-02-17 PROCEDURE — 1090000001 HH PPS REVENUE CREDIT

## 2024-02-17 PROCEDURE — 1090000002 HH PPS REVENUE DEBIT

## 2024-02-18 PROCEDURE — 1090000001 HH PPS REVENUE CREDIT

## 2024-02-18 PROCEDURE — 1090000002 HH PPS REVENUE DEBIT

## 2024-02-19 PROCEDURE — 1090000001 HH PPS REVENUE CREDIT

## 2024-02-19 PROCEDURE — 1090000002 HH PPS REVENUE DEBIT

## 2024-02-20 PROCEDURE — 1090000001 HH PPS REVENUE CREDIT

## 2024-02-20 PROCEDURE — 1090000002 HH PPS REVENUE DEBIT

## 2024-02-21 PROCEDURE — 1090000002 HH PPS REVENUE DEBIT

## 2024-02-21 PROCEDURE — 1090000001 HH PPS REVENUE CREDIT

## 2024-02-22 PROCEDURE — 1090000001 HH PPS REVENUE CREDIT

## 2024-02-22 PROCEDURE — 1090000002 HH PPS REVENUE DEBIT

## 2024-02-23 PROCEDURE — 1090000001 HH PPS REVENUE CREDIT

## 2024-02-23 PROCEDURE — 1090000002 HH PPS REVENUE DEBIT

## 2024-02-24 PROCEDURE — 1090000001 HH PPS REVENUE CREDIT

## 2024-02-24 PROCEDURE — 1090000002 HH PPS REVENUE DEBIT

## 2024-02-25 PROCEDURE — 1090000001 HH PPS REVENUE CREDIT

## 2024-02-25 PROCEDURE — 1090000002 HH PPS REVENUE DEBIT

## 2024-02-26 ENCOUNTER — HOME CARE VISIT (OUTPATIENT)
Dept: HOME HEALTH SERVICES | Facility: HOME HEALTH | Age: 89
End: 2024-02-26
Payer: MEDICARE

## 2024-02-26 VITALS — DIASTOLIC BLOOD PRESSURE: 62 MMHG | SYSTOLIC BLOOD PRESSURE: 122 MMHG | TEMPERATURE: 98.1 F | HEART RATE: 68 BPM

## 2024-02-26 PROCEDURE — 1090000002 HH PPS REVENUE DEBIT

## 2024-02-26 PROCEDURE — 1090000003 HH PPS REVENUE ADJ

## 2024-02-26 PROCEDURE — 400014 HH F/U

## 2024-02-26 PROCEDURE — 1090000001 HH PPS REVENUE CREDIT

## 2024-02-26 PROCEDURE — G0151 HHCP-SERV OF PT,EA 15 MIN: HCPCS | Mod: HHH

## 2024-02-26 SDOH — HEALTH STABILITY: PHYSICAL HEALTH: EXERCISE COMMENTS: ASSESSED CARRYOVER OF COMPREHENSIVE HEP WITH INSTR FOR IMPROVING COMPLIANCE AND PREVENTING DECLINE

## 2024-02-26 ASSESSMENT — ENCOUNTER SYMPTOMS
LIMITED RANGE OF MOTION: 1
PERSON REPORTING PAIN: PATIENT
OCCASIONAL FEELINGS OF UNSTEADINESS: 1
MUSCLE WEAKNESS: 1
PAIN LOCATION: RIGHT ARM
DEPRESSION: 0
HIGHEST PAIN SEVERITY IN PAST 24 HOURS: 5/10
LOSS OF SENSATION IN FEET: 1
LOWEST PAIN SEVERITY IN PAST 24 HOURS: 5/10
PAIN: 1
SUBJECTIVE PAIN PROGRESSION: UNCHANGED
PAIN LOCATION - PAIN SEVERITY: 5/10

## 2024-02-26 ASSESSMENT — ACTIVITIES OF DAILY LIVING (ADL)
OASIS_M1830: 04
AMBULATION_DISTANCE/DURATION_TOLERATED: 5
HOME_HEALTH_OASIS: 01
AMBULATION ASSISTANCE ON FLAT SURFACES: 1

## 2024-05-07 NOTE — ADDENDUM NOTE
Encounter addended by: Mati Lawson on: 5/7/2024 3:56 PM   Actions taken: Charge Capture section accepted

## 2024-05-07 NOTE — ADDENDUM NOTE
Encounter addended by: Mati Lawson on: 5/7/2024 11:59 AM   Actions taken: Charge Capture section accepted